# Patient Record
Sex: MALE | Race: BLACK OR AFRICAN AMERICAN | Employment: UNEMPLOYED | URBAN - METROPOLITAN AREA
[De-identification: names, ages, dates, MRNs, and addresses within clinical notes are randomized per-mention and may not be internally consistent; named-entity substitution may affect disease eponyms.]

---

## 2017-02-16 ENCOUNTER — HOSPITAL ENCOUNTER (EMERGENCY)
Age: 57
Discharge: HOME | End: 2017-02-16
Attending: EMERGENCY MEDICINE
Payer: MEDICARE

## 2017-02-16 VITALS
HEIGHT: 77 IN | WEIGHT: 271 LBS | HEART RATE: 79 BPM | SYSTOLIC BLOOD PRESSURE: 135 MMHG | TEMPERATURE: 98.9 F | DIASTOLIC BLOOD PRESSURE: 87 MMHG | BODY MASS INDEX: 32 KG/M2 | RESPIRATION RATE: 18 BRPM | OXYGEN SATURATION: 100 %
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
## 2017-02-16 DIAGNOSIS — M25.511 PAIN OF BOTH SHOULDER JOINTS: ICD-10-CM

## 2017-02-16 DIAGNOSIS — M25.512 PAIN OF BOTH SHOULDER JOINTS: ICD-10-CM

## 2017-02-16 PROCEDURE — 99283 EMERGENCY DEPT VISIT LOW MDM: CPT | Performed by: EMERGENCY MEDICINE

## 2017-02-16 RX ORDER — CYCLOBENZAPRINE HCL 5 MG
10 TABLET ORAL
Qty: 20 TAB | Refills: 0 | Status: SHIPPED | OUTPATIENT
Start: 2017-02-16 | End: 2017-03-21 | Stop reason: CLARIF
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
## 2017-02-17 ENCOUNTER — PATIENT OUTREACH (OUTPATIENT)
Dept: CASE MANAGEMENT | Age: 57
End: 2017-02-17
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
## 2017-03-10 ENCOUNTER — APPOINTMENT (OUTPATIENT)
Dept: GENERAL RADIOLOGY | Age: 57
End: 2017-03-10
Attending: EMERGENCY MEDICINE
Payer: MEDICARE

## 2017-03-10 ENCOUNTER — HOSPITAL ENCOUNTER (EMERGENCY)
Age: 57
Discharge: HOME | End: 2017-03-10
Attending: EMERGENCY MEDICINE
Payer: MEDICARE

## 2017-03-10 VITALS
TEMPERATURE: 98 F | WEIGHT: 269 LBS | HEIGHT: 77 IN | RESPIRATION RATE: 17 BRPM | DIASTOLIC BLOOD PRESSURE: 80 MMHG | OXYGEN SATURATION: 98 % | SYSTOLIC BLOOD PRESSURE: 135 MMHG | HEART RATE: 75 BPM | BODY MASS INDEX: 31.76 KG/M2

## 2017-03-10 DIAGNOSIS — S82.142A TIBIAL PLATEAU FRACTURE, LEFT, CLOSED, INITIAL ENCOUNTER: Primary | ICD-10-CM

## 2017-03-10 PROCEDURE — 74011250636 HC RX REV CODE- 250/636: Performed by: EMERGENCY MEDICINE

## 2017-03-10 PROCEDURE — 73562 X-RAY EXAM OF KNEE 3: CPT

## 2017-03-10 PROCEDURE — 96372 THER/PROPH/DIAG INJ SC/IM: CPT | Performed by: EMERGENCY MEDICINE

## 2017-03-10 PROCEDURE — 99284 EMERGENCY DEPT VISIT MOD MDM: CPT | Performed by: EMERGENCY MEDICINE

## 2017-03-10 PROCEDURE — 73590 X-RAY EXAM OF LOWER LEG: CPT

## 2017-03-10 PROCEDURE — 74011250637 HC RX REV CODE- 250/637: Performed by: EMERGENCY MEDICINE

## 2017-03-10 RX ORDER — MORPHINE SULFATE 4 MG/ML
4 INJECTION, SOLUTION INTRAMUSCULAR; INTRAVENOUS
Status: COMPLETED | OUTPATIENT
Start: 2017-03-10 | End: 2017-03-10

## 2017-03-10 RX ORDER — ONDANSETRON 4 MG/1
4 TABLET, ORALLY DISINTEGRATING ORAL
Status: COMPLETED | OUTPATIENT
Start: 2017-03-10 | End: 2017-03-10

## 2017-03-10 RX ORDER — TRAMADOL HYDROCHLORIDE 50 MG/1
50-100 TABLET ORAL
Qty: 23 TAB | Refills: 0 | Status: SHIPPED | OUTPATIENT
Start: 2017-03-10 | End: 2018-08-06

## 2017-03-10 RX ADMIN — MORPHINE SULFATE 4 MG: 4 INJECTION, SOLUTION INTRAMUSCULAR; INTRAVENOUS at 15:41

## 2017-03-10 RX ADMIN — ONDANSETRON 4 MG: 4 TABLET, ORALLY DISINTEGRATING ORAL at 15:39

## 2017-03-10 RX ADMIN — MORPHINE SULFATE 4 MG: 4 INJECTION, SOLUTION INTRAMUSCULAR; INTRAVENOUS at 17:15

## 2017-03-10 NOTE — DISCHARGE INSTRUCTIONS
Return with any fevers, worsening symptoms, or additional concerns. Keep your brace on at all times and use the crutches at all times. Please call Dr. Maykel Daniels office on Monday morning to arrange a follow-up appointment.
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
## 2017-03-12 ENCOUNTER — PATIENT OUTREACH (OUTPATIENT)
Dept: CASE MANAGEMENT | Age: 57
End: 2017-03-12

## 2017-03-12 NOTE — PROGRESS NOTES
Care coordinator attempted to outreach patient in regards to recent hospital discharge. Will attempt to outreach patient again. LVM. This note will not be viewable in [address]

## 2017-03-13 NOTE — PROGRESS NOTES
Transition of Care Discharge Follow-up Questionnaire   Date/Time of Call:   3/12/17 2:30p   What was the patient hospitalized for? Does the patient understand his/her diagnosis and/or treatment and what happened during the hospitalization? Patient verbalized understanding of treatment and diagnosis. Did the patient receive discharge instructions? Yes   Review any discharge instructions (see notes in [facility]). Ask patient if they understand these. Do they have any questions? He verbalized understanding of instructions. Were home services ordered (nursing, PT, OT, ST, etc.)? No   If so, has the first visit occurred? If not, why? (Assist with coordination of services if necessary.) n/a   Was any DME ordered? No   If so, has it been received? If not, why?  (Assist with coordination of arranging DME orders if necessary.) n/a   Complete a review of all medications (new, continued and discontinued meds per the D/C instructions and medication tab in [facility]). Care coordinator and patient review current medications. Were all new prescriptions filled? If not, why?  (Assist with obtainment of medications if necessary.) No. Patient states he has pancreatitis and is not taking any new medications. Does the patient understand the purpose and dosing instructions for all medications? (If patient has questions, provide explanation and education.) Yes   Does the patient have any problems in performing ADLs? (If patient is unable to perform ADLs  what is the limiting factor(s)? Do they have a support system that can assist? If no support system is present, discuss possible assistance that they may be able to obtain.) Patient is currently using crutches for assistance with mobility. Patient is independent with ADLs. Patient has family support. Does the patient have all follow-up appointments scheduled? Has transportation been arranged?   [facility] Pulmonary follow-up should be within 7 days of discharge; all others should have PCP follow-up within 7 days of discharge; follow-ups with other specialists as appropriate or ordered.) Patient will schedule f/u appointment. Any other questions or concerns expressed by the patient? No other questions or concerns were voiced by patient to care coordinator. He was thankful for call. GERALD Call Completed By: Angela Mcclellan LPN  [facility] [address]  Coordinator          This note will not be viewable in [address]

## 2017-03-17 PROBLEM — S82.143A FRACTURE, TIBIAL PLATEAU: Status: ACTIVE | Noted: 2017-03-17

## 2017-03-21 ENCOUNTER — ANESTHESIA EVENT (OUTPATIENT)
Dept: SURGERY | Age: 57
DRG: 492 | End: 2017-03-21
Payer: MEDICARE

## 2017-03-21 ENCOUNTER — HOSPITAL ENCOUNTER (OUTPATIENT)
Dept: CT IMAGING | Age: 57
Discharge: HOME | DRG: 492 | End: 2017-03-21
Attending: ORTHOPAEDIC SURGERY
Payer: MEDICARE

## 2017-03-21 DIAGNOSIS — S82.142A TIBIAL PLATEAU FRACTURE, LEFT, CLOSED, INITIAL ENCOUNTER: ICD-10-CM

## 2017-03-22 ENCOUNTER — ANESTHESIA (OUTPATIENT)
Dept: SURGERY | Age: 57
DRG: 492 | End: 2017-03-22
Payer: MEDICARE

## 2017-03-22 ENCOUNTER — HOSPITAL ENCOUNTER (INPATIENT)
Age: 57
LOS: 1 days | Discharge: HOME | DRG: 492 | End: 2017-03-23
Attending: ORTHOPAEDIC SURGERY | Admitting: ORTHOPAEDIC SURGERY
Payer: MEDICARE

## 2017-03-22 ENCOUNTER — APPOINTMENT (OUTPATIENT)
Dept: GENERAL RADIOLOGY | Age: 57
DRG: 492 | End: 2017-03-22
Attending: ORTHOPAEDIC SURGERY
Payer: MEDICARE

## 2017-03-22 ENCOUNTER — SURGERY (OUTPATIENT)
Age: 57
End: 2017-03-22

## 2017-03-22 DIAGNOSIS — S82.142A FRACTURE, TIBIAL PLATEAU, LEFT, CLOSED, INITIAL ENCOUNTER: Primary | ICD-10-CM

## 2017-03-22 LAB
ABO + RH BLD: NORMAL
BLOOD GROUP ANTIBODIES SERPL: NORMAL
HGB BLD-MCNC: 7.7 G/DL (ref 13.6–17.2)
POTASSIUM BLD-SCNC: 3.2 MMOL/L (ref 3.5–5.1)
SPECIMEN EXP DATE BLD: NORMAL

## 2017-03-22 PROCEDURE — 74011000250 HC RX REV CODE- 250

## 2017-03-22 PROCEDURE — 74011250636 HC RX REV CODE- 250/636: Performed by: NURSE PRACTITIONER

## 2017-03-22 PROCEDURE — C1713 ANCHOR/SCREW BN/BN,TIS/BN: HCPCS | Performed by: ORTHOPAEDIC SURGERY

## 2017-03-22 PROCEDURE — 77030002933 HC SUT MCRYL J&J -A: Performed by: ORTHOPAEDIC SURGERY

## 2017-03-22 PROCEDURE — 73590 X-RAY EXAM OF LOWER LEG: CPT

## 2017-03-22 PROCEDURE — 74011250636 HC RX REV CODE- 250/636

## 2017-03-22 PROCEDURE — 77030008703 HC TU ET UNCUF COVD -A: Performed by: ANESTHESIOLOGY

## 2017-03-22 PROCEDURE — 77030003862 HC BIT DRL SYNT -B: Performed by: ORTHOPAEDIC SURGERY

## 2017-03-22 PROCEDURE — L1830 KO IMMOB CANVAS LONG PRE OTS: HCPCS | Performed by: ORTHOPAEDIC SURGERY

## 2017-03-22 PROCEDURE — 74011000250 HC RX REV CODE- 250: Performed by: ANESTHESIOLOGY

## 2017-03-22 PROCEDURE — 77030021122 HC SPLNT MAT FST BSNM -A: Performed by: ORTHOPAEDIC SURGERY

## 2017-03-22 PROCEDURE — 77030002620 HC PLT TIB SYNT -H: Performed by: ORTHOPAEDIC SURGERY

## 2017-03-22 PROCEDURE — 77030020782 HC GWN BAIR PAWS FLX 3M -B: Performed by: ANESTHESIOLOGY

## 2017-03-22 PROCEDURE — 74011250636 HC RX REV CODE- 250/636: Performed by: ORTHOPAEDIC SURGERY

## 2017-03-22 PROCEDURE — 77030029637: Performed by: ORTHOPAEDIC SURGERY

## 2017-03-22 PROCEDURE — 77030027138 HC INCENT SPIROMETER -A

## 2017-03-22 PROCEDURE — 74011250637 HC RX REV CODE- 250/637: Performed by: ORTHOPAEDIC SURGERY

## 2017-03-22 PROCEDURE — 74011250637 HC RX REV CODE- 250/637: Performed by: NURSE PRACTITIONER

## 2017-03-22 PROCEDURE — 74011250637 HC RX REV CODE- 250/637: Performed by: ANESTHESIOLOGY

## 2017-03-22 PROCEDURE — 0QHH05Z INSERTION OF EXTERNAL FIXATION DEVICE INTO LEFT TIBIA, OPEN APPROACH: ICD-10-PCS | Performed by: ORTHOPAEDIC SURGERY

## 2017-03-22 PROCEDURE — 77030025102 HC BIT DRL QC SS 2 SYNT -B: Performed by: ORTHOPAEDIC SURGERY

## 2017-03-22 PROCEDURE — 77030016474 HC BIT DRL QC3 SYNT -C: Performed by: ORTHOPAEDIC SURGERY

## 2017-03-22 PROCEDURE — 76210000000 HC OR PH I REC 2 TO 2.5 HR: Performed by: ORTHOPAEDIC SURGERY

## 2017-03-22 PROCEDURE — 77030011640 HC PAD GRND REM COVD -A: Performed by: ORTHOPAEDIC SURGERY

## 2017-03-22 PROCEDURE — 85018 HEMOGLOBIN: CPT | Performed by: ANESTHESIOLOGY

## 2017-03-22 PROCEDURE — 84132 ASSAY OF SERUM POTASSIUM: CPT

## 2017-03-22 PROCEDURE — 76060000035 HC ANESTHESIA 2 TO 2.5 HR: Performed by: ORTHOPAEDIC SURGERY

## 2017-03-22 PROCEDURE — 86900 BLOOD TYPING SEROLOGIC ABO: CPT | Performed by: ANESTHESIOLOGY

## 2017-03-22 PROCEDURE — 77030019908 HC STETH ESOPH SIMS -A: Performed by: ANESTHESIOLOGY

## 2017-03-22 PROCEDURE — 77030020753 HC CUF TRNQT 1BLA STRY -B: Performed by: ORTHOPAEDIC SURGERY

## 2017-03-22 PROCEDURE — 74011250636 HC RX REV CODE- 250/636: Performed by: ANESTHESIOLOGY

## 2017-03-22 PROCEDURE — 76010000171 HC OR TIME 2 TO 2.5 HR INTENSV-TIER 1: Performed by: ORTHOPAEDIC SURGERY

## 2017-03-22 PROCEDURE — 99218 HC RM OBSERVATION: CPT

## 2017-03-22 PROCEDURE — 0QSH04Z REPOSITION LEFT TIBIA WITH INTERNAL FIXATION DEVICE, OPEN APPROACH: ICD-10-PCS | Performed by: ORTHOPAEDIC SURGERY

## 2017-03-22 PROCEDURE — 74011000258 HC RX REV CODE- 258: Performed by: NURSE PRACTITIONER

## 2017-03-22 PROCEDURE — 77030018836 HC SOL IRR NACL ICUM -A: Performed by: ORTHOPAEDIC SURGERY

## 2017-03-22 PROCEDURE — 77030003843: Performed by: ORTHOPAEDIC SURGERY

## 2017-03-22 PROCEDURE — 77030008477 HC STYL SATN SLP COVD -A: Performed by: ANESTHESIOLOGY

## 2017-03-22 PROCEDURE — 0QPH05Z REMOVAL OF EXTERNAL FIXATION DEVICE FROM LEFT TIBIA, OPEN APPROACH: ICD-10-PCS | Performed by: ORTHOPAEDIC SURGERY

## 2017-03-22 DEVICE — SCREW BNE L65MM DIA3.5MM CORT S STL ST LOK FULL THRD: Type: IMPLANTABLE DEVICE | Site: TIBIA | Status: FUNCTIONAL

## 2017-03-22 DEVICE — SCREW BNE L32MM DIA3.5MM CORT S STL ST LOK FULL THRD: Type: IMPLANTABLE DEVICE | Site: TIBIA | Status: FUNCTIONAL

## 2017-03-22 DEVICE — IMPLANTABLE DEVICE: Type: IMPLANTABLE DEVICE | Site: LEG | Status: FUNCTIONAL

## 2017-03-22 DEVICE — SCREW BNE L40MM DIA3.5MM CORT S STL ST LOK FULL THRD: Type: IMPLANTABLE DEVICE | Site: TIBIA | Status: FUNCTIONAL

## 2017-03-22 DEVICE — SCREW BNE L34MM DIA3.5MM CORT S STL ST LOK FULL THRD: Type: IMPLANTABLE DEVICE | Site: TIBIA | Status: FUNCTIONAL

## 2017-03-22 DEVICE — IMPLANTABLE DEVICE: Type: IMPLANTABLE DEVICE | Site: TIBIA | Status: FUNCTIONAL

## 2017-03-22 DEVICE — SCREW FIX L200MM DIA5MM S STL BLNT TRCR PNT MR CONDITIONAL: Type: IMPLANTABLE DEVICE | Site: TIBIA | Status: FUNCTIONAL

## 2017-03-22 DEVICE — SCREW BNE L85MM DIA3.5MM CORT S STL ST LOK FULL THRD T15: Type: IMPLANTABLE DEVICE | Site: TIBIA | Status: FUNCTIONAL

## 2017-03-22 DEVICE — ROTATOR CUFF QUICKANCHOR PLUS SIZE 2 (5 METRIC) PANACRYL BRAIDED ABSORBABLE SUTURE 36 INCHES (91CM), WITH CP-2 NEEDLES, WITH DISPOSABLE INSERTER
Type: IMPLANTABLE DEVICE | Site: TIBIA | Status: FUNCTIONAL
Brand: QUICKANCHOR PANACRYL

## 2017-03-22 RX ORDER — HYDROMORPHONE HYDROCHLORIDE 1 MG/ML
1.5 INJECTION, SOLUTION INTRAMUSCULAR; INTRAVENOUS; SUBCUTANEOUS
Status: DISCONTINUED | OUTPATIENT
Start: 2017-03-22 | End: 2017-03-23

## 2017-03-22 RX ORDER — HYDROMORPHONE HYDROCHLORIDE 1 MG/ML
1 INJECTION, SOLUTION INTRAMUSCULAR; INTRAVENOUS; SUBCUTANEOUS
Status: DISCONTINUED | OUTPATIENT
Start: 2017-03-22 | End: 2017-03-23

## 2017-03-22 RX ORDER — CINACALCET 30 MG/1
30 TABLET, FILM COATED ORAL DAILY
Status: DISCONTINUED | OUTPATIENT
Start: 2017-03-23 | End: 2017-03-23 | Stop reason: HOSPADM

## 2017-03-22 RX ORDER — ROCURONIUM BROMIDE 10 MG/ML
INJECTION, SOLUTION INTRAVENOUS AS NEEDED
Status: DISCONTINUED | OUTPATIENT
Start: 2017-03-22 | End: 2017-03-22 | Stop reason: HOSPADM

## 2017-03-22 RX ORDER — HYDROMORPHONE HYDROCHLORIDE 1 MG/ML
2 INJECTION, SOLUTION INTRAMUSCULAR; INTRAVENOUS; SUBCUTANEOUS
Status: DISCONTINUED | OUTPATIENT
Start: 2017-03-22 | End: 2017-03-23

## 2017-03-22 RX ORDER — SODIUM CHLORIDE 9 MG/ML
25 INJECTION, SOLUTION INTRAVENOUS CONTINUOUS
Status: DISCONTINUED | OUTPATIENT
Start: 2017-03-22 | End: 2017-03-22 | Stop reason: HOSPADM

## 2017-03-22 RX ORDER — SODIUM CHLORIDE, SODIUM LACTATE, POTASSIUM CHLORIDE, CALCIUM CHLORIDE 600; 310; 30; 20 MG/100ML; MG/100ML; MG/100ML; MG/100ML
75 INJECTION, SOLUTION INTRAVENOUS CONTINUOUS
Status: DISCONTINUED | OUTPATIENT
Start: 2017-03-22 | End: 2017-03-23 | Stop reason: HOSPADM

## 2017-03-22 RX ORDER — FENTANYL CITRATE 50 UG/ML
100 INJECTION, SOLUTION INTRAMUSCULAR; INTRAVENOUS ONCE
Status: DISCONTINUED | OUTPATIENT
Start: 2017-03-22 | End: 2017-03-22 | Stop reason: HOSPADM

## 2017-03-22 RX ORDER — DIPHENHYDRAMINE HYDROCHLORIDE 50 MG/ML
12.5 INJECTION, SOLUTION INTRAMUSCULAR; INTRAVENOUS
Status: DISCONTINUED | OUTPATIENT
Start: 2017-03-22 | End: 2017-03-22 | Stop reason: HOSPADM

## 2017-03-22 RX ORDER — FAMOTIDINE 20 MG/1
20 TABLET, FILM COATED ORAL DAILY
Status: DISCONTINUED | OUTPATIENT
Start: 2017-03-23 | End: 2017-03-23 | Stop reason: HOSPADM

## 2017-03-22 RX ORDER — HYDROMORPHONE HYDROCHLORIDE 2 MG/ML
0.5 INJECTION, SOLUTION INTRAMUSCULAR; INTRAVENOUS; SUBCUTANEOUS
Status: COMPLETED | OUTPATIENT
Start: 2017-03-22 | End: 2017-03-22

## 2017-03-22 RX ORDER — PROPOFOL 10 MG/ML
INJECTION, EMULSION INTRAVENOUS AS NEEDED
Status: DISCONTINUED | OUTPATIENT
Start: 2017-03-22 | End: 2017-03-22 | Stop reason: HOSPADM

## 2017-03-22 RX ORDER — OXYCODONE HYDROCHLORIDE 5 MG/1
10 TABLET ORAL
Status: DISCONTINUED | OUTPATIENT
Start: 2017-03-22 | End: 2017-03-23 | Stop reason: HOSPADM

## 2017-03-22 RX ORDER — ASPIRIN 81 MG/1
81 TABLET ORAL
Status: DISCONTINUED | OUTPATIENT
Start: 2017-03-23 | End: 2017-03-23 | Stop reason: HOSPADM

## 2017-03-22 RX ORDER — SODIUM CHLORIDE, SODIUM LACTATE, POTASSIUM CHLORIDE, CALCIUM CHLORIDE 600; 310; 30; 20 MG/100ML; MG/100ML; MG/100ML; MG/100ML
75 INJECTION, SOLUTION INTRAVENOUS CONTINUOUS
Status: DISCONTINUED | OUTPATIENT
Start: 2017-03-22 | End: 2017-03-22 | Stop reason: HOSPADM

## 2017-03-22 RX ORDER — CLONIDINE HYDROCHLORIDE 0.2 MG/1
0.2 TABLET ORAL 3 TIMES DAILY
Status: DISCONTINUED | OUTPATIENT
Start: 2017-03-22 | End: 2017-03-23 | Stop reason: HOSPADM

## 2017-03-22 RX ORDER — CALCITRIOL 0.25 UG/1
0.25 CAPSULE ORAL
Status: DISCONTINUED | OUTPATIENT
Start: 2017-03-22 | End: 2017-03-22

## 2017-03-22 RX ORDER — MIDAZOLAM HYDROCHLORIDE 1 MG/ML
2 INJECTION, SOLUTION INTRAMUSCULAR; INTRAVENOUS
Status: DISCONTINUED | OUTPATIENT
Start: 2017-03-22 | End: 2017-03-22 | Stop reason: HOSPADM

## 2017-03-22 RX ORDER — HYDROMORPHONE HYDROCHLORIDE 1 MG/ML
1-2 INJECTION, SOLUTION INTRAMUSCULAR; INTRAVENOUS; SUBCUTANEOUS
Status: DISCONTINUED | OUTPATIENT
Start: 2017-03-22 | End: 2017-03-22 | Stop reason: SDUPTHER

## 2017-03-22 RX ORDER — MIDAZOLAM HYDROCHLORIDE 1 MG/ML
2 INJECTION, SOLUTION INTRAMUSCULAR; INTRAVENOUS ONCE
Status: DISCONTINUED | OUTPATIENT
Start: 2017-03-22 | End: 2017-03-22 | Stop reason: HOSPADM

## 2017-03-22 RX ORDER — FLUMAZENIL 0.1 MG/ML
0.2 INJECTION INTRAVENOUS AS NEEDED
Status: DISCONTINUED | OUTPATIENT
Start: 2017-03-22 | End: 2017-03-22 | Stop reason: HOSPADM

## 2017-03-22 RX ORDER — LIDOCAINE HYDROCHLORIDE 20 MG/ML
INJECTION, SOLUTION EPIDURAL; INFILTRATION; INTRACAUDAL; PERINEURAL AS NEEDED
Status: DISCONTINUED | OUTPATIENT
Start: 2017-03-22 | End: 2017-03-22 | Stop reason: HOSPADM

## 2017-03-22 RX ORDER — OXYCODONE HYDROCHLORIDE 5 MG/1
10 TABLET ORAL
Status: DISCONTINUED | OUTPATIENT
Start: 2017-03-22 | End: 2017-03-22 | Stop reason: HOSPADM

## 2017-03-22 RX ORDER — OXYCODONE HYDROCHLORIDE 5 MG/1
5 TABLET ORAL
Status: DISCONTINUED | OUTPATIENT
Start: 2017-03-22 | End: 2017-03-22 | Stop reason: HOSPADM

## 2017-03-22 RX ORDER — NIFEDIPINE 30 MG/1
90 TABLET, EXTENDED RELEASE ORAL DAILY
Status: DISCONTINUED | OUTPATIENT
Start: 2017-03-23 | End: 2017-03-23 | Stop reason: HOSPADM

## 2017-03-22 RX ORDER — ACETAMINOPHEN 325 MG/1
650 TABLET ORAL EVERY 8 HOURS
Status: DISCONTINUED | OUTPATIENT
Start: 2017-03-22 | End: 2017-03-23 | Stop reason: HOSPADM

## 2017-03-22 RX ORDER — OXYCODONE HYDROCHLORIDE 5 MG/1
5 TABLET ORAL
Status: DISCONTINUED | OUTPATIENT
Start: 2017-03-22 | End: 2017-03-23 | Stop reason: HOSPADM

## 2017-03-22 RX ORDER — NALOXONE HYDROCHLORIDE 0.4 MG/ML
0.1 INJECTION, SOLUTION INTRAMUSCULAR; INTRAVENOUS; SUBCUTANEOUS
Status: DISCONTINUED | OUTPATIENT
Start: 2017-03-22 | End: 2017-03-22 | Stop reason: HOSPADM

## 2017-03-22 RX ORDER — TRAMADOL HYDROCHLORIDE 50 MG/1
100 TABLET ORAL
Status: DISCONTINUED | OUTPATIENT
Start: 2017-03-22 | End: 2017-03-23 | Stop reason: HOSPADM

## 2017-03-22 RX ORDER — SODIUM CHLORIDE 0.9 % (FLUSH) 0.9 %
5-10 SYRINGE (ML) INJECTION EVERY 8 HOURS
Status: DISCONTINUED | OUTPATIENT
Start: 2017-03-22 | End: 2017-03-23 | Stop reason: HOSPADM

## 2017-03-22 RX ORDER — HYDROMORPHONE HYDROCHLORIDE 2 MG/ML
INJECTION, SOLUTION INTRAMUSCULAR; INTRAVENOUS; SUBCUTANEOUS AS NEEDED
Status: DISCONTINUED | OUTPATIENT
Start: 2017-03-22 | End: 2017-03-22 | Stop reason: HOSPADM

## 2017-03-22 RX ORDER — TRAMADOL HYDROCHLORIDE 50 MG/1
50 TABLET ORAL
Status: DISCONTINUED | OUTPATIENT
Start: 2017-03-22 | End: 2017-03-23 | Stop reason: HOSPADM

## 2017-03-22 RX ORDER — LISINOPRIL 20 MG/1
40 TABLET ORAL DAILY
Status: DISCONTINUED | OUTPATIENT
Start: 2017-03-23 | End: 2017-03-23 | Stop reason: HOSPADM

## 2017-03-22 RX ORDER — LIDOCAINE HYDROCHLORIDE 10 MG/ML
0.1 INJECTION INFILTRATION; PERINEURAL AS NEEDED
Status: DISCONTINUED | OUTPATIENT
Start: 2017-03-22 | End: 2017-03-22 | Stop reason: HOSPADM

## 2017-03-22 RX ORDER — OXYCODONE HYDROCHLORIDE 5 MG/1
5-10 TABLET ORAL
Status: DISCONTINUED | OUTPATIENT
Start: 2017-03-22 | End: 2017-03-22 | Stop reason: SDUPTHER

## 2017-03-22 RX ORDER — CEFAZOLIN SODIUM IN 0.9 % NACL 2 G/50 ML
2 INTRAVENOUS SOLUTION, PIGGYBACK (ML) INTRAVENOUS
Status: COMPLETED | OUTPATIENT
Start: 2017-03-22 | End: 2017-03-22

## 2017-03-22 RX ORDER — ONDANSETRON 2 MG/ML
4 INJECTION INTRAMUSCULAR; INTRAVENOUS
Status: DISCONTINUED | OUTPATIENT
Start: 2017-03-22 | End: 2017-03-23 | Stop reason: HOSPADM

## 2017-03-22 RX ORDER — SODIUM CHLORIDE 0.9 % (FLUSH) 0.9 %
5-10 SYRINGE (ML) INJECTION AS NEEDED
Status: DISCONTINUED | OUTPATIENT
Start: 2017-03-22 | End: 2017-03-23 | Stop reason: HOSPADM

## 2017-03-22 RX ORDER — MAG HYDROX/ALUMINUM HYD/SIMETH 200-200-20
30 SUSPENSION, ORAL (FINAL DOSE FORM) ORAL
Status: DISCONTINUED | OUTPATIENT
Start: 2017-03-22 | End: 2017-03-23 | Stop reason: HOSPADM

## 2017-03-22 RX ORDER — TRAMADOL HYDROCHLORIDE 50 MG/1
50-100 TABLET ORAL
Status: DISCONTINUED | OUTPATIENT
Start: 2017-03-22 | End: 2017-03-22 | Stop reason: SDUPTHER

## 2017-03-22 RX ORDER — FENTANYL CITRATE 50 UG/ML
INJECTION, SOLUTION INTRAMUSCULAR; INTRAVENOUS AS NEEDED
Status: DISCONTINUED | OUTPATIENT
Start: 2017-03-22 | End: 2017-03-22 | Stop reason: HOSPADM

## 2017-03-22 RX ADMIN — FENTANYL CITRATE 25 MCG: 50 INJECTION, SOLUTION INTRAMUSCULAR; INTRAVENOUS at 17:28

## 2017-03-22 RX ADMIN — FENTANYL CITRATE 100 MCG: 50 INJECTION, SOLUTION INTRAMUSCULAR; INTRAVENOUS at 15:39

## 2017-03-22 RX ADMIN — HYDROMORPHONE HYDROCHLORIDE 0.5 MG: 2 INJECTION, SOLUTION INTRAMUSCULAR; INTRAVENOUS; SUBCUTANEOUS at 17:55

## 2017-03-22 RX ADMIN — CEFAZOLIN 2 G: 1 INJECTION, POWDER, FOR SOLUTION INTRAMUSCULAR; INTRAVENOUS; PARENTERAL at 15:49

## 2017-03-22 RX ADMIN — Medication 10 ML: at 21:45

## 2017-03-22 RX ADMIN — OXYCODONE HYDROCHLORIDE 10 MG: 5 TABLET ORAL at 23:45

## 2017-03-22 RX ADMIN — CLONIDINE HYDROCHLORIDE 0.2 MG: 0.2 TABLET ORAL at 21:43

## 2017-03-22 RX ADMIN — HYDROMORPHONE HYDROCHLORIDE 0.5 MG: 2 INJECTION, SOLUTION INTRAMUSCULAR; INTRAVENOUS; SUBCUTANEOUS at 18:15

## 2017-03-22 RX ADMIN — SODIUM CHLORIDE 25 ML/HR: 900 INJECTION, SOLUTION INTRAVENOUS at 12:29

## 2017-03-22 RX ADMIN — PROPOFOL 200 MG: 10 INJECTION, EMULSION INTRAVENOUS at 15:39

## 2017-03-22 RX ADMIN — PROMETHAZINE HYDROCHLORIDE 6.25 MG: 25 INJECTION, SOLUTION INTRAMUSCULAR; INTRAVENOUS at 17:50

## 2017-03-22 RX ADMIN — HYDROMORPHONE HYDROCHLORIDE 1 MG: 2 INJECTION, SOLUTION INTRAMUSCULAR; INTRAVENOUS; SUBCUTANEOUS at 17:22

## 2017-03-22 RX ADMIN — ACETAMINOPHEN 650 MG: 325 TABLET, FILM COATED ORAL at 21:42

## 2017-03-22 RX ADMIN — HYDROMORPHONE HYDROCHLORIDE 0.5 MG: 2 INJECTION, SOLUTION INTRAMUSCULAR; INTRAVENOUS; SUBCUTANEOUS at 18:00

## 2017-03-22 RX ADMIN — HYDROMORPHONE HYDROCHLORIDE 0.5 MG: 2 INJECTION, SOLUTION INTRAMUSCULAR; INTRAVENOUS; SUBCUTANEOUS at 17:13

## 2017-03-22 RX ADMIN — HYDROMORPHONE HYDROCHLORIDE 0.5 MG: 2 INJECTION, SOLUTION INTRAMUSCULAR; INTRAVENOUS; SUBCUTANEOUS at 17:18

## 2017-03-22 RX ADMIN — SODIUM CHLORIDE, SODIUM LACTATE, POTASSIUM CHLORIDE, AND CALCIUM CHLORIDE 75 ML/HR: 600; 310; 30; 20 INJECTION, SOLUTION INTRAVENOUS at 21:44

## 2017-03-22 RX ADMIN — FENTANYL CITRATE 25 MCG: 50 INJECTION, SOLUTION INTRAMUSCULAR; INTRAVENOUS at 16:54

## 2017-03-22 RX ADMIN — ROCURONIUM BROMIDE 40 MG: 10 INJECTION, SOLUTION INTRAVENOUS at 15:39

## 2017-03-22 RX ADMIN — CEFAZOLIN SODIUM 1 G: 1 INJECTION, POWDER, FOR SOLUTION INTRAMUSCULAR; INTRAVENOUS at 23:45

## 2017-03-22 RX ADMIN — FENTANYL CITRATE 25 MCG: 50 INJECTION, SOLUTION INTRAMUSCULAR; INTRAVENOUS at 17:01

## 2017-03-22 RX ADMIN — FENTANYL CITRATE 25 MCG: 50 INJECTION, SOLUTION INTRAMUSCULAR; INTRAVENOUS at 17:04

## 2017-03-22 RX ADMIN — HYDROMORPHONE HYDROCHLORIDE 0.5 MG: 2 INJECTION, SOLUTION INTRAMUSCULAR; INTRAVENOUS; SUBCUTANEOUS at 17:50

## 2017-03-22 RX ADMIN — FENTANYL CITRATE 25 MCG: 50 INJECTION, SOLUTION INTRAMUSCULAR; INTRAVENOUS at 17:35

## 2017-03-22 RX ADMIN — FENTANYL CITRATE 50 MCG: 50 INJECTION, SOLUTION INTRAMUSCULAR; INTRAVENOUS at 17:39

## 2017-03-22 RX ADMIN — FENTANYL CITRATE 25 MCG: 50 INJECTION, SOLUTION INTRAMUSCULAR; INTRAVENOUS at 16:57

## 2017-03-22 RX ADMIN — LIDOCAINE HYDROCHLORIDE 100 MG: 20 INJECTION, SOLUTION EPIDURAL; INFILTRATION; INTRACAUDAL; PERINEURAL at 15:39

## 2017-03-22 RX ADMIN — OXYCODONE HYDROCHLORIDE 10 MG: 5 TABLET ORAL at 18:00

## 2017-03-22 RX ADMIN — SODIUM CHLORIDE: 900 INJECTION, SOLUTION INTRAVENOUS at 17:03

## 2017-03-22 RX ADMIN — HYDROMORPHONE HYDROCHLORIDE 1 MG: 1 INJECTION, SOLUTION INTRAMUSCULAR; INTRAVENOUS; SUBCUTANEOUS at 21:42

## 2017-03-22 NOTE — BRIEF OP NOTE
BRIEF OPERATIVE NOTE    Date of Procedure: 3/22/2017   Preoperative Diagnosis: Fracture of left tibial plateau, closed, initial encounter [K39.799A]  Postoperative Diagnosis: Left lateral tibial plateau fracture    Procedure(s):  LEFT LATERAL TIBIAL PLATEAU OPEN REDUCTION INTERNAL FIXATION  APPLICATION AND REMOVAL UIPLANAR EX FIX LEFT LEG  Surgeon(s) and Role:     * Chelo Pineda MD - Primary            Surgical Staff:  Circ-1: Martinez Moralez RN  Radiology Technician: Trace Mi RT, R  Scrub Tech-1: Wagner Strauss  Scrub Tech-2: Belle Cosme  Scrub Tech-3: Dayne Randall  Event Time In   Incision Start 1605   Incision Close 1732     Anesthesia: Regional   Estimated Blood Loss: TOURNIQUET  Specimens: * No specimens in log *   Findings: NONE   Complications: NONE  Implants:   Implant Name Type Inv.  Item Serial No.  Lot No. LRB No. Used Action   PUTTY NORIAN DRL FAST ST 5ML --  - [id number]  PUTTY NORIAN DRL FAST ST 5ML --   [employer] [place] [id number] Left 1 Implanted   SCR BNE SCHNZ BLNT [id number] SS --  - [id number]  SCR BNE SCHNZ BLNT [id number] SS --   [employer] [place] [id number] Left 1 Implanted   SCR BNE LCK ST T25 3.5X32MM SS --  - [id number]  SCR BNE LCK ST T25 3.5X32MM SS --   [employer] [place] [id number] Left 2 Implanted   SCR BNE LCK ST T25 3.5X34MM SS --  - [id number]  SCR BNE LCK ST T25 3.5X34MM SS --   [employer] [place] [id number] Left 1 Implanted   SCR BNE LCK ST T25 3.5X40MM SS --  - [id number]  SCR BNE LCK ST T25 3.5X40MM SS --   [employer] [place] [id number] Left 1 Implanted   SCR BNE LCK ST T25 3.5X65MM SS --  - [id number]  SCR BNE LCK ST T25 3.5X65MM SS --   [employer] [place] [id number] Left 1 Implanted   SCR BNE LCK ST T25 3.5X85MM SS --  - [id number]  SCR BNE LCK ST T25 3.5X85MM SS --   [employer] [place] [id number] Left 2 Implanted   SCR BNE LCK ST T25 3.5X95MM SS --  - [id number]  SCR BNE LCK ST T25 3.5X95MM SS --   [employer] [place] [id number] Left 1 Implanted   PLATE TIB PROX 62R 9.1T263 LT -- [facility] - [id number]  PLATE TIB PROX 10H 3.5X159 LT -- [id number] [facility] [id number] Left 1 Implanted

## 2017-03-22 NOTE — ANESTHESIA PREPROCEDURE EVALUATION
Anesthetic History   No history of anesthetic complications            Review of Systems / Medical History  Patient summary reviewed and pertinent labs reviewed    Pulmonary        Sleep apnea: No treatment           Neuro/Psych   Within defined limits           Cardiovascular    Hypertension: poorly controlled            Pertinent negatives: No angina       GI/Hepatic/Renal     GERD    Renal disease: CRI and ESRD       Endo/Other        Obesity and anemia (Hgb 7.7 - pt reports this is chronic)     Other Findings            Physical Exam    Airway  Mallampati: III  TM Distance: > 6 cm  Neck ROM: normal range of motion   Mouth opening: Normal     Cardiovascular    Rhythm: regular  Rate: normal         Dental    Dentition: Poor dentition     Pulmonary  Breath sounds clear to auscultation               Abdominal         Other Findings            Anesthetic Plan    ASA: 3  Anesthesia type: general          Induction: Intravenous  Anesthetic plan and risks discussed with: Patient      T&S. Discussed possibility of blood transfusion.

## 2017-03-22 NOTE — H&P
Outpatient Surgery History and Physical      [facility] was seen and examined. Chief Complaint:   LEFT KNEE PAIN    Physical Exam:   There were no vitals taken for this visit. Heart:   Regular rhythm      Lungs:  Are clear      History:  Past Medical History:   Diagnosis Date    Anxiety     CAD (coronary artery disease)     per pt- no MI- \"They said the back of my heart was something. ..like 40%\"    Chronic kidney disease     stage 4; not on dialysis; pt has AV fistula to left arm; staRTED PERITONEAL DIALYSIS 2014    GERD (gastroesophageal reflux disease)     takes OTC med prn    Hypertension     controlled with med    Hypertensive nephrosclerosis     per nephrology note 4/14/14    PUD (peptic ulcer disease)     [facility] 1/92[facility]7733    Umbilical hernia     Unspecified sleep apnea     pt does not have a CPAP      Past Surgical History:   Procedure Laterality Date    ABDOMEN SURGERY PROC UNLISTED  6/11/14    PD catheter placement    HX AMPUTATION Right 1995    5th finger    HX ORTHOPAEDIC Left 1989    knee    HX ORTHOPAEDIC Left as a child    thumb    HX VASCULAR ACCESS Left 2012    AV fistula to arm     Family History   Problem Relation Age of Onset    Diabetes Mother     Hypertension Mother     Heart Disease Father     COPD Father     Hypertension Father       Social History     Occupational History    Not on file. Social History Main Topics    Smoking status: Former Smoker     Packs/day: 0.50     Years: 23.00     Quit date: 3/1/2014    Smokeless tobacco: Never Used      Comment: pt states only smoked cigarettes when drinking alcohol    Alcohol use No      Comment: on occasion    Drug use: Yes     Special: Marijuana      Comment: daily    Sexual activity: Not on file       Allergies: Reviewed per EMR  No Known Allergies    Medications:    Prior to Admission medications    Medication Sig Start Date End Date Taking?  Authorizing Provider raNITIdine (ZANTAC) 150 mg tablet Take 150 mg by mouth every morning. Historical Provider   HYDROcodone-acetaminophen (NORCO)  mg tablet Take 1 Tab by mouth every six (6) hours as needed for Pain. Historical Provider   traMADol (ULTRAM) 50 mg tablet Take 1-2 Tabs by mouth every eight (8) hours as needed for Pain. Max Daily Amount: 300 mg. 3/10/17   Nida Russell MD   calcitRIOL (ROCALTROL) 0.25 mcg capsule Take 0.25 mcg by mouth every seven (7) days. 5 capsules weekly    Historical Provider   lisinopril (PRINIVIL, ZESTRIL) 10 mg tablet Take 40 mg by mouth daily. Historical Provider   cinacalcet (SENSIPAR) 30 mg tablet Take 30 mg by mouth daily. Historical Provider   multivitamins-minerals-lutein (CENTRUM SILVER) tab tablet Take 1 Tab by mouth daily. Historical Provider   NIFEdipine ER (PROCARDIA XL) 90 mg ER tablet Take 90 mg by mouth daily. Instructed to take DOS per Anesthesia guidelines. Indications: HYPERTENSION    Historical Provider   cloNIDine (CATAPRES) 0.2 mg tablet Take 0.2 mg by mouth three (3) times daily. Instructed to take DOS per Anesthesia guidelines. Indications: HYPERTENSION    Historical Provider   aspirin delayed-release 81 mg tablet Take 81 mg by mouth daily (with lunch). Historical Provider        The surgery is planned for OPEN REDUCTION INTERNAL FIXATION OF LEFT TIBIAL PLATEAU          The patient is here today for outpatient surgery. I have examined the patient, no changes are noted in the patient's medical status. Necessity for the procedure/care is still present and the history and physical above is current.       Signed By: Rosa Lacey NP     March 21, 2017 8:25 PM

## 2017-03-22 NOTE — IP AVS SNAPSHOT
Jaison alessia 
 
 
 [address] [address] [address] [facility] 
[phone] Patient: [facility] MRN: [id number] :1960 You are allergic to the following No active allergies Recent Documentation Height Weight BMI Smoking Status 1.956 m 122 kg 31.9 kg/m2 Former Smoker Emergency Contacts Name Discharge Info Relation Home Work Mobile Catherine Melton  Other Relative [6] [phone] About your hospitalization You were admitted on:  2017 You last received care in the:  [facility] 2 SURGICAL You were discharged on:  2017 Unit phone number:  [phone] Why you were hospitalized Your primary diagnosis was:  Not on File Providers Seen During Your Hospitalizations Provider Role Specialty Primary office phone Cyril Stark MD Attending Provider Orthopedic Surgery [phone] Your Primary Care Physician (PCP) Primary Care Physician Office Phone Office Fax Arandres Ybarraena [phone] Follow-up Information Follow up With Details Comments Contact Info Cyril Stark MD On 3/31/2017 10:20 [address] [employer] [address] [facility] [address] Adriane Toro MD   [address] [place] [id number] [phone] Your Appointments   3:20 PM EDT Follow Up with Adriane Toro MD  
[address] [facility] ([address] [facility]) [address] [place] [id number]  
[phone] Current Discharge Medication List  
  
CONTINUE these medications which have NOT CHANGED Dose & Instructions Dispensing Information Comments Morning Noon Evening Bedtime  
 aspirin delayed-release 81 mg tablet Dose:  81 mg Take 81 mg by mouth daily (with lunch). Refills:  0  
     
   
  
   
   
  
 calcitRIOL 0.25 mcg capsule Commonly known as:  ROCALTROL Notes to Patient:  As needed and directed Dose:  0.25 mcg Take 0.25 mcg by mouth every seven (7) days. 5 capsules weekly Refills:  0 CENTRUM SILVER Tab tablet Generic drug:  multivitamins-minerals-lutein Dose:  1 Tab Take 1 Tab by mouth daily. Refills:  0  
     
  
   
   
   
  
 cloNIDine HCl 0.2 mg tablet Commonly known as:  CATAPRES Dose:  0.2 mg Take 0.2 mg by mouth three (3) times daily. Instructed to take DOS per Anesthesia guidelines. Indications: HYPERTENSION Refills:  0  
     
  
   
  
   
  
   
  
 lisinopril 10 mg tablet Commonly known as:  Jesús Melodie Dose:  40 mg Take 40 mg by mouth daily. Refills:  0 NORCO  mg tablet Generic drug:  HYDROcodone-acetaminophen Notes to Patient:  As needed and directed Dose:  1 Tab Take 1 Tab by mouth every six (6) hours as needed for Pain. Refills:  0 PROCARDIA XL 90 mg ER tablet Generic drug:  NIFEdipine ER Dose:  90 mg Take 90 mg by mouth daily. Instructed to take DOS per Anesthesia guidelines. Indications: HYPERTENSION Refills:  0 SENSIPAR 30 mg tablet Generic drug:  cinacalcet Dose:  30 mg Take 30 mg by mouth daily. Refills:  0  
     
  
   
   
   
  
 traMADol 50 mg tablet Commonly known as:  ULTRAM  
Notes to Patient:  As needed and directed Dose:   mg Take 1-2 Tabs by mouth every eight (8) hours as needed for Pain. Max Daily Amount: 300 mg. Quantity:  23 Tab Refills:  0  
     
   
   
   
  
 ZANTAC 150 mg tablet Generic drug:  raNITIdine Dose:  150 mg Take 150 mg by mouth every morning. Refills:  0 Discharge Instructions NON-WEIGHT BEARING LEFT LEG 
ELEVATE LEFT LEG 
 KEEP DRESSING CLEAN AND DRY - DO NOT REMOVE HINGED BRACE LEFT LEG - UNLOCKED IN BED OR CHAIR; LOCKED AT 30 DEGREES WHILE AMBULATING 
ICE TO LEFT KNEE 
FOLLOW-UP APPT WITH DR Sarahi Briseno - 3/31/17 @ 10:20 AM 
PRESCRIPTION FOR OXYCODONE 5 MG GIVEN TO PATIENT 
ASPIRIN 325 - 1 TABLET BY MOUTH DAILY X 1 MONTH 
INTERIM HOME HEALTH SET UP BY OFFICE - THEY WILL CALL TO SCHEDULE APPTS DISCHARGE SUMMARY from Nurse The following personal items are in your possession at time of discharge: 
 
Dental Appliances: None Visual Aid: None Home Medications: None Jewelry: None Clothing: Pants, Shirt, Footwear, Undergarments Other Valuables: Torsten Disla PATIENT INSTRUCTIONS: 
 
After general anesthesia or intravenous sedation, for 24 hours or while taking prescription Narcotics: · Limit your activities · Do not drive and operate hazardous machinery · Do not make important personal or business decisions · Do  not drink alcoholic beverages · If you have not urinated within 8 hours after discharge, please contact your surgeon on call. Report the following to your surgeon: 
· Excessive pain, swelling, redness or odor of or around the surgical area · Temperature over 100.5 · Nausea and vomiting lasting longer than 4 hours or if unable to take medications · Any signs of decreased circulation or nerve impairment to extremity: change in color, persistent  numbness, tingling, coldness or increase pain · Any questions What to do at Home: 
Recommended activity: Activity as tolerated, per MD instructions If you experience any of the following symptoms fever > 100.5, nausea, vomiting, pain without relief from medications, chest pain, shortness of breath please follow up with MD. 
 
 
*  Please give a list of your current medications to your Primary Care Provider.  
 
*  Please update this list whenever your medications are discontinued, doses are 
 changed, or new medications (including over-the-counter products) are added. *  Please carry medication information at all times in case of emergency situations. These are general instructions for a healthy lifestyle: No smoking/ No tobacco products/ Avoid exposure to second hand smoke Surgeon General's Warning:  Quitting smoking now greatly reduces serious risk to your health. Obesity, smoking, and sedentary lifestyle greatly increases your risk for illness A healthy diet, regular physical exercise & weight monitoring are important for maintaining a healthy lifestyle You may be retaining fluid if you have a history of heart failure or if you experience any of the following symptoms:  Weight gain of 3 pounds or more overnight or 5 pounds in a week, increased swelling in our hands or feet or shortness of breath while lying flat in bed. Please call your doctor as soon as you notice any of these symptoms; do not wait until your next office visit. Recognize signs and symptoms of STROKE: 
 
F-face looks uneven A-arms unable to move or move unevenly S-speech slurred or non-existent T-time-call 911 as soon as signs and symptoms begin-DO NOT go Back to bed or wait to see if you get better-TIME IS BRAIN. Warning Signs of HEART ATTACK Call 911 if you have these symptoms: 
? Chest discomfort. Most heart attacks involve discomfort in the center of the chest that lasts more than a few minutes, or that goes away and comes back. It can feel like uncomfortable pressure, squeezing, fullness, or pain. ? Discomfort in other areas of the upper body. Symptoms can include pain or discomfort in one or both arms, the back, neck, jaw, or stomach. ? Shortness of breath with or without chest discomfort. ? Other signs may include breaking out in a cold sweat, nausea, or lightheadedness. Don't wait more than five minutes to call [address]!  Fast action can save your life. Calling 911 is almost always the fastest way to get lifesaving treatment. Emergency Medical Services staff can begin treatment when they arrive  up to an hour sooner than if someone gets to the hospital by car. The discharge information has been reviewed with the patient. The patient verbalized understanding. Discharge medications reviewed with the patient and appropriate educational materials and side effects teaching were provided. Surgery: What to Expect at [facility] Your Recovery This care sheet gives you a general idea about how long it will take for you to recover from your surgery. But each person recovers at a different pace. How can you care for yourself at home? Activity · Allow your body to heal. Don't move quickly or lift anything heavy until you are feeling better. · Rest when you feel tired. · Your doctor may give you specific instructions on when you can do your normal activities again, such as driving and going back to work. · Be active. Walking is a good choice. Diet · You can eat your normal diet when you feel well. If your stomach is upset, try bland, low-fat foods like plain rice, broiled chicken, toast, and yogurt. · If your bowel movements are not regular right after surgery, try to avoid constipation and straining. Drink plenty of water. Your doctor may suggest fiber, a stool softener, or a mild laxative. Medicines · Your doctor will tell you if and when you can restart your medicines. He or she will also give you instructions about taking any new medicines. · If you take blood thinners, such as warfarin (Coumadin), clopidogrel (Plavix), or aspirin, be sure to talk to your doctor. He or she will tell you if and when to start taking those medicines again. Make sure that you understand exactly what your doctor wants you to do. · Be safe with medicines. Read and follow all instructions on the label. ¨ If the doctor gave you a prescription medicine for pain, take it as prescribed. ¨ If you are not taking a prescription pain medicine, ask your doctor if you can take an over-the-counter medicine. Incision care · You will have a dressing over the cut (incision). A dressing helps the incision heal and protects it. Your doctor will tell you how to take care of this. · If you have strips of tape on the cut the doctor made, leave the tape on for a week or until it falls off. · If you had stitches, your doctor will tell you when to come back to have them removed. · If you have skin adhesive on the cut, leave it on until it falls off. Skin adhesive is also called liquid stitches. · Change the bandage every day. · Wash the area daily with warm, soapy water, and pat it dry. Don't use hydrogen peroxide or alcohol. They can slow healing. · You may cover the area with a gauze bandage if it oozes fluid or rubs against clothing. · You may shower 24 to 48 hours after surgery. Pat the incision dry. Don't swim or take a bath for the first 2 weeks, or until your doctor tells you it is okay. Follow-up care is a key part of your treatment and safety. Be sure to make and go to all appointments, and call your doctor if you are having problems. It's also a good idea to know your test results and keep a list of the medicines you take. When should you call for help? Call 911 anytime you think you may need emergency care. For example, call if: 
· You passed out (lost consciousness). · You have severe trouble breathing. · You have sudden chest pain and shortness of breath, or you cough up blood. Call your doctor now or seek immediate medical care if: 
· You have pain that does not get better after you take pain medicine. · You have loose stitches, or your incision comes open. · You are bleeding through your dressing. A small amount of blood is normal. 
· You have signs of infection, such as: ¨ Increased pain, swelling, warmth, or redness. ¨ Red streaks leading from the incision. ¨ Pus draining from the incision. ¨ A fever. · You have symptoms of a blood clot in your arm or leg (called a deep vein thrombosis). These may include: 
¨ Pain in your calf, back of the knee, thigh, or groin. ¨ Redness and swelling in the arm, leg, or groin. Watch closely for any changes in your health, and be sure to contact your doctor if: 
· You do not have a bowel movement after taking a laxative. Where can you learn more? Go to http://darcy-chandana.info/. Enter Y816 in the search box to learn more about \"Surgery: What to Expect at Home. \" Current as of: August 14, 2016 Content Version: 11.1 © 9517-8015 [employer]. Care instructions adapted under license by [employer] (which disclaims liability or warranty for this information). If you have questions about a medical condition or this instruction, always ask your healthcare professional. [address] [address] any warranty or liability for your use of this information. Discharge Orders Procedure Order Date Status Priority Quantity Spec Type Associated Dx CALL YOUR DOCTOR For: Severe uncontrolled pain. , Redness, tenderness, or signs of infection. 03/22/17 1635 Normal Routine 1  Fracture, tibial plateau, left, closed, initial encounter [[id number]] Questions: For:  Severe uncontrolled pain. For:  Redness, tenderness, or signs of infection. ACTIVITY AFTER DISCHARGE Patient should: Restrict weight bearing 03/22/17 1635 Normal Routine 1  Fracture, tibial plateau, left, closed, initial encounter [[id number]] Questions: Patient should:  Restrict weight bearing DIET REGULAR No added salt 03/22/17 1635 Normal Routine 1  Fracture, tibial plateau, left, closed, initial encounter [[id number]] Questions: Additional options:  No added salt DRESSING, DO NOT REMOVE 03/22/17 1635 Normal Routine 1  Fracture, tibial plateau, left, closed, initial encounter [[id number]] Comments:  Keep clean, dry and intact until next clinic visit. ACO Transitions of Care Introducing [address] [address] Jazmin Rush offers a voluntary care coordination program to provide high quality service and care to Maico Reynolds fee-for-service beneficiaries. Polo Deluna was designed to help you enhance your health and well-being through the following services: ? Transitions of Care  support for individuals who are transitioning from one care setting to another (example: Hospital to home). ? Chronic and Complex Care Coordination  support for individuals and caregivers of those with serious or chronic illnesses or with more than one chronic (ongoing) condition and those who take a number of different medications. If you meet specific medical criteria, a [address] [place] [address] Rd may call you directly to coordinate your care with your primary care physician and your other care providers. For questions about the [facility] programs, please, contact your physicians office. For general questions or additional information about Accountable Care Organizations: 
Please visit www.medicare.gov/acos. html or call 1-800-MEDICARE (4-913.781.2423) TTY users should call 7-744.133.3876. Introducing [facility]! Oj Jiménez introduces [employer] patient portal. Now you can access parts of your medical record, email your doctor's office, and request medication refills online. 1. In your internet browser, go to [email or web address] 2. Click on the First Time User? Click Here link in the Sign In box. You will see the New Member Sign Up page. 3. Enter your [employer] Access Code exactly as it appears below. You will not need to use this code after youve completed the sign-up process.  If you do not sign up before the expiration date, you must request a new code. · INMAN Access Code: [email or web address] Expires: 4/4/2017 10:47 AM 
 
4. Enter the last four digits of your Social Security Number (xxxx) and Date of Birth (mm/dd/yyyy) as indicated and click Submit. You will be taken to the next sign-up page. 5. Create a INMAN ID. This will be your INMAN login ID and cannot be changed, so think of one that is secure and easy to remember. 6. Create a INMAN password. You can change your password at any time. 7. Enter your Password Reset Question and Answer. This can be used at a later time if you forget your password. 8. Enter your e-mail address. You will receive e-mail notification when new information is available in [address] 9. Click Sign Up. You can now view and download portions of your medical record. 10. Click the Download Summary menu link to download a portable copy of your medical information. If you have questions, please visit the Frequently Asked Questions section of the INMAN website. Remember, INMAN is NOT to be used for urgent needs. For medical emergencies, dial 911. Now available from your iPhone and Android! General Information Please provide this summary of care documentation to your next provider. Patient Signature:  ____________________________________________________________ Date:  ____________________________________________________________  
  
Pablo Bailon Provider Signature:  ____________________________________________________________ Date:  ____________________________________________________________

## 2017-03-22 NOTE — ANESTHESIA POSTPROCEDURE EVALUATION
Post-Anesthesia Evaluation and Assessment    Patient: Adrienne Carrion MRN: [id number]  SSN: [id number]    YOB: 1960  Age: 55 y.o. Sex: male       Cardiovascular Function/Vital Signs  Visit Vitals    BP (!) 166/101    Pulse 98    Temp 36.7 °C (98 °F)    Resp 16    Ht 6' 5\" (1.956 m)    Wt 122 kg (269 lb)    SpO2 100%    BMI 31.9 kg/m2       Patient is status post general anesthesia for Procedure(s):  LEFT LATERAL TIBIAL PLATEAU OPEN REDUCTION INTERNAL FIXATION. Nausea/Vomiting: None    Postoperative hydration reviewed and adequate. Pain:  Pain Scale 1: Visual (03/22/17 1847)  Pain Intensity 1: 8 (03/22/17 1815)   Managed    Neurological Status:   Neuro (WDL): Within Defined Limits (03/22/17 1812)  Neuro  LUE Motor Response: Purposeful (03/22/17 1812)  LLE Motor Response: Nonpurposeful (03/22/17 1812)  RUE Motor Response: Purposeful (03/22/17 1812)  RLE Motor Response: Purposeful (03/22/17 1812)   At baseline    Mental Status and Level of Consciousness: Arousable    Pulmonary Status:   O2 Device: Nasal cannula (03/22/17 1812)   Adequate oxygenation and airway patent    Complications related to anesthesia: None    Post-anesthesia assessment completed.  No concerns    Signed By: Gladis Camacho MD     March 22, 2017

## 2017-03-23 VITALS
HEART RATE: 86 BPM | RESPIRATION RATE: 17 BRPM | DIASTOLIC BLOOD PRESSURE: 96 MMHG | SYSTOLIC BLOOD PRESSURE: 148 MMHG | WEIGHT: 269 LBS | OXYGEN SATURATION: 98 % | TEMPERATURE: 98.9 F | HEIGHT: 77 IN | BODY MASS INDEX: 31.76 KG/M2

## 2017-03-23 PROBLEM — S82.142A TIBIAL PLATEAU FRACTURE, LEFT: Status: ACTIVE | Noted: 2017-03-23

## 2017-03-23 PROCEDURE — 97161 PT EVAL LOW COMPLEX 20 MIN: CPT

## 2017-03-23 PROCEDURE — 74011250636 HC RX REV CODE- 250/636: Performed by: NURSE PRACTITIONER

## 2017-03-23 PROCEDURE — 74011250637 HC RX REV CODE- 250/637: Performed by: NURSE PRACTITIONER

## 2017-03-23 PROCEDURE — 97530 THERAPEUTIC ACTIVITIES: CPT

## 2017-03-23 PROCEDURE — 65270000029 HC RM PRIVATE

## 2017-03-23 PROCEDURE — 99218 HC RM OBSERVATION: CPT

## 2017-03-23 PROCEDURE — 74011250636 HC RX REV CODE- 250/636: Performed by: ORTHOPAEDIC SURGERY

## 2017-03-23 PROCEDURE — 74011000258 HC RX REV CODE- 258: Performed by: NURSE PRACTITIONER

## 2017-03-23 PROCEDURE — 74011250637 HC RX REV CODE- 250/637: Performed by: ORTHOPAEDIC SURGERY

## 2017-03-23 RX ADMIN — FAMOTIDINE 20 MG: 20 TABLET, FILM COATED ORAL at 08:31

## 2017-03-23 RX ADMIN — LISINOPRIL 40 MG: 20 TABLET ORAL at 08:38

## 2017-03-23 RX ADMIN — Medication 10 ML: at 13:24

## 2017-03-23 RX ADMIN — ACETAMINOPHEN 650 MG: 325 TABLET, FILM COATED ORAL at 05:46

## 2017-03-23 RX ADMIN — ASPIRIN 81 MG: 81 TABLET, COATED ORAL at 12:00

## 2017-03-23 RX ADMIN — ACETAMINOPHEN 650 MG: 325 TABLET, FILM COATED ORAL at 13:23

## 2017-03-23 RX ADMIN — HYDROMORPHONE HYDROCHLORIDE 1 MG: 1 INJECTION, SOLUTION INTRAMUSCULAR; INTRAVENOUS; SUBCUTANEOUS at 04:23

## 2017-03-23 RX ADMIN — CINACALCET HYDROCHLORIDE 30 MG: 30 TABLET, COATED ORAL at 09:41

## 2017-03-23 RX ADMIN — HYDROMORPHONE HYDROCHLORIDE 1 MG: 1 INJECTION, SOLUTION INTRAMUSCULAR; INTRAVENOUS; SUBCUTANEOUS at 08:40

## 2017-03-23 RX ADMIN — NIFEDIPINE 90 MG: 30 TABLET, FILM COATED, EXTENDED RELEASE ORAL at 08:37

## 2017-03-23 RX ADMIN — OXYCODONE HYDROCHLORIDE 10 MG: 5 TABLET ORAL at 07:21

## 2017-03-23 RX ADMIN — CLONIDINE HYDROCHLORIDE 0.2 MG: 0.2 TABLET ORAL at 08:38

## 2017-03-23 RX ADMIN — Medication 10 ML: at 05:46

## 2017-03-23 RX ADMIN — CEFAZOLIN SODIUM 1 G: 1 INJECTION, POWDER, FOR SOLUTION INTRAMUSCULAR; INTRAVENOUS at 08:51

## 2017-03-23 NOTE — PROGRESS NOTES
END OF SHIFT NOTE:    INTAKE/OUTPUT  03/22 0701 - 03/23 0700  In: 417 [I. V.:417]  Out: 100   Voiding: NO  Catheter: NO  Drain:              Flatus: Patient does have flatus present. Stool:  0 occurrences. Characteristics:       Emesis: 0 occurrences. Characteristics:        VITAL SIGNS  Patient Vitals for the past 12 hrs:   Temp Pulse Resp BP SpO2   03/23/17 0330 98.9 °F (37.2 °C) 96 16 (!) 155/103 98 %   03/22/17 2345 99.8 °F (37.7 °C) (!) 105 16 158/73 96 %   03/22/17 2138 98.5 °F (36.9 °C) 100 16 (!) 150/98 98 %   03/22/17 1942 - 94 16 (!) 162/92 99 %   03/22/17 1937 - 93 16 (!) 161/91 100 %   03/22/17 1932 - 93 16 (!) 166/96 100 %   03/22/17 1927 - (!) 102 16 (!) 172/95 95 %   03/22/17 1922 - 93 14 (!) 158/92 98 %   03/22/17 1917 - 92 14 162/90 97 %   03/22/17 1912 - 91 14 (!) 180/101 100 %   03/22/17 1907 - 98 16 (!) 166/101 100 %   03/22/17 1902 - 91 16 (!) 168/102 100 %   03/22/17 1857 - 90 14 (!) 179/97 99 %   03/22/17 1852 - 87 16 (!) 170/102 98 %   03/22/17 1847 - 88 16 (!) 158/101 98 %   03/22/17 1842 - 88 16 (!) 174/99 98 %   03/22/17 1837 - (!) 101 16 (!) 174/103 98 %   03/22/17 1832 - 88 14 (!) 152/96 99 %   03/22/17 1827 - 92 14 180/84 98 %       Pain Assessment  Pain Intensity 1: 6 (03/23/17 0521)  Pain Location 1: Leg  Pain Intervention(s) 1: Medication (see MAR)  Patient Stated Pain Goal: 0    Ambulating  No    Shift report given to oncoming nurse at the bedside.     Nany Aly RN

## 2017-03-23 NOTE — PROGRESS NOTES
TRANSFER - IN REPORT:    Verbal report received from J[facility]  being received from PACU for routine post - op      Report consisted of patients Situation, Background, Assessment and   Recommendations(SBAR). Information from the following report(s) SBAR, OR Summary, Intake/Output, MAR and Recent Results was reviewed with the receiving nurse. Opportunity for questions and clarification was provided. Assessment completed upon patients arrival to unit and care assumed.

## 2017-03-23 NOTE — PERIOP NOTES
Spoke with rachael Alvarez for pt to transfer to the 2nd floor as there are no beds available on the 7th floor

## 2017-03-23 NOTE — DISCHARGE INSTRUCTIONS
NON-WEIGHT BEARING LEFT LEG  ELEVATE LEFT LEG  KEEP DRESSING CLEAN AND DRY - DO NOT REMOVE  HINGED BRACE LEFT LEG - UNLOCKED IN BED OR CHAIR; LOCKED AT 30 DEGREES WHILE AMBULATING  ICE TO LEFT KNEE  FOLLOW-UP APPT WITH DR Lexx Broderick - 3/31/17 @ 10:20 AM  PRESCRIPTION FOR OXYCODONE 5 MG GIVEN TO PATIENT  ASPIRIN 325 - 1 TABLET BY MOUTH DAILY X 1 MONTH  [facility] SET UP BY OFFICE - THEY WILL CALL TO SCHEDULE APPTS        DISCHARGE SUMMARY from Nurse    The following personal items are in your possession at time of discharge:    Dental Appliances: None  Visual Aid: None     Home Medications: None  Jewelry: None  Clothing: Pants, Shirt, Footwear, Undergarments  Other Valuables: Crutches             PATIENT INSTRUCTIONS:    After general anesthesia or intravenous sedation, for 24 hours or while taking prescription Narcotics:  · Limit your activities  · Do not drive and operate hazardous machinery  · Do not make important personal or business decisions  · Do  not drink alcoholic beverages  · If you have not urinated within 8 hours after discharge, please contact your surgeon on call. Report the following to your surgeon:  · Excessive pain, swelling, redness or odor of or around the surgical area  · Temperature over 100.5  · Nausea and vomiting lasting longer than 4 hours or if unable to take medications  · Any signs of decreased circulation or nerve impairment to extremity: change in color, persistent  numbness, tingling, coldness or increase pain  · Any questions        What to do at Home:  Recommended activity: Activity as tolerated, per MD instructions    If you experience any of the following symptoms fever > 100.5, nausea, vomiting, pain without relief from medications, chest pain, shortness of breath please follow up with MD.      *  Please give a list of your current medications to your Primary Care Provider.     *  Please update this list whenever your medications are discontinued, doses are      changed, or new medications (including over-the-counter products) are added. *  Please carry medication information at all times in case of emergency situations. These are general instructions for a healthy lifestyle:    No smoking/ No tobacco products/ Avoid exposure to second hand smoke    Surgeon General's Warning:  Quitting smoking now greatly reduces serious risk to your health. Obesity, smoking, and sedentary lifestyle greatly increases your risk for illness    A healthy diet, regular physical exercise & weight monitoring are important for maintaining a healthy lifestyle    You may be retaining fluid if you have a history of heart failure or if you experience any of the following symptoms:  Weight gain of 3 pounds or more overnight or 5 pounds in a week, increased swelling in our hands or feet or shortness of breath while lying flat in bed. Please call your doctor as soon as you notice any of these symptoms; do not wait until your next office visit. Recognize signs and symptoms of STROKE:    F-face looks uneven    A-arms unable to move or move unevenly    S-speech slurred or non-existent    T-time-call 911 as soon as signs and symptoms begin-DO NOT go       Back to bed or wait to see if you get better-TIME IS BRAIN. Warning Signs of HEART ATTACK     Call 911 if you have these symptoms:   Chest discomfort. Most heart attacks involve discomfort in the center of the chest that lasts more than a few minutes, or that goes away and comes back. It can feel like uncomfortable pressure, squeezing, fullness, or pain.  Discomfort in other areas of the upper body. Symptoms can include pain or discomfort in one or both arms, the back, neck, jaw, or stomach.  Shortness of breath with or without chest discomfort.  Other signs may include breaking out in a cold sweat, nausea, or lightheadedness. Don't wait more than five minutes to call 911 - MINUTES MATTER! Fast action can save your life.  Calling 911 is almost always the fastest way to get lifesaving treatment. Emergency Medical Services staff can begin treatment when they arrive -- up to an hour sooner than if someone gets to the hospital by car. The discharge information has been reviewed with the patient. The patient verbalized understanding. Discharge medications reviewed with the patient and appropriate educational materials and side effects teaching were provided. Surgery: What to Expect at Home  Your Recovery  This care sheet gives you a general idea about how long it will take for you to recover from your surgery. But each person recovers at a different pace. How can you care for yourself at home? Activity  · Allow your body to heal. Don't move quickly or lift anything heavy until you are feeling better. · Rest when you feel tired. · Your doctor may give you specific instructions on when you can do your normal activities again, such as driving and going back to work. · Be active. Walking is a good choice. Diet  · You can eat your normal diet when you feel well. If your stomach is upset, try bland, low-fat foods like plain rice, broiled chicken, toast, and yogurt. · If your bowel movements are not regular right after surgery, try to avoid constipation and straining. Drink plenty of water. Your doctor may suggest fiber, a stool softener, or a mild laxative. Medicines  · Your doctor will tell you if and when you can restart your medicines. He or she will also give you instructions about taking any new medicines. · If you take blood thinners, such as warfarin (Coumadin), clopidogrel (Plavix), or aspirin, be sure to talk to your doctor. He or she will tell you if and when to start taking those medicines again. Make sure that you understand exactly what your doctor wants you to do. · Be safe with medicines. Read and follow all instructions on the label.   ¨ If the doctor gave you a prescription medicine for pain, take it as prescribed. ¨ If you are not taking a prescription pain medicine, ask your doctor if you can take an over-the-counter medicine. Incision care  · You will have a dressing over the cut (incision). A dressing helps the incision heal and protects it. Your doctor will tell you how to take care of this. · If you have strips of tape on the cut the doctor made, leave the tape on for a week or until it falls off. · If you had stitches, your doctor will tell you when to come back to have them removed. · If you have skin adhesive on the cut, leave it on until it falls off. Skin adhesive is also called liquid stitches. · Change the bandage every day. · Wash the area daily with warm, soapy water, and pat it dry. Don't use hydrogen peroxide or alcohol. They can slow healing. · You may cover the area with a gauze bandage if it oozes fluid or rubs against clothing. · You may shower 24 to 48 hours after surgery. Pat the incision dry. Don't swim or take a bath for the first 2 weeks, or until your doctor tells you it is okay. Follow-up care is a key part of your treatment and safety. Be sure to make and go to all appointments, and call your doctor if you are having problems. It's also a good idea to know your test results and keep a list of the medicines you take. When should you call for help? Call 911 anytime you think you may need emergency care. For example, call if:  · You passed out (lost consciousness). · You have severe trouble breathing. · You have sudden chest pain and shortness of breath, or you cough up blood. Call your doctor now or seek immediate medical care if:  · You have pain that does not get better after you take pain medicine. · You have loose stitches, or your incision comes open. · You are bleeding through your dressing. A small amount of blood is normal.  · You have signs of infection, such as:  ¨ Increased pain, swelling, warmth, or redness. ¨ Red streaks leading from the incision.   ¨ Pus draining from the incision. ¨ A fever. · You have symptoms of a blood clot in your arm or leg (called a deep vein thrombosis). These may include:  ¨ Pain in your calf, back of the knee, thigh, or groin. ¨ Redness and swelling in the arm, leg, or groin. Watch closely for any changes in your health, and be sure to contact your doctor if:  · You do not have a bowel movement after taking a laxative. Where can you learn more? Go to http://darcy-chandana.info/. Enter W269 in the search box to learn more about \"Surgery: What to Expect at Home. \"  Current as of: August 14, 2016  Content Version: 11.1  © 3574-8675 [employer]. Care instructions adapted under license by [employer] (which disclaims liability or warranty for this information). If you have questions about a medical condition or this instruction, always ask your healthcare professional. [place] [address] any warranty or liability for your use of this information.

## 2017-03-23 NOTE — ROUTINE PROCESS
TRANSFER - OUT REPORT:    Verbal report given to [address] on [facility]  being transferred to 2nd floor for routine progression of care       Report consisted of patients Situation, Background, Assessment and   Recommendations(SBAR). Information from the following report(s) SBAR, Intake/Output and MAR was reviewed with the receiving nurse. Lines:   Peripheral IV 03/22/17 Left; Lower Arm (Active)   Site Assessment Clean, dry, & intact 3/22/2017  6:12 PM   Phlebitis Assessment 0 3/22/2017  6:12 PM   Infiltration Assessment 0 3/22/2017  6:12 PM   Dressing Status Clean, dry, & intact 3/22/2017  6:12 PM   Dressing Type Tape;Transparent 3/22/2017  6:12 PM   Hub Color/Line Status Pink; Infusing 3/22/2017  6:12 PM   Alcohol Cap Used No 3/22/2017  6:12 PM        Opportunity for questions and clarification was provided. Patient transported with:   Pt is on room air     VTE prophylaxis orders have been written for [facility]. Patient and family given floor number and nurses name. Family updated re: pt status after security code verified.

## 2017-03-23 NOTE — PROGRESS NOTES
Problem: Mobility Impaired (Adult and Pediatric)  Goal: *Acute Goals and Plan of Care (Insert Text)  Discharge Goals:  (1.)Mr. Eladio Gaviria will perform bed mobility with MODIFIED INDEPENDENCE within 3 day(s). (2.)Mr. Eladio Gaviria will transfer from bed to chair and chair to bed with MODIFIED INDEPENDENCE using the least restrictive device within 3 day(s). (3.)Mr. Eladio Gaviria will ambulate with MODIFIED INDEPENDENCE for 100+ feet with the least restrictive device within 3 day(s). (4.)Mr. Eladio Gaviria will demonstrate good dynamic standing balance utilizing crutches within 3 day(s). ________________________________________________________________________________________________      PHYSICAL THERAPY: INITIAL ASSESSMENT, TREATMENT DAY: DAY OF ASSESSMENT, AM    3/23/2017  INPATIENT: Hospital Day: 2  [place] / [place] / Product Type: Medicare /       [facility] L LE  Hinge Brace locked at 30 degrees with mobility  CPM: 0-45 degrees, 2 cycles/minute, 4 hours BID     NAME/AGE/GENDER: Seamus Lombardi is a 55 y.o. male      PRIMARY DIAGNOSIS: Fracture of left tibial plateau, closed, initial encounter [S82.142A] <principal problem not specified> <principal problem not specified>  Procedure(s) (LRB):  LEFT LATERAL TIBIAL PLATEAU OPEN REDUCTION INTERNAL FIXATION (Left)  1 Day Post-Op  ICD-10: Treatment Diagnosis:       · Difficulty in walking, Not elsewhere classified (R26.2)   Precaution/Allergies:  Review of patient's allergies indicates no known allergies. ASSESSMENT:      Mr. Eladio Gaviria presents is a 55year old male 1 day s/p left lateral tibial plateau ORIF and is NWB on L LE. Patient has a hinge brace to be locked at 30 degrees with mobility, unlocked in bed/chair, and CPM set up 0-45 degrees 2 cycles/minute, 4 hours BID. Patient seen this AM for initial physical therapy evaluation: presents supine in bed, oriented x4, and endorses 8/10 L LE pain.  Patient lives alone in a single story residence with 5 steps to enter. At baseline, patient is independent with ADLs, ambulates without utilizing an assistive device, and drives. Patient has been utilizing crutches and modified independence with ADLs/IADLs since initial L LE injury. Today, B UE and R LE strength/ROM WFL and sensation is intact to light touch C4-T1 and L3-S1 B. Hinge brace applied to L LE at stated parameters. Patient performed bed mobility with supervision and additional time, transfers with CGA, and ambulation x3ft with crutches and close CGA. Demonstrated a slow, shuffled, step-to gait pattern on R LE with fair dynamic balance throughout. Patient desires to be independent throughout mobility with mild decreased safety awareness and impulsivity; education provided. Transitioned back into supine and CPM applied at stated parameters. Patient tolerating well with no c/o pain. Emergency stop switch provided to patient and RN notified. Educated patient on hinge brace, home safety, and NWB status L LE. Mr. Julio Leblanc presents with decreased functional mobility and balance/gait status from baseline. Recommend continued skilled PT services to address stated deficits. Will follow and progress toward stated goals during acute stay. This section established at most recent assessment   PROBLEM LIST (Impairments causing functional limitations):  1. Decreased Strength  2. Decreased ADL/Functional Activities  3. Decreased Transfer Abilities  4. Decreased Ambulation Ability/Technique  5. Decreased Balance  6. Increased Pain  7. Decreased Activity Tolerance  8. Decreased Knowledge of Precautions  9. Decreased Cameron with Home Exercise Program    INTERVENTIONS PLANNED: (Benefits and precautions of physical therapy have been discussed with the patient.)  1. Balance Exercise  2. Bed Mobility  3. Family Education  4. Gait Training  5. Home Exercise Program (HEP)  6. Range of Motion (ROM)  7. Therapeutic Activites  8. Therapeutic Exercise/Strengthening  9.  Transfer Training  10. CPM  11. Group Therapy      TREATMENT PLAN: Frequency/Duration: twice daily for duration of hospital stay  Rehabilitation Potential For Stated Goals: GOOD      RECOMMENDED REHABILITATION/EQUIPMENT: (at time of discharge pending progress): Continue Skilled Therapy. HISTORY:   History of Present Injury/Illness (Reason for Referral):  Difficulty in walking; S/p left lateral tibial plateau ORIF  Past Medical History/Comorbidities:   Mr. Sonu Persaud  has a past medical history of Anxiety; CAD (coronary artery disease); Chronic kidney disease; GERD (gastroesophageal reflux disease); Hypertension; Hypertensive nephrosclerosis; PUD (peptic ulcer disease); SBP (spontaneous bacterial peritonitis) ([address] (9/24/2016); Umbilical hernia; and Unspecified sleep apnea. He also has no past medical history of Aneurysm ([address] Arrhythmia; Arthritis; Asthma; Autoimmune disease ([address] Cancer ([address] Chronic obstructive pulmonary disease ([address] Chronic pain; Coagulation disorder ([address] Diabetes ([address] Difficult intubation; Heart failure ([address] Ill-defined condition; Liver disease; Malignant hyperthermia due to anesthesia; Morbid obesity ([address] Nausea & vomiting; Pseudocholinesterase deficiency; Seizures ([address] Stroke [facility]); Thromboembolus ([address] Thyroid disease; or Unspecified adverse effect of anesthesia. Mr. Sonu Persaud  has a past surgical history that includes orthopaedic (Left, 1989); orthopaedic (Left, as a child); amputation (Right, 1995); vascular access (Left, 2012); and abdomen surgery proc unlisted (6/11/14).   Social History/Living Environment:   Home Environment: Private residence  # Steps to Enter: 5  One/Two Story Residence: One story  Living Alone: Yes  Support Systems: Family member(s), Friends \ neighbors  Patient Expects to be Discharged to[de-identified] Private residence  Current DME Used/Available at Home: Crutches  Tub or Shower Type: Tub  Prior Level of Function/Work/Activity:  Patient seen this AM for initial physical therapy evaluation: presents supine in bed, oriented x4, and endorses 8/10 L LE pain. Patient lives alone in a single story residence with 5 steps to enter. At baseline, patient is independent with ADLs, ambulates without utilizing an assistive device, and drives. Patient has been utilizing crutches and modified independence with ADLs/IADLs since initial L LE injury. Number of Personal Factors/Comorbidities that affect the Plan of Care: 1-2: MODERATE COMPLEXITY   EXAMINATION:   Most Recent Physical Functioning:   Gross Assessment:  AROM: Grossly decreased, non-functional (L LE)  Strength: Within functional limits (B UE and R LE)  Coordination: Within functional limits  Sensation: Intact (Light touch C4-T1 and L3-S1 B)               Posture:  Posture (WDL): Exceptions to WDL  Posture Assessment: Forward head  Balance:  Sitting: Intact  Standing: Impaired  Standing - Static: Fair  Standing - Dynamic : Fair Bed Mobility:  Supine to Sit: Supervision  Sit to Supine: Contact guard assistance  Scooting: Supervision  Wheelchair Mobility:     Transfers:  Sit to Stand: Contact guard assistance  Stand to Sit: Minimum assistance  Gait:     Base of Support: Shift to right;Center of gravity altered  Speed/Zaina: Slow  Stance: Right increased  Gait Abnormalities: Decreased step clearance; Step to gait;Trunk sway increased  Distance (ft): 3 Feet (ft)  Assistive Device: Crutches  Ambulation - Level of Assistance: Contact guard assistance  Interventions: Safety awareness training; Tactile cues; Verbal cues       Body Structures Involved:  1. Bones  2. Joints  3. Muscles  4. Ligaments Body Functions Affected:  1. Neuromusculoskeletal  2. Movement Related Activities and Participation Affected:  1. General Tasks and Demands  2. Mobility  3. Self Care  4.  Domestic Life   Number of elements that affect the Plan of Care: 4+: HIGH COMPLEXITY   CLINICAL PRESENTATION:   Presentation: Stable and uncomplicated: LOW COMPLEXITY CLINICAL DECISION MAKIN[address] [place] [id number] AM-PAC 6 Clicks   Basic Mobility Inpatient Short Form  How much difficulty does the patient currently have. .. Unable A Lot A Little None   1. Turning over in bed (including adjusting bedclothes, sheets and blankets)? [ ] 1   [ ] 2   [ ] 3   [X] 4   2. Sitting down on and standing up from a chair with arms ( e.g., wheelchair, bedside commode, etc.)   [ ] 1   [ ] 2   [ ] 3   [X] 4   3. Moving from lying on back to sitting on the side of the bed? [ ] 1   [ ] 2   [ ] 3   [X] 4   How much help from another person does the patient currently need. .. Total A Lot A Little None   4. Moving to and from a bed to a chair (including a wheelchair)? [ ] 1   [ ] 2   [X] 3   [ ] 4   5. Need to walk in hospital room? [ ] 1   [ ] 2   [X] 3   [ ] 4   6. Climbing 3-5 steps with a railing? [ ] 1   [ ] 2   [X] 3   [ ] 4   © 2007, Trustees of [address] [place] [address] [address] [address], under license to [employer]. All rights reserved    Score:  Initial: 21 Most Recent: 21 (Date: 3/23/17 )     Interpretation of Tool:  Represents activities that are increasingly more difficult (i.e. Bed mobility, Transfers, Gait). Score 24 23 22-20 19-15 14-10 9-7 6       Modifier CH CI CJ CK CL CM CN         · Mobility - Walking and Moving Around:               - CURRENT STATUS:    CJ - 20%-39% impaired, limited or restricted               - GOAL STATUS:           CI - 1%-19% impaired, limited or restricted               - D/C STATUS:                       ---------------To be determined---------------  [place] / [place] / Product Type: Medicare /       Medical Necessity:     · Patient demonstrates good rehab potential due to higher previous functional level. Reason for Services/Other Comments:  · Patient continues to require skilled intervention due to decreased functional mobility and balance/gait status from baseline. .   Use of outcome tool(s) and clinical judgement create a POC that gives a: Clear prediction of patient's progress: LOW COMPLEXITY                 TREATMENT:       Pre-treatment Symptoms/Complaints:    Pain: Initial:   Pain Intensity 1: 8  Pain Location 1: Leg  Pain Orientation 1: Left  Pain Intervention(s) 1: Ambulation/Increased Activity, Emotional support, Rest, Repositioned  Post Session:  Comfortable resting in bed with CPM set up. Pain unchanged. Initial Evaluation   Therapeutic Activity: (    10 Minutes): Therapeutic activities including bed mobility, transfer training, balance training, safety awareness training, ambulation on level ground x3ft, and patient education on hinge brace, CPM, home safety, and NWB status to improve mobility and balance. Required minimal Safety awareness training; Tactile cues; Verbal cues to promote dynamic balance in standing. Braces/Orthotics/Lines/Etc:   · IV  · Hinge Brace L LE  Treatment/Session Assessment:    · Response to Treatment:  See above. · Interdisciplinary Collaboration:  · Physical Therapist  · Registered Nurse  · Certified Nursing Assistant/Patient Care Technician  · After treatment position/precautions:  · Supine in bed  · Bed alarm/tab alert on  · Bed/Chair-wheels locked  · Bed in low position  · Call light within reach  · Side rails x 3  · Compliance with Program/Exercises: Will assess as treatment progresses. · Recommendations/Intent for next treatment session: \"Next visit will focus on advancements to more challenging activities and reduction in assistance provided\".      Total Treatment Duration:  PT Patient Time In/Time Out  Time In: 0944  Time Out: 4375 Kris Valverde, DPT

## 2017-03-23 NOTE — PROGRESS NOTES
Spoke to Mr. Nicole Pedraza in room 205 about discharge planning. He will return home alone, with crutches, with no discharge needs identified. He signed the [facility] Outpatient Observation Notice\" letter (although his status will be corrected to \"inpatient\"), original into his chart, and copy left with Frank Nicole Pedraza.

## 2017-03-23 NOTE — PROGRESS NOTES
Patient arrived to the unit via stretcher with a member of hospital staff. Patient is alert and oriented x4. A dual skin assessment was completed with Maria Del Rosario Allen RN with the following findings. Patient has a dressing on his left leg and an external brace, all other skin is clean, dry, and intact. Patient's vitals are stable and no distress is noted. Patient was oriented to the room, bed is low and locked with call light within reach.

## 2017-03-23 NOTE — PROGRESS NOTES
Pt's D/C instructions completed. Verbalized understanding of all instructions including diet, activity, s/sx to alert MD, medications, wound care, and f/u appointment. Patient has no family members to  and lives alone and will stay for lunch and leave after that via yellow cab. Per patient he has car here and will drive home by self, discussed with patient cant drive self home due to pain medications, SW will speak to patient regarding ride.

## 2017-03-23 NOTE — PROGRESS NOTES
Primary Nurse Haider Barrera RN and Jayson Otero RN performed a dual skin assessment on this patient Impairment noted- see wound doc flow sheet  Sb score is 18

## 2017-03-25 ENCOUNTER — PATIENT OUTREACH (OUTPATIENT)
Dept: CASE MANAGEMENT | Age: 57
End: 2017-03-25

## 2017-03-25 NOTE — PROGRESS NOTES
Transition of Care Discharge Follow-up Questionnaire   Date/Time of Call:   March 24, 2017 3:15PM   What was the patient hospitalized for? Patient hospitalized for Tibial Plateau Fracture Left. Does the patient understand his/her diagnosis and/or treatment and what happened during the hospitalization? Patient states understanding of diagnosis and treatment during hospitalization. Did the patient receive discharge instructions? Patient states discharge instructions explained and received before discharge to home. Review any discharge instructions (see notes in [facility]). Ask patient if they understand these. Do they have any questions? Discharge instructions reviewed with patient per [facility], opportunity for questions and clarification provided. Patient states no questions at this time. Were home services ordered (nursing, PT, OT, ST, etc.)? Patient states home health services ordered (PT)       If so, has the first visit occurred? If not, why? (Assist with coordination of services if necessary.) Patient states first visit occurred today March 24, 2017. Was any DME ordered? No durable medical equipment ordered. If so, has it been received? If not, why?  (Assist with coordination of arranging DME orders if necessary. ) NA         Complete a review of all medications (new, continued and discontinued meds per the D/C instructions and [facility] Care Coordinator reviewed all medications with patient per [facility], several new prescriptions given before patient discharged to home. Were all new prescriptions filled? If not, why?  (Assist with obtainment of medications if necessary.) Patient states new prescriptions filled and currently being taken per doctors order. Does the patient understand the purpose and dosing instructions for all medications?   (If patient has questions, provide explanation and education.) Patient states understanding of medication purposes and dosing instructions. Does the patient have any problems in performing ADLs? (If patient is unable to perform ADLs - what is the limiting factor(s)? Do they have a support system that can assist? If no support system is present, discuss possible assistance that they may be able to obtain.) Patient states he is independent with ADL's and uses crutches to aid with ambulation. Patient states that he requires no assistance at the moment and is doing fine. Does the patient have all follow-up appointments scheduled? Has transportation been arranged? [facility] Pulmonary follow-up should be within 7 days of discharge; all others should have PCP follow-up within 7 days of discharge; follow-ups with other specialists as appropriate or ordered.) Patient states follow up appointment scheduled with Dr. Nora Brya (Orthopedic Surgeon) March 31, 2017 @ 10:20AM    Patient states that he has no transportation needs at this time. Any other questions or concerns expressed by the patient? Patient expresses no questions or concerns. Schedule next appointment with WILLOW ALDANA Coordinator or refer to RN Case Manager/  as appropriate. No further needs identified, patient instructed to call Care Coordinator if further questions or concerns arise.    GERALD Call Completed By: Kristie Luna LPN  Care Coordinator   [facility]

## 2017-03-28 NOTE — OP NOTES
[address]   OPERATIVE REPORT       Name:  Marylou Jackson   MR#:  [id number]   :  1960   Account #:  [de-identified]   Date of Adm:  2017       DATE OF SURGERY: 2017    PREOPERATIVE DIAGNOSIS: Left lateral tibial plateau fracture. POSTOPERATIVE DIAGNOSIS: Left lateral tibial plateau fracture. PROCEDURE   1. Open reduction and internal fixation of left lateral tibial   plateau fracture. 2. Application and removal of external fixator. OPERATING TEAM: Norma Muller. MD Gaurang    ANESTHESIA: General with a block for postoperative pain relief. DESCRIPTION OF PROCEDURE: The patient was brought to the   operative suite, placed in supine position. After adequate   anesthesia was achieved in the form a general with a block,   tourniquet was applied to left thigh with adequate padding and   Sof-Rol. It was preset to a level of 300 mmHg. Left lower extremity   was then prepped and draped in the usual sterile fashion. Two   Schanz pins were placed from a lateral to medial direction. This   was done through stab wounds using the triple guide. One was in   the femur and one was in the tibia. The external fixator in the   form of a femoral distractor was then applied to these Schanz   pins and distraction was applied across the lateral joint,   opening up the joint. The leg was then elevated and   exsanguinated with the Esmarch. The tourniquet was inflated. An   S-shaped incision was made over the proximal tibia, beginning   along the line of the tibial crest, curving posteriorly over   Gerdy tubercle, and up along the lateral aspect of the distal   femur. Hemostasis was achieved with Bovie cautery. The anterior   tib muscle was taken down extraperiosteally off the tibial   shaft, except at the level of Gerdy tubercle, where it was taken   down subperiosteally. The coronary artery ligament was opened. The lateral meniscus was intact. The fracture was open booked.    The laminar  was used to visualize the displaced   fragment. The fragments were elevated and reduced in an anatomic   position. They were provisionally fixed with K-wires. Norian was   injected into the defect. Warm saline was irrigated on the   Norian until it was hardened. Once the Norian was hardened, the   laminar  was removed and the open book was closed. This   resulted in an anatomic reduction of the fracture. A Synthes 3.5   mm proximal tibial locking plate was chosen. The first screw   placed was a conical screw proximally through the plate. This   allowed the fracture to be compressed. Locking screws were then   placed proximally. A cortical screw was placed distally to pull   the plate to the bone, and then locking screws were placed   distally. The original conical screw proximally was replaced   with a locking screw and the original cortical screw in the   shaft was removed. At this point AP, lateral, and oblique   fluoroscopic images confirmed that the fracture was reduced   anatomically and the hardware was in good position. The external   fixator frame was then removed and the Schanz pins were removed. The wound was irrigated with normal saline. The coronary   ligament was repaired and the remainder of the wound was closed   in layers. Sterile compressive dressings were applied. A hinged   knee brace was applied. Tourniquet was deflated and the patient   was transferred to the recovery room, alert and oriented, under   the care of Anesthesia. ESTIMATED BLOOD LOSS: Minimal.    FLUIDS: See anesthesia record. CLOSURE: Primary. COMPLICATIONS: None. MD Renetta Shea / [address]   D:  03/28/2017   08:50   T:  03/28/2017   11:58   Job #:  [id number]

## 2017-06-24 ENCOUNTER — APPOINTMENT (OUTPATIENT)
Dept: GENERAL RADIOLOGY | Age: 57
DRG: 868 | End: 2017-06-24
Payer: MEDICARE

## 2017-06-24 ENCOUNTER — HOSPITAL ENCOUNTER (INPATIENT)
Age: 57
LOS: 1 days | Discharge: HOME | DRG: 868 | End: 2017-06-24
Admitting: HOSPITALIST
Payer: MEDICARE

## 2017-06-24 VITALS
TEMPERATURE: 98.9 F | RESPIRATION RATE: 17 BRPM | BODY MASS INDEX: 30.11 KG/M2 | DIASTOLIC BLOOD PRESSURE: 63 MMHG | HEIGHT: 77 IN | HEART RATE: 100 BPM | WEIGHT: 255 LBS | OXYGEN SATURATION: 100 % | SYSTOLIC BLOOD PRESSURE: 149 MMHG

## 2017-06-24 DIAGNOSIS — K65.2 SBP (SPONTANEOUS BACTERIAL PERITONITIS) (HCC): Primary | ICD-10-CM

## 2017-06-24 DIAGNOSIS — Z99.2: ICD-10-CM

## 2017-06-24 PROBLEM — T85.71XA PERITONEAL DIALYSIS-ASSOCIATED PERITONITIS (HCC): Status: ACTIVE | Noted: 2017-06-24

## 2017-06-24 PROBLEM — I16.0 HYPERTENSIVE URGENCY: Status: ACTIVE | Noted: 2017-06-24

## 2017-06-24 LAB
ALBUMIN SERPL BCP-MCNC: 3.1 G/DL (ref 3.5–5)
ALBUMIN/GLOB SERPL: 0.6 {RATIO} (ref 1.2–3.5)
ALP SERPL-CCNC: 71 U/L (ref 50–136)
ALT SERPL-CCNC: 16 U/L (ref 12–65)
ANION GAP BLD CALC-SCNC: 12 MMOL/L (ref 7–16)
AST SERPL W P-5'-P-CCNC: 20 U/L (ref 15–37)
ATRIAL RATE: 84 BPM
BASOPHILS # BLD AUTO: 0 K/UL (ref 0–0.2)
BASOPHILS # BLD: 0 % (ref 0–2)
BILIRUB SERPL-MCNC: 0.6 MG/DL (ref 0.2–1.1)
BUN SERPL-MCNC: 46 MG/DL (ref 6–23)
CALCIUM SERPL-MCNC: 8.8 MG/DL (ref 8.3–10.4)
CALCULATED P AXIS, ECG09: 46 DEGREES
CALCULATED R AXIS, ECG10: 39 DEGREES
CALCULATED T AXIS, ECG11: 61 DEGREES
CHLORIDE SERPL-SCNC: 96 MMOL/L (ref 98–107)
CO2 SERPL-SCNC: 31 MMOL/L (ref 21–32)
CREAT SERPL-MCNC: 15.8 MG/DL (ref 0.8–1.5)
DIAGNOSIS, 93000: NORMAL
DIFFERENTIAL METHOD BLD: ABNORMAL
EOSINOPHIL # BLD: 0.1 K/UL (ref 0–0.8)
EOSINOPHIL NFR BLD: 1 % (ref 0.5–7.8)
ERYTHROCYTE [DISTWIDTH] IN BLOOD BY AUTOMATED COUNT: 17.4 % (ref 11.9–14.6)
GLOBULIN SER CALC-MCNC: 4.8 G/DL (ref 2.3–3.5)
GLUCOSE SERPL-MCNC: 125 MG/DL (ref 65–100)
HCT VFR BLD AUTO: 31.1 % (ref 41.1–50.3)
HGB BLD-MCNC: 10.6 G/DL (ref 13.6–17.2)
IMM GRANULOCYTES # BLD: 0 K/UL (ref 0–0.5)
IMM GRANULOCYTES NFR BLD AUTO: 0.3 % (ref 0–5)
LACTATE BLD-SCNC: 1.6 MMOL/L (ref 0.5–1.9)
LYMPHOCYTES # BLD AUTO: 12 % (ref 13–44)
LYMPHOCYTES # BLD: 1.5 K/UL (ref 0.5–4.6)
MCH RBC QN AUTO: 31.6 PG (ref 26.1–32.9)
MCHC RBC AUTO-ENTMCNC: 34.1 G/DL (ref 31.4–35)
MCV RBC AUTO: 92.8 FL (ref 79.6–97.8)
MONOCYTES # BLD: 0.8 K/UL (ref 0.1–1.3)
MONOCYTES NFR BLD AUTO: 7 % (ref 4–12)
NEUTS SEG # BLD: 9.8 K/UL (ref 1.7–8.2)
NEUTS SEG NFR BLD AUTO: 80 % (ref 43–78)
P-R INTERVAL, ECG05: 178 MS
PLATELET # BLD AUTO: 199 K/UL (ref 150–450)
PMV BLD AUTO: 11 FL (ref 10.8–14.1)
POTASSIUM SERPL-SCNC: 3.6 MMOL/L (ref 3.5–5.1)
PROCALCITONIN SERPL-MCNC: 0.7 NG/ML
PROT SERPL-MCNC: 7.9 G/DL (ref 6.3–8.2)
Q-T INTERVAL, ECG07: 410 MS
QRS DURATION, ECG06: 96 MS
QTC CALCULATION (BEZET), ECG08: 484 MS
RBC # BLD AUTO: 3.35 M/UL (ref 4.23–5.67)
SODIUM SERPL-SCNC: 139 MMOL/L (ref 136–145)
VENTRICULAR RATE, ECG03: 84 BPM
WBC # BLD AUTO: 12.2 K/UL (ref 4.3–11.1)

## 2017-06-24 PROCEDURE — 85025 COMPLETE CBC W/AUTO DIFF WBC: CPT

## 2017-06-24 PROCEDURE — 87040 BLOOD CULTURE FOR BACTERIA: CPT

## 2017-06-24 PROCEDURE — 96376 TX/PRO/DX INJ SAME DRUG ADON: CPT | Performed by: EMERGENCY MEDICINE

## 2017-06-24 PROCEDURE — 96365 THER/PROPH/DIAG IV INF INIT: CPT | Performed by: EMERGENCY MEDICINE

## 2017-06-24 PROCEDURE — 65270000029 HC RM PRIVATE

## 2017-06-24 PROCEDURE — 87205 SMEAR GRAM STAIN: CPT

## 2017-06-24 PROCEDURE — 84145 PROCALCITONIN (PCT): CPT

## 2017-06-24 PROCEDURE — 74011250636 HC RX REV CODE- 250/636: Performed by: HOSPITALIST

## 2017-06-24 PROCEDURE — 87077 CULTURE AEROBIC IDENTIFY: CPT

## 2017-06-24 PROCEDURE — 96366 THER/PROPH/DIAG IV INF ADDON: CPT | Performed by: EMERGENCY MEDICINE

## 2017-06-24 PROCEDURE — 99284 EMERGENCY DEPT VISIT MOD MDM: CPT | Performed by: EMERGENCY MEDICINE

## 2017-06-24 PROCEDURE — 96375 TX/PRO/DX INJ NEW DRUG ADDON: CPT | Performed by: EMERGENCY MEDICINE

## 2017-06-24 PROCEDURE — 87186 SC STD MICRODIL/AGAR DIL: CPT

## 2017-06-24 PROCEDURE — 83605 ASSAY OF LACTIC ACID: CPT

## 2017-06-24 PROCEDURE — 96367 TX/PROPH/DG ADDL SEQ IV INF: CPT | Performed by: EMERGENCY MEDICINE

## 2017-06-24 PROCEDURE — 80053 COMPREHEN METABOLIC PANEL: CPT

## 2017-06-24 PROCEDURE — 71010 XR CHEST PORT: CPT

## 2017-06-24 PROCEDURE — 74011250636 HC RX REV CODE- 250/636

## 2017-06-24 PROCEDURE — 93005 ELECTROCARDIOGRAM TRACING: CPT

## 2017-06-24 PROCEDURE — 74011000258 HC RX REV CODE- 258

## 2017-06-24 RX ORDER — CINACALCET 30 MG/1
30 TABLET, FILM COATED ORAL DAILY
Status: CANCELLED | OUTPATIENT
Start: 2017-06-24

## 2017-06-24 RX ORDER — LISINOPRIL 20 MG/1
40 TABLET ORAL DAILY
Status: CANCELLED | OUTPATIENT
Start: 2017-06-24

## 2017-06-24 RX ORDER — ONDANSETRON 2 MG/ML
4 INJECTION INTRAMUSCULAR; INTRAVENOUS
Status: COMPLETED | OUTPATIENT
Start: 2017-06-24 | End: 2017-06-24

## 2017-06-24 RX ORDER — HEPARIN SODIUM 5000 [USP'U]/ML
5000 INJECTION, SOLUTION INTRAVENOUS; SUBCUTANEOUS EVERY 8 HOURS
Status: CANCELLED | OUTPATIENT
Start: 2017-06-24

## 2017-06-24 RX ORDER — TRAMADOL HYDROCHLORIDE 50 MG/1
50-100 TABLET ORAL
Status: CANCELLED | OUTPATIENT
Start: 2017-06-24

## 2017-06-24 RX ORDER — VANCOMYCIN/0.9 % SOD CHLORIDE 1.5G/250ML
1500 PLASTIC BAG, INJECTION (ML) INTRAVENOUS
Status: DISCONTINUED | OUTPATIENT
Start: 2017-06-24 | End: 2017-06-24

## 2017-06-24 RX ORDER — HYDROMORPHONE HYDROCHLORIDE 1 MG/ML
0.5 INJECTION, SOLUTION INTRAMUSCULAR; INTRAVENOUS; SUBCUTANEOUS
Status: CANCELLED | OUTPATIENT
Start: 2017-06-24

## 2017-06-24 RX ORDER — SODIUM CHLORIDE 0.9 % (FLUSH) 0.9 %
5-10 SYRINGE (ML) INJECTION AS NEEDED
Status: DISCONTINUED | OUTPATIENT
Start: 2017-06-24 | End: 2017-06-24

## 2017-06-24 RX ORDER — CALCITRIOL 0.25 UG/1
0.25 CAPSULE ORAL
Status: CANCELLED | OUTPATIENT
Start: 2017-06-24

## 2017-06-24 RX ORDER — ACETAMINOPHEN 325 MG/1
650 TABLET ORAL
Status: CANCELLED | OUTPATIENT
Start: 2017-06-24

## 2017-06-24 RX ORDER — NIFEDIPINE 90 MG/1
90 TABLET, EXTENDED RELEASE ORAL DAILY
Status: CANCELLED | OUTPATIENT
Start: 2017-06-24

## 2017-06-24 RX ORDER — HYDRALAZINE HYDROCHLORIDE 20 MG/ML
20 INJECTION INTRAMUSCULAR; INTRAVENOUS ONCE
Status: COMPLETED | OUTPATIENT
Start: 2017-06-24 | End: 2017-06-24

## 2017-06-24 RX ORDER — NALOXONE HYDROCHLORIDE 0.4 MG/ML
0.4 INJECTION, SOLUTION INTRAMUSCULAR; INTRAVENOUS; SUBCUTANEOUS AS NEEDED
Status: CANCELLED | OUTPATIENT
Start: 2017-06-24

## 2017-06-24 RX ORDER — CLONIDINE HYDROCHLORIDE 0.1 MG/1
0.2 TABLET ORAL 3 TIMES DAILY
Status: CANCELLED | OUTPATIENT
Start: 2017-06-24

## 2017-06-24 RX ORDER — HYDROCODONE BITARTRATE AND ACETAMINOPHEN 10; 325 MG/1; MG/1
1 TABLET ORAL
Status: CANCELLED | OUTPATIENT
Start: 2017-06-24

## 2017-06-24 RX ORDER — ADHESIVE BANDAGE
30 BANDAGE TOPICAL DAILY PRN
Status: CANCELLED | OUTPATIENT
Start: 2017-06-24

## 2017-06-24 RX ORDER — SODIUM CHLORIDE 0.9 % (FLUSH) 0.9 %
5-10 SYRINGE (ML) INJECTION AS NEEDED
Status: CANCELLED | OUTPATIENT
Start: 2017-06-24

## 2017-06-24 RX ORDER — VANCOMYCIN 2 GRAM/500 ML IN 0.9 % SODIUM CHLORIDE INTRAVENOUS
2000 ONCE
Status: DISCONTINUED | OUTPATIENT
Start: 2017-06-24 | End: 2017-06-24

## 2017-06-24 RX ORDER — HYDROMORPHONE HYDROCHLORIDE 1 MG/ML
1 INJECTION, SOLUTION INTRAMUSCULAR; INTRAVENOUS; SUBCUTANEOUS
Status: COMPLETED | OUTPATIENT
Start: 2017-06-24 | End: 2017-06-24

## 2017-06-24 RX ORDER — ASPIRIN 81 MG/1
81 TABLET ORAL
Status: CANCELLED | OUTPATIENT
Start: 2017-06-24

## 2017-06-24 RX ORDER — SODIUM CHLORIDE 0.9 % (FLUSH) 0.9 %
5-10 SYRINGE (ML) INJECTION EVERY 8 HOURS
Status: CANCELLED | OUTPATIENT
Start: 2017-06-24

## 2017-06-24 RX ORDER — ONDANSETRON 2 MG/ML
4 INJECTION INTRAMUSCULAR; INTRAVENOUS
Status: CANCELLED | OUTPATIENT
Start: 2017-06-24

## 2017-06-24 RX ORDER — FAMOTIDINE 20 MG/1
20 TABLET, FILM COATED ORAL 2 TIMES DAILY
Status: CANCELLED | OUTPATIENT
Start: 2017-06-24

## 2017-06-24 RX ADMIN — HYDRALAZINE HYDROCHLORIDE 20 MG: 20 INJECTION INTRAMUSCULAR; INTRAVENOUS at 08:40

## 2017-06-24 RX ADMIN — HYDROMORPHONE HYDROCHLORIDE 1 MG: 1 INJECTION, SOLUTION INTRAMUSCULAR; INTRAVENOUS; SUBCUTANEOUS at 06:37

## 2017-06-24 RX ADMIN — VANCOMYCIN HYDROCHLORIDE 2000 MG: 10 INJECTION, POWDER, LYOPHILIZED, FOR SOLUTION INTRAVENOUS at 07:12

## 2017-06-24 RX ADMIN — PIPERACILLIN SODIUM,TAZOBACTAM SODIUM 4.5 G: 4; .5 INJECTION, POWDER, FOR SOLUTION INTRAVENOUS at 06:47

## 2017-06-24 RX ADMIN — SODIUM CHLORIDE 3471 ML: 900 INJECTION, SOLUTION INTRAVENOUS at 07:11

## 2017-06-24 RX ADMIN — HYDROMORPHONE HYDROCHLORIDE 1 MG: 1 INJECTION, SOLUTION INTRAMUSCULAR; INTRAVENOUS; SUBCUTANEOUS at 08:08

## 2017-06-24 RX ADMIN — ONDANSETRON 4 MG: 2 INJECTION INTRAMUSCULAR; INTRAVENOUS at 06:37

## 2017-06-24 NOTE — PROGRESS NOTES
This RN accessed chart to obtain report from ED. Per ED RN Ольга Leach, patient is no longer being admitted.

## 2017-06-24 NOTE — ED PROVIDER NOTES
HPI Comments: 81-year-old male with abdominal pain. Patient's peritoneal dialysis andstarted having abdominal pain and noticed that his fluid is now cloudy. He's had this once before and it was spontaneous bacterial peritonitis. He does have a low-grade temp. Patient is a 55 y.o. male presenting with abdominal pain. The history is provided by the patient. Abdominal Pain    This is a recurrent problem. The current episode started 6 to 12 hours ago. The problem occurs constantly. The pain is located in the generalized abdominal region. The quality of the pain is aching. The pain is at a severity of 10/10. The pain is severe. Associated symptoms include nausea. The pain is worsened by palpation. The pain is relieved by nothing. The patient's surgical history non-contributory. Past Medical History:   Diagnosis Date    Anxiety     CAD (coronary artery disease)     per pt- no MI- \"They said the back of my heart was something. ..like 40%\"    Chronic kidney disease     stage 4; not on dialysis; pt has AV fistula to left arm; staRTED PERITONEAL DIALYSIS 2014    GERD (gastroesophageal reflux disease)     takes OTC med prn    Hypertension     controlled with med    Hypertensive nephrosclerosis     per nephrology note 4/14/14    PUD (peptic ulcer disease)     SBP (spontaneous bacterial peritonitis) ([facility] [address] 3/08/6632    Umbilical hernia     Unspecified sleep apnea     pt does not have a CPAP       Past Surgical History:   Procedure Laterality Date    ABDOMEN SURGERY PROC UNLISTED  6/11/14    PD catheter placement    HX AMPUTATION Right 1995    5th finger    HX ORTHOPAEDIC Left 1989    knee    HX ORTHOPAEDIC Left as a child    thumb    HX VASCULAR ACCESS Left 2012    AV fistula to arm         Family History:   Problem Relation Age of Onset    Diabetes Mother     Hypertension Mother     Heart Disease Father     COPD Father     Hypertension Father        Social History     Social History    Marital status: SINGLE     Spouse name: N/A    Number of children: N/A    Years of education: N/A     Occupational History    Not on file. Social History Main Topics    Smoking status: Former Smoker     Packs/day: 0.50     Years: 23.00     Quit date: 3/1/2014    Smokeless tobacco: Never Used      Comment: pt states only smoked cigarettes when drinking alcohol    Alcohol use No      Comment: on occasion    Drug use: Yes     Special: Marijuana      Comment: daily    Sexual activity: Not on file     Other Topics Concern    Not on file     Social History Narrative         ALLERGIES: Review of patient's allergies indicates no known allergies. Review of Systems   Constitutional: Negative. Negative for activity change. HENT: Negative. Eyes: Negative. Respiratory: Negative. Cardiovascular: Negative. Gastrointestinal: Positive for abdominal pain and nausea. Genitourinary: Negative. Musculoskeletal: Negative. Skin: Negative. Neurological: Negative. Psychiatric/Behavioral: Negative. All other systems reviewed and are negative. Vitals:    06/24/17 0543   BP: 138/83   Pulse: 88   Resp: 22   Temp: 99.4 °F (37.4 °C)   SpO2: 97%   Weight: 115.7 kg (255 lb)   Height: 6' 5\" (1.956 m)            Physical Exam   Constitutional: He is oriented to person, place, and time. He appears well-developed and well-nourished. No distress. HENT:   Head: Normocephalic and atraumatic. Right Ear: External ear normal.   Left Ear: External ear normal.   Nose: Nose normal.   Mouth/Throat: Oropharynx is clear and moist. No oropharyngeal exudate. Eyes: Conjunctivae and EOM are normal. Pupils are equal, round, and reactive to light. Right eye exhibits no discharge. Left eye exhibits no discharge. No scleral icterus. Neck: Normal range of motion. Neck supple. No JVD present. No tracheal deviation present. Cardiovascular: Normal rate, regular rhythm and intact distal pulses.     Pulmonary/Chest: Effort normal and breath sounds normal. No stridor. No respiratory distress. He has no wheezes. He exhibits no tenderness. Abdominal: Soft. Bowel sounds are normal. He exhibits no distension and no mass. There is no tenderness. Musculoskeletal: Normal range of motion. He exhibits no edema or tenderness. Neurological: He is alert and oriented to person, place, and time. No cranial nerve deficit. Skin: Skin is warm and dry. No rash noted. He is not diaphoretic. No erythema. No pallor. Psychiatric: He has a normal mood and affect. His behavior is normal. Thought content normal.   Nursing note and vitals reviewed. MDM  Number of Diagnoses or Management Options  Peritoneal dialysis finding [facility]): established and worsening  SBP (spontaneous bacterial peritonitis) ([facility] [address]): established and worsening  Diagnosis management comments: Diffuse abdominal tenderness consistent with SBP dialysate wwith gram-positive cocci and leukocytes. Awaiting nephrology return page to discuss intra-abdominal vancomycin otherwise patient submitted to the hospital service. Dominique Larios  Nephrology called back and came and saw pt. Dr. Delta Cline. She wants pt to be discharged after Vanc with cipro and pain meds outpatient and follow up Monday for a repeat Vanc dose. Hospitalist notified. Will discharge per nephrology.         Amount and/or Complexity of Data Reviewed  Clinical lab tests: ordered and reviewed  Tests in the radiology section of CPT®: ordered and reviewed  Tests in the medicine section of CPT®: ordered and reviewed    Risk of Complications, Morbidity, and/or Mortality  Presenting problems: high  Diagnostic procedures: high  Management options: high      ED Course       Procedures

## 2017-06-24 NOTE — ED TRIAGE NOTES
Patient states that he is having abdominal pain and has noticed that his dialysis fluid has changed color. States that last time he had this happen he had an infection.

## 2017-06-24 NOTE — Clinical Note
Status[de-identified] Inpatient [101] Type of Bed: Telemetry Remote [29] Inpatient Hospitalization Certified Necessary for the Following Reasons: 3. Patient receiving treatment that can only be provided in an inpatient setting (further clarification in H&P documentation) Admitting Diagnosis: Peritoneal dialysis-associated peritonitis ([facility] [address] [8744421] Admitting Diagnosis: Hypertensive urgency [8751583] Admitting Physician: Vianney Orozco [[id number]] Attending Physician: Vianney Orozco [[id number]] Estimated Length of Stay: 3-4 Midnights Discharge Plan[de-identified] Home with Office Follow-up

## 2017-06-24 NOTE — IP AVS SNAPSHOT
[address] [facility] 
[phone] Patient: [facility] MRN: [id number] :1960 You are allergic to the following No active allergies Recent Documentation Height Weight BMI Smoking Status 1.956 m 115.7 kg 30.24 kg/m2 Former Smoker Unresulted Labs Order Current Status CULTURE, BLOOD In process CULTURE, BLOOD Preliminary result CULTURE, BODY FLUID W GRAM STAIN Preliminary result Emergency Contacts Name Discharge Info Relation Home Work Mobile Noe Dakins  Other Relative [6] [phone] About your hospitalization You were admitted on:  2017 You last received care in the:  [facility] 7 MED SURG You were discharged on:  2017 Unit phone number:  [phone] Why you were hospitalized Your primary diagnosis was:  Not on File Your diagnoses also included:  Peritoneal Dialysis-Associated Peritonitis (Hcc), Hypertensive Urgency Providers Seen During Your Hospitalizations Provider Role Specialty Primary office phone Sanam Michelle MD Attending Provider Emergency Medicine [phone] Karen Lugo MD Attending Provider Internal Medicine [phone] Your Primary Care Physician (PCP) Primary Care Physician Office Phone Office Fax [facility] [phone] Follow-up Information None Current Discharge Medication List  
  
ASK your doctor about these medications Dose & Instructions Dispensing Information Comments Morning Noon Evening Bedtime  
 aspirin delayed-release 81 mg tablet Your last dose was: Your next dose is:    
   
   
 Dose:  81 mg Take 81 mg by mouth daily (with lunch). Refills:  0  
     
   
   
   
  
 calcitRIOL 0.25 mcg capsule Commonly known as:  ROCALTROL Your last dose was: Your next dose is:    
   
   
 Dose:  0.25 mcg Take 0.25 mcg by mouth every seven (7) days. 5 capsules weekly Refills:  0 CENTRUM SILVER Tab tablet Generic drug:  multivitamins-minerals-lutein Your last dose was: Your next dose is:    
   
   
 Dose:  1 Tab Take 1 Tab by mouth daily. Refills:  0  
     
   
   
   
  
 cloNIDine HCl 0.2 mg tablet Commonly known as:  CATAPRES Your last dose was: Your next dose is:    
   
   
 Dose:  0.2 mg Take 0.2 mg by mouth three (3) times daily. Instructed to take DOS per Anesthesia guidelines. Indications: HYPERTENSION Refills:  0  
     
   
   
   
  
 lisinopril 10 mg tablet Commonly known as:  Khadijah Ebony Your last dose was: Your next dose is:    
   
   
 Dose:  40 mg Take 40 mg by mouth daily. Refills:  0 NORCO  mg tablet Generic drug:  HYDROcodone-acetaminophen Your last dose was: Your next dose is:    
   
   
 Dose:  1 Tab Take 1 Tab by mouth every six (6) hours as needed for Pain. Refills:  0 PROCARDIA XL 90 mg ER tablet Generic drug:  NIFEdipine ER Your last dose was: Your next dose is:    
   
   
 Dose:  90 mg Take 90 mg by mouth daily. Instructed to take DOS per Anesthesia guidelines. Indications: HYPERTENSION Refills:  0 SENSIPAR 30 mg tablet Generic drug:  cinacalcet Your last dose was: Your next dose is:    
   
   
 Dose:  30 mg Take 30 mg by mouth daily. Refills:  0  
     
   
   
   
  
 traMADol 50 mg tablet Commonly known as:  ULTRAM  
   
Your last dose was: Your next dose is:    
   
   
 Dose:   mg Take 1-2 Tabs by mouth every eight (8) hours as needed for Pain. Max Daily Amount: 300 mg. Quantity:  23 Tab Refills:  0  
     
   
   
   
  
 ZANTAC 150 mg tablet Generic drug:  raNITIdine Your last dose was: Your next dose is:    
   
   
 Dose:  150 mg Take 150 mg by mouth every morning. Refills:  0 Discharge Instructions None Discharge Orders None ACO Transitions of Care Introducing [facility] [employer] offers a voluntary care coordination program to provide high quality service and care to [facility] fee-for-service beneficiaries. Kathy Tee was designed to help you enhance your health and well-being through the following services: ? Transitions of Care  support for individuals who are transitioning from one care setting to another (example: Hospital to home). ? Chronic and Complex Care Coordination  support for individuals and caregivers of those with serious or chronic illnesses or with more than one chronic (ongoing) condition and those who take a number of different medications. If you meet specific medical criteria, a [address] [place] [address] Rd may call you directly to coordinate your care with your primary care physician and your other care providers. For questions about the [facility] programs, please, contact your physicians office. For general questions or additional information about Accountable Care Organizations: 
Please visit www.medicare.gov/acos. html or call 1-800-MEDICARE (3-129.256.2165) TTY users should call 8-541.559.1598. Introducing [facility]! Jensengloria Joselin introduces [facility] patient portal. Now you can access parts of your medical record, email your doctor's office, and request medication refills online. 1. In your internet browser, go to [email or web address] 2. Click on the First Time User? Click Here link in the Sign In box. You will see the New Member Sign Up page. 3. Enter your [facility] Access Code exactly as it appears below.  You will not need to use this code after youve completed the sign-up process. If you do not sign up before the expiration date, you must request a new code. · "[employer] Access Code: [email or web address] Expires: 7/8/2017  6:50 PM 
 
4. Enter the last four digits of your Social Security Number (xxxx) and Date of Birth (mm/dd/yyyy) as indicated and click Submit. You will be taken to the next sign-up page. 5. Create a "[employer] ID. This will be your "[employer] login ID and cannot be changed, so think of one that is secure and easy to remember. 6. Create a "[employer] password. You can change your password at any time. 7. Enter your Password Reset Question and Answer. This can be used at a later time if you forget your password. 8. Enter your e-mail address. You will receive e-mail notification when new information is available in [address] 9. Click Sign Up. You can now view and download portions of your medical record. 10. Click the Download Summary menu link to download a portable copy of your medical information. If you have questions, please visit the Frequently Asked Questions section of the "[employer] website. Remember, "[employer] is NOT to be used for urgent needs. For medical emergencies, dial 911. Now available from your iPhone and Android! General Information Please provide this summary of care documentation to your next provider. Patient Signature:  ____________________________________________________________ Date:  ____________________________________________________________  
  
aLverne Engle Provider Signature:  ____________________________________________________________ Date:  ____________________________________________________________

## 2017-06-24 NOTE — CONSULTS
Nephrology consult    Admission Date:  6/24/2017    Admission Diagnosis  Peritoneal dialysis-associated peritonitis ([facility] [address]  Hypertensive urgency    We are asked by Dr. Reggie Muir    History of Present Illness:this is a 55year old male on PD for over two years now. He states everything has been doing well and he noted some ankle swelling for the previous 24 hours. He used a 4.25% regimen overnight and developed severe cramping with a 3.5 Liter UF. His effluent had been clear and his first bag this morning he noted to be abnormally cloudy. His home clinic is in [place], [place] and he came to the ER because it is after hours. He had one episode of nausea and emesis otherwise he has been tolerating oral well and he has had fevers and abdominal pain. He also has had regular bowel movements. Past Medical History:   Diagnosis Date    Anxiety     CAD (coronary artery disease)     per pt- no MI- \"They said the back of my heart was something. ..like 40%\"    Chronic kidney disease     stage 4; not on dialysis; pt has AV fistula to left arm; staRTED PERITONEAL DIALYSIS 2014    GERD (gastroesophageal reflux disease)     takes OTC med prn    Hypertension     controlled with med    Hypertensive nephrosclerosis     per nephrology note 4/14/14    PUD (peptic ulcer disease)     SBP (spontaneous bacterial peritonitis) ([facility] [address] 4/23/2848    Umbilical hernia     Unspecified sleep apnea     pt does not have a CPAP      Past Surgical History:   Procedure Laterality Date    ABDOMEN SURGERY PROC UNLISTED  6/11/14    PD catheter placement    HX AMPUTATION Right 1995    5th finger    HX ORTHOPAEDIC Left 1989    knee    HX ORTHOPAEDIC Left as a child    thumb    HX VASCULAR ACCESS Left 2012    AV fistula to arm      No current facility-administered medications for this encounter. Current Outpatient Prescriptions   Medication Sig    raNITIdine (ZANTAC) 150 mg tablet Take 150 mg by mouth every morning.     HYDROcodone-acetaminophen (NORCO)  mg tablet Take 1 Tab by mouth every six (6) hours as needed for Pain.  traMADol (ULTRAM) 50 mg tablet Take 1-2 Tabs by mouth every eight (8) hours as needed for Pain. Max Daily Amount: 300 mg.  calcitRIOL (ROCALTROL) 0.25 mcg capsule Take 0.25 mcg by mouth every seven (7) days. 5 capsules weekly    lisinopril (PRINIVIL, ZESTRIL) 10 mg tablet Take 40 mg by mouth daily.  cinacalcet (SENSIPAR) 30 mg tablet Take 30 mg by mouth daily.  multivitamins-minerals-lutein (CENTRUM SILVER) tab tablet Take 1 Tab by mouth daily.  NIFEdipine ER (PROCARDIA XL) 90 mg ER tablet Take 90 mg by mouth daily. Instructed to take DOS per Anesthesia guidelines. Indications: HYPERTENSION    cloNIDine (CATAPRES) 0.2 mg tablet Take 0.2 mg by mouth three (3) times daily. Instructed to take DOS per Anesthesia guidelines. Indications: HYPERTENSION    aspirin delayed-release 81 mg tablet Take 81 mg by mouth daily (with lunch).      No Known Allergies   Social History   Substance Use Topics    Smoking status: Former Smoker     Packs/day: 0.50     Years: 23.00     Quit date: 3/1/2014    Smokeless tobacco: Never Used      Comment: pt states only smoked cigarettes when drinking alcohol    Alcohol use No      Comment: on occasion      Family History   Problem Relation Age of Onset    Diabetes Mother     Hypertension Mother     Heart Disease Father     COPD Father     Hypertension Father         Review of Systems  Gen - no fever, no chills, appetite okay  HEENT - no sore throat, no decreased vision, no hearing loss  Neck - no neck mass  CV - no chest pain, no palpitation, no orthopnea  Lung - no shortness of breath, no cough, no hemoptysis  Abd - no tenderness, no nausea/vomiting, no bloody stool  Ext - no edema, no clubbing, no cyanosis  Musculoskeletal - no joint pain, no back pain  Neurologic - no headaches, no dizziness, no seizures  Psychiatric - no anxiety, no depression  Skin - no rashes, no pupura  Genitourinary - no decreased urine output, no hematuria, no foamy urine    Objective:     Vitals:    06/24/17 0816 06/24/17 0831 06/24/17 0840 06/24/17 0846   BP: (!) 163/96 (!) 151/93 (!) 151/93 (!) 163/92   Pulse: 96 94 94 99   Resp: 19 16  16   Temp:    98.9 °F (37.2 °C)   SpO2: 98% 97%  100%   Weight:       Height:         No intake or output data in the 24 hours ending 06/24/17 0914    Physical Exam  GEN :in no distress, alert and oriented  HEENT: anicteric sclerae, eomi. Oropharynx without lesions. Mucous membranes are moist.  Neck - supple without JVD, no thyromegaly. No lymphadenopathy. CV - regular rate and rhythm, no murmur, no rub  Lung - clear bilaterally, lungs expand symmetrically  Chest wall - normal appearance  Abd - soft, nontender, bowel sounds present, no hepatosplenomegaly  Ext - no clubbing, no cyanosis, no edema  Neurologic - nonfocal  Genitourinary - bladder nonpalpable  Skin - no rashes, no purpura, no ecchymoses  Psychiatric: Normal mood and affect. Data Review:   Recent Labs      06/24/17   0555   WBC  12.2*   HGB  10.6*   HCT  31.1*   PLT  199     Recent Labs      06/24/17   0555   NA  139   K  3.6   CL  96*   CO2  31   BUN  46*   CREA  15.80*   GLU  125*   CA  8.8     No results for input(s): PH, PCO2, PO2, PCO2 in the last 72 hours. Problem List:     Patient Active Problem List    Diagnosis Date Noted    Peritoneal dialysis-associated peritonitis ([address] 06/24/2017    Hypertensive urgency 06/24/2017    Tibial plateau fracture, left 03/23/2017    Fracture, tibial plateau 84/64/7860    SBP (spontaneous bacterial peritonitis) ([address] 09/24/2016    Sepsis ([address] 09/24/2016    Essential hypertension 04/13/2016    Unspecified sleep apnea 08/07/2015    CKD (chronic kidney disease), stage IV ([address] 33/58/7587    Umbilical hernia 44/48/4108       Impression:    Plan:   1. ESRD on PD   2. Peritonitis  3.  Gram stain and C&S done  Pt given IV vancomycin   Ashly Pollard written a RX cipro 500 mg po bid #20 1 RF\  Norco 10/325 mg q 6 prn #12 0RF  Pt will f/u with [facility] Monday for further treatments and reculture once abx over  He voices understanding and wishes to proceed  He will avoid 4.25% unless he has breathing problems

## 2017-06-24 NOTE — IP AVS SNAPSHOT
Current Discharge Medication List  
  
ASK your doctor about these medications Dose & Instructions Dispensing Information Comments Morning Noon Evening Bedtime  
 aspirin delayed-release 81 mg tablet Your last dose was: Your next dose is:    
   
   
 Dose:  81 mg Take 81 mg by mouth daily (with lunch). Refills:  0  
     
   
   
   
  
 calcitRIOL 0.25 mcg capsule Commonly known as:  ROCALTROL Your last dose was: Your next dose is:    
   
   
 Dose:  0.25 mcg Take 0.25 mcg by mouth every seven (7) days. 5 capsules weekly Refills:  0 CENTRUM SILVER Tab tablet Generic drug:  multivitamins-minerals-lutein Your last dose was: Your next dose is:    
   
   
 Dose:  1 Tab Take 1 Tab by mouth daily. Refills:  0  
     
   
   
   
  
 cloNIDine HCl 0.2 mg tablet Commonly known as:  CATAPRES Your last dose was: Your next dose is:    
   
   
 Dose:  0.2 mg Take 0.2 mg by mouth three (3) times daily. Instructed to take DOS per Anesthesia guidelines. Indications: HYPERTENSION Refills:  0  
     
   
   
   
  
 lisinopril 10 mg tablet Commonly known as:  Shaun Apo Your last dose was: Your next dose is:    
   
   
 Dose:  40 mg Take 40 mg by mouth daily. Refills:  0 NORCO  mg tablet Generic drug:  HYDROcodone-acetaminophen Your last dose was: Your next dose is:    
   
   
 Dose:  1 Tab Take 1 Tab by mouth every six (6) hours as needed for Pain. Refills:  0 PROCARDIA XL 90 mg ER tablet Generic drug:  NIFEdipine ER Your last dose was: Your next dose is:    
   
   
 Dose:  90 mg Take 90 mg by mouth daily. Instructed to take DOS per Anesthesia guidelines. Indications: HYPERTENSION Refills:  0 SENSIPAR 30 mg tablet Generic drug:  cinacalcet Your last dose was: Your next dose is:    
   
   
 Dose:  30 mg Take 30 mg by mouth daily. Refills:  0  
     
   
   
   
  
 traMADol 50 mg tablet Commonly known as:  ULTRAM  
   
Your last dose was: Your next dose is:    
   
   
 Dose:   mg Take 1-2 Tabs by mouth every eight (8) hours as needed for Pain. Max Daily Amount: 300 mg. Quantity:  23 Tab Refills:  0  
     
   
   
   
  
 ZANTAC 150 mg tablet Generic drug:  raNITIdine Your last dose was: Your next dose is:    
   
   
 Dose:  150 mg Take 150 mg by mouth every morning. Refills:  0

## 2017-06-25 ENCOUNTER — PATIENT OUTREACH (OUTPATIENT)
Dept: CASE MANAGEMENT | Age: 57
End: 2017-06-25

## 2017-06-25 NOTE — PROGRESS NOTES
Transition of Care Discharge Follow-up Questionnaire   Date/Time of Call:   6/25/17 6:50p   What was the patient hospitalized for? SBP (spontaneous bacterial peritonitis) ([facility] [address]    Peritoneal dialysis finding [facility])         Does the patient understand his/her diagnosis and/or treatment and what happened during the hospitalization? Patient understands diagnosis and treatment during hospitalization. Did the patient receive discharge instructions? Yes   Review any discharge instructions (see notes in [facility]). Ask patient if they understand these. Do they have any questions? He does not have any questions in regards to instructions. Were home services ordered (nursing, PT, OT, ST, etc.)? No   If so, has the first visit occurred? If not, why? (Assist with coordination of services if necessary.) n/a   Was any DME ordered? No   If so, has it been received? If not, why?  (Assist with coordination of arranging DME orders if necessary.) n/a   Complete a review of all medications (new, continued and discontinued meds per the D/C instructions and medication tab in [facility]). Patient and care coordinator reviewed current medications. An ABT (not displayed on chart review per [facility]) and Norco   Were all new prescriptions filled? If not, why?  (Assist with obtainment of medications if necessary.) Yes, patient taking ABT and [place] for pain. Does the patient understand the purpose and dosing instructions for all medications? (If patient has questions, provide explanation and education.) Yes   Does the patient have any problems in performing ADLs? (If patient is unable to perform ADLs  what is the limiting factor(s)? Do they have a support system that can assist? If no support system is present, discuss possible assistance that they may be able to obtain.) Patient does not verbalize having any problems in performing ADLs. Patient states, \"I've been managing. I'm good.  I will follow-up with my doctor tomorrow. \"        Does the patient have all follow-up appointments scheduled? Has transportation been arranged? [facility] Pulmonary follow-up should be within 7 days of discharge; all others should have PCP follow-up within 7 days of discharge; follow-ups with other specialists as appropriate or ordered.) Patient does not have f/u appointment scheduled with PCP. Patient states, \"I will follow-up with my Dialysis physician. This doesn't have anything to do with Dr. Ngoc Mccollum. I will follow-up with my dialysis physician. \"        Any other questions or concerns expressed by the patient? No other questions or concerns were expressed by patient to care coordinator. He was thankful for services received at [facility]. and appreciative for follow-up call. GERALD Call Completed By:  Marlys Lemon LPN  [facility] [address] [facility] Coordinator          This note will not be viewable in [address]

## 2017-06-25 NOTE — Clinical Note
Admission f/u completed. [employer] patient needs 7 day f/u appointment. He probably won't go because he will f/u with his Nephrologist tomorrow. But just wanted to let you know. TY!

## 2017-06-27 LAB
BACTERIA SPEC CULT: ABNORMAL
GRAM STN SPEC: ABNORMAL
SERVICE CMNT-IMP: ABNORMAL

## 2017-06-29 LAB
BACTERIA SPEC CULT: NORMAL
BACTERIA SPEC CULT: NORMAL
SERVICE CMNT-IMP: NORMAL
SERVICE CMNT-IMP: NORMAL

## 2017-07-13 ENCOUNTER — PATIENT OUTREACH (OUTPATIENT)
Dept: CASE MANAGEMENT | Age: 57
End: 2017-07-13

## 2017-07-13 NOTE — PROGRESS NOTES
GERALD Call #2:    Patient verbalizes that he is \"doing okay. \" Patient is under the care of Dialysis physicians. Patient states he is receiving treatment at the clinic. Patient does not verbalize any new concerns or needs. He was thankful for follow-up call. This note will not be viewable in [address]

## 2018-03-22 ENCOUNTER — ANESTHESIA EVENT (OUTPATIENT)
Dept: ENDOSCOPY | Age: 58
End: 2018-03-22
Payer: MEDICARE

## 2018-03-22 RX ORDER — SODIUM CHLORIDE 0.9 % (FLUSH) 0.9 %
5-10 SYRINGE (ML) INJECTION AS NEEDED
Status: CANCELLED | OUTPATIENT
Start: 2018-03-22

## 2018-03-22 RX ORDER — SODIUM CHLORIDE, SODIUM LACTATE, POTASSIUM CHLORIDE, CALCIUM CHLORIDE 600; 310; 30; 20 MG/100ML; MG/100ML; MG/100ML; MG/100ML
100 INJECTION, SOLUTION INTRAVENOUS CONTINUOUS
Status: CANCELLED | OUTPATIENT
Start: 2018-03-22

## 2018-03-23 ENCOUNTER — ANESTHESIA (OUTPATIENT)
Dept: ENDOSCOPY | Age: 58
End: 2018-03-23
Payer: MEDICARE

## 2018-03-23 ENCOUNTER — HOSPITAL ENCOUNTER (OUTPATIENT)
Age: 58
Setting detail: OUTPATIENT SURGERY
Discharge: HOME | End: 2018-03-23
Attending: INTERNAL MEDICINE | Admitting: INTERNAL MEDICINE
Payer: MEDICARE

## 2018-03-23 VITALS
HEIGHT: 77 IN | WEIGHT: 286 LBS | HEART RATE: 69 BPM | OXYGEN SATURATION: 100 % | RESPIRATION RATE: 24 BRPM | SYSTOLIC BLOOD PRESSURE: 115 MMHG | TEMPERATURE: 96.8 F | DIASTOLIC BLOOD PRESSURE: 65 MMHG | BODY MASS INDEX: 33.77 KG/M2

## 2018-03-23 LAB — POTASSIUM BLD-SCNC: 3.4 MMOL/L (ref 3.5–5.1)

## 2018-03-23 PROCEDURE — 76060000032 HC ANESTHESIA 0.5 TO 1 HR: Performed by: INTERNAL MEDICINE

## 2018-03-23 PROCEDURE — 88312 SPECIAL STAINS GROUP 1: CPT | Performed by: INTERNAL MEDICINE

## 2018-03-23 PROCEDURE — 76040000026: Performed by: INTERNAL MEDICINE

## 2018-03-23 PROCEDURE — 74011250636 HC RX REV CODE- 250/636: Performed by: INTERNAL MEDICINE

## 2018-03-23 PROCEDURE — 88305 TISSUE EXAM BY PATHOLOGIST: CPT | Performed by: INTERNAL MEDICINE

## 2018-03-23 PROCEDURE — 74011250636 HC RX REV CODE- 250/636

## 2018-03-23 PROCEDURE — 84132 ASSAY OF SERUM POTASSIUM: CPT

## 2018-03-23 PROCEDURE — 77030009426 HC FCPS BIOP ENDOSC BSC -B: Performed by: INTERNAL MEDICINE

## 2018-03-23 RX ORDER — PROPOFOL 10 MG/ML
INJECTION, EMULSION INTRAVENOUS AS NEEDED
Status: DISCONTINUED | OUTPATIENT
Start: 2018-03-23 | End: 2018-03-23 | Stop reason: HOSPADM

## 2018-03-23 RX ORDER — SODIUM CHLORIDE 9 MG/ML
50 INJECTION, SOLUTION INTRAVENOUS CONTINUOUS
Status: DISCONTINUED | OUTPATIENT
Start: 2018-03-23 | End: 2018-03-23 | Stop reason: HOSPADM

## 2018-03-23 RX ORDER — PROPOFOL 10 MG/ML
INJECTION, EMULSION INTRAVENOUS
Status: DISCONTINUED | OUTPATIENT
Start: 2018-03-23 | End: 2018-03-23 | Stop reason: HOSPADM

## 2018-03-23 RX ADMIN — SODIUM CHLORIDE 50 ML/HR: 900 INJECTION, SOLUTION INTRAVENOUS at 08:30

## 2018-03-23 RX ADMIN — PROPOFOL 50 MG: 10 INJECTION, EMULSION INTRAVENOUS at 09:17

## 2018-03-23 RX ADMIN — PROPOFOL 50 MG: 10 INJECTION, EMULSION INTRAVENOUS at 09:15

## 2018-03-23 RX ADMIN — PROPOFOL 160 MCG/KG/MIN: 10 INJECTION, EMULSION INTRAVENOUS at 09:15

## 2018-03-23 RX ADMIN — PROPOFOL 50 MG: 10 INJECTION, EMULSION INTRAVENOUS at 09:18

## 2018-03-23 NOTE — PROCEDURES
DATE OF PROCEDURE: 3/23/18    REQUESTING PHYSICIAN: Dr Dot Latif: Esophagogastroduodenoscopy. ENDOSCOPIST: Joann Whitehead M.D. PREOPERATIVE DIAGNOSIS: Anemia, Heme + stool, Kidney transplant eval, GERD    POSTOPERATIVE DIAGNOSIS: Reflux esophagitis, Gastritis, Duodenitis, Irregular Z line    INSTRUMENTS: GIF H190    SEDATION: MAC    DESCRIPTION: After informed consent was obtained, the patient was taken to the endoscopy suite and placed in the left lateral decubitus position. Intravenous sedation was administered as above and posterior pharynx was anesthetized with local anesthetic spray. After adequate sedation had been achieved the endoscope was inserted over the tongue and through the posterior pharynx under direct visualization down the esophagus to the stomach and into the second portion of the duodenum. The endoscopic was withdrawn from that point, performing a careful survey of the mucosa. Retroflexion was performed in the gastric fundus. FINDINGS:  Esophagus: Normal mucosa with mild healing LA grade A reflux esophagitis with irregular Z line. Bx's. Stomach: Normal proximal mucosa with mild inflammation in the antrum. Bx's. Duodenum: Nodular duodenitis in the bulb with remaining mucosa being normal.     Estimated blood loss:  0 cc-minimal    IMPRESSION:   Gastritis  Duodenitis  Reflux esophagitis  Irregular Z line    PLAN:  - F/u path and treat H pylori if +  - Repeat EGD in one yr if Pineda's  - Continue daily Omeprazole  - Proceed to colo          PROCEDURE: Colonoscopy. PREOPERATIVE DIAGNOSIS: CRCS, Anemia, Heme + stool, Kidney transplant eval    POSTOPERATIVE DIAGNOSIS: Polyps, Diverticulosis, Internal hemorrhoids     SEDATION: MAC    INSTRUMENT: CF H190    DESCRIPTION OF PROCEDURE:  After informed consent was obtained the patient was placed in the left lateral decubitus position. Intravenous sedation was administered as above.   After adequate sedation had been achieved, digital rectal examination was performed. The colonoscope was then inserted through the anus and followed the course of the rectum and colon to the cecum, which was confirmed by visualization of the ileocecal valve and appendiceal orifice. The colonoscope was withdrawn from that point, performing a careful survey of the mucosa. Retroflexion was performed in the rectum. FINDINGS:  Normal colonic mucosa from rectum to cecum without inflammation. 4mm ascending polyp removed with bx forceps. 6mm transverse polyp removed with cold snare. 5mm sigmoid polyp removed with cold snare. Mild sigmoid diverticulosis. Internal hemorrhoids seen on retroflexion. Fair to poor prep though no large masses or cancer seen.      Estimated Blood Loss:  0 cc-min    IMPRESSION:  Colon polyps  Diverticulosis  Internal hemorrhoids     PLAN:  - F/u path  - Repeat colo in 2yrs with 2 day prep  - Proceed with transplant eval P Edris Boxer, MD

## 2018-03-23 NOTE — DISCHARGE INSTRUCTIONS
Gastrointestinal Colonoscopy/Flexible Sigmoidoscopy - Lower Exam Discharge Instructions  1. Call Dr. Thang Gresham at [phone] for any problems or questions. 2. Contact the doctors office for follow up appointment as directed  3. Medication may cause drowsiness for several hours, therefore, do not drive or operate machinery for remainder of the day. 4. No alcohol today. 5. Ordinarily, you may resume regular diet and activity after exam unless otherwise specified by your physician. 6. Because of air put into your colon during exam, you may experience some abdominal distension, relieved by the passage of gas, for several hours. 7. Contact your physician if you have any of the following:  a. Excessive amount of bleeding - large amount when having a bowel movement. Occasional specks of blood with bowel movement would not be unusual.  b. Severe abdominal pain  c. Fever or Chills    Any additional instructions: Follow up as needed      Gastrointestinal Esophagogastroduodenoscopy (EGD) - Upper Exam Discharge Instructions    1. Call Dr. Thang Gresham at [phone] for any problems or questions. 2. Contact the doctor's office for follow up appointment as directed. 3. Medication may cause drowsiness for several hours, therefore, do not drive or  operate machinery for remainder of the day. 4. No alcohol today. 5. Ordinarily, you may resume regular diet and activity after exam unless otherwise specified by your physician. 6. For mild soreness in your throat you may use Cepacol throat lozenges or warm  salt-water gargles as needed. Any additional instructions:   Follow up as needed

## 2018-03-23 NOTE — IP AVS SNAPSHOT
[address] 
[phone] Patient: Shorty Agrawal MRN: [id number] :1960 About your hospitalization You were admitted on:  2018 You last received care in the:  [facility] You were discharged on:  2018 Why you were hospitalized Your primary diagnosis was:  Not on File Follow-up Information None Discharge Orders None A check jayesh indicates which time of day the medication should be taken. My Medications ASK your doctor about these medications Instructions Each Dose to Equal  
 Morning Noon Evening Bedtime  
 aspirin delayed-release 81 mg tablet Your last dose was: Your next dose is: Take 81 mg by mouth nightly. 81 mg  
    
   
   
   
  
 calcitRIOL 0.25 mcg capsule Commonly known as:  ROCALTROL Your last dose was: Your next dose is: Take 0.25 mcg by mouth nightly. 5 capsules weekly 0.25 mcg CENTRUM SILVER Tab tablet Generic drug:  multivitamins-minerals-lutein Your last dose was: Your next dose is: Take 1 Tab by mouth daily. 1 Tab  
    
   
   
   
  
 cloNIDine HCl 0.2 mg tablet Commonly known as:  CATAPRES Your last dose was: Your next dose is: Take 0.2 mg by mouth three (3) times daily. Instructed to take DOS per Anesthesia guidelines. Indications: HYPERTENSION  
 0.2 mg  
    
   
   
   
  
 NORCO  mg tablet Generic drug:  HYDROcodone-acetaminophen Your last dose was: Your next dose is: Take 1 Tab by mouth every six (6) hours as needed for Pain. 1 Tab  
    
   
   
   
  
 omeprazole 40 mg capsule Commonly known as:  PRILOSEC Your last dose was: Your next dose is: Take 40 mg by mouth nightly.   
 40 mg  
    
   
   
   
 PROCARDIA XL 90 mg ER tablet Generic drug:  NIFEdipine ER Your last dose was: Your next dose is: Take 90 mg by mouth nightly. Instructed to take DOS per Anesthesia guidelines. Indications: hypertension 90 mg SENSIPAR 30 mg tablet Generic drug:  cinacalcet Your last dose was: Your next dose is: Take 30 mg by mouth nightly. 30 mg  
    
   
   
   
  
 traMADol 50 mg tablet Commonly known as:  ULTRAM  
   
Your last dose was: Your next dose is: Take 1-2 Tabs by mouth every eight (8) hours as needed for Pain. Max Daily Amount: 300 mg.  
  mg  
    
   
   
   
  
 valsartan 320 mg tablet Commonly known as:  DIOVAN Your last dose was: Your next dose is: Take 320 mg by mouth nightly. 320 mg Discharge Instructions Gastrointestinal Colonoscopy/Flexible Sigmoidoscopy - Lower Exam Discharge Instructions 1. Call Dr. Shelia Fuller at [phone] for any problems or questions. 2. Contact the doctors office for follow up appointment as directed 3. Medication may cause drowsiness for several hours, therefore, do not drive or operate machinery for remainder of the day. 4. No alcohol today. 5. Ordinarily, you may resume regular diet and activity after exam unless otherwise specified by your physician. 6. Because of air put into your colon during exam, you may experience some abdominal distension, relieved by the passage of gas, for several hours. 7. Contact your physician if you have any of the following: 
a. Excessive amount of bleeding  large amount when having a bowel movement. Occasional specks of blood with bowel movement would not be unusual. 
b. Severe abdominal pain 
c. Fever or Chills Any additional instructions: Follow up as needed Gastrointestinal Esophagogastroduodenoscopy (EGD) - Upper Exam Discharge Instructions 1. Call Dr. Tutu Story at [phone] for any problems or questions. 2. Contact the doctor's office for follow up appointment as directed. 3. Medication may cause drowsiness for several hours, therefore, do not drive or  operate machinery for remainder of the day. 4. No alcohol today. 5. Ordinarily, you may resume regular diet and activity after exam unless otherwise specified by your physician. 6. For mild soreness in your throat you may use Cepacol throat lozenges or warm  salt-water gargles as needed. Any additional instructions: Follow up as needed ACO Transitions of Care Introducing [employer] [address]azmin Rush offers a voluntary care coordination program to provide high quality service and care to [facility] fee-for-service beneficiaries. Michelle Ng was designed to help you enhance your health and well-being through the following services: ? Transitions of Care  support for individuals who are transitioning from one care setting to another (example: Hospital to home). ? Chronic and Complex Care Coordination  support for individuals and caregivers of those with serious or chronic illnesses or with more than one chronic (ongoing) condition and those who take a number of different medications. If you meet specific medical criteria, a [facility] [address] may call you directly to coordinate your care with your primary care physician and your other care providers. For questions about the [facility] programs, please, contact your physicians office. For general questions or additional information about Accountable Care Organizations: 
Please visit www.medicare.gov/acos. html or call 1-800-MEDICARE (6-410.769.1900) TTY users should call 6-805.393.6609. Introducing [facility] [place] [facility]!    
 [facility] introduces [employer] patient portal. Now you can access parts of your medical record, email your doctor's office, and request medication refills online. 1. In your internet browser, go to [email or web address] 2. Click on the First Time User? Click Here link in the Sign In box. You will see the New Member Sign Up page. 3. Enter your [employer] Access Code exactly as it appears below. You will not need to use this code after youve completed the sign-up process. If you do not sign up before the expiration date, you must request a new code. · [employer] Access Code: [email or web address] Expires: 4/6/2018  9:49 AM 
 
4. Enter the last four digits of your Social Security Number (xxxx) and Date of Birth (mm/dd/yyyy) as indicated and click Submit. You will be taken to the next sign-up page. 5. Create a [employer] ID. This will be your [employer] login ID and cannot be changed, so think of one that is secure and easy to remember. 6. Create a [employer] password. You can change your password at any time. 7. Enter your Password Reset Question and Answer. This can be used at a later time if you forget your password. 8. Enter your e-mail address. You will receive e-mail notification when new information is available in [address] 9. Click Sign Up. You can now view and download portions of your medical record. 10. Click the Download Summary menu link to download a portable copy of your medical information. If you have questions, please visit the Frequently Asked Questions section of the [employer] website. Remember, [employer] is NOT to be used for urgent needs. For medical emergencies, dial 911. Now available from your iPhone and Android! Providers Seen During Your Hospitalization Provider Specialty Primary office phone Chino Cross MD Gastroenterology [phone] Your Primary Care Physician (PCP) Primary Care Physician Office Phone Office Fax Wild Marx [phone] You are allergic to the following No active allergies Recent Documentation Height Weight BMI Smoking Status 1.956 m 129.7 kg 33.91 kg/m2 Former Smoker Emergency Contacts Name Discharge Info Relation Home Work Mobile Percy Todd  Brother [24] [phone] Fabian Ruizs CAREGIVER [3] Friend [5] [phone] Patient Belongings The following personal items are in your possession at time of discharge: 
  Dental Appliances: None  Visual Aid: None Please provide this summary of care documentation to your next provider. Signatures-by signing, you are acknowledging that this After Visit Summary has been reviewed with you and you have received a copy. Patient Signature:  ____________________________________________________________ Date:  ____________________________________________________________  
  
[facility] Provider Signature:  ____________________________________________________________ Date:  ____________________________________________________________

## 2018-03-23 NOTE — ANESTHESIA POSTPROCEDURE EVALUATION
Post-Anesthesia Evaluation and Assessment    Patient: Thelma Ivey MRN: [id number]  SSN: [id number]    YOB: 1960  Age: 65 y.o. Sex: male       Cardiovascular Function/Vital Signs  Visit Vitals    BP 93/50    Pulse 78    Temp 36 °C (96.8 °F)    Resp 16    Ht 6' 5\" (1.956 m)    Wt 129.7 kg (286 lb)    SpO2 97%    BMI 33.91 kg/m2       Patient is status post total IV anesthesia anesthesia for Procedure(s):  ESOPHAGOGASTRODUODENOSCOPY (EGD)  COLONOSCOPY  BMI 34  ESOPHAGOGASTRODUODENAL (EGD) BIOPSY  ENDOSCOPIC POLYPECTOMY. Nausea/Vomiting: None    Postoperative hydration reviewed and adequate. Pain:  Pain Scale 1: Numeric (0 - 10) (03/23/18 4244)  Pain Intensity 1: 0 (03/23/18 5734)   Managed    Neurological Status: At baseline    Mental Status and Level of Consciousness: Arousable    Pulmonary Status:   O2 Device: CO2 nasal cannula (03/23/18 9688)   Adequate oxygenation and airway patent    Complications related to anesthesia: None    Post-anesthesia assessment completed.  No concerns    Signed By: Diana Burr MD     March 23, 2018

## 2018-03-23 NOTE — ANESTHESIA PREPROCEDURE EVALUATION
Anesthetic History   No history of anesthetic complications            Review of Systems / Medical History  Patient summary reviewed and pertinent labs reviewed    Pulmonary        Sleep apnea: No treatment           Neuro/Psych   Within defined limits           Cardiovascular    Hypertension: well controlled            Pertinent negatives: No angina  Exercise tolerance: >4 METS     GI/Hepatic/Renal     GERD    Renal disease: CRI and ESRD       Endo/Other        Obesity and anemia (Hgb 7.7 - pt reports this is chronic)     Other Findings              Physical Exam    Airway  Mallampati: III  TM Distance: > 6 cm  Neck ROM: normal range of motion   Mouth opening: Normal     Cardiovascular    Rhythm: regular  Rate: normal         Dental    Dentition: Poor dentition     Pulmonary  Breath sounds clear to auscultation               Abdominal         Other Findings            Anesthetic Plan    ASA: 3  Anesthesia type: total IV anesthesia          Induction: Intravenous  Anesthetic plan and risks discussed with: Patient

## 2018-03-23 NOTE — ROUTINE PROCESS
Pt. Discharged to car by Jefry Viera with friend . Vital signs stable. Able to tolerate PO fluids. Passing gas.  Seen by MD.

## 2018-08-06 PROBLEM — E66.01 SEVERE OBESITY (BMI 35.0-39.9): Status: ACTIVE | Noted: 2018-08-06

## 2018-12-06 RX ORDER — CEFAZOLIN SODIUM/WATER 2 G/20 ML
2 SYRINGE (ML) INTRAVENOUS
Status: CANCELLED | OUTPATIENT
Start: 2018-12-06 | End: 2018-12-06

## 2018-12-11 ENCOUNTER — ANESTHESIA (OUTPATIENT)
Dept: SURGERY | Age: 58
End: 2018-12-11
Payer: MEDICARE

## 2018-12-11 ENCOUNTER — HOSPITAL ENCOUNTER (OUTPATIENT)
Age: 58
Setting detail: OUTPATIENT SURGERY
Discharge: HOME | End: 2018-12-11
Attending: UROLOGY | Admitting: UROLOGY
Payer: MEDICARE

## 2018-12-11 ENCOUNTER — ANESTHESIA EVENT (OUTPATIENT)
Dept: SURGERY | Age: 58
End: 2018-12-11
Payer: MEDICARE

## 2018-12-11 VITALS
HEIGHT: 77 IN | DIASTOLIC BLOOD PRESSURE: 86 MMHG | TEMPERATURE: 97.4 F | SYSTOLIC BLOOD PRESSURE: 125 MMHG | RESPIRATION RATE: 18 BRPM | OXYGEN SATURATION: 96 % | WEIGHT: 315 LBS | BODY MASS INDEX: 37.19 KG/M2 | HEART RATE: 75 BPM

## 2018-12-11 DIAGNOSIS — R31.0 GROSS HEMATURIA: Primary | ICD-10-CM

## 2018-12-11 LAB
POTASSIUM BLD-SCNC: 3.4 MMOL/L (ref 3.5–5.1)
PSA SERPL-MCNC: 0.7 NG/ML

## 2018-12-11 PROCEDURE — 76060000032 HC ANESTHESIA 0.5 TO 1 HR: Performed by: UROLOGY

## 2018-12-11 PROCEDURE — 76210000020 HC REC RM PH II FIRST 0.5 HR: Performed by: UROLOGY

## 2018-12-11 PROCEDURE — 74011250637 HC RX REV CODE- 250/637: Performed by: ANESTHESIOLOGY

## 2018-12-11 PROCEDURE — 84132 ASSAY OF SERUM POTASSIUM: CPT

## 2018-12-11 PROCEDURE — 77030033269 HC SLV COMPR SCD KNE2 CARD -B: Performed by: UROLOGY

## 2018-12-11 PROCEDURE — 77030010509 HC AIRWY LMA MSK TELE -A: Performed by: ANESTHESIOLOGY

## 2018-12-11 PROCEDURE — 76210000006 HC OR PH I REC 0.5 TO 1 HR: Performed by: UROLOGY

## 2018-12-11 PROCEDURE — 74011250636 HC RX REV CODE- 250/636

## 2018-12-11 PROCEDURE — 74011636320 HC RX REV CODE- 636/320: Performed by: UROLOGY

## 2018-12-11 PROCEDURE — C1769 GUIDE WIRE: HCPCS | Performed by: UROLOGY

## 2018-12-11 PROCEDURE — 77030019927 HC TBNG IRR CYSTO BAXT -A: Performed by: UROLOGY

## 2018-12-11 PROCEDURE — 88112 CYTOPATH CELL ENHANCE TECH: CPT

## 2018-12-11 PROCEDURE — 74011250636 HC RX REV CODE- 250/636: Performed by: ANESTHESIOLOGY

## 2018-12-11 PROCEDURE — C1758 CATHETER, URETERAL: HCPCS | Performed by: UROLOGY

## 2018-12-11 PROCEDURE — 74011250636 HC RX REV CODE- 250/636: Performed by: UROLOGY

## 2018-12-11 PROCEDURE — 84153 ASSAY OF PSA TOTAL: CPT

## 2018-12-11 PROCEDURE — 76010000160 HC OR TIME 0.5 TO 1 HR INTENSV-TIER 1: Performed by: UROLOGY

## 2018-12-11 RX ORDER — SODIUM CHLORIDE 0.9 % (FLUSH) 0.9 %
5-10 SYRINGE (ML) INJECTION AS NEEDED
Status: DISCONTINUED | OUTPATIENT
Start: 2018-12-11 | End: 2018-12-11 | Stop reason: HOSPADM

## 2018-12-11 RX ORDER — ONDANSETRON 2 MG/ML
INJECTION INTRAMUSCULAR; INTRAVENOUS AS NEEDED
Status: DISCONTINUED | OUTPATIENT
Start: 2018-12-11 | End: 2018-12-11 | Stop reason: HOSPADM

## 2018-12-11 RX ORDER — SODIUM CHLORIDE 9 MG/ML
50 INJECTION, SOLUTION INTRAVENOUS CONTINUOUS
Status: DISCONTINUED | OUTPATIENT
Start: 2018-12-11 | End: 2018-12-11 | Stop reason: HOSPADM

## 2018-12-11 RX ORDER — SODIUM CHLORIDE 0.9 % (FLUSH) 0.9 %
5-10 SYRINGE (ML) INJECTION EVERY 8 HOURS
Status: DISCONTINUED | OUTPATIENT
Start: 2018-12-11 | End: 2018-12-11 | Stop reason: HOSPADM

## 2018-12-11 RX ORDER — MIDAZOLAM HYDROCHLORIDE 1 MG/ML
2 INJECTION, SOLUTION INTRAMUSCULAR; INTRAVENOUS
Status: DISCONTINUED | OUTPATIENT
Start: 2018-12-11 | End: 2018-12-11 | Stop reason: HOSPADM

## 2018-12-11 RX ORDER — FENTANYL CITRATE 50 UG/ML
INJECTION, SOLUTION INTRAMUSCULAR; INTRAVENOUS AS NEEDED
Status: DISCONTINUED | OUTPATIENT
Start: 2018-12-11 | End: 2018-12-11 | Stop reason: HOSPADM

## 2018-12-11 RX ORDER — DEXAMETHASONE SODIUM PHOSPHATE 4 MG/ML
INJECTION, SOLUTION INTRA-ARTICULAR; INTRALESIONAL; INTRAMUSCULAR; INTRAVENOUS; SOFT TISSUE AS NEEDED
Status: DISCONTINUED | OUTPATIENT
Start: 2018-12-11 | End: 2018-12-11 | Stop reason: HOSPADM

## 2018-12-11 RX ORDER — SODIUM CHLORIDE, SODIUM LACTATE, POTASSIUM CHLORIDE, CALCIUM CHLORIDE 600; 310; 30; 20 MG/100ML; MG/100ML; MG/100ML; MG/100ML
1000 INJECTION, SOLUTION INTRAVENOUS CONTINUOUS
Status: DISCONTINUED | OUTPATIENT
Start: 2018-12-11 | End: 2018-12-11 | Stop reason: HOSPADM

## 2018-12-11 RX ORDER — OXYCODONE AND ACETAMINOPHEN 5; 325 MG/1; MG/1
1 TABLET ORAL
Qty: 10 TAB | Refills: 0 | Status: SHIPPED | OUTPATIENT
Start: 2018-12-11 | End: 2020-11-09 | Stop reason: ALTCHOICE

## 2018-12-11 RX ORDER — PROPOFOL 10 MG/ML
INJECTION, EMULSION INTRAVENOUS AS NEEDED
Status: DISCONTINUED | OUTPATIENT
Start: 2018-12-11 | End: 2018-12-11 | Stop reason: HOSPADM

## 2018-12-11 RX ORDER — ALBUTEROL SULFATE 0.83 MG/ML
2.5 SOLUTION RESPIRATORY (INHALATION) AS NEEDED
Status: DISCONTINUED | OUTPATIENT
Start: 2018-12-11 | End: 2018-12-11 | Stop reason: HOSPADM

## 2018-12-11 RX ORDER — LIDOCAINE HYDROCHLORIDE 20 MG/ML
INJECTION, SOLUTION EPIDURAL; INFILTRATION; INTRACAUDAL; PERINEURAL AS NEEDED
Status: DISCONTINUED | OUTPATIENT
Start: 2018-12-11 | End: 2018-12-11 | Stop reason: HOSPADM

## 2018-12-11 RX ORDER — ONDANSETRON 2 MG/ML
4 INJECTION INTRAMUSCULAR; INTRAVENOUS
Status: DISCONTINUED | OUTPATIENT
Start: 2018-12-11 | End: 2018-12-11 | Stop reason: HOSPADM

## 2018-12-11 RX ORDER — HYDROMORPHONE HYDROCHLORIDE 2 MG/ML
0.5 INJECTION, SOLUTION INTRAMUSCULAR; INTRAVENOUS; SUBCUTANEOUS
Status: DISCONTINUED | OUTPATIENT
Start: 2018-12-11 | End: 2018-12-11 | Stop reason: HOSPADM

## 2018-12-11 RX ORDER — LIDOCAINE HYDROCHLORIDE 10 MG/ML
0.1 INJECTION INFILTRATION; PERINEURAL AS NEEDED
Status: DISCONTINUED | OUTPATIENT
Start: 2018-12-11 | End: 2018-12-11 | Stop reason: HOSPADM

## 2018-12-11 RX ORDER — ACETAMINOPHEN 500 MG
1000 TABLET ORAL ONCE
Status: COMPLETED | OUTPATIENT
Start: 2018-12-11 | End: 2018-12-11

## 2018-12-11 RX ADMIN — HYDROMORPHONE HYDROCHLORIDE 0.5 MG: 2 INJECTION, SOLUTION INTRAMUSCULAR; INTRAVENOUS; SUBCUTANEOUS at 09:15

## 2018-12-11 RX ADMIN — FENTANYL CITRATE 25 MCG: 50 INJECTION, SOLUTION INTRAMUSCULAR; INTRAVENOUS at 08:44

## 2018-12-11 RX ADMIN — FENTANYL CITRATE 25 MCG: 50 INJECTION, SOLUTION INTRAMUSCULAR; INTRAVENOUS at 08:36

## 2018-12-11 RX ADMIN — ACETAMINOPHEN 1000 MG: 500 TABLET, FILM COATED ORAL at 07:14

## 2018-12-11 RX ADMIN — DEXAMETHASONE SODIUM PHOSPHATE 4 MG: 4 INJECTION, SOLUTION INTRA-ARTICULAR; INTRALESIONAL; INTRAMUSCULAR; INTRAVENOUS; SOFT TISSUE at 08:47

## 2018-12-11 RX ADMIN — PROPOFOL 50 MG: 10 INJECTION, EMULSION INTRAVENOUS at 08:44

## 2018-12-11 RX ADMIN — ONDANSETRON 4 MG: 2 INJECTION INTRAMUSCULAR; INTRAVENOUS at 08:47

## 2018-12-11 RX ADMIN — SODIUM CHLORIDE 50 ML/HR: 900 INJECTION, SOLUTION INTRAVENOUS at 07:13

## 2018-12-11 RX ADMIN — PROPOFOL 250 MG: 10 INJECTION, EMULSION INTRAVENOUS at 08:25

## 2018-12-11 RX ADMIN — LIDOCAINE HYDROCHLORIDE 100 MG: 20 INJECTION, SOLUTION EPIDURAL; INFILTRATION; INTRACAUDAL; PERINEURAL at 08:25

## 2018-12-11 RX ADMIN — Medication 3 G: at 08:30

## 2018-12-11 NOTE — OP NOTES
[facility] REPORT    Jael Palacios  MR#: [id number]  : 1960  ACCOUNT #: [de-identified]   DATE OF SERVICE: 2018    PREOPERATIVE DIAGNOSIS:  Gross hematuria. POSTOPERATIVE DIAGNOSIS:  Gross hematuria. PROCEDURE PERFORMED:  Cystoscopy with bilateral retrograde pyelograms. SURGEON:  Ray Bautista MD    ASSISTANT:  None. ANESTHESIA:  General.    ESTIMATED BLOOD LOSS:  1 mL. SPECIMENS REMOVED:  Urine sent for cytology. FINDINGS:  Normal cystourethroscopy with bilateral retrograde pyelograms without filling defects. The patient was found to have a friable prostate with some mild enlargement of the lateral lobes. There was no median lobe identified. COMPLICATIONS:  None. IMPLANTS:  None. INDICATIONS FOR OPERATIVE PROCEDURE:  The patient is a 81-year-old gentleman with end-stage renal disease on peritoneal dialysis, who presents with gross hematuria from the penis. He makes a little bit of urine and usually voids once daily to once every other day. Therefore, hematuria workup was indicated. He was unable to have IV contrast given that his kidneys do not function and therefore was taken to the operating room for cystoscopy with bilateral retrograde pyelograms. He is also scheduled to have a CT without contrast as an outpatient for his upper tract imaging. DESCRIPTION OF OPERATIVE PROCEDURE:  After informed consent was obtained and the correct patient identified in the preoperative holding area, he was taken back to the operating room suite and placed on the table in the supine position. Timeout was performed confirming the correct patient and planned procedure. He received 2 g of IV Ancef prior to the smooth induction of general endotracheal anesthesia. He was moved into the dorsal lithotomy position and prepped and draped in the usual sterile fashion.   I began the case by inserting a 22-Bangladeshi rigid cystoscope with a 30-degree lens into the urethra and advanced into the patient's bladder. Pancystoscopy was performed which was unremarkable. There were no concerning bladder lesions identified. A urine sample was sent off for cytology. We then turned our attention to the right ureteral orifice, which was in the orthotopic position. An open-ended 5-English Pollack catheter was inserted into the ureteral orifice distally and then 10 mL of a 50:50 mixture of Isovue contrast and water was injected. There were no filling defects or hydroureteronephrosis noted on the right side. I then turned my attention to the left ureteral orifice and performed a retrograde pyelogram on the left ureteral orifice in the exact same fashion, which was also normal.  I then repeated the cystourethroscopy, which was completely normal.  I then backed the scope out into the prostatic urethra. He had mild obstruction from lateral lobes, but no median lobe or other abnormalities. The prostate was found to be friable and likely the source of the patient's bleeding. I then drained the bladder completely. The patient was awoken from anesthesia and transferred to PACU in stable condition. He tolerated the procedure well. There were no complications. All counts were correct at the end of the procedure. MD DRE De León / PN  D: 12/11/2018 08:59     T: 12/11/2018 09:39  JOB #: [id number]

## 2018-12-11 NOTE — ANESTHESIA POSTPROCEDURE EVALUATION
Procedure(s): 
CYSTOSCOPY WITH BILATERAL RETROGRADE PYELOGRAMS. Anesthesia Post Evaluation Multimodal analgesia: multimodal analgesia used between 6 hours prior to anesthesia start to PACU discharge Patient location during evaluation: bedside Patient participation: complete - patient participated Level of consciousness: responsive to physical stimuli and awake Pain management: adequate Airway patency: patent Anesthetic complications: no 
Cardiovascular status: acceptable, stable and hemodynamically stable Respiratory status: unassisted, spontaneous ventilation, nonlabored ventilation and acceptable Hydration status: acceptable Post anesthesia nausea and vomiting:  controlled Visit Vitals /73 Pulse 82 Temp 36.3 °C (97.3 °F) Resp 18 Ht 6' 5\" (1.956 m) Wt 146.7 kg (323 lb 6.4 oz) SpO2 93% BMI 38.35 kg/m²

## 2018-12-11 NOTE — DISCHARGE INSTRUCTIONS
Cystoscopy: What to Expect at [address] [facility]  A cystoscopy is a procedure that lets a doctor look inside of the bladder and the urethra. The urethra is the tube that carries urine from the bladder to outside the body. The doctor uses a thin, lighted tube called a cystoscope to look for kidney or bladder stones, tumors, bleeding, or infection. After the cystoscopy, your urethra may be sore at first, and it may burn when you urinate for the first few days after the procedure. You may feel the need to urinate more often, and your urine may be pink. These symptoms should get better in 1 or 2 days. You will probably be able to go back to work or most of your usual activities in 1 or 2 days. How can you care for yourself at home? Activity  1. Rest when you feel tired. Getting enough sleep will help you recover. 2. Try to walk each day. Start by walking a little more than you did the day before. Bit by bit, increase the amount you walk. Walking boosts blood flow and helps prevent pneumonia and constipation. 3. Avoid strenuous activities, such as bicycle riding, jogging, weight lifting, or aerobic exercise, until your doctor says it is okay. 4. Ask your doctor when you can drive again. 5. Most people are able to return to work within 1 or 2 days after the procedure. 6. You may shower and take baths as usual.  7. Ask your doctor when it is okay for you to have sex. Diet  · You can eat your normal diet. If your stomach is upset, try bland, low-fat foods like plain rice, broiled chicken, toast, and yogurt. · Drink plenty of fluids (unless your doctor tells you not to). Medicines  · Take pain medicines exactly as directed. ¨ If the doctor gave you a prescription medicine for pain, take it as prescribed. ¨ If you are not taking a prescription pain medicine, ask your doctor if you can take an over-the-counter medicine.   · If you think your pain medicine is making you sick to your stomach:  ¨ Take your medicine after meals (unless your doctor has told you not to). ¨ Ask your doctor for a different pain medicine. · If your doctor prescribed antibiotics, take them as directed. Do not stop taking them just because you feel better. You need to take the full course of antibiotics. Follow-up care is a key part of your treatment and safety. Be sure to make and go to all appointments, and call your doctor if you are having problems. It's also a good idea to know your test results and keep a list of the medicines you take. When should you call for help? Call 911 anytime you think you may need emergency care. For example, call if:  · You passed out (lost consciousness). · You have severe trouble breathing. · You have sudden chest pain and shortness of breath, or you cough up blood. · You have severe belly pain. Call your doctor now or seek immediate medical care if:  · You are sick to your stomach or cannot keep fluids down. · Your urine is still red or you see blood clots after you have urinated several times. · You have trouble passing urine or stool, especially if you have pain or swelling in your lower belly. · You have signs of a blood clot, such as:  ¨ Pain in your calf, back of the knee, thigh, or groin. ¨ Redness and swelling in your leg or groin. · You develop a fever or severe chills. · You have pain in your back just below your rib cage. This is called flank pain. Watch closely for changes in your health, and be sure to contact your doctor if:  · A burning feeling is normal for a day or two after the test, but call if it does not get better. · You have a frequent urge to urinate but can pass only small amounts of urine. · It is normal for the urine to have a pinkish color for a few days after the test, but call if it does not get better or if your urine is cloudy, smells bad, or has pus in it.     After general anesthesia or intravenous sedation, for 24 hours or while taking prescription Narcotics:  · Limit your activities  · Do not drive and operate hazardous machinery  · Do not make important personal or business decisions  · Do  not drink alcoholic beverages  · If you have not urinated within 8 hours after discharge, please contact your surgeon on call. *  Please give a list of your current medications to your Primary Care Provider. *  Please update this list whenever your medications are discontinued, doses are      changed, or new medications (including over-the-counter products) are added. *  Please carry medication information at all times in case of emergency situations. These are general instructions for a healthy lifestyle:  No smoking/ No tobacco products/ Avoid exposure to second hand smoke  Surgeon General's Warning:  Quitting smoking now greatly reduces serious risk to your health. Obesity, smoking, and sedentary lifestyle greatly increases your risk for illness  A healthy diet, regular physical exercise & weight monitoring are important for maintaining a healthy lifestyle    You may be retaining fluid if you have a history of heart failure or if you experience any of the following symptoms:  Weight gain of 3 pounds or more overnight or 5 pounds in a week, increased swelling in our hands or feet or shortness of breath while lying flat in bed. Please call your doctor as soon as you notice any of these symptoms; do not wait until your next office visit. Recognize signs and symptoms of STROKE:    F-face looks uneven  A-arms unable to move or move unevenly  S-speech slurred or non-existent  T-time-call 911 as soon as signs and symptoms begin-DO NOT go       Back to bed or wait to see if you get better-TIME IS BRAIN.

## 2018-12-11 NOTE — ANESTHESIA PREPROCEDURE EVALUATION
Anesthetic History No history of anesthetic complications Review of Systems / Medical History Patient summary reviewed and pertinent labs reviewed Pulmonary Sleep apnea: No treatment Neuro/Psych Within defined limits Cardiovascular Hypertension: well controlled Pertinent negatives: No angina Exercise tolerance: >4 METS 
  
GI/Hepatic/Renal 
  
GERD Renal disease: CRI and ESRD Endo/Other Morbid obesity and anemia (Hgb 7.7 - pt reports this is chronic) Other Findings Physical Exam 
 
Airway Mallampati: III 
TM Distance: > 6 cm Neck ROM: normal range of motion Mouth opening: Normal 
 
 Cardiovascular Rhythm: regular Rate: normal 
 
 
Pertinent negatives: No murmur Dental 
 
Dentition: Poor dentition Pulmonary Breath sounds clear to auscultation Abdominal 
 
 
 
 Other Findings Anesthetic Plan ASA: 3 Anesthesia type: general 
 
 
 
 
Induction: Intravenous Anesthetic plan and risks discussed with: Patient LMA

## 2018-12-11 NOTE — BRIEF OP NOTE
BRIEF OPERATIVE NOTE Date of Procedure: 12/11/2018 Preoperative Diagnosis: Gross hematuria [R31.0] Postoperative Diagnosis: * No post-op diagnosis entered * Procedure(s): 
CYSTOSCOPY WITH BILATERAL RETROGRADE PYELOGRAMS Surgeon(s) and Role: Annalisa Liriano MD - Primary Surgical Assistant: None Surgical Staff: 
Circ-1: Hannah Everett RN 
Circ-2: Ernestina Nails RN Event Time In Time Out Incision Start 1835 Incision Close Anesthesia: General  
Estimated Blood Loss: 1 cc Specimens:  
ID Type Source Tests Collected by Time Destination 1 : Urine Fresh Urine, Cystoscopy  José Huerta MD 12/11/2018 0848 Cytology Findings: Normal cystoscopy and bilateral retrograde pyelograms without filling defects Complications: None Implants: * No implants in log *

## 2018-12-21 ENCOUNTER — HOSPITAL ENCOUNTER (OUTPATIENT)
Dept: CT IMAGING | Age: 58
Discharge: HOME | End: 2018-12-21
Attending: UROLOGY
Payer: MEDICARE

## 2018-12-21 DIAGNOSIS — R31.0 GROSS HEMATURIA: ICD-10-CM

## 2018-12-21 PROCEDURE — 74176 CT ABD & PELVIS W/O CONTRAST: CPT

## 2019-01-10 ENCOUNTER — HOSPITAL ENCOUNTER (OUTPATIENT)
Dept: ULTRASOUND IMAGING | Age: 59
Discharge: HOME | End: 2019-01-10
Attending: UROLOGY
Payer: MEDICARE

## 2019-01-10 DIAGNOSIS — N28.1 RENAL CYST: ICD-10-CM

## 2019-01-10 PROCEDURE — 76770 US EXAM ABDO BACK WALL COMP: CPT

## 2019-01-10 NOTE — PROGRESS NOTES
Please call patient to let him know that his ultrasound showed kidney cysts (which is common when on dialysis) but no concerning solid kidney masses. I will plan to see him in 1 year for follow up for his kidney stones as we previously planned.

## 2020-10-01 ENCOUNTER — HOSPITAL ENCOUNTER (OUTPATIENT)
Dept: MRI IMAGING | Age: 60
Discharge: HOME | End: 2020-10-01
Attending: INTERNAL MEDICINE
Payer: MEDICARE

## 2020-10-01 DIAGNOSIS — G89.29 CHRONIC BACK PAIN: ICD-10-CM

## 2020-10-01 DIAGNOSIS — M54.9 CHRONIC BACK PAIN: ICD-10-CM

## 2020-10-01 PROCEDURE — 72148 MRI LUMBAR SPINE W/O DYE: CPT

## 2020-11-13 ENCOUNTER — HOSPITAL ENCOUNTER (OUTPATIENT)
Dept: CT IMAGING | Age: 60
Discharge: HOME | End: 2020-11-13
Attending: UROLOGY
Payer: MEDICARE

## 2020-11-13 DIAGNOSIS — R31.0 GROSS HEMATURIA: ICD-10-CM

## 2020-11-13 PROCEDURE — 74176 CT ABD & PELVIS W/O CONTRAST: CPT

## 2021-05-04 PROBLEM — S82.143A FRACTURE, TIBIAL PLATEAU: Status: RESOLVED | Noted: 2017-03-17 | Resolved: 2021-05-04

## 2021-05-04 PROBLEM — T85.71XA PERITONEAL DIALYSIS-ASSOCIATED PERITONITIS (HCC): Status: RESOLVED | Noted: 2017-06-24 | Resolved: 2021-05-04

## 2021-05-04 PROBLEM — N18.6 ESRD (END STAGE RENAL DISEASE) (HCC): Status: ACTIVE | Noted: 2021-05-04

## 2021-05-04 PROBLEM — S82.142A TIBIAL PLATEAU FRACTURE, LEFT: Status: RESOLVED | Noted: 2017-03-23 | Resolved: 2021-05-04

## 2021-09-30 PROBLEM — K40.90 NON-RECURRENT UNILATERAL INGUINAL HERNIA WITHOUT OBSTRUCTION OR GANGRENE: Status: ACTIVE | Noted: 2020-12-22

## 2022-03-18 PROBLEM — I16.0 HYPERTENSIVE URGENCY: Status: ACTIVE | Noted: 2017-06-24

## 2022-03-19 PROBLEM — N18.6 ESRD (END STAGE RENAL DISEASE) (HCC): Status: ACTIVE | Noted: 2021-05-04

## 2022-03-19 PROBLEM — K40.90 NON-RECURRENT UNILATERAL INGUINAL HERNIA WITHOUT OBSTRUCTION OR GANGRENE: Status: ACTIVE | Noted: 2020-12-22

## 2022-04-21 PROBLEM — E78.49 OTHER HYPERLIPIDEMIA: Status: ACTIVE | Noted: 2022-04-21

## 2022-06-09 NOTE — PERIOP NOTE
Patient verified name and . Order for consent NOT found in EHR; patient verifies procedure. Type 1B surgery, phone assessment complete. Orders NOT received. Labs per surgeon: unknown; no orders received in EHR at time of assessment. Labs per anesthesia protocol: HGB-states had labs drawn at [address] [facility]. [facility] on 22. Office called ([phone]) and requested labs to be faxed to [phone]. If no HGB included will need one dos. Charge nurse to follow up. Patient answered medical/surgical history questions at their best of ability. All prior to admission medications documented in [facility]. Patient instructed to take the following medications the day of surgery according to anesthesia guidelines with a small sip of water: patient states he will not take his medication on the the of surgery because it will be to early in the morning. On the day before surgery please take Acetaminophen 1000mg in the morning and then again before bed. You may substitute for Tylenol 650 mg. Hold all vitamins 7 days prior to surgery and NSAIDS 5 days prior to surgery. Prescription meds to hold: none. Patient instructed on the following:    > Arrive at A Entrance, time of arrival to be called the day before by 1700  > NPO after midnight including gum, mints, and ice chips  > Responsible adult must drive patient to the hospital, stay during surgery, and patient will need supervision 24 hours after anesthesia  > Use dial antibacterial soap in shower the night before surgery and on the morning of surgery  > All piercings must be removed prior to arrival.    > Leave all valuables (money and jewelry) at home but bring insurance card and ID on DOS.   > You may be required to pay a deductible or co-pay on the day of your procedure. You can pre-pay by calling [phone] if your surgery is at the [facility] or [phone] if your surgery is at the [facility].   > Do not wear make-up, nail polish, lotions, cologne, perfumes, powders, or oil on skin. Artificial nails are not permitted.

## 2022-06-10 NOTE — H&P
aDate: 2022      Name: Angela Richardson      MRN: [id number]       : 1960       Age: 62 y.o. Sex: male        Mary Alaniz MD       CC:  No chief complaint on file. HPI:     Angela Richardson is a 62 y.o. male who presents for evaluation of soft tissue masses as a referral from Dr. Xenia Alexis. I know the patient from a laparoscopic PD catheter placement with an umbilical hernia repair done on 14. The patient's PD catheter is still functioning well. Today he reports three soft tissue masses which likely represent epidermal inclusion cysts. There is one on the left buttock which is painful when he sits down. There is one on the anterior RLQ abdominal wall and a third over the upper right flank. The one on the upper right flank has opened up and drained twice. He would like to have these excised due to pain and a fear that they will get infected and affect his PD catheter. PMH:    Past Medical History:   Diagnosis Date    Anxiety     CAD (coronary artery disease)     per pt- no MI- \"They said the back of my heart was something. ..like 40%\"    Chronic kidney disease     stage 4; not on dialysis; pt has AV fistula to left arm; staRTED PERITONEAL DIALYSIS     CKD (chronic kidney disease)     Fracture, tibial plateau     GERD (gastroesophageal reflux disease)     takes OTC med prn    Heart disease     Hypertension     controlled with med    Hypertensive nephrosclerosis     per nephrology note 14    Hypoactive thyroid     Peritoneal dialysis-associated peritonitis ([address] 2017    PUD (peptic ulcer disease)     SBP (spontaneous bacterial peritonitis) ([address] 2016    Sepsis ([address] 2016    Tibial plateau fracture, left     Umbilical hernia     Unspecified sleep apnea     pt does not have a CPAP       PSH:    Past Surgical History:   Procedure Laterality Date    COLONOSCOPY N/A 3/23/2018    COLONOSCOPY  BMI 34 performed by Dio Blanton MD at [facility] ENDOSCOPY    HX AMPUTATION Right 1995    5th finger    HX HERNIA REPAIR      HX ORTHOPAEDIC Left 1989    knee    HX ORTHOPAEDIC Left as a child    thumb    HX VASCULAR ACCESS Left 2012    AV fistula to arm    [place] ABDOMEN SURGERY PROC UNLISTED  6/11/14    PD catheter placement       MEDS:    Current Outpatient Medications   Medication Sig    rosuvastatin (CRESTOR) 10 mg tablet Take 1 Tablet by mouth nightly.  escitalopram oxalate (LEXAPRO) 10 mg tablet Take 1 Tablet by mouth daily.  traZODone (DESYREL) 50 mg tablet Take 1 Tablet by mouth nightly.  finasteride (PROSCAR) 5 mg tablet Take 1 Tablet by mouth daily.  baclofen (LIORESAL) 10 mg tablet Take 0.5 Tablets by mouth three (3) times daily. (Patient not taking: Reported on 4/21/2022)    potassium chloride SR (KLOR-CON 10) 10 mEq tablet Take 20 mEq by mouth two (2) times a day.  esomeprazole (NEXIUM) 40 mg capsule Take 1 Cap by mouth daily.  Dialyvite 100-1 mg tab tablet Take 1 Tab by mouth daily.  hydrALAZINE (APRESOLINE) 50 mg tablet Take 1 Tab by mouth three (3) times daily.  ondansetron hcl (ZOFRAN) 4 mg tablet Take 4 mg by mouth every eight (8) hours as needed for Nausea or Vomiting.  cinacalcet (SENSIPAR) 90 mg tablet Take 90 mg by mouth daily.  aspirin 81 mg chewable tablet Take 81 mg by mouth nightly.  valsartan (DIOVAN) 320 mg tablet Take 320 mg by mouth nightly.  sevelamer (RENAGEL) 800 mg tablet Take 800 mg by mouth three (3) times daily (with meals).  cloNIDine (CATAPRES) 0.3 mg/24 hr 1 Patch by TransDERmal route every seven (7) days.  LACTULOSE PO Take 15 mL by mouth as needed.  calcitRIOL (ROCALTROL) 0.25 mcg capsule Take 0.25 mcg by mouth nightly. 4 capsules three times a week (Patient not taking: Reported on 4/21/2022)    NIFEdipine ER (PROCARDIA XL) 90 mg ER tablet Take 90 mg by mouth nightly. Instructed to take DOS per Anesthesia guidelines.   Indications: hypertension  cloNIDine (CATAPRES) 0.2 mg tablet Take 0.2 mg by mouth three (3) times daily. Instructed to take DOS per Anesthesia guidelines. Indications: HYPERTENSION     No current facility-administered medications for this visit. ALLERGIES:      No Known Allergies    SH:    Social History     Tobacco Use    Smoking status: Former Smoker     Packs/day: 0.50     Years: 23.00     Pack years: 11.50     Quit date: 3/1/2014     Years since quittin.1    Smokeless tobacco: Never Used    Tobacco comment: pt states only smoked cigarettes when drinking alcohol   Vaping Use    Vaping Use: Never used   Substance Use Topics    Alcohol use: Yes     Comment: 1 glass every 3 mths    Drug use: Yes     Types: Marijuana     Comment: daily       FH:    Family History   Problem Relation Age of Onset    Diabetes Mother     Hypertension Mother     Heart Disease Father     COPD Father     Hypertension Father        ROS: The patient has no difficulty with chest pain or shortness of breath. No fever or chills. Comprehensive 13 point review of systems was otherwise unremarkable except as noted above. Physical Exam:     There were no vitals taken for this visit. General: Alert, oriented, cooperative black male in no acute distress. Eyes: Sclera are clear. Conjunctiva and lids within normal limits. No icterus. Ears and Nose: no gross deformities to visual inspection, gross hearing intact  Neck: Supple, trachea midline. No lymphadenopathy. Resp: Breathing is  non-labored. Lungs clear to auscultation without wheezing or rhonchi   CV: RRR. No murmurs, rubs or gallops appreciated. Abd: soft non-tender and non-distended without peritoneal signs. +bs   Skin: Today he reports three soft tissue masses which likely represent epidermal inclusion cysts. There is one on the left buttock which is painful when he sits down. There is one on the anterior RLQ abdominal wall and a third over the upper right flank.  The one on the upper right flank has opened up and drained twice. None of these three areas appear infected at the present time. Psych:  Mood and affect appropriate. Short-term memory and understanding intact      Assessment/Plan:  Rajani Alonso is a 62 y.o. male who has signs and symptoms consistent with a soft tissue masses of the left buttock, RLQ abdominal wall and upper right flank. 1.  Excision of soft tissue masses of the left buttock, RLQ abdominal wall and upper right flank in the operating room. 2.  I went through the risks of bleeding, infection, anesthesia, hematoma/seroma formation and possible recurrence of the lesion. I said it may be necessary to place a drain. It may be necessary to leave the wound open, if badly infected.     Caryn Dickey MD     FACS   5/7/2022  4:01 PM

## 2022-06-12 RX ORDER — SODIUM CHLORIDE 0.9 % (FLUSH) 0.9 %
5-40 SYRINGE (ML) INJECTION EVERY 12 HOURS SCHEDULED
Status: CANCELLED | OUTPATIENT
Start: 2022-06-12

## 2022-06-12 RX ORDER — SODIUM CHLORIDE 0.9 % (FLUSH) 0.9 %
5-40 SYRINGE (ML) INJECTION PRN
Status: CANCELLED | OUTPATIENT
Start: 2022-06-12

## 2022-06-13 ENCOUNTER — ANESTHESIA (OUTPATIENT)
Dept: SURGERY | Age: 62
End: 2022-06-13
Payer: MEDICARE

## 2022-06-13 ENCOUNTER — ANESTHESIA EVENT (OUTPATIENT)
Dept: SURGERY | Age: 62
End: 2022-06-13
Payer: MEDICARE

## 2022-06-13 ENCOUNTER — HOSPITAL ENCOUNTER (OUTPATIENT)
Age: 62
Setting detail: OUTPATIENT SURGERY
Discharge: HOME | End: 2022-06-13
Attending: SURGERY | Admitting: SURGERY
Payer: MEDICARE

## 2022-06-13 VITALS
OXYGEN SATURATION: 95 % | HEIGHT: 76 IN | RESPIRATION RATE: 16 BRPM | TEMPERATURE: 97.9 F | DIASTOLIC BLOOD PRESSURE: 80 MMHG | SYSTOLIC BLOOD PRESSURE: 126 MMHG | BODY MASS INDEX: 33.06 KG/M2 | WEIGHT: 271.5 LBS | HEART RATE: 82 BPM

## 2022-06-13 DIAGNOSIS — M79.89 SOFT TISSUE MASS: Primary | ICD-10-CM

## 2022-06-13 LAB
HGB BLD-MCNC: 10.9 G/DL (ref 13.6–17.2)
POTASSIUM BLD-SCNC: 3.7 MMOL/L (ref 3.5–5.1)

## 2022-06-13 PROCEDURE — 7100000001 HC PACU RECOVERY - ADDTL 15 MIN: Performed by: SURGERY

## 2022-06-13 PROCEDURE — 3700000000 HC ANESTHESIA ATTENDED CARE: Performed by: SURGERY

## 2022-06-13 PROCEDURE — 88304 TISSUE EXAM BY PATHOLOGIST: CPT

## 2022-06-13 PROCEDURE — 7100000000 HC PACU RECOVERY - FIRST 15 MIN: Performed by: SURGERY

## 2022-06-13 PROCEDURE — 6360000002 HC RX W HCPCS: Performed by: NURSE ANESTHETIST, CERTIFIED REGISTERED

## 2022-06-13 PROCEDURE — 88305 TISSUE EXAM BY PATHOLOGIST: CPT

## 2022-06-13 PROCEDURE — 11404 EXC TR-EXT B9+MARG 3.1-4 CM: CPT | Performed by: SURGERY

## 2022-06-13 PROCEDURE — 7100000010 HC PHASE II RECOVERY - FIRST 15 MIN: Performed by: SURGERY

## 2022-06-13 PROCEDURE — 2500000003 HC RX 250 WO HCPCS: Performed by: NURSE ANESTHETIST, CERTIFIED REGISTERED

## 2022-06-13 PROCEDURE — 84132 ASSAY OF SERUM POTASSIUM: CPT

## 2022-06-13 PROCEDURE — 3600000012 HC SURGERY LEVEL 2 ADDTL 15MIN: Performed by: SURGERY

## 2022-06-13 PROCEDURE — 2500000003 HC RX 250 WO HCPCS: Performed by: ANESTHESIOLOGY

## 2022-06-13 PROCEDURE — 2709999900 HC NON-CHARGEABLE SUPPLY: Performed by: SURGERY

## 2022-06-13 PROCEDURE — 6360000002 HC RX W HCPCS: Performed by: SURGERY

## 2022-06-13 PROCEDURE — 3600000002 HC SURGERY LEVEL 2 BASE: Performed by: SURGERY

## 2022-06-13 PROCEDURE — 22903 EXC ABD LES SC 3 CM/>: CPT | Performed by: SURGERY

## 2022-06-13 PROCEDURE — 11402 EXC TR-EXT B9+MARG 1.1-2 CM: CPT | Performed by: SURGERY

## 2022-06-13 PROCEDURE — 2500000003 HC RX 250 WO HCPCS: Performed by: SURGERY

## 2022-06-13 PROCEDURE — 2580000003 HC RX 258: Performed by: ANESTHESIOLOGY

## 2022-06-13 PROCEDURE — 6370000000 HC RX 637 (ALT 250 FOR IP): Performed by: ANESTHESIOLOGY

## 2022-06-13 PROCEDURE — 2580000003 HC RX 258: Performed by: NURSE ANESTHETIST, CERTIFIED REGISTERED

## 2022-06-13 PROCEDURE — 3700000001 HC ADD 15 MINUTES (ANESTHESIA): Performed by: SURGERY

## 2022-06-13 PROCEDURE — 12032 INTMD RPR S/A/T/EXT 2.6-7.5: CPT | Performed by: SURGERY

## 2022-06-13 PROCEDURE — 85018 HEMOGLOBIN: CPT

## 2022-06-13 PROCEDURE — 6360000002 HC RX W HCPCS: Performed by: ANESTHESIOLOGY

## 2022-06-13 RX ORDER — BUPIVACAINE HYDROCHLORIDE 5 MG/ML
INJECTION, SOLUTION EPIDURAL; INTRACAUDAL PRN
Status: DISCONTINUED | OUTPATIENT
Start: 2022-06-13 | End: 2022-06-13 | Stop reason: ALTCHOICE

## 2022-06-13 RX ORDER — ACETAMINOPHEN 500 MG
1000 TABLET ORAL ONCE
Status: COMPLETED | OUTPATIENT
Start: 2022-06-13 | End: 2022-06-13

## 2022-06-13 RX ORDER — EPHEDRINE SULFATE/0.9% NACL/PF 50 MG/5 ML
SYRINGE (ML) INTRAVENOUS PRN
Status: DISCONTINUED | OUTPATIENT
Start: 2022-06-13 | End: 2022-06-13 | Stop reason: SDUPTHER

## 2022-06-13 RX ORDER — LIDOCAINE HYDROCHLORIDE 10 MG/ML
1 INJECTION, SOLUTION INFILTRATION; PERINEURAL
Status: COMPLETED | OUTPATIENT
Start: 2022-06-13 | End: 2022-06-13

## 2022-06-13 RX ORDER — GLYCOPYRROLATE 0.2 MG/ML
INJECTION INTRAMUSCULAR; INTRAVENOUS PRN
Status: DISCONTINUED | OUTPATIENT
Start: 2022-06-13 | End: 2022-06-13 | Stop reason: SDUPTHER

## 2022-06-13 RX ORDER — SODIUM CHLORIDE, SODIUM LACTATE, POTASSIUM CHLORIDE, CALCIUM CHLORIDE 600; 310; 30; 20 MG/100ML; MG/100ML; MG/100ML; MG/100ML
INJECTION, SOLUTION INTRAVENOUS CONTINUOUS
Status: DISCONTINUED | OUTPATIENT
Start: 2022-06-13 | End: 2022-06-13 | Stop reason: HOSPADM

## 2022-06-13 RX ORDER — FENTANYL CITRATE 50 UG/ML
INJECTION, SOLUTION INTRAMUSCULAR; INTRAVENOUS PRN
Status: DISCONTINUED | OUTPATIENT
Start: 2022-06-13 | End: 2022-06-13 | Stop reason: SDUPTHER

## 2022-06-13 RX ORDER — PROCHLORPERAZINE EDISYLATE 5 MG/ML
5 INJECTION INTRAMUSCULAR; INTRAVENOUS
Status: DISCONTINUED | OUTPATIENT
Start: 2022-06-13 | End: 2022-06-13 | Stop reason: HOSPADM

## 2022-06-13 RX ORDER — MIDAZOLAM HYDROCHLORIDE 2 MG/2ML
2 INJECTION, SOLUTION INTRAMUSCULAR; INTRAVENOUS
Status: DISCONTINUED | OUTPATIENT
Start: 2022-06-13 | End: 2022-06-13 | Stop reason: HOSPADM

## 2022-06-13 RX ORDER — SODIUM CHLORIDE 0.9 % (FLUSH) 0.9 %
5-40 SYRINGE (ML) INJECTION EVERY 12 HOURS SCHEDULED
Status: DISCONTINUED | OUTPATIENT
Start: 2022-06-13 | End: 2022-06-13 | Stop reason: HOSPADM

## 2022-06-13 RX ORDER — SODIUM CHLORIDE 9 MG/ML
INJECTION, SOLUTION INTRAVENOUS PRN
Status: DISCONTINUED | OUTPATIENT
Start: 2022-06-13 | End: 2022-06-13 | Stop reason: HOSPADM

## 2022-06-13 RX ORDER — HYDROMORPHONE HYDROCHLORIDE 1 MG/ML
0.5 INJECTION, SOLUTION INTRAMUSCULAR; INTRAVENOUS; SUBCUTANEOUS EVERY 5 MIN PRN
Status: DISCONTINUED | OUTPATIENT
Start: 2022-06-13 | End: 2022-06-13 | Stop reason: HOSPADM

## 2022-06-13 RX ORDER — OXYCODONE HYDROCHLORIDE 5 MG/1
5 TABLET ORAL
Status: DISCONTINUED | OUTPATIENT
Start: 2022-06-13 | End: 2022-06-13 | Stop reason: HOSPADM

## 2022-06-13 RX ORDER — SODIUM CHLORIDE, SODIUM LACTATE, POTASSIUM CHLORIDE, CALCIUM CHLORIDE 600; 310; 30; 20 MG/100ML; MG/100ML; MG/100ML; MG/100ML
INJECTION, SOLUTION INTRAVENOUS CONTINUOUS PRN
Status: DISCONTINUED | OUTPATIENT
Start: 2022-06-13 | End: 2022-06-13 | Stop reason: SDUPTHER

## 2022-06-13 RX ORDER — HALOPERIDOL 5 MG/ML
1 INJECTION INTRAMUSCULAR
Status: DISCONTINUED | OUTPATIENT
Start: 2022-06-13 | End: 2022-06-13 | Stop reason: HOSPADM

## 2022-06-13 RX ORDER — LIDOCAINE HYDROCHLORIDE 20 MG/ML
INJECTION, SOLUTION EPIDURAL; INFILTRATION; INTRACAUDAL; PERINEURAL PRN
Status: DISCONTINUED | OUTPATIENT
Start: 2022-06-13 | End: 2022-06-13 | Stop reason: SDUPTHER

## 2022-06-13 RX ORDER — OXYCODONE HYDROCHLORIDE AND ACETAMINOPHEN 5; 325 MG/1; MG/1
1 TABLET ORAL EVERY 6 HOURS PRN
Qty: 20 TABLET | Refills: 0 | Status: SHIPPED | OUTPATIENT
Start: 2022-06-13 | End: 2022-06-18

## 2022-06-13 RX ORDER — NEOSTIGMINE METHYLSULFATE 1 MG/ML
INJECTION, SOLUTION INTRAVENOUS PRN
Status: DISCONTINUED | OUTPATIENT
Start: 2022-06-13 | End: 2022-06-13 | Stop reason: SDUPTHER

## 2022-06-13 RX ORDER — ROCURONIUM BROMIDE 10 MG/ML
INJECTION, SOLUTION INTRAVENOUS PRN
Status: DISCONTINUED | OUTPATIENT
Start: 2022-06-13 | End: 2022-06-13 | Stop reason: SDUPTHER

## 2022-06-13 RX ORDER — PROPOFOL 10 MG/ML
INJECTION, EMULSION INTRAVENOUS PRN
Status: DISCONTINUED | OUTPATIENT
Start: 2022-06-13 | End: 2022-06-13 | Stop reason: SDUPTHER

## 2022-06-13 RX ORDER — SODIUM CHLORIDE 0.9 % (FLUSH) 0.9 %
5-40 SYRINGE (ML) INJECTION PRN
Status: DISCONTINUED | OUTPATIENT
Start: 2022-06-13 | End: 2022-06-13 | Stop reason: HOSPADM

## 2022-06-13 RX ORDER — IPRATROPIUM BROMIDE AND ALBUTEROL SULFATE 2.5; .5 MG/3ML; MG/3ML
1 SOLUTION RESPIRATORY (INHALATION)
Status: DISCONTINUED | OUTPATIENT
Start: 2022-06-13 | End: 2022-06-13 | Stop reason: HOSPADM

## 2022-06-13 RX ORDER — FENTANYL CITRATE 50 UG/ML
100 INJECTION, SOLUTION INTRAMUSCULAR; INTRAVENOUS
Status: DISCONTINUED | OUTPATIENT
Start: 2022-06-13 | End: 2022-06-13 | Stop reason: HOSPADM

## 2022-06-13 RX ADMIN — GLYCOPYRROLATE 0.2 MG: 0.2 INJECTION, SOLUTION INTRAMUSCULAR; INTRAVENOUS at 09:56

## 2022-06-13 RX ADMIN — FENTANYL CITRATE 100 MCG: 50 INJECTION INTRAMUSCULAR; INTRAVENOUS at 09:36

## 2022-06-13 RX ADMIN — Medication 2 G: at 09:30

## 2022-06-13 RX ADMIN — Medication 3 MG: at 10:24

## 2022-06-13 RX ADMIN — SODIUM CHLORIDE, POTASSIUM CHLORIDE, SODIUM LACTATE AND CALCIUM CHLORIDE: 600; 310; 30; 20 INJECTION, SOLUTION INTRAVENOUS at 08:30

## 2022-06-13 RX ADMIN — PHENYLEPHRINE HYDROCHLORIDE 100 MCG: 0.1 INJECTION, SOLUTION INTRAVENOUS at 09:55

## 2022-06-13 RX ADMIN — PROPOFOL 160 MG: 10 INJECTION, EMULSION INTRAVENOUS at 09:36

## 2022-06-13 RX ADMIN — Medication 10 MG: at 10:00

## 2022-06-13 RX ADMIN — PHENYLEPHRINE HYDROCHLORIDE 100 MCG: 0.1 INJECTION, SOLUTION INTRAVENOUS at 09:59

## 2022-06-13 RX ADMIN — ACETAMINOPHEN 1000 MG: 500 TABLET, FILM COATED ORAL at 08:28

## 2022-06-13 RX ADMIN — LIDOCAINE HYDROCHLORIDE 80 MG: 20 INJECTION, SOLUTION EPIDURAL; INFILTRATION; INTRACAUDAL; PERINEURAL at 09:36

## 2022-06-13 RX ADMIN — ROCURONIUM BROMIDE 40 MG: 50 INJECTION, SOLUTION INTRAVENOUS at 09:37

## 2022-06-13 RX ADMIN — GLYCOPYRROLATE 0.4 MG: 0.2 INJECTION, SOLUTION INTRAMUSCULAR; INTRAVENOUS at 10:24

## 2022-06-13 RX ADMIN — SODIUM CHLORIDE, SODIUM LACTATE, POTASSIUM CHLORIDE, AND CALCIUM CHLORIDE: 600; 310; 30; 20 INJECTION, SOLUTION INTRAVENOUS at 09:30

## 2022-06-13 RX ADMIN — HYDROMORPHONE HYDROCHLORIDE 0.5 MG: 1 INJECTION, SOLUTION INTRAMUSCULAR; INTRAVENOUS; SUBCUTANEOUS at 10:48

## 2022-06-13 RX ADMIN — HYDROMORPHONE HYDROCHLORIDE 0.5 MG: 1 INJECTION, SOLUTION INTRAMUSCULAR; INTRAVENOUS; SUBCUTANEOUS at 10:53

## 2022-06-13 RX ADMIN — LIDOCAINE HYDROCHLORIDE 1 ML: 10 INJECTION, SOLUTION INFILTRATION; PERINEURAL at 08:31

## 2022-06-13 ASSESSMENT — PAIN DESCRIPTION - PAIN TYPE
TYPE: SURGICAL PAIN

## 2022-06-13 ASSESSMENT — PAIN DESCRIPTION - ORIENTATION
ORIENTATION: RIGHT

## 2022-06-13 ASSESSMENT — PAIN DESCRIPTION - FREQUENCY
FREQUENCY: CONTINUOUS

## 2022-06-13 ASSESSMENT — PAIN SCALES - GENERAL
PAINLEVEL_OUTOF10: 7
PAINLEVEL_OUTOF10: 7
PAINLEVEL_OUTOF10: 8

## 2022-06-13 ASSESSMENT — PAIN DESCRIPTION - DESCRIPTORS
DESCRIPTORS: ACHING;BURNING

## 2022-06-13 ASSESSMENT — PAIN DESCRIPTION - ONSET
ONSET: ON-GOING

## 2022-06-13 ASSESSMENT — PAIN - FUNCTIONAL ASSESSMENT: PAIN_FUNCTIONAL_ASSESSMENT: 0-10

## 2022-06-13 NOTE — ANESTHESIA POSTPROCEDURE EVALUATION
Department of Anesthesiology  Postprocedure Note    Patient: Sher Crane  MRN: [id number]  YOB: 1960  Date of evaluation: 6/13/2022  Time:  11:32 AM     Procedure Summary     Date: 06/13/22 [place] / [place] [facility]  / [facility] [place]    Anesthesia Start: 0930 Anesthesia Stop: 2213    Procedures:       RIGHT LOWER QUADRANT ABDOMEN LESION BIOPSY EXCISION (Right Abdomen)      LEFT BUTTOCK MASS EXCISION (Left Buttocks) Diagnosis:       Soft tissue mass      (Soft tissue mass [M79.89])    Surgeons: Sola Stuart MD Responsible Provider: Yoko Og MD    Anesthesia Type: general ASA Status: 3          Anesthesia Type: No value filed. [place] Phase I: [place]ico Score: 10    [place]ico Phase II: [place] Score: 10    Last vitals: Reviewed and per EMR flowsheets.        Anesthesia Post Evaluation    Patient location during evaluation: PACU  Patient participation: complete - patient participated  Level of consciousness: awake  Airway patency: patent  Nausea & Vomiting: no nausea  Complications: no  Cardiovascular status: hemodynamically stable  Respiratory status: acceptable and nonlabored ventilation  Hydration status: stable  Multimodal analgesia pain management approach

## 2022-06-13 NOTE — PERIOP NOTE
Sister Mark Meng [phone], will return after surgery. Pls ask Dr. Cherry Flores call her with update. Call her with time to return to hospital for discharge.

## 2022-06-13 NOTE — OP NOTE
[place]  OPERATIVE REPORT    Name:  Terry Brandt  MR#:  [id number]  :  1960  ACCOUNT #:  [de-identified]  DATE OF SERVICE:  2022    PREOPERATIVE DIAGNOSIS:  Three soft tissue masses, one of the left buttock, one of the right anterior abdominal wall in the right lower quadrant, one in the right flank. POSTOPERATIVE DIAGNOSIS:  Three soft tissue masses, one of the left buttock, one of the right anterior abdominal wall in the right lower quadrant, one in the right flank, probable epidermal inclusion cysts. PROCEDURE PERFORMED:  Excision of three skin and soft tissue masses of the left buttock, right lower quadrant abdominal wall, and right flank. The left buttock wound was 4 cm x 2 cm x 2 cm. The right lower quadrant was likewise 4 cm x 2 cm x 2 cm. The right flank one was 2 cm x 2 cm x 2 cm. SURGEON:  Rainelle Riedel. [facility], MD    ASSISTANT:  None. ANESTHESIA:  General endotracheal anesthesia with Dr. Obdulio Villalba plus 30 mL of 0.5% Marcaine used as local anesthesia at the end of the procedure. COMPLICATIONS:  None. SPECIMENS REMOVED:  1. Left buttock soft tissue mass. 2.  Right anterior abdominal wall right lower quadrant soft tissue mass. 3.  Right flank soft tissue mass    IMPLANTS:  None. ESTIMATED BLOOD LOSS:  10 mL. DRAINS:  None. HISTORY:  This is a 58-year-old male who I know from previous peritoneal dialysis catheter placement some 8 years ago. The PD catheter is still working. He came back to my office with complaints of multiple soft tissue masses. The one on the right flank had opened up and drained several times. He and his primary care physician, Dr. Jasmin Garcia, were concerned that this would cause infection of the peritoneal dialysis catheter. He was seen in the office. I recommended excision of all these lesions. The one on the left buttock caused pain when he sat down. The one on the anterior abdominal wall gave him pain at times when he was sitting. In the right flank, lesion had drained twice. I recommended excision of this. I went through risks of bleeding, infection, anesthesia, hematoma, seroma formation, potential recurrence of all these lesions. He was agreeable, signed the consent form. PROCEDURE:  The patient was seen in the preop area. All these lesions were marked with a marking pen. He was then transported to room #4 [address] [place] [address].  He was placed on the operating room table in the right lateral decubitus position so that we could attend to the left buttock soft tissue mass. The area was prepped and draped in the usual sterile manner. I did a time-out for all three procedures indicating his name, his birth date of 1960. surgeon of Jose Carlos Lobo, and what we are going to excise. When everyone agreed with the time-out, I began the procedure. The left buttock wound was excised using a combination of the 15 scalpel blade and electrocautery. I excised 4 cm x 2 cm x 2 cm of skin and soft tissue. Lesion was excised and sent to Pathology for evaluation. The hemostasis was achieved with electrocautery. The wound was then closed with vertical mattress sutures of 2-0 nylon and surgical staples. I injected 10 mL of 0.5% Marcaine around this wound site. The patient was then placed in the supine position and the right anterior abdominal wall right lower quadrant area lesion was excised. It measured 4 cm x 2 cm x 2 cm of skin and soft tissue. I excised it with a combination of the 15 scalpel blade and electrocautery. Lesion was excised and sent to Pathology for evaluation as the right anterior abdominal wall soft tissue mass. Hemostasis was achieved with electrocautery. The wound was closed with vertical mattress sutures of 2-0 nylon and surgical staples. I injected 10 mL of 0.5% Marcaine around this wound site. The right flank lesion was then excised in exactly the same manner. It was smaller.   It measured only 2 cm x 2 cm x 2 cm. It was excised and sent to Pathology for evaluation. Hemostasis was achieved with electrocautery. Wound was closed vertical mattress sutures of 2-0 nylon and surgical staples. I injected 10 mL of 0.5% Marcaine plain around this wound site. Total local anesthesia was 10 mL in each of the three sites for a total of 30 mL. Dry sterile dressings were placed over the wounds. The patient tolerated the procedure well, was extubated and brought to recovery room in stable condition.       MD LUCHO [id number]  D:  06/13/2022 11:15  T:  06/13/2022 18:50  JOB #:  [id number]

## 2022-06-13 NOTE — INTERVAL H&P NOTE
Update History & Physical    The patient's History and Physical of 6/10/22 was reviewed with the patient and I examined the patient. There was no change. The surgical site was confirmed by the patient and me. Plan: The risks, benefits, expected outcome, and alternative to the recommended procedure have been discussed with the patient. Patient understands and wants to proceed with the procedure.      Electronically signed by Ivanna Watters MD on 6/13/2022 at 7:56 AM

## 2022-06-13 NOTE — BRIEF OP NOTE
Brief Postoperative Note      Patient: [address]  YOB: 1960  MRN: [id number]    Date of Procedure: 6/13/2022    Pre-Op Diagnosis: Soft tissue mass [M79.89] x 3    Post-Op Diagnosis: Same       Procedure: Excision of three soft tissue masses consisting of skin and soft tissue from the left buttock, RLQ anterior abdominal wall and right flank    Surgeon(s):  Jadiel Almanza MD    Assistant:  * No surgical staff found *    Anesthesia: General    Estimated Blood Loss (mL): Minimal    Complications: None    Specimens:   ID Type Source Tests Collected by Time Destination   A : Left Buttock soft tissue mass Tissue Thigh SURGICAL PATHOLOGY Jadiel Almanza MD 6/13/2022 1009    B : Right flank soft tissues mass Tissue Flank SURGICAL PATHOLOGY Jadiel Almanza MD 6/13/2022 1022    C : Right lower quadrant soft tissue mass Tissue Abdomen SURGICAL PATHOLOGY Jadiel Almanza MD 6/13/2022 1027        Implants:  * No implants in log *      Drains: * No LDAs found *    Findings: See dictated note.     Electronically signed by Marry Dc MD on 6/13/2022 at 10:29 AM

## 2022-06-13 NOTE — ANESTHESIA PRE PROCEDURE
Department of Anesthesiology  Preprocedure Note       Name:  Rock Munoz   Age:  62 y.o.  :  1960                                          MRN:  [id number]         Date:  2022      Surgeon: Miguel Angel Jackson):  Jermaine Woods MD    Procedure: Procedure(s):  RIGHT LOWER QUADRANT ABDOMEN LESION BIOPSY EXCISION  LEFT BUTTOCK MASS EXCISION    Medications prior to admission:   Prior to Admission medications    Medication Sig Start Date End Date Taking?  Authorizing Provider   B Complex-C-Folic Acid (DIALYVITE PO) Take 1 tablet by mouth daily 3/2/21  Yes Historical Provider, MD   LACTULOSE PO Take 15 mLs by mouth as needed    Ar Automatic Reconciliation   aspirin 81 MG chewable tablet Take 81 mg by mouth  Patient not taking: Reported on 2022    Ar Automatic Reconciliation   baclofen (LIORESAL) 10 MG tablet Take 5 mg by mouth 3 times daily 21   Ar Automatic Reconciliation   calcitRIOL (ROCALTROL) 0.25 MCG capsule Take 0.25 mcg by mouth  Patient not taking: Reported on 2022    Ar Automatic Reconciliation   cinacalcet (SENSIPAR) 90 MG tablet Take 90 mg by mouth daily    Ar Automatic Reconciliation   cloNIDine (CATAPRES) 0.3 MG/24HR PTWK Place 1 patch onto the skin every 3 days     Ar Automatic Reconciliation   cloNIDine (CATAPRES) 0.2 MG tablet Take 0.2 mg by mouth 2 times daily     Ar Automatic Reconciliation   escitalopram (LEXAPRO) 10 MG tablet Take 10 mg by mouth daily 21   Ar Automatic Reconciliation   esomeprazole (NEXIUM) 40 MG delayed release capsule Take 1 capsule by mouth daily 21   Ar Automatic Reconciliation   finasteride (PROSCAR) 5 MG tablet Take 5 mg by mouth daily 22   Ar Automatic Reconciliation   hydrALAZINE (APRESOLINE) 50 MG tablet Take 1 tablet by mouth 3 times daily  Patient not taking: Reported on 2022   Ar Automatic Reconciliation   NIFEdipine (PROCARDIA XL) 90 MG extended release tablet Take 90 mg by mouth daily     Ar Automatic Reconciliation ondansetron (ZOFRAN) 4 MG tablet Take 4 mg by mouth every 8 hours as needed    Ar Automatic Reconciliation   potassium chloride (KLOR-CON) 10 MEQ extended release tablet Take 10-20 mEq by mouth daily     Ar Automatic Reconciliation   rosuvastatin (CRESTOR) 10 MG tablet Take 10 mg by mouth at bedtime  9/13/21   Ar Automatic Reconciliation   sevelamer hcl (RENAGEL) 800 MG tablet Take 2,400 mg by mouth 3 times daily (with meals)     Ar Automatic Reconciliation   traZODone (DESYREL) 50 MG tablet Take 50 mg by mouth nightly  9/13/21   Ar Automatic Reconciliation   valsartan (DIOVAN) 320 MG tablet Take 320 mg by mouth at bedtime     Ar Automatic Reconciliation       Current medications:    Current Facility-Administered Medications   Medication Dose Route Frequency Provider Last Rate Last Admin    ceFAZolin (ANCEF) 2000 mg in sterile water 20 mL IV syringe  2,000 mg IntraVENous Once Nuvia Araya MD        lidocaine 1 % injection 1 mL  1 mL IntraDERmal Once PRN Zita Sánchez MD        acetaminophen (TYLENOL) tablet 1,000 mg  1,000 mg Oral Once Zita Sánchez MD        fentaNYL (SUBLIMAZE) injection 100 mcg  100 mcg IntraVENous Once PRN Zita Sánchez MD        lactated ringers infusion   IntraVENous Continuous Zita Sánchez MD        0.9 % sodium chloride infusion   IntraVENous PRN Zita Sánchez MD        midazolam PF (VERSED) injection 2 mg  2 mg IntraVENous Once PRN Zita Sánchez MD           Allergies:  No Known Allergies    Problem List:    Patient Active Problem List   Diagnosis Code    Umbilical hernia E40.6    Hypertensive urgency I16.0    Essential hypertension I10    ESRD (end stage renal disease) ([facility] [address] N18.6    Non-recurrent unilateral inguinal hernia without obstruction or gangrene K40.90    Other hyperlipidemia E78.49       Past Medical History:        Diagnosis Date    Anemia     Anxiety     Arthritis     CAD (coronary artery disease)     per pt- no MI- \"They said the back of my heart was something. ..like 40%\"    Chronic kidney disease     [facility]; stage 5; pt has AV fistula to left arm; PERITONEAL DIALYSIS daily since     Chronic pain     CKD (chronic kidney disease)     Former smoker     Fracture, tibial plateau     GERD (gastroesophageal reflux disease)     Heart disease     Hyperlipidemia     on med for control    Hypertension     controlled with med    Hypertensive nephrosclerosis     per nephrology note 14    Hypoactive thyroid     Peritoneal dialysis catheter in place [facility])     Peritoneal dialysis-associated peritonitis ([facility] [address] 2017    PUD (peptic ulcer disease)     SBP (spontaneous bacterial peritonitis) ([facility] [address] 2016    Sepsis ([facility] [address] 2016    Tibial plateau fracture, left 6141    Umbilical hernia     Unspecified sleep apnea     pt does not have a CPAP       Past Surgical History:        Procedure Laterality Date    AMPUTATION Right     tip of 5th finger    COLONOSCOPY N/A 3/23/2018    COLONOSCOPY  BMI 34 performed by Anthony Jane MD at [place] [address] Left as a child    thumb    ORTHOPEDIC SURGERY Left     knee    ORTHOPEDIC SURGERY Left 2017    LEFT LATERAL TIBIAL PLATEAU OPEN REDUCTION INTERNAL FIXATION    [place] ABDOMEN SURGERY PROC UNLISTED  14    PD catheter placement    VASCULAR SURGERY Left     AV fistula to arm       Social History:    Social History     Tobacco Use    Smoking status: Former Smoker     Packs/day: 0.50     Types: Cigarettes     Quit date: 3/1/2014     Years since quittin.2    Smokeless tobacco: Never Used    Tobacco comment: Quit smoking: pt states only smoked cigarettes when drinking alcohol   Substance Use Topics    Alcohol use:  Yes                                Counseling given: Not Answered  Comment: Quit smoking: pt states only smoked cigarettes when drinking alcohol      Vital Signs (Current):   Vitals:    06/09/22 1451 06/13/22 0800   BP:  128/83   Pulse:  70   Resp:  16   Temp:  98.1 °F (36.7 °C)   TempSrc:  Oral   SpO2:  100%   Weight: 263 lb (119.3 kg) 271 lb 8 oz (123.2 kg)   Height: 6' 4\" (1.93 m)                                               BP Readings from Last 3 Encounters:   06/13/22 128/83   05/10/22 120/84   04/21/22 130/81       NPO Status:                                                                                 BMI:   Wt Readings from Last 3 Encounters:   06/13/22 271 lb 8 oz (123.2 kg)   05/10/22 269 lb 3.2 oz (122.1 kg)   04/21/22 276 lb 12.8 oz (125.6 kg)     Body mass index is 33.05 kg/m². CBC:   Lab Results   Component Value Date    WBC 5.8 04/21/2022    RBC 3.18 04/21/2022    HGB 9.7 04/21/2022    HCT 29.3 04/21/2022    MCV 92 04/21/2022    RDW 17.7 04/21/2022     04/21/2022       CMP:   Lab Results   Component Value Date     04/21/2022    K 4.1 04/21/2022    CL 94 04/21/2022    CO2 27 04/21/2022    BUN 45 04/21/2022    CREATININE 12.46 04/21/2022    GFRAA 3 05/04/2021    GLUCOSE 98 04/21/2022    PROT 7.1 04/21/2022    CALCIUM 9.5 04/21/2022    BILITOT 0.4 04/21/2022    ALKPHOS 71 04/21/2022    AST 11 04/21/2022    ALT 13 04/21/2022       POC Tests: No results for input(s): POCGLU, POCNA, POCK, POCCL, POCBUN, POCHEMO, POCHCT in the last 72 hours.     Coags: No results found for: PROTIME, INR, APTT    HCG (If Applicable): No results found for: PREGTESTUR, PREGSERUM, HCG, HCGQUANT     ABGs: No results found for: PHART, PO2ART, EFK8BJY, VPH5EEZ, BEART, C7BSERLP     Type & Screen (If Applicable):  No results found for: LABABO, LABRH    Drug/Infectious Status (If Applicable):  No results found for: HIV, HEPCAB    COVID-19 Screening (If Applicable): No results found for: COVID19        Anesthesia Evaluation  Patient summary reviewed and Nursing notes reviewed no history of anesthetic complications:   Airway: Mallampati: II  TM distance: >3 FB   Neck ROM: full  Mouth opening: > = 3 FB   Dental: normal exam         Pulmonary: breath sounds clear to auscultation  (+) sleep apnea: on noncompliant,                             Cardiovascular:  Exercise tolerance: good (>4 METS),   (+) hypertension: severe, CAD: non-obstructive, hyperlipidemia    (-)  angina    ECG reviewed               Beta Blocker:  Not on Beta Blocker         Neuro/Psych:   (+) depression/anxiety             GI/Hepatic/Renal:   (+) GERD:, renal disease (peritoneal): CRI, ESRD and dialysis,           Endo/Other: Negative Endo/Other ROS                    Abdominal:             Vascular: negative vascular ROS. Other Findings: Anemia of chronic dz- most recent Hgb ~9.5          Anesthesia Plan      general     ASA 3       Induction: intravenous. MIPS: Postoperative opioids intended and Prophylactic antiemetics administered. Anesthetic plan and risks discussed with patient.                         Rloand Melendrez MD   6/13/2022

## 2022-07-01 NOTE — PROGRESS NOTES
poDate: 2022      Name: Titi Leblanc      MRN: [id number]       : 1960       Age: 62 y.o. Sex: male        Ruddy Etienne MD       CC:  No chief complaint on file. HPI:  The patient presents for the first post-op visit s/p excision of three skin and soft tissue masses of the left buttock, right lower quadrant abdominal wall, and right flank done on 22. The left buttock wound was 4 cm x 2 cm x 2 cm. The right lower quadrant was likewise 4 cm x 2 cm x 2 cm. The right flank one was 2 cm x 2 cm x 2 cm. . The patient comes back on post-op day number 22. He has no complaints. Physical Exam:     There were no vitals taken for this visit. General: Alert, oriented, cooperative black male in no acute distress. Neck: Supple, trachea midline, no appreciable thyromegaly  Resp: Breathing is  non-labored. Lungs clear to auscultation without wheezing or rhonchi   CV: RRR. No murmurs, rubs or gallops appreciated. Abd: soft non-tender and non-distended without peritoneal signs. +bs    Skin/incision: all three wounds intact. No signs of infection. Assessment/Plan:  Titi Leblanc is a 62 y.o. male who is s/p excision of three skin and soft tissue masses of the left buttock, right lower quadrant abdominal wall, and right flank done on 22. The left buttock wound was 4 cm x 2 cm x 2 cm. The right lower quadrant was likewise 4 cm x 2 cm x 2 cm. The right flank one was 2 cm x 2 cm x 2 cm. .    1. Remove all staples. 2. Remove all sutures. 3. Follow-up with me prn.     Walter Judge MD  FACS   2022  3:29 PM

## 2022-07-05 ENCOUNTER — OFFICE VISIT (OUTPATIENT)
Dept: SURGERY | Age: 62
End: 2022-07-05

## 2022-07-05 DIAGNOSIS — M79.89 SOFT TISSUE MASS: Primary | ICD-10-CM

## 2022-07-05 PROCEDURE — 99024 POSTOP FOLLOW-UP VISIT: CPT | Performed by: SURGERY

## 2022-07-06 ENCOUNTER — APPOINTMENT (OUTPATIENT)
Dept: CT IMAGING | Age: 62
End: 2022-07-06
Payer: MEDICARE

## 2022-07-06 ENCOUNTER — HOSPITAL ENCOUNTER (EMERGENCY)
Age: 62
Discharge: HOME | End: 2022-07-06
Attending: EMERGENCY MEDICINE
Payer: MEDICARE

## 2022-07-06 VITALS
WEIGHT: 265 LBS | HEART RATE: 84 BPM | DIASTOLIC BLOOD PRESSURE: 70 MMHG | BODY MASS INDEX: 32.27 KG/M2 | RESPIRATION RATE: 18 BRPM | HEIGHT: 76 IN | OXYGEN SATURATION: 97 % | SYSTOLIC BLOOD PRESSURE: 108 MMHG | TEMPERATURE: 99.2 F

## 2022-07-06 DIAGNOSIS — S30.1XXA ABDOMINAL WALL SEROMA, INITIAL ENCOUNTER: Primary | ICD-10-CM

## 2022-07-06 LAB
ALBUMIN SERPL-MCNC: 3 G/DL (ref 3.2–4.6)
ALBUMIN/GLOB SERPL: 0.6 {RATIO} (ref 1.2–3.5)
ALP SERPL-CCNC: 71 U/L (ref 50–136)
ALT SERPL-CCNC: 18 U/L (ref 12–65)
ANION GAP SERPL CALC-SCNC: 4 MMOL/L (ref 7–16)
AST SERPL-CCNC: 20 U/L (ref 15–37)
BASOPHILS # BLD: 0 K/UL (ref 0–0.2)
BASOPHILS NFR BLD: 0 % (ref 0–2)
BILIRUB SERPL-MCNC: 0.3 MG/DL (ref 0.2–1.1)
BUN SERPL-MCNC: 41 MG/DL (ref 8–23)
CALCIUM SERPL-MCNC: 9.2 MG/DL (ref 8.3–10.4)
CHLORIDE SERPL-SCNC: 97 MMOL/L (ref 98–107)
CO2 SERPL-SCNC: 39 MMOL/L (ref 21–32)
CREAT SERPL-MCNC: 15.3 MG/DL (ref 0.8–1.5)
DIFFERENTIAL METHOD BLD: ABNORMAL
EOSINOPHIL # BLD: 0.1 K/UL (ref 0–0.8)
EOSINOPHIL NFR BLD: 2 % (ref 0.5–7.8)
ERYTHROCYTE [DISTWIDTH] IN BLOOD BY AUTOMATED COUNT: 15.6 % (ref 11.9–14.6)
GLOBULIN SER CALC-MCNC: 4.7 G/DL (ref 2.3–3.5)
GLUCOSE SERPL-MCNC: 95 MG/DL (ref 65–100)
HCT VFR BLD AUTO: 32.1 % (ref 41.1–50.3)
HGB BLD-MCNC: 10.6 G/DL (ref 13.6–17.2)
IMM GRANULOCYTES # BLD AUTO: 0 K/UL (ref 0–0.5)
IMM GRANULOCYTES NFR BLD AUTO: 0 % (ref 0–5)
LACTATE SERPL-SCNC: 1.1 MMOL/L (ref 0.4–2)
LYMPHOCYTES # BLD: 1.5 K/UL (ref 0.5–4.6)
LYMPHOCYTES NFR BLD: 29 % (ref 13–44)
MCH RBC QN AUTO: 31.3 PG (ref 26.1–32.9)
MCHC RBC AUTO-ENTMCNC: 33 G/DL (ref 31.4–35)
MCV RBC AUTO: 94.7 FL (ref 79.6–97.8)
MONOCYTES # BLD: 0.6 K/UL (ref 0.1–1.3)
MONOCYTES NFR BLD: 11 % (ref 4–12)
NEUTS SEG # BLD: 3 K/UL (ref 1.7–8.2)
NEUTS SEG NFR BLD: 58 % (ref 43–78)
NRBC # BLD: 0 K/UL (ref 0–0.2)
PLATELET # BLD AUTO: 226 K/UL (ref 150–450)
PMV BLD AUTO: 10.7 FL (ref 9.4–12.3)
POTASSIUM SERPL-SCNC: 3.1 MMOL/L (ref 3.5–5.1)
PROT SERPL-MCNC: 7.7 G/DL (ref 6.3–8.2)
RBC # BLD AUTO: 3.39 M/UL (ref 4.23–5.6)
SODIUM SERPL-SCNC: 140 MMOL/L (ref 138–145)
WBC # BLD AUTO: 5.2 K/UL (ref 4.3–11.1)

## 2022-07-06 PROCEDURE — 99285 EMERGENCY DEPT VISIT HI MDM: CPT

## 2022-07-06 PROCEDURE — 80053 COMPREHEN METABOLIC PANEL: CPT

## 2022-07-06 PROCEDURE — 83605 ASSAY OF LACTIC ACID: CPT

## 2022-07-06 PROCEDURE — 74177 CT ABD & PELVIS W/CONTRAST: CPT

## 2022-07-06 PROCEDURE — 87040 BLOOD CULTURE FOR BACTERIA: CPT

## 2022-07-06 PROCEDURE — 80047 BASIC METABLC PNL IONIZED CA: CPT

## 2022-07-06 PROCEDURE — 85025 COMPLETE CBC W/AUTO DIFF WBC: CPT

## 2022-07-06 PROCEDURE — 6360000004 HC RX CONTRAST MEDICATION: Performed by: EMERGENCY MEDICINE

## 2022-07-06 RX ADMIN — IOPAMIDOL 100 ML: 755 INJECTION, SOLUTION INTRAVENOUS at 02:25

## 2022-07-06 ASSESSMENT — ENCOUNTER SYMPTOMS
ABDOMINAL PAIN: 0
FACIAL SWELLING: 0
DIARRHEA: 0
VOMITING: 0
SHORTNESS OF BREATH: 0

## 2022-07-06 ASSESSMENT — PAIN - FUNCTIONAL ASSESSMENT: PAIN_FUNCTIONAL_ASSESSMENT: NONE - DENIES PAIN

## 2022-07-06 NOTE — ED TRIAGE NOTES
Patient states that he had a cyst removed from lower abdomen that he had the sutures/staples removed from yesterday. States has been leaking for the past hour. States fluid is clear. Patient does do PD.

## 2022-07-06 NOTE — ED PROVIDER NOTES
[employer] Emergency Department Provider Note                   PCP:                Shea Jiménez MD               Age: 62 y.o. Sex: male       ICD-10-CM    1. Abdominal wall seroma, initial encounter  S30. 1XXA        DISPOSITION          MDM  Number of Diagnoses or Management Options  Diagnosis management comments: I wore appropriate PPE throughout this patient's ED visit. Rolando Mohan MD, 1:34 AM    Seroma vs peritoneal dialysate drainage. Will get labs and CT    CT reviewed with Dr. Gracie Coleman at handoff. Inconclusive whether fluid is coming from the intra-abdominal cavity through the wound. Wound has opened slightly but not full-thickness. There is some serous fluid noted. Patient states that he did not see fluid coming from the wound until he instilled the dialysis fluid placement made him think that it was coming from the wound. The pressure on the abdominal wall could have pushed the seroma fluid out as well. Discussed with case surgery and they do not think it is coming from the intraperitoneal cavity through the wound. There is fluid leaking around his PD catheter which is chronic. The want to see him in the office tomorrow. They are making arrangements for that to happen. The wound was sealed with Dermabond and reinforced with Steri-Strips so it does not open any further. Also the patient keep putting his hands into the wound and therefore after thorough cleansing with ChloraPrep prep the glue was placed and Tegaderm were placed over top of it to keep his hands out of it.        Amount and/or Complexity of Data Reviewed  Clinical lab tests: ordered and reviewed  Tests in the radiology section of CPT®: ordered and reviewed  Review and summarize past medical records: yes  Independent visualization of images, tracings, or specimens: yes         Orders Placed This Encounter   Procedures    Blood Culture 1    Blood Culture 2    CT ABDOMEN PELVIS W IV CONTRAST Additional Contrast? None quadrant abdominal wall, and right flank done on 6/13/22. The left buttock wound was 4 cm x 2 cm x 2 cm.  The right lower quadrant was likewise 4 cm x 2 cm x 2 cm.  The right flank one was 2 cm x 2 cm x 2 cm. .     1. Remove all staples.     2. Remove all sutures.      3. Follow-up with me prn.  Laura Tran MD              Review of Systems   Constitutional: Negative for fever. HENT: Negative for facial swelling. Eyes: Negative for visual disturbance. Respiratory: Negative for shortness of breath. Cardiovascular: Negative for chest pain. Gastrointestinal: Negative for abdominal pain, diarrhea and vomiting. Musculoskeletal: Negative for joint swelling. Skin: Positive for wound. Negative for rash. Neurological: Negative for speech difficulty. Psychiatric/Behavioral: Negative for confusion. All other systems reviewed and are negative. Past Medical History:   Diagnosis Date    Anemia     Anxiety     Arthritis     CAD (coronary artery disease)     per pt- no MI- \"They said the back of my heart was something. ..like 40%\"    Chronic kidney disease     [facility]; stage 5; pt has AV fistula to left arm; PERITONEAL DIALYSIS daily since 2014    Chronic pain     CKD (chronic kidney disease)     Former smoker     Fracture, tibial plateau 41/16/1480    GERD (gastroesophageal reflux disease)     Heart disease     Hyperlipidemia     on med for control    Hypertension     controlled with med    Hypertensive nephrosclerosis     per nephrology note 4/14/14    Hypoactive thyroid     Peritoneal dialysis catheter in place [facility])     Peritoneal dialysis-associated peritonitis ([address] 06/24/2017    PUD (peptic ulcer disease)     SBP (spontaneous bacterial peritonitis) ([address] 09/24/2016    Sepsis ([address] 09/24/2016    Tibial plateau fracture, left 32/07/0957    Umbilical hernia     Unspecified sleep apnea     pt does not have a CPAP        Past Surgical History:   Procedure Laterality Date    ABDOMEN SURGERY Right 6/13/2022    RIGHT LOWER QUADRANT ABDOMEN LESION BIOPSY EXCISION performed by Nigel Etienne MD at [facility] Right 1995    tip of 5th finger    COLONOSCOPY N/A 3/23/2018    COLONOSCOPY  BMI 34 performed by [facility]ond [facility]ine [facility] at [facility] PYELOGRAMS    HERNIA REPAIR      ORTHOPEDIC SURGERY Left as a child    thumb    ORTHOPEDIC SURGERY Left 1989    knee    ORTHOPEDIC SURGERY Left 2017    LEFT LATERAL TIBIAL PLATEAU OPEN REDUCTION INTERNAL FIXATION    [place] ABDOMEN SURGERY PROC UNLISTED  6/11/14    PD catheter placement    SKIN BIOPSY Left 6/13/2022    LEFT BUTTOCK MASS EXCISION performed by [facility]el [facility]ienne MD at  [facility] VASCULAR SURGERY Left 2012    AV fistula to arm        Family History   Problem Relation Age of Onset    COPD Father     Heart Disease Father     Hypertension Mother     Diabetes Mother     Hypertension Father            Social Connections:     Frequency of Communication with Friends and Family: Not on file    Frequency of Social Gatherings with Friends and Family: Not on file    Attends [employer]ism Services: Not on file    Active Member of Clubs or Organizations: Not on file    Attends Club or Organization Meetings: Not on file    Marital Status: Not on file        No Known Allergies     Vitals signs and nursing note reviewed. No data found. Physical Exam  Vitals and nursing note reviewed. Constitutional:       Appearance: Normal appearance. HENT:      Head: Normocephalic and atraumatic. Nose: Nose normal.      Mouth/Throat:      Mouth: Mucous membranes are moist.   Eyes:      Extraocular Movements: Extraocular movements intact. Pupils: Pupils are equal, round, and reactive to light. Cardiovascular:      Rate and Rhythm: Normal rate and regular rhythm.    Pulmonary:      Effort: Pulmonary effort is normal. No respiratory distress. Abdominal:      General: Abdomen is flat. There is distension. Tenderness: There is no abdominal tenderness. Musculoskeletal:         General: No deformity. Normal range of motion. Cervical back: Normal range of motion and neck supple. Skin:     General: Skin is warm and dry. Neurological:      General: No focal deficit present. Mental Status: He is alert. Mental status is at baseline. Psychiatric:         Mood and Affect: Mood normal.          Procedures      Labs Reviewed   CBC WITH AUTO DIFFERENTIAL - Abnormal; Notable for the following components:       Result Value    RBC 3.39 (*)     Hemoglobin 10.6 (*)     Hematocrit 32.1 (*)     RDW 15.6 (*)     All other components within normal limits   COMPREHENSIVE METABOLIC PANEL - Abnormal; Notable for the following components:    Potassium 3.1 (*)     Chloride 97 (*)     CO2 39 (*)     Anion Gap 4 (*)     BUN 41 (*)     CREATININE 15.30 (*)     GFR  4 (*)     GFR Non- 3 (*)     Albumin 3.0 (*)     Globulin 4.7 (*)     Albumin/Globulin Ratio 0.6 (*)     All other components within normal limits   CULTURE, BLOOD 1   CULTURE, BLOOD 1   LACTIC ACID        CT ABDOMEN PELVIS W IV CONTRAST Additional Contrast? None   Final Result      1. Periumbilical hernia, containing small amount of fluid and a small bowel   loop. This was also noted on the November 2020 CT. 2. Herniated abdominal fluid into the left hemiscrotum. 3. Moderate fluid within the abdomen, likely related to the presence of   peritoneal dialysis catheter. Ascites related to underlying liver disease is   also possible. 4. Polycystic kidneys. No hydronephrosis. 5. No evidence of colitis, diverticulitis, appendicitis or bowel obstruction.                        ED Course as of 07/06/22 0839   Wed Jul 06, 2022   0721 CT ABDOMEN PELVIS W IV CONTRAST Additional Contrast? None [JH]      ED Course User Index  [JH] Shaun Zimmer MD        Voice dictation software was used during the making of this note. This software is not perfect and grammatical and other typographical errors may be present. This note has not been completely proofread for errors.      Shaun Zimmer MD  07/06/22 2728       Shaun Zimmer MD  07/06/22 5682

## 2022-07-06 NOTE — PROGRESS NOTES
poDate: 2022      Name: Tracy Mckeon      MRN: [id number]       : 1960       Age: 62 y.o. Sex: male        Elvin Markham MD       CC:  No chief complaint on file. HPI:  The patient presents for the second post-op visit s/p excision of three soft tissue masses done on 22. The patient had the staples removed on 22 which was day number 22 after surgery. The patient had some wound drainage for which he went to the ED on 22. The ED visit was as follows:    Seroma vs peritoneal dialysate drainage. Will get labs and CT     CT reviewed with Dr. Savage Chin at handoff. Inconclusive whether fluid is coming from the intra-abdominal cavity through the wound. Wound has opened slightly but not full-thickness. There is some serous fluid noted.     Patient states that he did not see fluid coming from the wound until he instilled the dialysis fluid placement made him think that it was coming from the wound. The pressure on the abdominal wall could have pushed the seroma fluid out as well. Discussed with case surgery and they do not think it is coming from the intraperitoneal cavity through the wound. There is fluid leaking around his PD catheter which is chronic. The want to see him in the office tomorrow. They are making arrangements for that to happen. The wound was sealed with Dermabond and reinforced with Steri-Strips so it does not open any further. Also the patient keep putting his hands into the wound and therefore after thorough cleansing with ChloraPrep prep the glue was placed and Tegaderm were placed over top of it to keep his hands out of it. Serous drainage from right lower abdominal wound noted. Patient reports pain at the site. Physical Exam:     There were no vitals taken for this visit. General: Alert, oriented, cooperative black male in no acute distress. Neck: Supple, trachea midline, no appreciable thyromegaly  Resp: Breathing is  non-labored.  Lungs clear to auscultation without wheezing or rhonchi   CV: RRR. No murmurs, rubs or gallops appreciated. Abd: soft non-tender and non-distended without peritoneal signs. +bs    Skin/incisions: serous drainage noted from right lower abdominal wound. Assessment/Plan:  Jenni Franco is a 62 y.o. male who is s/p excision of three soft tissue masses done on 6/9/22. 1. Local wound care. 2. Light activities. 3. Follow-up in 2 weeks.     Martha Vasquez MD  [facility]   7/6/2022  12:37 PM

## 2022-07-06 NOTE — ED NOTES
Discharge instructions reviewed with patient, no prescriptions given, patient ambulatory to discharge home with self     Esther Shah RN  07/06/22 [id number]

## 2022-07-07 ENCOUNTER — OFFICE VISIT (OUTPATIENT)
Dept: SURGERY | Age: 62
End: 2022-07-07

## 2022-07-07 DIAGNOSIS — M79.89 SOFT TISSUE MASS: Primary | ICD-10-CM

## 2022-07-07 DIAGNOSIS — N18.6 ESRD (END STAGE RENAL DISEASE) (HCC): ICD-10-CM

## 2022-07-07 PROCEDURE — 99024 POSTOP FOLLOW-UP VISIT: CPT | Performed by: SURGERY

## 2022-07-07 RX ORDER — OXYCODONE HYDROCHLORIDE AND ACETAMINOPHEN 5; 325 MG/1; MG/1
1 TABLET ORAL EVERY 6 HOURS PRN
Qty: 20 TABLET | Refills: 0 | Status: SHIPPED | OUTPATIENT
Start: 2022-07-07 | End: 2022-07-12

## 2022-07-14 LAB
BUN BLD-MCNC: 36 MG/DL (ref 9–20)
CHLORIDE BLD-SCNC: 97 MMOL/L (ref 98–107)
CO2 BLD-SCNC: 32.2 MMOL/L (ref 21–32)
CREAT BLD-MCNC: >15 MG/DL (ref 0.6–1.3)
GLUCOSE BLD-MCNC: 80 MG/DL (ref 65–100)
LACTATE BLD-SCNC: 0.99 MMOL/L (ref 0.4–2)
POTASSIUM BLD-SCNC: 3 MMOL/L (ref 3.5–5.1)
SODIUM BLD-SCNC: 145 MMOL/L (ref 136–145)

## 2022-07-18 ENCOUNTER — HOSPITAL ENCOUNTER (EMERGENCY)
Age: 62
Discharge: HOME | End: 2022-07-18
Attending: STUDENT IN AN ORGANIZED HEALTH CARE EDUCATION/TRAINING PROGRAM
Payer: MEDICARE

## 2022-07-18 VITALS
OXYGEN SATURATION: 99 % | BODY MASS INDEX: 31.17 KG/M2 | WEIGHT: 264 LBS | HEART RATE: 76 BPM | DIASTOLIC BLOOD PRESSURE: 83 MMHG | HEIGHT: 77 IN | TEMPERATURE: 98.3 F | RESPIRATION RATE: 18 BRPM | SYSTOLIC BLOOD PRESSURE: 118 MMHG

## 2022-07-18 DIAGNOSIS — K05.219 GINGIVAL ABSCESS: ICD-10-CM

## 2022-07-18 DIAGNOSIS — K08.89 PAIN, DENTAL: Primary | ICD-10-CM

## 2022-07-18 PROCEDURE — 99283 EMERGENCY DEPT VISIT LOW MDM: CPT

## 2022-07-18 RX ORDER — HYDROCODONE BITARTRATE AND ACETAMINOPHEN 5; 325 MG/1; MG/1
1 TABLET ORAL EVERY 8 HOURS PRN
Qty: 10 TABLET | Refills: 0 | Status: SHIPPED | OUTPATIENT
Start: 2022-07-18 | End: 2022-07-21

## 2022-07-18 RX ORDER — AMOXICILLIN AND CLAVULANATE POTASSIUM 875; 125 MG/1; MG/1
1 TABLET, FILM COATED ORAL 2 TIMES DAILY
Qty: 14 TABLET | Refills: 0 | Status: SHIPPED | OUTPATIENT
Start: 2022-07-18 | End: 2022-07-25

## 2022-07-18 ASSESSMENT — PAIN SCALES - GENERAL
PAINLEVEL_OUTOF10: 8
PAINLEVEL_OUTOF10: 7

## 2022-07-18 ASSESSMENT — ENCOUNTER SYMPTOMS
NAUSEA: 0
TROUBLE SWALLOWING: 0
RHINORRHEA: 0
ABDOMINAL PAIN: 0
VOICE CHANGE: 0
SHORTNESS OF BREATH: 0
VOMITING: 0
SORE THROAT: 0

## 2022-07-18 ASSESSMENT — PAIN DESCRIPTION - LOCATION: LOCATION: TEETH

## 2022-07-18 NOTE — DISCHARGE INSTRUCTIONS
Take the antibiotics as prescribed for the abscess. Medicine for pain as needed. Contact the dental office phone number I have provided you to try and schedule an appointment. Follow-up with your family doctor. Return to the ER as necessary should you develop fevers, worsening pain, or other new symptoms.

## 2022-07-18 NOTE — ED PROVIDER NOTES
[facility] Emergency Department Provider Note                   PCP:                Bertram Libman, MD               Age: 62 y.o. Sex: male       ICD-10-CM    1. Pain, dental  K08.89       2. Gingival abscess  K05.219 HYDROcodone-acetaminophen (NORCO) 5-325 MG per tablet          DISPOSITION Decision To Discharge 07/18/2022 11:23:33 AM        MDM  Number of Diagnoses or Management Options  Gingival abscess  Pain, dental  Diagnosis management comments: Patient is a 62-year-old male who presents to facility with a dental abscess. We discussed draining versus antibiotics and patient preferred to go the antibiotic route at this time. Patient is afebrile, normotensive, with regular heart rate. No need for labs at this time. A small dental abscess underneath the right molar is noted. We will start antibiotics and medicine for pain. Follow-up with dentistry, office number given to patient to contact today when he leaves. Discussed strict return precautions such as fevers, worsening pain, difficulty swallowing, or other new complications which patient reports understanding. Patient agreeable with the plan as discussed and discharged in stable condition. Amount and/or Complexity of Data Reviewed  Review and summarize past medical records: yes  Independent visualization of images, tracings, or specimens: yes    Risk of Complications, Morbidity, and/or Mortality  Presenting problems: low  Diagnostic procedures: low  Management options: low    Patient Progress  Patient progress: stable       No orders of the defined types were placed in this encounter. Sherine Huntley is a 62 y.o. male who presents to the Emergency Department with chief complaint of    Chief Complaint   Patient presents with    Dental Pain      Patient is a 62-year-old male who states since Friday he has been having some dental pain that he feels like it is going down his neck and through his teeth and up into his cheek as well.   He states it is uncomfortable to eat as well when it involves chewing. He states he is drinking without issues. He has had no fevers or chills. States he tried to go to the dentist today but they would not see him so after speaking to his dialysis doctor they told him to come to the ER to be evaluated so he presents here today. The history is provided by the patient. Review of Systems   Constitutional:  Negative for chills and fever. HENT:  Positive for dental problem. Negative for congestion, drooling, rhinorrhea, sore throat, trouble swallowing and voice change. Respiratory:  Negative for shortness of breath. Cardiovascular:  Negative for chest pain. Gastrointestinal:  Negative for abdominal pain, nausea and vomiting. Musculoskeletal:  Negative for gait problem. Skin:  Negative for rash. Neurological:  Negative for dizziness, syncope and headaches. Psychiatric/Behavioral:  Negative for agitation and behavioral problems. All other systems reviewed and are negative. Past Medical History:   Diagnosis Date    Anemia     Anxiety     Arthritis     CAD (coronary artery disease)     per pt- no MI- \"They said the back of my heart was something. ..like 40%\"    Chronic kidney disease     [facility]; pt has AV fistula to left arm; PERITONEAL DIALYSIS daily since 2014    Chronic pain     CKD (chronic kidney disease)     Former smoker     Fracture, tibial plateau 00/47/2552    GERD (gastroesophageal reflux disease)     Heart disease     Hyperlipidemia     on med for control    Hypertension     controlled with med    Hypertensive nephrosclerosis     per nephrology note 4/14/14    Hypoactive thyroid     Peritoneal dialysis catheter in place [facility])     Peritoneal dialysis-associated peritonitis ([address] 06/24/2017    PUD (peptic ulcer disease)     SBP (spontaneous bacterial peritonitis) ([address] 09/24/2016    Sepsis ([address] 09/24/2016    Tibial plateau fracture, left 03/23/2017 Umbilical hernia     Unspecified sleep apnea     pt does not have a CPAP        Past Surgical History:   Procedure Laterality Date    ABDOMEN SURGERY Right 6/13/2022    RIGHT LOWER QUADRANT ABDOMEN LESION BIOPSY EXCISION performed by Sylvia Liao MD at [address] Right 1995    tip of 5th finger    COLONOSCOPY N/A 3/23/2018    COLONOSCOPY  BMI 34 performed by Nain Rogers MD at [facility] Left as a child    thumb    ORTHOPEDIC SURGERY Left 1989    knee    ORTHOPEDIC SURGERY Left 2017    LEFT LATERAL TIBIAL PLATEAU OPEN REDUCTION INTERNAL FIXATION    [place] ABDOMEN SURGERY PROC UNLISTED  6/11/14    PD catheter placement    SKIN BIOPSY Left 6/13/2022    LEFT BUTTOCK MASS EXCISION performed by Syl[facility] L[facility] MD at [facility]    VASCULAR SURGERY Left 2012    AV fistula to arm        Family History   Problem Relation Age of Onset    COPD Father     Heart Disease Father     Hypertension Mother     Diabetes Mother     Hypertension Father         Social Connections: Not on file        No Known Allergies     Vitals signs and nursing note reviewed. Patient Vitals for the past 4 hrs:   Temp Pulse Resp BP SpO2   07/18/22 1149 98.3 °F (36.8 °C) 76 18 118/83 99 %   07/18/22 0930 98.4 °F (36.9 °C) 80 18 122/85 98 %          Physical Exam  Vitals and nursing note reviewed. Constitutional:       General: He is not in acute distress. Appearance: Normal appearance. He is not ill-appearing. HENT:      Head: Normocephalic and atraumatic. Right Ear: External ear normal.      Left Ear: External ear normal.      Mouth/Throat:      Mouth: Mucous membranes are dry. Dentition: Dental caries and dental abscesses present. Pharynx: Oropharynx is clear. Uvula midline. No pharyngeal swelling, posterior oropharyngeal erythema or uvula swelling.      Eyes:      Extraocular Movements: Extraocular movements intact. Conjunctiva/sclera: Conjunctivae normal.   Cardiovascular:      Rate and Rhythm: Normal rate and regular rhythm. Pulses: Normal pulses. Heart sounds: Normal heart sounds. Pulmonary:      Effort: Pulmonary effort is normal.      Breath sounds: Normal breath sounds. Abdominal:      General: Abdomen is flat. Musculoskeletal:         General: Normal range of motion. Cervical back: Normal range of motion. Skin:     General: Skin is warm and dry. Capillary Refill: Capillary refill takes less than 2 seconds. Neurological:      General: No focal deficit present. Mental Status: He is alert and oriented to person, place, and time. Psychiatric:         Mood and Affect: Mood normal.         Behavior: Behavior normal.        Procedures      Labs Reviewed - No data to display     No orders to display                          Voice dictation software was used during the making of this note. This software is not perfect and grammatical and other typographical errors may be present. This note has not been completely proofread for errors.      Rajinder Alberto PA-C  07/18/22 [id number]

## 2022-07-18 NOTE — ED NOTES
I have reviewed discharge instructions with the patient. The patient verbalized understanding. Patient left ED via Discharge Method: ambulatory to Home with self. Opportunity for questions and clarification provided. Patient given 2 scripts. To continue your aftercare when you leave the hospital, you may receive an automated call from our care team to check in on how you are doing. This is a free service and part of our promise to provide the best care and service to meet your aftercare needs.  If you have questions, or wish to unsubscribe from this service please call [phone]. Thank you for Choosing our [facility] Emergency Department.        Terence Craig RN  07/18/22 2779

## 2022-07-23 NOTE — PROGRESS NOTES
poDate: 2022      Name: Colby Parker      MRN: [id number]       : 1960       Age: 62 y.o. Sex: male        Kia Alvarado MD       CC:  No chief complaint on file. HPI:  The patient presents for the third post-op visit s/p excision of three soft tissue masses done on 22. The patient had the staples removed on 22 which was day number 22 after surgery. The patient had some wound drainage for which he went to the ED on 22. The ED visit was as follows:    Seroma vs peritoneal dialysate drainage. Will get labs and CT     CT reviewed with Dr. Marilu Sethi at handoff. Inconclusive whether fluid is coming from the intra-abdominal cavity through the wound. Wound has opened slightly but not full-thickness. There is some serous fluid noted. Patient states that he did not see fluid coming from the wound until he instilled the dialysis fluid placement made him think that it was coming from the wound. The pressure on the abdominal wall could have pushed the seroma fluid out as well. Discussed with case surgery and they do not think it is coming from the intraperitoneal cavity through the wound. There is fluid leaking around his PD catheter which is chronic. The want to see him in the office tomorrow. They are making arrangements for that to happen. The wound was sealed with Dermabond and reinforced with Steri-Strips so it does not open any further. Also the patient keep putting his hands into the wound and therefore after thorough cleansing with ChloraPrep prep the glue was placed and Tegaderm were placed over top of it to keep his hands out of it. Serous drainage from right lower abdominal wound noted. Patient reports pain at the site. Physical Exam:     There were no vitals taken for this visit. General: Alert, oriented, cooperative black male in no acute distress. Neck: Supple, trachea midline, no appreciable thyromegaly  Resp: Breathing is  non-labored.  Lungs clear to auscultation without wheezing or rhonchi   CV: RRR. No murmurs, rubs or gallops appreciated. Abd: soft non-tender and non-distended without peritoneal signs. +bs    Skin/incisions: no drainage noted from right lower abdominal wound. No signs of infection. Assessment/Plan:  Edith Caballero is a 62 y.o. male who is s/p excision of three soft tissue masses done on 6/9/22. 1. Local wound care. 2. May resume all activities. 3. Follow-up prn.     Estefani Noguera MD  [facility]   7/23/2022  3:59 PM

## 2022-07-26 ENCOUNTER — OFFICE VISIT (OUTPATIENT)
Dept: SURGERY | Age: 62
End: 2022-07-26

## 2022-07-26 DIAGNOSIS — M79.89 SOFT TISSUE MASS: Primary | ICD-10-CM

## 2022-07-26 PROCEDURE — 99024 POSTOP FOLLOW-UP VISIT: CPT | Performed by: SURGERY

## 2022-08-24 ENCOUNTER — OFFICE VISIT (OUTPATIENT)
Dept: INTERNAL MEDICINE CLINIC | Facility: CLINIC | Age: 62
End: 2022-08-24
Payer: MEDICARE

## 2022-08-24 VITALS
HEIGHT: 77 IN | SYSTOLIC BLOOD PRESSURE: 99 MMHG | TEMPERATURE: 97.2 F | DIASTOLIC BLOOD PRESSURE: 80 MMHG | OXYGEN SATURATION: 96 % | WEIGHT: 258 LBS | HEART RATE: 81 BPM | BODY MASS INDEX: 30.46 KG/M2

## 2022-08-24 DIAGNOSIS — G47.33 OBSTRUCTIVE SLEEP APNEA SYNDROME: ICD-10-CM

## 2022-08-24 DIAGNOSIS — E78.49 OTHER HYPERLIPIDEMIA: ICD-10-CM

## 2022-08-24 DIAGNOSIS — N18.6 END STAGE RENAL DISEASE (HCC): ICD-10-CM

## 2022-08-24 DIAGNOSIS — Z00.00 MEDICARE ANNUAL WELLNESS VISIT, SUBSEQUENT: ICD-10-CM

## 2022-08-24 DIAGNOSIS — F51.01 PRIMARY INSOMNIA: ICD-10-CM

## 2022-08-24 DIAGNOSIS — I10 ESSENTIAL HYPERTENSION: ICD-10-CM

## 2022-08-24 DIAGNOSIS — R10.9 ABDOMINAL PAIN, UNSPECIFIED ABDOMINAL LOCATION: Primary | ICD-10-CM

## 2022-08-24 PROCEDURE — G0439 PPPS, SUBSEQ VISIT: HCPCS | Performed by: INTERNAL MEDICINE

## 2022-08-24 PROCEDURE — 99213 OFFICE O/P EST LOW 20 MIN: CPT | Performed by: INTERNAL MEDICINE

## 2022-08-24 RX ORDER — TRAZODONE HYDROCHLORIDE 50 MG/1
50 TABLET ORAL NIGHTLY
Qty: 90 TABLET | Refills: 1 | Status: SHIPPED | OUTPATIENT
Start: 2022-08-24

## 2022-08-24 SDOH — ECONOMIC STABILITY: FOOD INSECURITY: WITHIN THE PAST 12 MONTHS, YOU WORRIED THAT YOUR FOOD WOULD RUN OUT BEFORE YOU GOT MONEY TO BUY MORE.: NEVER TRUE

## 2022-08-24 SDOH — ECONOMIC STABILITY: FOOD INSECURITY: WITHIN THE PAST 12 MONTHS, THE FOOD YOU BOUGHT JUST DIDN'T LAST AND YOU DIDN'T HAVE MONEY TO GET MORE.: NEVER TRUE

## 2022-08-24 ASSESSMENT — SOCIAL DETERMINANTS OF HEALTH (SDOH): HOW HARD IS IT FOR YOU TO PAY FOR THE VERY BASICS LIKE FOOD, HOUSING, MEDICAL CARE, AND HEATING?: NOT HARD AT ALL

## 2022-08-24 ASSESSMENT — ENCOUNTER SYMPTOMS
ABDOMINAL PAIN: 1
DIARRHEA: 0
NAUSEA: 0
ABDOMINAL DISTENTION: 1
VOMITING: 0
SHORTNESS OF BREATH: 0

## 2022-08-24 ASSESSMENT — LIFESTYLE VARIABLES
HOW MANY STANDARD DRINKS CONTAINING ALCOHOL DO YOU HAVE ON A TYPICAL DAY: PATIENT DOES NOT DRINK
HOW OFTEN DO YOU HAVE A DRINK CONTAINING ALCOHOL: NEVER

## 2022-08-24 ASSESSMENT — PATIENT HEALTH QUESTIONNAIRE - PHQ9
SUM OF ALL RESPONSES TO PHQ9 QUESTIONS 1 & 2: 0
SUM OF ALL RESPONSES TO PHQ QUESTIONS 1-9: 0
SUM OF ALL RESPONSES TO PHQ QUESTIONS 1-9: 0
2. FEELING DOWN, DEPRESSED OR HOPELESS: 0
1. LITTLE INTEREST OR PLEASURE IN DOING THINGS: 0
SUM OF ALL RESPONSES TO PHQ QUESTIONS 1-9: 0
SUM OF ALL RESPONSES TO PHQ QUESTIONS 1-9: 0

## 2022-08-24 NOTE — PROGRESS NOTES
Kimberly Quevedo M.D. Internal Medicine  [facility] [address]  Office : [phone]  Fax : [phone]    Chief Complaint   Patient presents with    Abdominal Pain       History of Present Illness:  Jimbo Pleitez is a 62 y.o. male. Abdominal Pain  This is a new problem. The current episode started 1 to 4 weeks ago. The onset quality is sudden. The problem occurs constantly. The problem has been rapidly improving. The pain is located in the suprapubic region, right flank and left flank. The pain is mild. The quality of the pain is aching. The abdominal pain does not radiate. Pertinent negatives include no anorexia, diarrhea, fever, nausea or vomiting. Nothing aggravates the pain. Relieved by: antibiotics. He has tried antibiotics for the symptoms. The treatment provided significant relief. His past medical history is significant for abdominal surgery. Patient had an episode of peritonitis and had been in the hospital 3 times since the last visit. Now he is being considered for a nephrectomy    Hypertension  Patient is in for Hypertension which is stable. Diet and Lifestyle: generally follows a low sodium diet  exercises sporadically  nonsmoker  Home BP Monitoring: is not measured at home . Patient's recent blood pressures were   BP Readings from Last 3 Encounters:   08/24/22 99/80   07/18/22 118/83   07/06/22 108/70   . taking medications as instructed, no medication side effects noted, no TIA's, no chest pain on exertion, no dyspnea on exertion, no swelling of ankles. Cardiac risk factors consist of dyslipidemia, hypertension, and male gender. Hyperlipidemia  Patient is in for follow-up for hyperlipidemia. Diet and Lifestyle: generally follows a low fat low cholesterol diet. Risk factors for vascular disease consist of hyperlipidemia and hypertension.  Last LDL was   Lab Results   Component Value Date    LDLCALC 76 04/21/2022   . Last ALT was   Lab Results   Component Value Date/Time    ALT 18 07/06/2022 02:01 AM   .  No muscle aches. ESRD   Managed by nephrology. Doing peritoneal dialysis every night. Being evaluated for a kidney transplant      Sleep Apnea   Non-adherent to CPAP due to insomnia      Anxiety   Much improved on lexapro. Still using THC           Past Medical History:  Past Medical History:   Diagnosis Date    Anemia     Anxiety     Arthritis     CAD (coronary artery disease)     per pt- no MI- \"They said the back of my heart was something. ..like 40%\"    Chronic kidney disease     [facility]; stage 5; pt has AV fistula to left arm; PERITONEAL DIALYSIS daily since 2014    Chronic pain     CKD (chronic kidney disease)     Former smoker     Fracture, tibial plateau 59/18/7074    GERD (gastroesophageal reflux disease)     Heart disease     Hyperlipidemia     on med for control    Hypertension     controlled with med    Hypertensive nephrosclerosis     per nephrology note 4/14/14    Hypoactive thyroid     Peritoneal dialysis catheter in place [facility])     Peritoneal dialysis-associated peritonitis ([facility] 06/24/2017    PUD (peptic ulcer disease)     SBP (spontaneous bacterial peritonitis) ([address] 09/24/2016    Sepsis ([address] 09/24/2016    Tibial plateau fracture, left 10/52/0411    Umbilical hernia     Unspecified sleep apnea     pt does not have a CPAP     Past Surgical History:  Past Surgical History:   Procedure Laterality Date    ABDOMEN SURGERY Right 6/13/2022    RIGHT LOWER QUADRANT ABDOMEN LESION BIOPSY EXCISION performed by Janiya Paris MD at [facility] Right 1995    tip of 5th finger    COLONOSCOPY N/A 3/23/2018    COLONOSCOPY  BMI 34 performed by Bin Murray MD at [facility] Left as a child    thumb    ORTHOPEDIC SURGERY Left 1989    knee    ORTHOPEDIC SURGERY Left 2017    LEFT LATERAL TIBIAL PLATEAU OPEN REDUCTION INTERNAL FIXATION    [place] ABDOMEN SURGERY PROC UNLISTED  6/11/14    PD catheter placement    SKIN BIOPSY Left 6/13/2022    LEFT BUTTOCK MASS EXCISION performed by Jean Valenzuela MD at [address] [place] [address] Left 2012    AV fistula to arm     Allergies:   No Known Allergies  Medications:   Current Outpatient Medications   Medication Sig Dispense Refill    traZODone (DESYREL) 50 MG tablet Take 1 tablet by mouth nightly 90 tablet 1    B Complex-C-Folic Acid (DIALYVITE PO) Take 1 tablet by mouth daily      LACTULOSE PO Take 15 mLs by mouth as needed      aspirin 81 MG chewable tablet Take 81 mg by mouth       baclofen (LIORESAL) 10 MG tablet Take 5 mg by mouth 3 times daily      calcitRIOL (ROCALTROL) 0.25 MCG capsule Take 0.25 mcg by mouth       cinacalcet (SENSIPAR) 90 MG tablet Take 90 mg by mouth daily      cloNIDine (CATAPRES) 0.3 MG/24HR PTWK Place 1 patch onto the skin every 3 days       cloNIDine (CATAPRES) 0.2 MG tablet Take 0.2 mg by mouth 2 times daily       escitalopram (LEXAPRO) 10 MG tablet Take 10 mg by mouth daily      esomeprazole (NEXIUM) 40 MG delayed release capsule Take 1 capsule by mouth daily      finasteride (PROSCAR) 5 MG tablet Take 5 mg by mouth daily      hydrALAZINE (APRESOLINE) 50 MG tablet Take 1 tablet by mouth 3 times daily       NIFEdipine (PROCARDIA XL) 90 MG extended release tablet Take 90 mg by mouth daily       ondansetron (ZOFRAN) 4 MG tablet Take 4 mg by mouth every 8 hours as needed      potassium chloride (KLOR-CON) 10 MEQ extended release tablet Take 10-20 mEq by mouth daily       rosuvastatin (CRESTOR) 10 MG tablet Take 10 mg by mouth at bedtime       sevelamer hcl (RENAGEL) 800 MG tablet Take 2,400 mg by mouth 3 times daily (with meals)       valsartan (DIOVAN) 320 MG tablet Take 320 mg by mouth at bedtime        No current facility-administered medications for this visit.      Social History:  Social History     Tobacco Use    Smoking status: Former     Packs/day: 0.50     Years: 15.00     Pack years: 7.50     Types: Cigarettes     Quit date: 3/1/2014     Years since quittin.4    Smokeless tobacco: Never    Tobacco comments:     Quit smoking: pt states only smoked cigarettes when drinking alcohol   Substance Use Topics    Alcohol use: Yes     Family History  Family History   Problem Relation Age of Onset    COPD Father     Heart Disease Father     Hypertension Mother     Diabetes Mother     Hypertension Father        Review of Systems   Constitutional:  Negative for chills, fatigue and fever. Respiratory:  Negative for shortness of breath. Gastrointestinal:  Positive for abdominal distention and abdominal pain. Negative for anorexia, diarrhea, nausea and vomiting. Skin:  Negative for rash. Neurological:  Negative for speech difficulty. Psychiatric/Behavioral:  Positive for sleep disturbance. Negative for dysphoric mood. Vital Signs  BP 99/80 (Site: Left Upper Arm, Position: Sitting, Cuff Size: Large Adult)   Pulse 81   Temp 97.2 °F (36.2 °C) (Temporal)   Ht 6' 5\" (1.956 m)   Wt 258 lb (117 kg)   SpO2 96%   BMI 30.59 kg/m²   Body mass index is 30.59 kg/m². Physical Exam  Vitals reviewed. Constitutional:       General: He is not in acute distress. Appearance: Normal appearance. He is not ill-appearing. HENT:      Head: Normocephalic and atraumatic. Eyes:      General: No scleral icterus. Extraocular Movements: Extraocular movements intact. Conjunctiva/sclera: Conjunctivae normal.   Neck:      Vascular: No carotid bruit. Cardiovascular:      Rate and Rhythm: Normal rate and regular rhythm. Heart sounds: Normal heart sounds. No murmur heard. Pulmonary:      Effort: Pulmonary effort is normal.      Breath sounds: Normal breath sounds. Abdominal:      General: Bowel sounds are normal. There is distension. Palpations: Abdomen is soft. There is no mass. Tenderness: There is no abdominal tenderness. Musculoskeletal:         General: No swelling. Skin:     Coloration: Skin is not jaundiced. Findings: No rash. Neurological:      General: No focal deficit present. Mental Status: He is alert. Mental status is at baseline. Cranial Nerves: No cranial nerve deficit. Motor: No weakness. Gait: Gait normal.   Psychiatric:         Mood and Affect: Mood normal.         Behavior: Behavior normal.         Thought Content: Thought content normal.         Judgment: Judgment normal.         Assessment/Plan:  Demarco Rosales was seen today for abdominal pain. Diagnoses and all orders for this visit:    Abdominal pain, unspecified abdominal location    Medicare annual wellness visit, subsequent    Primary insomnia  -     traZODone (DESYREL) 50 MG tablet; Take 1 tablet by mouth nightly    Essential hypertension    Other hyperlipidemia    End stage renal disease (HCC)    Obstructive sleep apnea syndrome  Pain likely related to kidneys and peritonitis has improved. He will follow up with urology    Return in 4 months (on 12/24/2022), or if symptoms worsen or fail to improve, for Medicare Annual Wellness Visit in 1 year.   __  Tequila Palmer M.D.

## 2022-08-24 NOTE — PATIENT INSTRUCTIONS
Personalized Preventive Plan for Jackie Weaver - 8/24/2022  Medicare offers a range of preventive health benefits. Some of the tests and screenings are paid in full while other may be subject to a deductible, co-insurance, and/or copay. Some of these benefits include a comprehensive review of your medical history including lifestyle, illnesses that may run in your family, and various assessments and screenings as appropriate. After reviewing your medical record and screening and assessments performed today your provider may have ordered immunizations, labs, imaging, and/or referrals for you. A list of these orders (if applicable) as well as your Preventive Care list are included within your After Visit Summary for your review. Other Preventive Recommendations:    A preventive eye exam performed by an eye specialist is recommended every 1-2 years to screen for glaucoma; cataracts, macular degeneration, and other eye disorders. A preventive dental visit is recommended every 6 months. Try to get at least 150 minutes of exercise per week or 10,000 steps per day on a pedometer . Order or download the FREE \"Exercise & Physical Activity: Your Everyday Guide\" from [employer]. Call 7-603.230.2928 or search [employer] online. You need 4664-7589 mg of calcium and 4182-7882 IU of vitamin D per day. It is possible to meet your calcium requirement with diet alone, but a vitamin D supplement is usually necessary to meet this goal.  When exposed to the sun, use a sunscreen that protects against both UVA and UVB radiation with an SPF of 30 or greater. Reapply every 2 to 3 hours or after sweating, drying off with a towel, or swimming. Always wear a seat belt when traveling in a car. Always wear a helmet when riding a bicycle or motorcycle.

## 2022-08-24 NOTE — PROGRESS NOTES
Radha Zaragoza M.D. Internal Medicine  [address] , [address]   Office : [phone]  Fax : [phone]  Chief Complaint   Patient presents with    Medicare AWV     4 mo follow up with AWV     Medicare Annual Wellness Visit    [facility] is here for Medicare AWV (4 mo follow up with AWV)    Assessment & Plan   Medicare annual wellness visit, subsequent    Recommendations for Preventive Services Due: see orders and patient instructions/AVS.  Recommended screening schedule for the next 5-10 years is provided to the patient in written form: see Patient Instructions/AVS.     Return for Medicare Annual Wellness Visit in 1 year. Subjective       Patient's complete Health Risk Assessment and screening values have been reviewed and are found in Flowsheets. The following problems were reviewed today and where indicated follow up appointments were made and/or referrals ordered.     Positive Risk Factor Screenings with Interventions:         Drug Use Screening: DAST-10 Score: 1  DAST-10 Score Interpretation:  1-2: Low level - Monitor, re-assess at a later date; 3-5: Moderate level - Further Investigation; 6-8: Substantial level - Intensive Assessment; 9-10: Severe level - Intensive Assessment  Substance Use - Drug Use Interventions:  patient is not ready to change his/her recreational drug use behavior at this time       General Health and ACP:  General  In general, how would you say your health is?: Good  In the past 7 days, have you experienced any of the following: New or Increased Pain, New or Increased Fatigue, Loneliness, Social Isolation, Stress or Anger?: No  Do you get the social and emotional support that you need?: (!) No  Do you have a Living Will?: (!) No    Advance Directives       Power of  Living Will ACP-Advance Directive ACP-Power of     Not on File Not on File Not on File Not on File General Health Risk Interventions:  Inadequate social/emotional support: patient declines any further intervention for this issue  No Living Will: Patient declines ACP discussion/assistance    Health Habits/Nutrition:  Physical Activity: Insufficiently Active    Days of Exercise per Week: 1 day    Minutes of Exercise per Session: 20 min     Have you lost any weight without trying in the past 3 months?: No  Body mass index: (!) 30.59  Have you seen the dentist within the past year?: (!) No  Health Habits/Nutrition Interventions:  Dental exam overdue:  patient encouraged to make appointment with his/her dentist    Hearing/Vision:  Do you or your family notice any trouble with your hearing that hasn't been managed with hearing aids?: No  Do you have difficulty driving, watching TV, or doing any of your daily activities because of your eyesight?: No  Have you had an eye exam within the past year?: (!) No  No results found. Hearing/Vision Interventions:  Vision concerns:  patient encouraged to make appointment with his/her eye specialist    Safety:  Do you have working smoke detectors?: Yes  Do you have any tripping hazards - loose or unsecured carpets or rugs?: No  Do you have any tripping hazards - clutter in doorways, halls, or stairs?: No  Do you have either shower bars, grab bars, non-slip mats or non-slip surfaces in your shower or bathtub?: Yes  Do all of your stairways have a railing or banister?: (!) No  Do you always fasten your seatbelt when you are in a car?: Yes  Safety Interventions:  Home safety tips provided           Objective   Vitals:    08/24/22 1516   BP: 99/80   Site: Left Upper Arm   Position: Sitting   Cuff Size: Large Adult   Pulse: 81   Temp: 97.2 °F (36.2 °C)   TempSrc: Temporal   SpO2: 96%   Weight: 258 lb (117 kg)   Height: 6' 5\" (1.956 m)      Body mass index is 30.59 kg/m². No Known Allergies  Prior to Visit Medications    Medication Sig Taking?  Authorizing Provider B Complex-C-Folic Acid (DIALYVITE PO) Take 1 tablet by mouth daily Yes Historical Provider, MD   LACTULOSE PO Take 15 mLs by mouth as needed Yes Ar Automatic Reconciliation   aspirin 81 MG chewable tablet Take 81 mg by mouth  Yes Ar Automatic Reconciliation   baclofen (LIORESAL) 10 MG tablet Take 5 mg by mouth 3 times daily Yes Ar Automatic Reconciliation   calcitRIOL (ROCALTROL) 0.25 MCG capsule Take 0.25 mcg by mouth  Yes Ar Automatic Reconciliation   cinacalcet (SENSIPAR) 90 MG tablet Take 90 mg by mouth daily Yes Ar Automatic Reconciliation   cloNIDine (CATAPRES) 0.3 MG/24HR PTWK Place 1 patch onto the skin every 3 days  Yes Ar Automatic Reconciliation   cloNIDine (CATAPRES) 0.2 MG tablet Take 0.2 mg by mouth 2 times daily  Yes Ar Automatic Reconciliation   escitalopram (LEXAPRO) 10 MG tablet Take 10 mg by mouth daily Yes Ar Automatic Reconciliation   esomeprazole (NEXIUM) 40 MG delayed release capsule Take 1 capsule by mouth daily Yes Ar Automatic Reconciliation   finasteride (PROSCAR) 5 MG tablet Take 5 mg by mouth daily Yes Ar Automatic Reconciliation   hydrALAZINE (APRESOLINE) 50 MG tablet Take 1 tablet by mouth 3 times daily  Yes Ar Automatic Reconciliation   NIFEdipine (PROCARDIA XL) 90 MG extended release tablet Take 90 mg by mouth daily  Yes Ar Automatic Reconciliation   ondansetron (ZOFRAN) 4 MG tablet Take 4 mg by mouth every 8 hours as needed Yes Ar Automatic Reconciliation   potassium chloride (KLOR-CON) 10 MEQ extended release tablet Take 10-20 mEq by mouth daily  Yes Ar Automatic Reconciliation   rosuvastatin (CRESTOR) 10 MG tablet Take 10 mg by mouth at bedtime  Yes Ar Automatic Reconciliation   sevelamer hcl (RENAGEL) 800 MG tablet Take 2,400 mg by mouth 3 times daily (with meals)  Yes Ar Automatic Reconciliation   traZODone (DESYREL) 50 MG tablet Take 50 mg by mouth nightly  Yes Ar Automatic Reconciliation   valsartan (DIOVAN) 320 MG tablet Take 320 mg by mouth at bedtime  Yes Ar Automatic Reconciliation       CareTeam (Including outside providers/suppliers regularly involved in providing care):   Patient Care Team:  Catrachita Mcginnis MD as PCP - General (Internal Medicine)  Catrachita Mcginnis MD as PCP - [facility] Empaneled Provider  LEROY Ring - NP as Referring Physician  Sylvia Liao MD as Surgeon     Reviewed and updated this visit:  Tobacco  Allergies  Meds  Problems  Med Hx  Surg Hx  Soc Hx  Fam Hx          __  Catrachita Mcginnis M.D.

## 2022-09-28 ENCOUNTER — TELEPHONE (OUTPATIENT)
Dept: INTERNAL MEDICINE CLINIC | Facility: CLINIC | Age: 62
End: 2022-09-28

## 2022-09-28 NOTE — TELEPHONE ENCOUNTER
Received fax from [facility] stating the request submitted for Saranya Callahan had been approved. Patient is eligible for 252 total meals.

## 2022-10-06 ENCOUNTER — TELEPHONE (OUTPATIENT)
Dept: INTERNAL MEDICINE CLINIC | Facility: CLINIC | Age: 62
End: 2022-10-06

## 2022-10-06 NOTE — TELEPHONE ENCOUNTER
Received fax from [facility] stating the request submitted by [facility] #[id number] has been approved.

## 2022-10-11 ENCOUNTER — OFFICE VISIT (OUTPATIENT)
Dept: UROLOGY | Age: 62
End: 2022-10-11
Payer: MEDICARE

## 2022-10-11 DIAGNOSIS — N18.6 ESRD (END STAGE RENAL DISEASE) (HCC): ICD-10-CM

## 2022-10-11 DIAGNOSIS — N20.0 BILATERAL RENAL STONES: Primary | ICD-10-CM

## 2022-10-11 DIAGNOSIS — Q61.3 POLYCYSTIC KIDNEY: ICD-10-CM

## 2022-10-11 PROCEDURE — 99215 OFFICE O/P EST HI 40 MIN: CPT | Performed by: NURSE PRACTITIONER

## 2022-10-11 ASSESSMENT — ENCOUNTER SYMPTOMS: BACK PAIN: 1

## 2022-10-11 NOTE — PROGRESS NOTES
[facility]  [address] [facility]    David Trevizo [address], [address]  : 1960    Chief Complaint   Patient presents with    Nephrolithiasis          HPI     Bianka Barker is a 62 y.o. male being seen today for left flank pain. Followed by Dr. [place]. PMH of gross hematuria s/p negative work up 2018 and 2020 (stones and BPh = sources), BPH, ESRD on dialysis as well as bilateral kidney stones. Finasteride was added for hematuria likely due to BPH in . Last PSA 1.1 in 2020. Had CT abd pelvis w IV contrast in 2022 revealing polycystic kidneys. Stones not visualized. Images reviewed. Cr 15.3. Has had int left flank pain happening on a daily basis. Pain is severe at times. Denies gross hematuria, however he is ESRD, urinates very few drops. He is afebrile. Here today to discuss stone tx. KUB in office today reviewed by myself and shows 9-10 mm stone to left renal pelvis; right kidney is not visualized. Followed by Dr. Sonia Tobias. Past Medical History:   Diagnosis Date    Anemia     Anxiety     Arthritis     CAD (coronary artery disease)     per pt- no MI- \"They said the back of my heart was something. ..like 40%\"    Chronic kidney disease     [facility]ed [facility]; stage 5; pt has AV fistula to left arm; PERITONEAL DIALYSIS daily since     Chronic pain     CKD (chronic kidney disease)     Former smoker     Fracture, tibial plateau     GERD (gastroesophageal reflux disease)     Heart disease     Hyperlipidemia     on med for control    Hypertension     controlled with med    Hypertensive nephrosclerosis     per nephrology note 14    Hypoactive thyroid     Peritoneal dialysis catheter in place [facility])     Peritoneal dialysis-associated peritonitis ([address] 2017    PUD (peptic ulcer disease)     SBP (spontaneous bacterial peritonitis) ([address] 2016    Sepsis ([address] 2016 Tibial plateau fracture, left 76/01/2952    Umbilical hernia     Unspecified sleep apnea     pt does not have a CPAP     Past Surgical History:   Procedure Laterality Date    ABDOMEN SURGERY Right 6/13/2022    RIGHT LOWER QUADRANT ABDOMEN LESION BIOPSY EXCISION performed by Deandra Lino MD at [address] [facility] Right 1995    tip of 5th finger    COLONOSCOPY N/A 3/23/2018    COLONOSCOPY  BMI 34 performed by Tacos Mooney MD at [facility] Left as a child    thumb    ORTHOPEDIC SURGERY Left 1989    knee    ORTHOPEDIC SURGERY Left 2017    LEFT LATERAL TIBIAL PLATEAU OPEN REDUCTION INTERNAL FIXATION    KS ABDOMEN SURGERY PROC UNLISTED  6/11/14    PD catheter placement    SKIN BIOPSY Left 6/13/2022    LEFT BUTTOCK MASS EXCISION performed by Deandra Lino MD at [address] [facility] Left 2012    AV fistula to arm     Current Outpatient Medications   Medication Sig Dispense Refill    traZODone (DESYREL) 50 MG tablet Take 1 tablet by mouth nightly 90 tablet 1    B Complex-C-Folic Acid (DIALYVITE PO) Take 1 tablet by mouth daily      LACTULOSE PO Take 15 mLs by mouth as needed      aspirin 81 MG chewable tablet Take 81 mg by mouth       baclofen (LIORESAL) 10 MG tablet Take 5 mg by mouth 3 times daily      calcitRIOL (ROCALTROL) 0.25 MCG capsule Take 0.25 mcg by mouth       cinacalcet (SENSIPAR) 90 MG tablet Take 90 mg by mouth daily      cloNIDine (CATAPRES) 0.3 MG/24HR PTWK Place 1 patch onto the skin every 3 days       cloNIDine (CATAPRES) 0.2 MG tablet Take 0.2 mg by mouth 2 times daily       escitalopram (LEXAPRO) 10 MG tablet Take 10 mg by mouth daily      esomeprazole (NEXIUM) 40 MG delayed release capsule Take 1 capsule by mouth daily      finasteride (PROSCAR) 5 MG tablet Take 5 mg by mouth daily      hydrALAZINE (APRESOLINE) 50 MG tablet Take 1 tablet by mouth 3 times daily NIFEdipine (PROCARDIA XL) 90 MG extended release tablet Take 90 mg by mouth daily       ondansetron (ZOFRAN) 4 MG tablet Take 4 mg by mouth every 8 hours as needed      potassium chloride (KLOR-CON) 10 MEQ extended release tablet Take 10-20 mEq by mouth daily       rosuvastatin (CRESTOR) 10 MG tablet Take 10 mg by mouth at bedtime       sevelamer hcl (RENAGEL) 800 MG tablet Take 2,400 mg by mouth 3 times daily (with meals)       valsartan (DIOVAN) 320 MG tablet Take 320 mg by mouth at bedtime        No current facility-administered medications for this visit.      No Known Allergies  Social History     Socioeconomic History    Marital status: Single     Spouse name: Not on file    Number of children: Not on file    Years of education: Not on file    Highest education level: Not on file   Occupational History    Not on file   Tobacco Use    Smoking status: Former     Packs/day: 0.50     Years: 15.00     Pack years: 7.50     Types: Cigarettes     Quit date: 3/1/2014     Years since quittin.6    Smokeless tobacco: Never    Tobacco comments:     Quit smoking: pt states only smoked cigarettes when drinking alcohol   Vaping Use    Vaping Use: Not on file   Substance and Sexual Activity    Alcohol use: Yes    Drug use: Yes     Types: Marijuana Angela Erickson)     Comment: \"when I can afford it\"     Sexual activity: Not on file   Other Topics Concern    Not on file   Social History Narrative    Not on file     Social Determinants of Health     Financial Resource Strain: Low Risk     Difficulty of Paying Living Expenses: Not hard at all   Food Insecurity: No Food Insecurity    Worried About Running Out of Food in the Last Year: Never true    Ran Out of Food in the Last Year: Never true   Transportation Needs: Not on file   Physical Activity: Insufficiently Active    Days of Exercise per Week: 1 day    Minutes of Exercise per Session: 20 min   Stress: Not on file   Social Connections: Not on file   Intimate Partner Violence: Not on file   Housing Stability: Not on file     Family History   Problem Relation Age of Onset    COPD Father     Heart Disease Father     Hypertension Mother     Diabetes Mother     Hypertension Father        Review of Systems  Constitutional: Negative  GI: Neg GI ROS  Musculoskeletal: Positive for back pain (LEFT). Urinalysis  UA - Dipstick  No results found for this or any previous visit. PHYSICAL EXAM    There were no vitals taken for this visit. General appearance - well appearing and in no distress  Mental status - alert, oriented to person, place, and time  Neck - supple, no significant adenopathy   Heart- RRR Normal S1/S2, No murmurs  Chest/Lung-  Quiet, even and easy respiratory effort without use of accessory muscles, BS clear  Neurological - normal speech, no focal findings or movement disorder noted on gross visual exam  Musculoskeletal - normal gait and station  Skin - normal coloration and turgor, no rashes          Assessment and Plan    ICD-10-CM    1. Bilateral renal stones  N20.0       2. Polycystic kidney  Q61.3       3. ESRD (end stage renal disease) ([facility]  N18.6       We discussed stone tx in the form of URS vs ESWL. Due to medical complexity, will discuss pt w Dr. Yasemin Villavicencio before proceeding. Pt will be called w update. Mandy Helton is supervising physician today and he approves plan of care. I have spent 43 minutes today reviewing previous notes, test results and face to face with the patient as well as documenting. Addendum on 10/11/22 at 1648:  Called pt to discuss surgery. No answer. VM left to return call.    Suzanne Sauer, APRN - CNP

## 2022-10-12 ENCOUNTER — TELEPHONE (OUTPATIENT)
Dept: UROLOGY | Age: 62
End: 2022-10-12

## 2022-10-12 NOTE — PROGRESS NOTES
Called pt again to discuss surgical options for kidney stone. No answer. VM left to return call.    Olamide Huntley, APRN - CNP

## 2022-10-13 ENCOUNTER — TELEPHONE (OUTPATIENT)
Dept: UROLOGY | Age: 62
End: 2022-10-13

## 2022-10-13 NOTE — PROGRESS NOTES
Spoke w pt regarding stone tx to left kidney. Pt case was reviewed by Dr. Darren Montgomery who rec L URS w stent placement. This was discussed w pt. After our discussion, the patient would like to proceed with LEFT Ureteroscopy/Laser Lithotripsy/Basket Extraction of Stone/Stent Placement. Risks of ureteroscopy that we discussed were bleeding, infection, injury to the bladder/ureter/kidney and surrounding structures, incomplete fragmentation of stones, steinstrasse, inability to pass stone fragments requiring additional operative intervention in the form of second look ureteroscopy/laser lithotripsy and/or stent placement, ureteral stricture, stent migration, stent pain, urinary retention, risks of general anesthesia, recurrent stones. The patient expressed understanding of the above. He will be contacted w surgery details. Will follow up after the procedure. Advised to call sooner if needed.     Nicolasa Schneider, LEROY - CNP

## 2022-10-13 NOTE — TELEPHONE ENCOUNTER
----- Message from LEROY Fiore CNP sent at 10/13/2022 12:35 PM EDT -----  Needs left blue plate w doug in next 1-2 weeks.

## 2022-10-14 DIAGNOSIS — N20.0 CALCULUS OF KIDNEY: Primary | ICD-10-CM

## 2022-10-14 NOTE — TELEPHONE ENCOUNTER
Procedures: Procedure(s):   CYSTOSCOPY, LEFT URETEROSCOPY, LASER LITHOTRIPSY, LEFT URETERAL STENT INSERTION   Date: 11/1/2022   Time: 1440   Location: [facility] OR  CYSTO     Scheduled. Please complete orders.

## 2022-10-27 NOTE — PRE-PROCEDURE INSTRUCTIONS
Patient verified name and . Order for consent was found in EHR and matches case posting; patient verifies procedure. Type lB surgery, phone assessment complete. Orders were received. Labs per surgeon: noted in EHR. Patient has a hx of ESRD, unable to provide specimen  Labs per anesthesia protocol: Potassium DOS    Patient answered medical/surgical history questions at their best of ability. All prior to admission medications documented in [facility]. Patient instructed to take the following medications the day of surgery according to anesthesia guidelines with a small sip of water: none   On the day before surgery please take Acetaminophen 1000mg in the morning and then again before bed. You may substitute for Tylenol 650 mg. Hold all vitamins 7 days prior to surgery and NSAIDS 5 days prior to surgery. Prescription meds to hold:Potassium am of surgery  Patient instructed on the following:    > Arrive at [facility], time of arrival to be called the day before by 1700  > NPO after midnight, unless otherwise indicated, including gum, mints, and ice chips  > Responsible adult must drive patient to the hospital, stay during surgery, and patient will need supervision 24 hours after anesthesia  > Use antibacterial soap in shower the night before surgery and on the morning of surgery  > All piercings must be removed prior to arrival.    > Leave all valuables (money and jewelry) at home but bring insurance card and ID on DOS.   > You may be required to pay a deductible or co-pay on the day of your procedure. You can pre-pay by calling [phone] if your surgery is at the [facility] or [phone] if your surgery is at the [facility]. > Do not wear make-up, nail polish, lotions, cologne, perfumes, powders, or oil on skin. Artificial nails are not permitted.

## 2022-10-31 ENCOUNTER — ANESTHESIA EVENT (OUTPATIENT)
Dept: SURGERY | Age: 62
End: 2022-10-31
Payer: MEDICARE

## 2022-10-31 NOTE — PERIOP NOTE
Directly informed patient and or family member of pre op arrival time 18 on 11/1. All questions answered. Pre op instructions reviewed. Left contact information for any additional questions or needs.

## 2022-10-31 NOTE — PERIOP NOTE
Directly informed patient of pre op arrival time 1100 on 11/1. All questions answered. Pre op instructions reviewed. Left contact information for any additional questions or needs.

## 2022-11-01 ENCOUNTER — HOSPITAL ENCOUNTER (OUTPATIENT)
Age: 62
Setting detail: OUTPATIENT SURGERY
Discharge: HOME | End: 2022-11-01
Attending: UROLOGY | Admitting: UROLOGY
Payer: MEDICARE

## 2022-11-01 ENCOUNTER — ANESTHESIA (OUTPATIENT)
Dept: SURGERY | Age: 62
End: 2022-11-01
Payer: MEDICARE

## 2022-11-01 VITALS
WEIGHT: 262 LBS | BODY MASS INDEX: 30.94 KG/M2 | OXYGEN SATURATION: 95 % | TEMPERATURE: 98 F | HEART RATE: 84 BPM | SYSTOLIC BLOOD PRESSURE: 112 MMHG | RESPIRATION RATE: 17 BRPM | DIASTOLIC BLOOD PRESSURE: 75 MMHG | HEIGHT: 77 IN

## 2022-11-01 DIAGNOSIS — N20.0 KIDNEY STONE: ICD-10-CM

## 2022-11-01 LAB
ANION GAP SERPL CALC-SCNC: 9 MMOL/L (ref 2–11)
BASOPHILS # BLD: 0 K/UL (ref 0–0.2)
BASOPHILS NFR BLD: 1 % (ref 0–2)
BUN SERPL-MCNC: 34 MG/DL (ref 8–23)
CALCIUM SERPL-MCNC: 9.1 MG/DL (ref 8.3–10.4)
CHLORIDE SERPL-SCNC: 102 MMOL/L (ref 101–110)
CO2 SERPL-SCNC: 30 MMOL/L (ref 21–32)
CREAT SERPL-MCNC: 14 MG/DL (ref 0.8–1.5)
DIFFERENTIAL METHOD BLD: ABNORMAL
EOSINOPHIL # BLD: 0.1 K/UL (ref 0–0.8)
EOSINOPHIL NFR BLD: 3 % (ref 0.5–7.8)
ERYTHROCYTE [DISTWIDTH] IN BLOOD BY AUTOMATED COUNT: 15.1 % (ref 11.9–14.6)
GLUCOSE SERPL-MCNC: 100 MG/DL (ref 65–100)
HCT VFR BLD AUTO: 32.5 % (ref 41.1–50.3)
HGB BLD-MCNC: 10.8 G/DL (ref 13.6–17.2)
IMM GRANULOCYTES # BLD AUTO: 0 K/UL (ref 0–0.5)
IMM GRANULOCYTES NFR BLD AUTO: 0 % (ref 0–5)
LYMPHOCYTES # BLD: 1.3 K/UL (ref 0.5–4.6)
LYMPHOCYTES NFR BLD: 28 % (ref 13–44)
MCH RBC QN AUTO: 31.1 PG (ref 26.1–32.9)
MCHC RBC AUTO-ENTMCNC: 33.2 G/DL (ref 31.4–35)
MCV RBC AUTO: 93.7 FL (ref 82–102)
MONOCYTES # BLD: 0.5 K/UL (ref 0.1–1.3)
MONOCYTES NFR BLD: 11 % (ref 4–12)
NEUTS SEG # BLD: 2.7 K/UL (ref 1.7–8.2)
NEUTS SEG NFR BLD: 57 % (ref 43–78)
NRBC # BLD: 0 K/UL (ref 0–0.2)
PLATELET # BLD AUTO: 191 K/UL (ref 150–450)
PMV BLD AUTO: 11.7 FL (ref 9.4–12.3)
POTASSIUM BLD-SCNC: 3 MMOL/L (ref 3.5–5.1)
POTASSIUM SERPL-SCNC: 3.1 MMOL/L (ref 3.5–5.1)
RBC # BLD AUTO: 3.47 M/UL (ref 4.23–5.6)
SODIUM SERPL-SCNC: 141 MMOL/L (ref 133–143)
WBC # BLD AUTO: 4.7 K/UL (ref 4.3–11.1)

## 2022-11-01 PROCEDURE — C1894 INTRO/SHEATH, NON-LASER: HCPCS | Performed by: UROLOGY

## 2022-11-01 PROCEDURE — 6360000002 HC RX W HCPCS: Performed by: NURSE PRACTITIONER

## 2022-11-01 PROCEDURE — 6360000002 HC RX W HCPCS: Performed by: NURSE ANESTHETIST, CERTIFIED REGISTERED

## 2022-11-01 PROCEDURE — 84132 ASSAY OF SERUM POTASSIUM: CPT

## 2022-11-01 PROCEDURE — 3600000014 HC SURGERY LEVEL 4 ADDTL 15MIN: Performed by: UROLOGY

## 2022-11-01 PROCEDURE — 7100000000 HC PACU RECOVERY - FIRST 15 MIN: Performed by: UROLOGY

## 2022-11-01 PROCEDURE — 2720000010 HC SURG SUPPLY STERILE: Performed by: UROLOGY

## 2022-11-01 PROCEDURE — 7100000001 HC PACU RECOVERY - ADDTL 15 MIN: Performed by: UROLOGY

## 2022-11-01 PROCEDURE — 2500000003 HC RX 250 WO HCPCS: Performed by: NURSE ANESTHETIST, CERTIFIED REGISTERED

## 2022-11-01 PROCEDURE — 80048 BASIC METABOLIC PNL TOTAL CA: CPT

## 2022-11-01 PROCEDURE — C2617 STENT, NON-COR, TEM W/O DEL: HCPCS | Performed by: UROLOGY

## 2022-11-01 PROCEDURE — 85025 COMPLETE CBC W/AUTO DIFF WBC: CPT

## 2022-11-01 PROCEDURE — 3700000000 HC ANESTHESIA ATTENDED CARE: Performed by: UROLOGY

## 2022-11-01 PROCEDURE — 7100000010 HC PHASE II RECOVERY - FIRST 15 MIN: Performed by: UROLOGY

## 2022-11-01 PROCEDURE — C1769 GUIDE WIRE: HCPCS | Performed by: UROLOGY

## 2022-11-01 PROCEDURE — 3700000001 HC ADD 15 MINUTES (ANESTHESIA): Performed by: UROLOGY

## 2022-11-01 PROCEDURE — 6360000002 HC RX W HCPCS: Performed by: ANESTHESIOLOGY

## 2022-11-01 PROCEDURE — 2709999900 HC NON-CHARGEABLE SUPPLY: Performed by: UROLOGY

## 2022-11-01 PROCEDURE — 6370000000 HC RX 637 (ALT 250 FOR IP): Performed by: ANESTHESIOLOGY

## 2022-11-01 PROCEDURE — 3600000004 HC SURGERY LEVEL 4 BASE: Performed by: UROLOGY

## 2022-11-01 PROCEDURE — 2580000003 HC RX 258: Performed by: UROLOGY

## 2022-11-01 PROCEDURE — 52353 CYSTOURETERO W/LITHOTRIPSY: CPT | Performed by: UROLOGY

## 2022-11-01 DEVICE — URETERAL STENT
Type: IMPLANTABLE DEVICE | Site: PENIS | Status: FUNCTIONAL
Brand: PERCUFLEX™ PLUS

## 2022-11-01 RX ORDER — GLYCOPYRROLATE 0.2 MG/ML
INJECTION INTRAMUSCULAR; INTRAVENOUS PRN
Status: DISCONTINUED | OUTPATIENT
Start: 2022-11-01 | End: 2022-11-01 | Stop reason: SDUPTHER

## 2022-11-01 RX ORDER — ONDANSETRON 2 MG/ML
INJECTION INTRAMUSCULAR; INTRAVENOUS PRN
Status: DISCONTINUED | OUTPATIENT
Start: 2022-11-01 | End: 2022-11-01 | Stop reason: SDUPTHER

## 2022-11-01 RX ORDER — CEPHALEXIN 500 MG/1
500 CAPSULE ORAL 2 TIMES DAILY
Qty: 10 CAPSULE | Refills: 0 | Status: SHIPPED | OUTPATIENT
Start: 2022-11-01 | End: 2022-11-06

## 2022-11-01 RX ORDER — LABETALOL HYDROCHLORIDE 5 MG/ML
10 INJECTION, SOLUTION INTRAVENOUS
Status: DISCONTINUED | OUTPATIENT
Start: 2022-11-01 | End: 2022-11-01 | Stop reason: HOSPADM

## 2022-11-01 RX ORDER — HYDROCODONE BITARTRATE AND ACETAMINOPHEN 5; 325 MG/1; MG/1
1 TABLET ORAL EVERY 4 HOURS PRN
Qty: 18 TABLET | Refills: 0 | Status: SHIPPED | OUTPATIENT
Start: 2022-11-01 | End: 2022-11-04

## 2022-11-01 RX ORDER — OMEPRAZOLE 40 MG/1
CAPSULE, DELAYED RELEASE ORAL DAILY
COMMUNITY

## 2022-11-01 RX ORDER — PROPOFOL 10 MG/ML
INJECTION, EMULSION INTRAVENOUS PRN
Status: DISCONTINUED | OUTPATIENT
Start: 2022-11-01 | End: 2022-11-01 | Stop reason: SDUPTHER

## 2022-11-01 RX ORDER — SODIUM CHLORIDE 9 MG/ML
INJECTION, SOLUTION INTRAVENOUS CONTINUOUS
Status: DISCONTINUED | OUTPATIENT
Start: 2022-11-01 | End: 2022-11-01 | Stop reason: HOSPADM

## 2022-11-01 RX ORDER — HYDROMORPHONE HYDROCHLORIDE 2 MG/ML
0.25 INJECTION, SOLUTION INTRAMUSCULAR; INTRAVENOUS; SUBCUTANEOUS EVERY 5 MIN PRN
Status: DISCONTINUED | OUTPATIENT
Start: 2022-11-01 | End: 2022-11-01 | Stop reason: HOSPADM

## 2022-11-01 RX ORDER — NEOSTIGMINE METHYLSULFATE 1 MG/ML
INJECTION, SOLUTION INTRAVENOUS PRN
Status: DISCONTINUED | OUTPATIENT
Start: 2022-11-01 | End: 2022-11-01 | Stop reason: SDUPTHER

## 2022-11-01 RX ORDER — VALSARTAN AND HYDROCHLOROTHIAZIDE 320; 25 MG/1; MG/1
TABLET, FILM COATED ORAL
COMMUNITY
Start: 2022-10-24

## 2022-11-01 RX ORDER — LIDOCAINE HYDROCHLORIDE 20 MG/ML
INJECTION, SOLUTION EPIDURAL; INFILTRATION; INTRACAUDAL; PERINEURAL PRN
Status: DISCONTINUED | OUTPATIENT
Start: 2022-11-01 | End: 2022-11-01 | Stop reason: SDUPTHER

## 2022-11-01 RX ORDER — HYDROMORPHONE HYDROCHLORIDE 2 MG/ML
0.5 INJECTION, SOLUTION INTRAMUSCULAR; INTRAVENOUS; SUBCUTANEOUS EVERY 5 MIN PRN
Status: DISCONTINUED | OUTPATIENT
Start: 2022-11-01 | End: 2022-11-01 | Stop reason: HOSPADM

## 2022-11-01 RX ORDER — ROCURONIUM BROMIDE 10 MG/ML
INJECTION, SOLUTION INTRAVENOUS PRN
Status: DISCONTINUED | OUTPATIENT
Start: 2022-11-01 | End: 2022-11-01 | Stop reason: SDUPTHER

## 2022-11-01 RX ORDER — FENTANYL CITRATE 50 UG/ML
INJECTION, SOLUTION INTRAMUSCULAR; INTRAVENOUS PRN
Status: DISCONTINUED | OUTPATIENT
Start: 2022-11-01 | End: 2022-11-01 | Stop reason: SDUPTHER

## 2022-11-01 RX ORDER — SODIUM CHLORIDE, SODIUM LACTATE, POTASSIUM CHLORIDE, CALCIUM CHLORIDE 600; 310; 30; 20 MG/100ML; MG/100ML; MG/100ML; MG/100ML
INJECTION, SOLUTION INTRAVENOUS CONTINUOUS
Status: DISCONTINUED | OUTPATIENT
Start: 2022-11-01 | End: 2022-11-01 | Stop reason: HOSPADM

## 2022-11-01 RX ORDER — LIDOCAINE HYDROCHLORIDE 10 MG/ML
1 INJECTION, SOLUTION INFILTRATION; PERINEURAL
Status: DISCONTINUED | OUTPATIENT
Start: 2022-11-01 | End: 2022-11-01 | Stop reason: HOSPADM

## 2022-11-01 RX ORDER — MIDAZOLAM HYDROCHLORIDE 2 MG/2ML
2 INJECTION, SOLUTION INTRAMUSCULAR; INTRAVENOUS
Status: DISCONTINUED | OUTPATIENT
Start: 2022-11-01 | End: 2022-11-01 | Stop reason: HOSPADM

## 2022-11-01 RX ORDER — FENTANYL CITRATE 50 UG/ML
100 INJECTION, SOLUTION INTRAMUSCULAR; INTRAVENOUS
Status: DISCONTINUED | OUTPATIENT
Start: 2022-11-01 | End: 2022-11-01 | Stop reason: HOSPADM

## 2022-11-01 RX ORDER — OXYCODONE HYDROCHLORIDE 5 MG/1
5 TABLET ORAL
Status: COMPLETED | OUTPATIENT
Start: 2022-11-01 | End: 2022-11-01

## 2022-11-01 RX ORDER — PROCHLORPERAZINE EDISYLATE 5 MG/ML
5 INJECTION INTRAMUSCULAR; INTRAVENOUS
Status: DISCONTINUED | OUTPATIENT
Start: 2022-11-01 | End: 2022-11-01 | Stop reason: HOSPADM

## 2022-11-01 RX ORDER — HYDRALAZINE HYDROCHLORIDE 20 MG/ML
10 INJECTION INTRAMUSCULAR; INTRAVENOUS
Status: DISCONTINUED | OUTPATIENT
Start: 2022-11-01 | End: 2022-11-01 | Stop reason: HOSPADM

## 2022-11-01 RX ORDER — ONDANSETRON 2 MG/ML
4 INJECTION INTRAMUSCULAR; INTRAVENOUS
Status: DISCONTINUED | OUTPATIENT
Start: 2022-11-01 | End: 2022-11-01 | Stop reason: HOSPADM

## 2022-11-01 RX ORDER — HYOSCYAMINE SULFATE 0.12 MG/1
0.12 TABLET SUBLINGUAL EVERY 4 HOURS PRN
Qty: 30 EACH | Refills: 1 | Status: SHIPPED | OUTPATIENT
Start: 2022-11-01

## 2022-11-01 RX ADMIN — FENTANYL CITRATE 50 MCG: 50 INJECTION, SOLUTION INTRAMUSCULAR; INTRAVENOUS at 15:30

## 2022-11-01 RX ADMIN — ROCURONIUM BROMIDE 50 MG: 50 INJECTION, SOLUTION INTRAVENOUS at 15:13

## 2022-11-01 RX ADMIN — Medication 2 G: at 15:22

## 2022-11-01 RX ADMIN — OXYCODONE 5 MG: 5 TABLET ORAL at 16:44

## 2022-11-01 RX ADMIN — PROPOFOL 200 MG: 10 INJECTION, EMULSION INTRAVENOUS at 15:12

## 2022-11-01 RX ADMIN — LIDOCAINE HYDROCHLORIDE 60 MG: 20 INJECTION, SOLUTION EPIDURAL; INFILTRATION; INTRACAUDAL; PERINEURAL at 15:12

## 2022-11-01 RX ADMIN — HYDROMORPHONE HYDROCHLORIDE 0.5 MG: 2 INJECTION, SOLUTION INTRAMUSCULAR; INTRAVENOUS; SUBCUTANEOUS at 16:24

## 2022-11-01 RX ADMIN — SODIUM CHLORIDE: 9 INJECTION, SOLUTION INTRAVENOUS at 13:17

## 2022-11-01 RX ADMIN — GLYCOPYRROLATE 0.8 MG: 0.2 INJECTION, SOLUTION INTRAMUSCULAR; INTRAVENOUS at 16:03

## 2022-11-01 RX ADMIN — Medication 5 MG: at 16:03

## 2022-11-01 RX ADMIN — HYDROMORPHONE HYDROCHLORIDE 0.5 MG: 2 INJECTION, SOLUTION INTRAMUSCULAR; INTRAVENOUS; SUBCUTANEOUS at 16:29

## 2022-11-01 RX ADMIN — ONDANSETRON 4 MG: 2 INJECTION INTRAMUSCULAR; INTRAVENOUS at 15:43

## 2022-11-01 RX ADMIN — FENTANYL CITRATE 50 MCG: 50 INJECTION, SOLUTION INTRAMUSCULAR; INTRAVENOUS at 15:12

## 2022-11-01 RX ADMIN — HYDROMORPHONE HYDROCHLORIDE 0.5 MG: 2 INJECTION, SOLUTION INTRAMUSCULAR; INTRAVENOUS; SUBCUTANEOUS at 16:34

## 2022-11-01 ASSESSMENT — PAIN - FUNCTIONAL ASSESSMENT
PAIN_FUNCTIONAL_ASSESSMENT: 0-10

## 2022-11-01 ASSESSMENT — PAIN SCALES - GENERAL
PAINLEVEL_OUTOF10: 10
PAINLEVEL_OUTOF10: 8
PAINLEVEL_OUTOF10: 8

## 2022-11-01 ASSESSMENT — PAIN DESCRIPTION - LOCATION
LOCATION: PENIS
LOCATION: PENIS

## 2022-11-01 ASSESSMENT — PAIN DESCRIPTION - DESCRIPTORS: DESCRIPTORS: ACHING

## 2022-11-01 NOTE — DISCHARGE INSTRUCTIONS
Remove stent Thursday     Ureteral Stent Placement: What to Expect at [address] [facility]  A ureteral (say \"you-REE-ter-ul\") stent is a thin, hollow tube that is placed in the ureter to help urine pass from the kidney into the bladder. Ureters are the tubes that connect the kidneys to the bladder. You may have a small amount of blood in your urine for 1 to 3 days after the procedure. While the stent is in place, you may have to urinate more often, feel a sudden need to urinate, or feel like you cannot completely empty your bladder. You may feel some pain when you urinate or do strenuous activity. You also may notice a small amount of blood in your urine after strenuous activities. These side effects usually do not prevent people from doing their normal daily activities. You may have a string attached to your stent. Do not to pull the string unless the doctor tells you to. The doctor will use the string to pull out the stent when you no longer need it. After the procedure, urine may flow better from your kidneys to your bladder. A ureteral stent may be left in place for several days or for as long as several months. Your doctor will take it out when you no longer need it. [facility]yst[facility]: [facility]  A cystoscopy is a procedure that lets a doctor look inside of the bladder and the urethra. The urethra is the tube that carries urine from the bladder to outside the body. The doctor uses a thin, lighted tube called a cystoscope to look for kidney or bladder stones, tumors, bleeding, or infection. After the cystoscopy, your urethra may be sore at first, and it may burn when you urinate for the first few days after the procedure. You may feel the need to urinate more often, and your urine may be pink. These symptoms should get better in 1 or 2 days. You will probably be able to go back to work or most of your usual activities in 1 or 2 days.     How can you care for yourself at home?  Activity  Rest when you feel tired. Getting enough sleep will help you recover. Try to walk each day. Start by walking a little more than you did the day before. Bit by bit, increase the amount you walk. Walking boosts blood flow and helps prevent pneumonia and constipation. Avoid strenuous activities, such as bicycle riding, jogging, weight lifting, or aerobic exercise, until your doctor says it is okay. Ask your doctor when you can drive again. Most people are able to return to work within 1 or 2 days after the procedure. You may shower and take baths as usual.  Ask your doctor when it is okay for you to have sex. Diet  You can eat your normal diet. If your stomach is upset, try bland, low-fat foods like plain rice, broiled chicken, toast, and yogurt. Drink plenty of fluids (unless your doctor tells you not to). Medicines  Take pain medicines exactly as directed. If the doctor gave you a prescription medicine for pain, take it as prescribed. If you are not taking a prescription pain medicine, ask your doctor if you can take an over-the-counter medicine. If you think your pain medicine is making you sick to your stomach: Take your medicine after meals (unless your doctor has told you not to). Ask your doctor for a different pain medicine. If your doctor prescribed antibiotics, take them as directed. Do not stop taking them just because you feel better. You need to take the full course of antibiotics. Medication Interaction:  During your procedure you potentially received a medication or medications which may reduce the effectiveness of oral contraceptives. Please consider other forms of contraception for 1 month following your procedure if you are currently using oral contraceptives as your primary form of birth control. In addition to this, we recommend continuing your oral contraceptive as prescribed, unless otherwise instructed by your physician, during this time.     Follow-up care is a key part of your treatment and safety. Be sure to make and go to all appointments, and call your doctor if you are having problems. It's also a good idea to know your test results and keep a list of the medicines you take. When should you call for help? Call 911 anytime you think you may need emergency care. For example, call if:  You passed out (lost consciousness). You have severe trouble breathing. You have sudden chest pain and shortness of breath, or you cough up blood. You have severe belly pain. Call your doctor now or seek immediate medical care if:  You are sick to your stomach or cannot keep fluids down. Your urine is still red or you see blood clots after you have urinated several times. You have trouble passing urine or stool, especially if you have pain or swelling in your lower belly. You have signs of a blood clot, such as:  Pain in your calf, back of the knee, thigh, or groin. Redness and swelling in your leg or groin. You develop a fever or severe chills. You have pain in your back just below your rib cage. This is called flank pain. Watch closely for changes in your health, and be sure to contact your doctor if:  A burning feeling is normal for a day or two after the test, but call if it does not get better. You have a frequent urge to urinate but can pass only small amounts of urine. It is normal for the urine to have a pinkish color for a few days after the test, but call if it does not get better or if your urine is cloudy, smells bad, or has pus in it.     After general anesthesia or intravenous sedation, for 24 hours or while taking prescription Narcotics:  Limit your activities  A responsible adult needs to be with you for the next 24 hours  Do not drive and operate hazardous machinery  Do not make important personal or business decisions  Do not drink alcoholic beverages  If you have not urinated within 8 hours after discharge, and you are experiencing discomfort from urinary retention, please go to the nearest ED. If you have sleep apnea and have a CPAP machine, please use it for all naps and sleeping. Please use caution when taking narcotics and any of your home medications that may cause drowsiness. *  Please give a list of your current medications to your Primary Care Provider. *  Please update this list whenever your medications are discontinued, doses are      changed, or new medications (including over-the-counter products) are added. *  Please carry medication information at all times in case of emergency situations. These are general instructions for a healthy lifestyle:  No smoking/ No tobacco products/ Avoid exposure to second hand smoke  Surgeon General's Warning:  Quitting smoking now greatly reduces serious risk to your health. Obesity, smoking, and sedentary lifestyle greatly increases your risk for illness  A healthy diet, regular physical exercise & weight monitoring are important for maintaining a healthy lifestyle    You may be retaining fluid if you have a history of heart failure or if you experience any of the following symptoms:  Weight gain of 3 pounds or more overnight or 5 pounds in a week, increased swelling in our hands or feet or shortness of breath while lying flat in bed. Please call your doctor as soon as you notice any of these symptoms; do not wait until your next office visit.

## 2022-11-01 NOTE — BRIEF OP NOTE
Brief Postoperative Note      Patient: [address]  YOB: 1960  MRN: [id number]    Date of Procedure: 11/1/2022    Pre-Op Diagnosis: Kidney stone [N20.0]    Post-Op Diagnosis: Same       Procedure(s):  CYSTOSCOPY, LEFT URETEROSCOPY, BASKET STONE EXTRACTION, LEFT URETERAL STENT INSERTION    Surgeon(s):  Mitch French MD    Assistant:  * No surgical staff found *    Anesthesia: General    Estimated Blood Loss (mL): Minimal    Complications: None    Specimens:   * No specimens in log *    Implants:  Implant Name Type Inv. Item Serial No.  Lot No. LRB No. Used Action   Cordon Gault 6FR L28CM HYDR+ PGTL Ivanleatha Chadwick MRK  - [id number]  Cordon Gault 6FR L28CM HYDR+ PGTL TAPR TIP GRAD BLDR MRK LO  [employer] [facility]OLOG[facility]- [id number] Left 1 Implanted         Drains: * No LDAs found *    Findings: Left distal ureter and left kidney stones basketed and removed.      Electronically signed by Mitch French MD on 11/1/2022 at 4:15 PM

## 2022-11-01 NOTE — PERIOP NOTE
Discharge instructions given to Yulia, patient's sister. They verbalized understanding and was given opportunity for questions.

## 2022-11-01 NOTE — ANESTHESIA POSTPROCEDURE EVALUATION
Department of Anesthesiology  Postprocedure Note    Patient: Isreal Macdonald  MRN: [id number]  YOB: 1960  Date of evaluation: 11/1/2022      Procedure Summary     Date: 11/01/22 Room / Location: [facility] MAIN OR 01 CYSTO / [facility] MAIN OR    Anesthesia Start: 8418 Anesthesia Stop: 1622    Procedure: CYSTOSCOPY, LEFT URETEROSCOPY, BASKET STONE EXTRACTION, LEFT URETERAL STENT INSERTION (Left: Penis) Diagnosis:       Kidney stone      (Kidney stone [N20.0])    Providers: Mallory Amezcua MD Responsible Provider: Juan Carlos Connors MD    Anesthesia Type: general ASA Status: 3          Anesthesia Type: No value filed.     Chico Phase I: Chico Score: 10    Chico Phase II:        Anesthesia Post Evaluation    Patient location during evaluation: PACU  Patient participation: complete - patient participated  Level of consciousness: awake and alert  Pain score: 0  Airway patency: patent  Nausea & Vomiting: no nausea and no vomiting  Complications: no  Cardiovascular status: hemodynamically stable  Respiratory status: acceptable, nonlabored ventilation and spontaneous ventilation  Hydration status: euvolemic  Comments: /79   Pulse 79   Temp 97.8 °F (36.6 °C) (Skin)   Resp 16   Ht 6' 5\" (1.956 m)   Wt 262 lb (118.8 kg)   SpO2 99%   BMI 31.07 kg/m²     Multimodal analgesia pain management approach

## 2022-11-01 NOTE — ANESTHESIA PROCEDURE NOTES
Airway  Date/Time: 11/1/2022 3:16 PM  Urgency: elective    Airway not difficult    General Information and Staff    Patient location during procedure: OR  Resident/CRNA: LEROY Irving CRNA  Performed: resident/CRNA     Indications and Patient Condition  Indications for airway management: anesthesia  Spontaneous Ventilation: absent  Sedation level: deep  Preoxygenated: yes  Patient position: sniffing  MILS not maintained throughout  Mask difficulty assessment: vent by bag mask + OA or adjuvant +/- NMBA    Final Airway Details  Final airway type: endotracheal airway      Successful airway: ETT  Cuffed: yes   Successful intubation technique: direct laryngoscopy  Facilitating devices/methods: intubating stylet  Endotracheal tube insertion site: oral  Blade: Carpenter  Blade size: #2  ETT size (mm): 8.0  Cormack-Lehane Classification: grade I - full view of glottis  Placement verified by: chest auscultation and capnometry   Measured from: lips  ETT to lips (cm): 24  Number of attempts at approach: 1  Ventilation between attempts: bag mask  Number of other approaches attempted: 0    no

## 2022-11-01 NOTE — ANESTHESIA PRE PROCEDURE
Department of Anesthesiology  Preprocedure Note       Name:  Estela Luther   Age:  62 y.o.  :  1960                                          MRN:  [id number]         Date:  2022      Surgeon: Robyn Reed):  Susan Rios MD    Procedure: Procedure(s):  CYSTOSCOPY, LEFT URETEROSCOPY, LASER LITHOTRIPSY, LEFT URETERAL STENT INSERTION    Medications prior to admission:   Prior to Admission medications    Medication Sig Start Date End Date Taking?  Authorizing Provider   omeprazole (PRILOSEC) 40 MG delayed release capsule Take by mouth daily    Historical Provider, MD   valsartan-hydroCHLOROthiazide (DIOVAN-HCT) 320-25 MG per tablet TAKE ONE Tablet BY MOUTH ONCE A DAY 10/24/22   Historical Provider, MD   traZODone (DESYREL) 50 MG tablet Take 1 tablet by mouth nightly 22   Amando Ahumada MD   B Complex-C-Folic Acid (DIALYVITE PO) Take 1 tablet by mouth daily  Patient not taking: Reported on 2022 3/2/21   Historical Provider, MD   LACTULOSE PO Take 15 mLs by mouth as needed    Ar Automatic Reconciliation   cinacalcet (SENSIPAR) 90 MG tablet Take 90 mg by mouth at bedtime    Ar Automatic Reconciliation   cloNIDine (CATAPRES) 0.3 MG/24HR PTWK Place 1 patch onto the skin every 3 days     Ar Automatic Reconciliation   cloNIDine (CATAPRES) 0.2 MG tablet Take 0.2 mg by mouth at bedtime    Ar Automatic Reconciliation   escitalopram (LEXAPRO) 10 MG tablet Take 10 mg by mouth daily as needed 21   Ar Automatic Reconciliation   esomeprazole (NEXIUM) 40 MG delayed release capsule Take 1 capsule by mouth daily as needed 21   Ar Automatic Reconciliation   finasteride (PROSCAR) 5 MG tablet Take 5 mg by mouth at bedtime 22   Ar Automatic Reconciliation   hydrALAZINE (APRESOLINE) 50 MG tablet Take 1 tablet by mouth daily as needed 21   Ar Automatic Reconciliation   NIFEdipine (PROCARDIA XL) 90 MG extended release tablet Take 90 mg by mouth at bedtime    Ar Automatic Reconciliation ondansetron (ZOFRAN) 4 MG tablet Take 4 mg by mouth every 8 hours as needed    Ar Automatic Reconciliation   potassium chloride (KLOR-CON) 10 MEQ extended release tablet Take 10-20 mEq by mouth daily     Ar Automatic Reconciliation   rosuvastatin (CRESTOR) 10 MG tablet Take 10 mg by mouth at bedtime  9/13/21   Ar Automatic Reconciliation   sevelamer hcl (RENAGEL) 800 MG tablet Take 2,400 mg by mouth 3 times daily (with meals)     Ar Automatic Reconciliation       Current medications:    Current Facility-Administered Medications   Medication Dose Route Frequency Provider Last Rate Last Admin    lidocaine 1 % injection 1 mL  1 mL IntraDERmal Once PRN Dorthey Iron Friskey, DO        fentaNYL (SUBLIMAZE) injection 100 mcg  100 mcg IntraVENous Once PRN Dorthey Iron Friskey, DO        midazolam PF (VERSED) injection 2 mg  2 mg IntraVENous Once PRN Dorthey Iron Friskey, DO        ceFAZolin (ANCEF) 2000 mg in sterile water 20 mL IV syringe  2,000 mg IntraVENous On Call to Sim 43, APRN - CNP        lactated ringers infusion   IntraVENous Continuous Dorthey Iron Christian Salm, DO        0.9 % sodium chloride infusion   IntraVENous Continuous Dajuan Avalos  mL/hr at 11/01/22 1317 New Bag at 11/01/22 1317       Allergies: Allergies   Allergen Reactions    Iodine Other (See Comments)     Iodine Dye r/t kidney function       Problem List:    Patient Active Problem List   Diagnosis Code    Umbilical hernia B98.3    Hypertensive urgency I16.0    Essential hypertension I10    ESRD (end stage renal disease) ([facility] [address] N18.6    Non-recurrent unilateral inguinal hernia without obstruction or gangrene K40.90    Other hyperlipidemia E78.49    Soft tissue mass M79.89    Kidney stone N20.0       Past Medical History:        Diagnosis Date    Anemia     Anxiety     Arthritis     CAD (coronary artery disease)     per pt- no MI- \"They said the back of my heart was something. ..like 40%\"    Chronic kidney disease     [facility]; stage 5; pt has AV fistula to left arm; PERITONEAL DIALYSIS daily since     Chronic pain     CKD (chronic kidney disease)     Former smoker     Fracture, tibial plateau     GERD (gastroesophageal reflux disease)     Heart disease     Hyperlipidemia     on med for control    Hypertension     controlled with med    Hypertensive nephrosclerosis     per nephrology note 14    Hypoactive thyroid     Kidney stone     Peritoneal dialysis catheter in place [facility])     Peritoneal dialysis-associated peritonitis ([facility] [address] 2017    during hs    PUD (peptic ulcer disease)     SBP (spontaneous bacterial peritonitis) ([facility] [address] 2016    Sepsis ([facility] [address] 2016    Tibial plateau fracture, left     Umbilical hernia     Unspecified sleep apnea     pt does not have a CPAP       Past Surgical History:        Procedure Laterality Date    ABDOMEN SURGERY Right 2022    RIGHT LOWER QUADRANT ABDOMEN LESION BIOPSY EXCISION performed by Deepika Garland MD at [facility] Right     tip of 5th finger    COLONOSCOPY N/A 3/23/2018    COLONOSCOPY  BMI 34 performed by Rosemarie Carlisle MD at [facility] Left as a child    thumb    ORTHOPEDIC SURGERY Left     knee    ORTHOPEDIC SURGERY Left     LEFT LATERAL TIBIAL PLATEAU OPEN REDUCTION INTERNAL FIXATION    MO ABDOMEN SURGERY PROC UNLISTED  14    PD catheter placement    SKIN BIOPSY Left 2022    LEFT BUTTOCK MASS EXCISION performed by Deepika aGrland MD at [address] [place] [address] VASCULAR SURGERY Left     AV fistula to arm       Social History:    Social History     Tobacco Use    Smoking status: Former     Packs/day: 0.50     Years: 15.00     Pack years: 7.50     Types: Cigarettes     Quit date: 3/1/2014     Years since quittin.6    Smokeless tobacco: Never    Tobacco comments:     Quit smoking: pt states only smoked cigarettes when drinking alcohol   Substance Use Topics    Alcohol use: Yes                                Counseling given: Not Answered  Tobacco comments: Quit smoking: pt states only smoked cigarettes when drinking alcohol      Vital Signs (Current):   Vitals:    10/27/22 1528 11/01/22 1245   BP:  120/81   Pulse:  67   Resp:  16   Temp:  98.2 °F (36.8 °C)   TempSrc:  Oral   SpO2:  99%   Weight: 253 lb (114.8 kg) 262 lb (118.8 kg)   Height: 6' 5\" (1.956 m) 6' 5\" (1.956 m)                                              BP Readings from Last 3 Encounters:   11/01/22 120/81   08/24/22 99/80   07/18/22 118/83       NPO Status: Time of last liquid consumption: 0000                        Time of last solid consumption: 0000                        Date of last liquid consumption: 10/31/22                        Date of last solid food consumption: 10/31/22    BMI:   Wt Readings from Last 3 Encounters:   11/01/22 262 lb (118.8 kg)   08/24/22 258 lb (117 kg)   07/18/22 264 lb (119.7 kg)     Body mass index is 31.07 kg/m².     CBC:   Lab Results   Component Value Date/Time    WBC 4.7 11/01/2022 01:13 PM    RBC 3.47 11/01/2022 01:13 PM    HGB 10.8 11/01/2022 01:13 PM    HCT 32.5 11/01/2022 01:13 PM    MCV 93.7 11/01/2022 01:13 PM    RDW 15.1 11/01/2022 01:13 PM     11/01/2022 01:13 PM       CMP:   Lab Results   Component Value Date/Time     07/06/2022 02:01 AM    K 3.1 07/06/2022 02:01 AM    CL 97 07/06/2022 02:01 AM    CO2 39 07/06/2022 02:01 AM    BUN 41 07/06/2022 02:01 AM    CREATININE >15.00 07/06/2022 02:33 AM    CREATININE 15.30 07/06/2022 02:01 AM    GFRAA 4 07/06/2022 02:01 AM    LABGLOM 3 07/06/2022 02:01 AM    GLUCOSE 95 07/06/2022 02:01 AM    PROT 7.7 07/06/2022 02:01 AM    CALCIUM 9.2 07/06/2022 02:01 AM    BILITOT 0.3 07/06/2022 02:01 AM    ALKPHOS 71 07/06/2022 02:01 AM    ALKPHOS 71 04/21/2022 04:02 PM    AST 20 07/06/2022 02:01 AM    ALT 18 07/06/2022 02:01 AM       POC Tests:   Recent Labs     11/01/22  1313   POCK 3.0*       Coags: No results found for: PROTIME, INR, APTT    HCG (If Applicable): No results found for: PREGTESTUR, PREGSERUM, HCG, HCGQUANT     ABGs: No results found for: PHART, PO2ART, PQX1DEP, EPC4PST, BEART, R7OINVAW     Type & Screen (If Applicable):  No results found for: LABABO, LABRH    Drug/Infectious Status (If Applicable):  No results found for: HIV, HEPCAB    COVID-19 Screening (If Applicable): No results found for: COVID19        Anesthesia Evaluation  Patient summary reviewed and Nursing notes reviewed  Airway: Mallampati: II          Dental: normal exam         Pulmonary:normal exam    (+) sleep apnea: on noncompliant,                             Cardiovascular:  Exercise tolerance: good (>4 METS),   (+) hypertension:, CAD: no interval change,                   Neuro/Psych:   Negative Neuro/Psych ROS              GI/Hepatic/Renal:   (+) GERD: well controlled, PUD, renal disease: ESRD and dialysis,           Endo/Other:    (+) hypothyroidism::., .                 Abdominal:             Vascular: Other Findings:           Anesthesia Plan      general     ASA 3       Induction: intravenous. MIPS: Postoperative opioids intended and Prophylactic antiemetics administered. Anesthetic plan and risks discussed with patient.                         Ok Ziegler DO   11/1/2022

## 2022-11-01 NOTE — H&P
[address] Urology H&P Note                                           11/01/22     Patient: Xiomy Leahy  MRN: [id number]    Admission Date:  11/1/2022, 0  Admission Diagnosis: Kidney stone [N20.0]  Reason for Consult: Left Kidney Stone    ASSESSMENT: 62 y.o. male with Left Kidney Stone who presents for treatment today. PLAN:  -To OR for I reviewed the risks/benefits/alternatives for stone treatment today in detail with the patient. Treatment options discussed include medical expulsive therapy (MET) vs. ESWL vs. Ureteroscopy/laser lithotropsy vs. PCNL. After our discussion, the patient would like to proceed with LEFT Ureteroscopy/Laser Lithotripsy/Basket Extraction of Stone/Stent Placement. Risks of ureteroscopy that we discussed were bleeding, infection, injury to the bladder/ureter/kidney and surrounding structures, incomplete fragmentation of stones, steinstrasse, inability to pass stone fragments requiring additional operative intervention in the form of second look ureteroscopy/laser lithotripsy and/or stent placement, ureteral stricture, stent migration, stent pain, urinary retention, risks of general anesthesia, recurrent stones. The patient expressed understanding of the above.      -Consented  -Antibiotic on call to OR  -NPO for procedure.      __________________________________________________________________________________    HPI:     Xiomy Leahy is a 62 y.o. male with L flank pain and ESRD on dialysis. Has polycystic kidneys and 9 mm L renal pelvis stone that is thought to be ball valving into L ureter and causing pain. Presents today for treatment of stone. Past Medical History:  Past Medical History:   Diagnosis Date    Anemia     Anxiety     Arthritis     CAD (coronary artery disease)     per pt- no MI- \"They said the back of my heart was something. ..like 40%\"    Chronic kidney disease     [facility] [place]; stage 5; pt has AV fistula to left arm; PERITONEAL DIALYSIS daily since 2014    Chronic pain     CKD (chronic kidney disease)     Former smoker     Fracture, tibial plateau 61/01/1889    GERD (gastroesophageal reflux disease)     Heart disease     Hyperlipidemia     on med for control    Hypertension     controlled with med    Hypertensive nephrosclerosis     per nephrology note 4/14/14    Hypoactive thyroid     Kidney stone     Peritoneal dialysis catheter in place [facility])     Peritoneal dialysis-associated peritonitis ([address] 06/24/2017    during hs    PUD (peptic ulcer disease)     SBP (spontaneous bacterial peritonitis) ([address] 09/24/2016    Sepsis ([address] 09/24/2016    Tibial plateau fracture, left 08/77/9131    Umbilical hernia     Unspecified sleep apnea     pt does not have a CPAP       Past Surgical History:  Past Surgical History:   Procedure Laterality Date    ABDOMEN SURGERY Right 6/13/2022    RIGHT LOWER QUADRANT ABDOMEN LESION BIOPSY EXCISION performed by Prema Lira MD at [facility] Right 1995    tip of 5th finger    COLONOSCOPY N/A 3/23/2018    COLONOSCOPY  BMI 34 performed by Nicole Nazario MD at [facility] Left as a child    thumb    ORTHOPEDIC SURGERY Left 1989    knee    ORTHOPEDIC SURGERY Left 2017    LEFT LATERAL TIBIAL PLATEAU OPEN REDUCTION INTERNAL FIXATION    NE ABDOMEN SURGERY PROC UNLISTED  6/11/14    PD catheter placement    SKIN BIOPSY Left 6/13/2022    LEFT BUTTOCK MASS EXCISION performed by Prema Lira MD at [facility] Left 2012    AV fistula to arm       Medications:  No current facility-administered medications on file prior to encounter.      Current Outpatient Medications on File Prior to Encounter   Medication Sig Dispense Refill    omeprazole (PRILOSEC) 40 MG delayed release capsule Take by mouth daily valsartan-hydroCHLOROthiazide (DIOVAN-HCT) 320-25 MG per tablet TAKE ONE Tablet BY MOUTH ONCE A DAY      traZODone (DESYREL) 50 MG tablet Take 1 tablet by mouth nightly 90 tablet 1    B Complex-C-Folic Acid (DIALYVITE PO) Take 1 tablet by mouth daily (Patient not taking: Reported on 2022)      LACTULOSE PO Take 15 mLs by mouth as needed      cinacalcet (SENSIPAR) 90 MG tablet Take 90 mg by mouth at bedtime      cloNIDine (CATAPRES) 0.3 MG/24HR PTWK Place 1 patch onto the skin every 3 days       cloNIDine (CATAPRES) 0.2 MG tablet Take 0.2 mg by mouth at bedtime      escitalopram (LEXAPRO) 10 MG tablet Take 10 mg by mouth daily as needed      esomeprazole (NEXIUM) 40 MG delayed release capsule Take 1 capsule by mouth daily as needed      finasteride (PROSCAR) 5 MG tablet Take 5 mg by mouth at bedtime      hydrALAZINE (APRESOLINE) 50 MG tablet Take 1 tablet by mouth daily as needed      NIFEdipine (PROCARDIA XL) 90 MG extended release tablet Take 90 mg by mouth at bedtime      ondansetron (ZOFRAN) 4 MG tablet Take 4 mg by mouth every 8 hours as needed      potassium chloride (KLOR-CON) 10 MEQ extended release tablet Take 10-20 mEq by mouth daily       rosuvastatin (CRESTOR) 10 MG tablet Take 10 mg by mouth at bedtime       sevelamer hcl (RENAGEL) 800 MG tablet Take 2,400 mg by mouth 3 times daily (with meals)          Allergies:   Allergies   Allergen Reactions    Iodine Other (See Comments)     Iodine Dye r/t kidney function       Social History:  Social History     Socioeconomic History    Marital status: Single     Spouse name: Not on file    Number of children: Not on file    Years of education: Not on file    Highest education level: Not on file   Occupational History    Not on file   Tobacco Use    Smoking status: Former     Packs/day: 0.50     Years: 15.00     Pack years: 7.50     Types: Cigarettes     Quit date: 3/1/2014     Years since quittin.6    Smokeless tobacco: Never    Tobacco comments:     Quit smoking: pt states only smoked cigarettes when drinking alcohol   Vaping Use    Vaping Use: Not on file   Substance and Sexual Activity    Alcohol use: Yes    Drug use: Yes     Types: Marijuana Velkaylyn Ma)     Comment: \"when I can afford it\"     Sexual activity: Not on file   Other Topics Concern    Not on file   Social History Narrative    Not on file     Social Determinants of Health     Financial Resource Strain: Low Risk     Difficulty of Paying Living Expenses: Not hard at all   Food Insecurity: No Food Insecurity    Worried About Running Out of Food in the Last Year: Never true    Ran Out of Food in the Last Year: Never true   Transportation Needs: Not on file   Physical Activity: Insufficiently Active    Days of Exercise per Week: 1 day    Minutes of Exercise per Session: 20 min   Stress: Not on file   Social Connections: Not on file   Intimate Partner Violence: Not on file   Housing Stability: Not on file       Family History:  Family History   Problem Relation Age of Onset    COPD Father     Heart Disease Father     Hypertension Mother     Diabetes Mother     Hypertension Father        ROS:  Review of Systems - General ROS: negative for - chills, fatigue, or fever    Vitals:  Vitals:    11/01/22 1245   BP: 120/81   Pulse: 67   Resp: 16   Temp: 98.2 °F (36.8 °C)   SpO2: 99%       Intake/Output:  No intake or output data in the 24 hours ending 11/01/22 1339     Physical Exam:   /81   Pulse 67   Temp 98.2 °F (36.8 °C) (Oral)   Resp 16   Ht 6' 5\" (1.956 m)   Wt 262 lb (118.8 kg)   SpO2 99%   BMI 31.07 kg/m²      GENERAL: No acute distress, Awake, Alert, Oriented X 3  CARDIAC: regular rate and rhythm  CHEST AND LUNG: Easy work of breathing,   ABDOMEN: soft, non tender, non-distended,     Lab/Radiology/Other Diagnostic Tests:  Recent Labs     11/01/22  1313   HGB 10.8*   HCT 32.5*   WBC 4.7         Deejay Young M.D.     [facility]  [address] [facility], [facility] [address]  Phone: [phone]  Fax: [phone]

## 2022-11-02 NOTE — OP NOTE
[facility]  OPERATIVE REPORT    Name:  Cristiane Smiley  MR#:  [id number]  :  1960  ACCOUNT #:  [de-identified]  DATE OF SERVICE:  2022    PREOPERATIVE DIAGNOSIS:  Left kidney stone. POSTOPERATIVE DIAGNOSIS:  Left kidney stone. PROCEDURES PERFORMED:  Cystoscopy, left ureteroscopy, basket stone extraction, left ureteral stent placement. SURGEON:  Nawaf Donis MD    ASSISTANT:  None. ANESTHESIA:  General.    COMPLICATIONS:  None. SPECIMENS REMOVED:  None. IMPLANTS:  A 6 x 28 double-J left ureter stent with strings. ESTIMATED BLOOD LOSS:  Minimal.    DRAINS:  None. FINDINGS:  Left distal ureter and left kidney stones basketed and removed. INDICATIONS FOR OPERATIVE PROCEDURE:  The patient is a 60-year-old gentleman with a history of left flank pain and end-stage renal disease on dialysis, as well as polycystic kidneys who was found to have a left-sided kidney stones. He was in his native kidneys. He was counseled on management options and wanted to proceed with left-sided ureteroscopy today for stone removal.    DESCRIPTION OF OPERATIVE PROCEDURE:  After informed consent was obtained, the correct patient was identified in the preoperative holding area, he was taken back to the operating suite and placed on table in supine position. Time-out was performed confirming correct patient and planned procedure. He received 2 g IV Ancef prior to the smooth induction of general endotracheal anesthesia. He was moved into dorsal lithotomy position, prepped and draped in usual sterile fashion. I began the case by inserting a 22-[place] rigid cystoscope with a 30-degree lens in urethra, advanced in the patient's bladder. Pancystoscopy was performed which showed no bladder abnormalities. His ureteral orifices were stenotic consistent from longstanding end-stage renal disease. They were visible in the orthotopic positions.   He had mild lateral lobe BPH and a high-riding bladder neck. I attempted to cannulate the left ureteral orifice with a sensor wire. Unfortunately, it was too stenotic and it would not go. I then switched to pressure bag and my semi-rigid ureteroscope. I inserted the semi-rigid ureteroscope under direct vision through the urethra into the bladder, and I was able to navigate the sensor wire into the left ureteral orifice with the semi-rigid ureteroscope. I was able to then pass the wire under fluoroscopic guidance up to the left renal pelvis. I then was able to advance the scope over the wire, dilating the left UO stenosis and I encountered multiple stone fragments in the distal left ureter. A Alejandro basket was used to remove these pieces after backloading the scope off the wire and then inserting this into the distal left ureter. This cleared the distal left ureter of stone and these likely were the cause of his pain. I then passed a second sensor wire through the semi-rigid ureteroscope and advanced the scope over the wire all the way up to the left UPJ. No other ureteral abnormalities or stone fragments were noted. I then removed the semi-rigid scope. I loaded the second wire onto a 13 x 15 left ureter access sheath. I passed this under fluoroscopic guidance into the proximal left ureter. I then removed the inner wire and inner sheath leaving the outer sheath in place. I then switched to my flexible ureteroscope and inserted this through the sheath in the left renal pelvis. Pyeloscopy was performed. I used a [place] basket to remove the stone fragments that were in the left lower pole. No other stone fragments were identified. There were a few fragments remaining that were too small for the [place] basket, but none were large enough that required a laser, therefore, only basket stone extraction was performed. I then backed my scope down the ureter removing the access sheath and ureteroscope together leaving the wire in place as a safety.   I loaded the wire onto the rigid cystoscope using the orange pusher and then passed a 6 x 28 double-J left ureter stent with strings over the wire under fluoroscopic guidance in the left renal pelvis. Once the stent was in position, I pulled the wire noting a good curl in the left renal pelvis as well as the bladder. The patient was then awoken from anesthesia and transferred to PACU in stable condition. He tolerated the procedure well. There were no complications. All counts were correct at the end of the procedure.       Alba Baird MD      [id number]  D:  11/01/2022 16:23  T:  11/01/2022 21:10  JOB #:  [id number]

## 2022-11-04 ENCOUNTER — OFFICE VISIT (OUTPATIENT)
Dept: UROLOGY | Age: 62
End: 2022-11-04
Payer: MEDICARE

## 2022-11-04 DIAGNOSIS — N20.1 LEFT URETERAL STONE: Primary | ICD-10-CM

## 2022-11-04 DIAGNOSIS — N20.0 CALCULUS OF KIDNEY: ICD-10-CM

## 2022-11-04 PROCEDURE — 99214 OFFICE O/P EST MOD 30 MIN: CPT | Performed by: NURSE PRACTITIONER

## 2022-11-04 NOTE — PROGRESS NOTES
[facility]  [facility]    Colon Dienes [address], [address]  : 1960    Chief Complaint   Patient presents with    Nephrolithiasis     Stent           HPI     Gena Richards is a 62 y.o. male being seen today for post op visit. He was seen on 10/11/22 w  int left flank pain happening on a daily basis. Pain is severe at times. Denies gross hematuria, however he is ESRD, urinates very few drops. He is afebrile. Here today to discuss stone tx. KUB in office reviewed by myself and shows 9-10 mm stone to left renal pelvis; right kidney is not visualized. He is s/p L URS w stent placement on 22 by Dr. Wisam Young. Left distal ureter and left kidney stones basketed and removed. Today, he report minor irritative sx w stent. Afebrile. KUB in office today reviewed by myself and shows no ?large right renal stone, left ureter is clear; left ureteral stent in good position. PMH of gross hematuria s/p negative work up 2018 and 2020 (stones and BPh = sources), BPH, ESRD on dialysis as well as bilateral kidney stones. Finasteride was added for hematuria likely due to BPH in . Last PSA 1.1 in 2020. Had CT abd pelvis w IV contrast in 2022 revealing polycystic kidneys. Stones not visualized. Images reviewed. Cr 15.3. Past Medical History:   Diagnosis Date    Anemia     Anxiety     Arthritis     CAD (coronary artery disease)     per pt- no MI- \"They said the back of my heart was something. ..like 40%\"    Chronic kidney disease     [facility]; stage 5; pt has AV fistula to left arm; PERITONEAL DIALYSIS daily since     Chronic pain     CKD (chronic kidney disease)     Former smoker     Fracture, tibial plateau 18/15/0266    GERD (gastroesophageal reflux disease)     Heart disease     Hyperlipidemia     on med for control    Hypertension     controlled with med    Hypertensive nephrosclerosis     per nephrology note 4/14/14    Hypoactive thyroid     Kidney stone     Peritoneal dialysis catheter in place [facility])     Peritoneal dialysis-associated peritonitis [address] 06/24/2017    during hs    PUD (peptic ulcer disease)     SBP (spontaneous bacterial peritonitis) ([facility] [address] 09/24/2016    Sepsis ([facility] 09/24/2016    Tibial plateau fracture, left 23/28/3991    Umbilical hernia     Unspecified sleep apnea     pt does not have a CPAP     Past Surgical History:   Procedure Laterality Date    ABDOMEN SURGERY Right 6/13/2022    RIGHT LOWER QUADRANT ABDOMEN LESION BIOPSY EXCISION performed by aSulo Granado MD at [address] [facility] Right 1995    tip of 5th finger    COLONOSCOPY N/A 3/23/2018    COLONOSCOPY  BMI 34 performed by Renato Joyner MD at [facility]    CYSTOSCOPY Left 11/1/2022    CYSTOSCOPY, LEFT URETEROSCOPY, BASKET STONE EXTRACTION, LEFT URETERAL STENT INSERTION performed by Rafael Hansen MD at [facility] Left as a child    thumb    ORTHOPEDIC SURGERY Left 1989    knee    ORTHOPEDIC SURGERY Left 2017    LEFT LATERAL TIBIAL PLATEAU OPEN REDUCTION INTERNAL FIXATION    PA ABDOMEN SURGERY PROC UNLISTED  6/11/14    PD catheter placement    SKIN BIOPSY Left 6/13/2022    LEFT BUTTOCK MASS EXCISION performed by Saulo Granado MD at [address] [place] [address] Left 2012    AV fistula to arm     Current Outpatient Medications   Medication Sig Dispense Refill    omeprazole (PRILOSEC) 40 MG delayed release capsule Take by mouth daily      valsartan-hydroCHLOROthiazide (DIOVAN-HCT) 320-25 MG per tablet TAKE ONE Tablet BY MOUTH ONCE A DAY      HYDROcodone-acetaminophen (NORCO) 5-325 MG per tablet Take 1 tablet by mouth every 4 hours as needed for Pain for up to 3 days. Intended supply: 3 days.  Take lowest dose possible to manage pain 18 tablet 0    Hyoscyamine Sulfate SL (LEVSIN/SL) 0.125 MG SUBL Place 0.125 mg under the tongue every 4 hours as needed (bladder spasms) 30 each 1    cephALEXin (KEFLEX) 500 MG capsule Take 1 capsule by mouth 2 times daily for 5 days 10 capsule 0    traZODone (DESYREL) 50 MG tablet Take 1 tablet by mouth nightly 90 tablet 1    LACTULOSE PO Take 15 mLs by mouth as needed      cinacalcet (SENSIPAR) 90 MG tablet Take 90 mg by mouth at bedtime      cloNIDine (CATAPRES) 0.3 MG/24HR PTWK Place 1 patch onto the skin every 3 days       cloNIDine (CATAPRES) 0.2 MG tablet Take 0.2 mg by mouth at bedtime      escitalopram (LEXAPRO) 10 MG tablet Take 10 mg by mouth daily as needed      esomeprazole (NEXIUM) 40 MG delayed release capsule Take 1 capsule by mouth daily as needed      finasteride (PROSCAR) 5 MG tablet Take 5 mg by mouth at bedtime      hydrALAZINE (APRESOLINE) 50 MG tablet Take 1 tablet by mouth daily as needed      NIFEdipine (PROCARDIA XL) 90 MG extended release tablet Take 90 mg by mouth at bedtime      ondansetron (ZOFRAN) 4 MG tablet Take 4 mg by mouth every 8 hours as needed      potassium chloride (KLOR-CON) 10 MEQ extended release tablet Take 10-20 mEq by mouth daily       rosuvastatin (CRESTOR) 10 MG tablet Take 10 mg by mouth at bedtime       sevelamer hcl (RENAGEL) 800 MG tablet Take 2,400 mg by mouth 3 times daily (with meals)       B Complex-C-Folic Acid (DIALYVITE PO) Take 1 tablet by mouth daily (Patient not taking: No sig reported)       No current facility-administered medications for this visit.      Allergies   Allergen Reactions    Iodine Other (See Comments)     Iodine Dye r/t kidney function     Social History     Socioeconomic History    Marital status: Single     Spouse name: Not on file    Number of children: Not on file    Years of education: Not on file    Highest education level: Not on file   Occupational History    Not on file   Tobacco Use    Smoking status: Former     Packs/day: 0.50     Years: 15.00     Pack years: 7.50 Types: Cigarettes     Quit date: 3/1/2014     Years since quittin.6    Smokeless tobacco: Never    Tobacco comments:     Quit smoking: pt states only smoked cigarettes when drinking alcohol   Vaping Use    Vaping Use: Not on file   Substance and Sexual Activity    Alcohol use: Yes    Drug use: Yes     Types: Marijuana Rina Ma)     Comment: \"when I can afford it\"     Sexual activity: Not on file   Other Topics Concern    Not on file   Social History Narrative    Not on file     Social Determinants of Health     Financial Resource Strain: Low Risk     Difficulty of Paying Living Expenses: Not hard at all   Food Insecurity: No Food Insecurity    Worried About Running Out of Food in the Last Year: Never true    Ran Out of Food in the Last Year: Never true   Transportation Needs: Not on file   Physical Activity: Insufficiently Active    Days of Exercise per Week: 1 day    Minutes of Exercise per Session: 20 min   Stress: Not on file   Social Connections: Not on file   Intimate Partner Violence: Not on file   Housing Stability: Not on file     Family History   Problem Relation Age of Onset    COPD Father     Heart Disease Father     Hypertension Mother     Diabetes Mother     Hypertension Father        Review of Systems  Constitutional: Negative  GI: Neg GI ROS  Genitourinary: Positive for hematuria. Urinalysis  UA - Dipstick  No results found for this or any previous visit. PHYSICAL EXAM    General appearance - well appearing and in no distress  Mental status - alert, oriented to person, place, and time  Neck - supple, no significant adenopathy  Chest/Lung-  Quiet, even and easy respiratory effort without use of accessory muscles  Skin - normal coloration and turgor, no rashes        Assessment and Plan    ICD-10-CM    1. Left ureteral stone  N20.1 US RETROPERITONEAL COMPLETE      2. Calculus of kidney  N20.0 AMB POC XRAY ABDOMEN 1 VIEW     US RETROPERITONEAL COMPLETE        Patient placed in supine position.

## 2022-12-25 ENCOUNTER — APPOINTMENT (OUTPATIENT)
Dept: CT IMAGING | Age: 62
End: 2022-12-25
Payer: MEDICARE

## 2022-12-25 ENCOUNTER — HOSPITAL ENCOUNTER (INPATIENT)
Age: 62
LOS: 6 days | Discharge: HOME HEALTH | End: 2022-12-31
Attending: EMERGENCY MEDICINE | Admitting: INTERNAL MEDICINE
Payer: MEDICARE

## 2022-12-25 ENCOUNTER — APPOINTMENT (OUTPATIENT)
Dept: GENERAL RADIOLOGY | Age: 62
End: 2022-12-25
Payer: MEDICARE

## 2022-12-25 DIAGNOSIS — T85.71XA PERITONITIS, DIALYSIS-ASSOCIATED, INITIAL ENCOUNTER (HCC): Primary | ICD-10-CM

## 2022-12-25 DIAGNOSIS — M72.6 NECROTIZING FASCIITIS (HCC): ICD-10-CM

## 2022-12-25 DIAGNOSIS — L02.211 ABDOMINAL WALL ABSCESS: ICD-10-CM

## 2022-12-25 DIAGNOSIS — T85.71XD INFECTION ASSOCIATED WITH PERITONEAL DIALYSIS CATHETER, SUBSEQUENT ENCOUNTER (HCC): ICD-10-CM

## 2022-12-25 DIAGNOSIS — L98.493 ABDOMINAL WALL ULCER, WITH NECROSIS OF MUSCLE (HCC): ICD-10-CM

## 2022-12-25 DIAGNOSIS — K65.9 LOCALIZED PERITONITIS (HCC): ICD-10-CM

## 2022-12-25 PROBLEM — Z99.2 PERITONEAL DIALYSIS STATUS (HCC): Chronic | Status: ACTIVE | Noted: 2022-12-25

## 2022-12-25 PROBLEM — D64.9 ANEMIA: Status: ACTIVE | Noted: 2022-12-25

## 2022-12-25 PROBLEM — E83.42 HYPOMAGNESEMIA: Status: ACTIVE | Noted: 2022-12-25

## 2022-12-25 PROBLEM — N18.6 ESRD (END STAGE RENAL DISEASE) (HCC): Status: ACTIVE | Noted: 2021-05-04

## 2022-12-25 PROBLEM — A41.9 SEPSIS (HCC): Status: ACTIVE | Noted: 2022-12-25

## 2022-12-25 PROBLEM — Z66 DNR (DO NOT RESUSCITATE): Chronic | Status: ACTIVE | Noted: 2022-12-25

## 2022-12-25 LAB
ALBUMIN SERPL-MCNC: 2.8 G/DL (ref 3.2–4.6)
ALBUMIN/GLOB SERPL: 0.5 {RATIO} (ref 0.4–1.6)
ALP SERPL-CCNC: 85 U/L (ref 50–136)
ALT SERPL-CCNC: 15 U/L (ref 12–65)
ANION GAP SERPL CALC-SCNC: 10 MMOL/L (ref 2–11)
AST SERPL-CCNC: 9 U/L (ref 15–37)
BASOPHILS # BLD: 0 K/UL (ref 0–0.2)
BASOPHILS NFR BLD: 0 % (ref 0–2)
BILIRUB SERPL-MCNC: 0.8 MG/DL (ref 0.2–1.1)
BUN SERPL-MCNC: 49 MG/DL (ref 8–23)
CALCIUM SERPL-MCNC: 9.3 MG/DL (ref 8.3–10.4)
CHLORIDE SERPL-SCNC: 97 MMOL/L (ref 101–110)
CO2 SERPL-SCNC: 29 MMOL/L (ref 21–32)
CREAT SERPL-MCNC: 15.4 MG/DL (ref 0.8–1.5)
DIFFERENTIAL METHOD BLD: ABNORMAL
EKG ATRIAL RATE: 99 BPM
EKG DIAGNOSIS: NORMAL
EKG P AXIS: 36 DEGREES
EKG P-R INTERVAL: 194 MS
EKG Q-T INTERVAL: 366 MS
EKG QRS DURATION: 98 MS
EKG QTC CALCULATION (BAZETT): 469 MS
EKG R AXIS: 1 DEGREES
EKG T AXIS: 47 DEGREES
EKG VENTRICULAR RATE: 99 BPM
EOSINOPHIL # BLD: 0.1 K/UL (ref 0–0.8)
EOSINOPHIL NFR BLD: 0 % (ref 0.5–7.8)
ERYTHROCYTE [DISTWIDTH] IN BLOOD BY AUTOMATED COUNT: 15.9 % (ref 11.9–14.6)
GLOBULIN SER CALC-MCNC: 5.2 G/DL (ref 2.8–4.5)
GLUCOSE SERPL-MCNC: 109 MG/DL (ref 65–100)
HCT VFR BLD AUTO: 29.6 % (ref 41.1–50.3)
HGB BLD-MCNC: 9.8 G/DL (ref 13.6–17.2)
IMM GRANULOCYTES # BLD AUTO: 0.1 K/UL (ref 0–0.5)
IMM GRANULOCYTES NFR BLD AUTO: 0 % (ref 0–5)
LACTATE SERPL-SCNC: 1.4 MMOL/L (ref 0.4–2)
LIPASE SERPL-CCNC: 88 U/L (ref 73–393)
LYMPHOCYTES # BLD: 1 K/UL (ref 0.5–4.6)
LYMPHOCYTES NFR BLD: 8 % (ref 13–44)
MAGNESIUM SERPL-MCNC: 1.5 MG/DL (ref 1.8–2.4)
MCH RBC QN AUTO: 31.6 PG (ref 26.1–32.9)
MCHC RBC AUTO-ENTMCNC: 33.1 G/DL (ref 31.4–35)
MCV RBC AUTO: 95.5 FL (ref 82–102)
MONOCYTES # BLD: 0.9 K/UL (ref 0.1–1.3)
MONOCYTES NFR BLD: 8 % (ref 4–12)
NEUTS SEG # BLD: 9.6 K/UL (ref 1.7–8.2)
NEUTS SEG NFR BLD: 84 % (ref 43–78)
NRBC # BLD: 0 K/UL (ref 0–0.2)
PLATELET # BLD AUTO: 199 K/UL (ref 150–450)
PMV BLD AUTO: 10.6 FL (ref 9.4–12.3)
POTASSIUM SERPL-SCNC: 3.7 MMOL/L (ref 3.5–5.1)
PROT SERPL-MCNC: 8 G/DL (ref 6.3–8.2)
RBC # BLD AUTO: 3.1 M/UL (ref 4.23–5.6)
SODIUM SERPL-SCNC: 136 MMOL/L (ref 133–143)
WBC # BLD AUTO: 11.5 K/UL (ref 4.3–11.1)

## 2022-12-25 PROCEDURE — 3E1M39Z IRRIGATION OF PERITONEAL CAVITY USING DIALYSATE, PERCUTANEOUS APPROACH: ICD-10-PCS | Performed by: INTERNAL MEDICINE

## 2022-12-25 PROCEDURE — 96365 THER/PROPH/DIAG IV INF INIT: CPT

## 2022-12-25 PROCEDURE — 71045 X-RAY EXAM CHEST 1 VIEW: CPT

## 2022-12-25 PROCEDURE — 6360000002 HC RX W HCPCS: Performed by: INTERNAL MEDICINE

## 2022-12-25 PROCEDURE — 87070 CULTURE OTHR SPECIMN AEROBIC: CPT

## 2022-12-25 PROCEDURE — 1100000000 HC RM PRIVATE

## 2022-12-25 PROCEDURE — 6360000002 HC RX W HCPCS: Performed by: EMERGENCY MEDICINE

## 2022-12-25 PROCEDURE — 2580000003 HC RX 258: Performed by: EMERGENCY MEDICINE

## 2022-12-25 PROCEDURE — 85025 COMPLETE CBC W/AUTO DIFF WBC: CPT

## 2022-12-25 PROCEDURE — 96367 TX/PROPH/DG ADDL SEQ IV INF: CPT

## 2022-12-25 PROCEDURE — 80053 COMPREHEN METABOLIC PANEL: CPT

## 2022-12-25 PROCEDURE — 90945 DIALYSIS ONE EVALUATION: CPT

## 2022-12-25 PROCEDURE — 74176 CT ABD & PELVIS W/O CONTRAST: CPT

## 2022-12-25 PROCEDURE — 93005 ELECTROCARDIOGRAM TRACING: CPT | Performed by: INTERNAL MEDICINE

## 2022-12-25 PROCEDURE — 96375 TX/PRO/DX INJ NEW DRUG ADDON: CPT

## 2022-12-25 PROCEDURE — 83605 ASSAY OF LACTIC ACID: CPT

## 2022-12-25 PROCEDURE — 99285 EMERGENCY DEPT VISIT HI MDM: CPT

## 2022-12-25 PROCEDURE — 2580000003 HC RX 258: Performed by: INTERNAL MEDICINE

## 2022-12-25 PROCEDURE — 87040 BLOOD CULTURE FOR BACTERIA: CPT

## 2022-12-25 PROCEDURE — 83690 ASSAY OF LIPASE: CPT

## 2022-12-25 PROCEDURE — 6370000000 HC RX 637 (ALT 250 FOR IP): Performed by: INTERNAL MEDICINE

## 2022-12-25 PROCEDURE — 83735 ASSAY OF MAGNESIUM: CPT

## 2022-12-25 PROCEDURE — 6360000004 HC RX CONTRAST MEDICATION: Performed by: INTERNAL MEDICINE

## 2022-12-25 RX ORDER — CLONIDINE 0.3 MG/24H
1 PATCH, EXTENDED RELEASE TRANSDERMAL
Status: DISCONTINUED | OUTPATIENT
Start: 2022-12-25 | End: 2022-12-25

## 2022-12-25 RX ORDER — HEPARIN SODIUM 5000 [USP'U]/ML
5000 INJECTION, SOLUTION INTRAVENOUS; SUBCUTANEOUS EVERY 8 HOURS SCHEDULED
Status: DISCONTINUED | OUTPATIENT
Start: 2022-12-26 | End: 2022-12-31 | Stop reason: HOSPADM

## 2022-12-25 RX ORDER — ONDANSETRON 4 MG/1
4 TABLET, ORALLY DISINTEGRATING ORAL EVERY 8 HOURS PRN
Status: DISCONTINUED | OUTPATIENT
Start: 2022-12-25 | End: 2022-12-31 | Stop reason: HOSPADM

## 2022-12-25 RX ORDER — SODIUM CHLORIDE 0.9 % (FLUSH) 0.9 %
5-40 SYRINGE (ML) INJECTION PRN
Status: DISCONTINUED | OUTPATIENT
Start: 2022-12-25 | End: 2022-12-31 | Stop reason: HOSPADM

## 2022-12-25 RX ORDER — MORPHINE SULFATE 4 MG/ML
4 INJECTION, SOLUTION INTRAMUSCULAR; INTRAVENOUS
Status: COMPLETED | OUTPATIENT
Start: 2022-12-25 | End: 2022-12-25

## 2022-12-25 RX ORDER — MORPHINE SULFATE 2 MG/ML
2 INJECTION, SOLUTION INTRAMUSCULAR; INTRAVENOUS EVERY 4 HOURS PRN
Status: DISCONTINUED | OUTPATIENT
Start: 2022-12-25 | End: 2022-12-26 | Stop reason: SDUPTHER

## 2022-12-25 RX ORDER — PANTOPRAZOLE SODIUM 40 MG/1
40 TABLET, DELAYED RELEASE ORAL
Status: DISCONTINUED | OUTPATIENT
Start: 2022-12-26 | End: 2022-12-26

## 2022-12-25 RX ORDER — VALSARTAN AND HYDROCHLOROTHIAZIDE 320; 25 MG/1; MG/1
1 TABLET, FILM COATED ORAL DAILY
Status: DISCONTINUED | OUTPATIENT
Start: 2022-12-26 | End: 2022-12-25 | Stop reason: SDUPTHER

## 2022-12-25 RX ORDER — OXYCODONE HYDROCHLORIDE 5 MG/1
5 TABLET ORAL EVERY 6 HOURS PRN
Status: DISCONTINUED | OUTPATIENT
Start: 2022-12-25 | End: 2022-12-31 | Stop reason: HOSPADM

## 2022-12-25 RX ORDER — VALSARTAN 320 MG/1
320 TABLET ORAL DAILY
Status: DISCONTINUED | OUTPATIENT
Start: 2022-12-26 | End: 2022-12-31 | Stop reason: HOSPADM

## 2022-12-25 RX ORDER — ACETAMINOPHEN 325 MG/1
650 TABLET ORAL EVERY 6 HOURS PRN
Status: DISCONTINUED | OUTPATIENT
Start: 2022-12-25 | End: 2022-12-31 | Stop reason: HOSPADM

## 2022-12-25 RX ORDER — HYDROCHLOROTHIAZIDE 25 MG/1
25 TABLET ORAL DAILY
Status: DISCONTINUED | OUTPATIENT
Start: 2022-12-26 | End: 2022-12-31 | Stop reason: HOSPADM

## 2022-12-25 RX ORDER — SODIUM CHLORIDE 0.9 % (FLUSH) 0.9 %
5-40 SYRINGE (ML) INJECTION EVERY 12 HOURS SCHEDULED
Status: DISCONTINUED | OUTPATIENT
Start: 2022-12-25 | End: 2022-12-31 | Stop reason: HOSPADM

## 2022-12-25 RX ORDER — CINACALCET 30 MG/1
90 TABLET, FILM COATED ORAL NIGHTLY
Status: DISCONTINUED | OUTPATIENT
Start: 2022-12-25 | End: 2022-12-31 | Stop reason: HOSPADM

## 2022-12-25 RX ORDER — ONDANSETRON 2 MG/ML
4 INJECTION INTRAMUSCULAR; INTRAVENOUS
Status: COMPLETED | OUTPATIENT
Start: 2022-12-25 | End: 2022-12-25

## 2022-12-25 RX ORDER — ONDANSETRON 2 MG/ML
4 INJECTION INTRAMUSCULAR; INTRAVENOUS EVERY 6 HOURS PRN
Status: DISCONTINUED | OUTPATIENT
Start: 2022-12-25 | End: 2022-12-31 | Stop reason: HOSPADM

## 2022-12-25 RX ORDER — POLYETHYLENE GLYCOL 3350 17 G/17G
17 POWDER, FOR SOLUTION ORAL DAILY PRN
Status: DISCONTINUED | OUTPATIENT
Start: 2022-12-25 | End: 2022-12-27

## 2022-12-25 RX ORDER — FINASTERIDE 5 MG/1
5 TABLET, FILM COATED ORAL NIGHTLY
Status: DISCONTINUED | OUTPATIENT
Start: 2022-12-25 | End: 2022-12-31 | Stop reason: HOSPADM

## 2022-12-25 RX ORDER — SODIUM CHLORIDE 9 MG/ML
INJECTION, SOLUTION INTRAVENOUS PRN
Status: DISCONTINUED | OUTPATIENT
Start: 2022-12-25 | End: 2022-12-31 | Stop reason: HOSPADM

## 2022-12-25 RX ORDER — TRAZODONE HYDROCHLORIDE 50 MG/1
50 TABLET ORAL NIGHTLY
Status: DISCONTINUED | OUTPATIENT
Start: 2022-12-25 | End: 2022-12-31 | Stop reason: HOSPADM

## 2022-12-25 RX ORDER — NIFEDIPINE 90 MG/1
90 TABLET, EXTENDED RELEASE ORAL NIGHTLY
Status: DISCONTINUED | OUTPATIENT
Start: 2022-12-25 | End: 2022-12-31 | Stop reason: HOSPADM

## 2022-12-25 RX ORDER — ACETAMINOPHEN 650 MG/1
650 SUPPOSITORY RECTAL EVERY 6 HOURS PRN
Status: DISCONTINUED | OUTPATIENT
Start: 2022-12-25 | End: 2022-12-31 | Stop reason: HOSPADM

## 2022-12-25 RX ORDER — CLONIDINE 0.3 MG/24H
1 PATCH, EXTENDED RELEASE TRANSDERMAL WEEKLY
Status: DISCONTINUED | OUTPATIENT
Start: 2022-12-26 | End: 2022-12-31 | Stop reason: HOSPADM

## 2022-12-25 RX ADMIN — TRAZODONE HYDROCHLORIDE 50 MG: 50 TABLET ORAL at 20:32

## 2022-12-25 RX ADMIN — ONDANSETRON 4 MG: 2 INJECTION INTRAMUSCULAR; INTRAVENOUS at 22:34

## 2022-12-25 RX ADMIN — ONDANSETRON 4 MG: 2 INJECTION INTRAMUSCULAR; INTRAVENOUS at 12:45

## 2022-12-25 RX ADMIN — OXYCODONE 5 MG: 5 TABLET ORAL at 17:58

## 2022-12-25 RX ADMIN — FINASTERIDE 5 MG: 5 TABLET, FILM COATED ORAL at 20:32

## 2022-12-25 RX ADMIN — PIPERACILLIN AND TAZOBACTAM 3375 MG: 3; .375 INJECTION, POWDER, LYOPHILIZED, FOR SOLUTION INTRAVENOUS at 20:32

## 2022-12-25 RX ADMIN — CINACALCET HYDROCHLORIDE 90 MG: 30 TABLET, FILM COATED ORAL at 20:32

## 2022-12-25 RX ADMIN — MORPHINE SULFATE 4 MG: 4 INJECTION INTRAVENOUS at 12:45

## 2022-12-25 RX ADMIN — PIPERACILLIN AND TAZOBACTAM 4500 MG: 4; .5 INJECTION, POWDER, LYOPHILIZED, FOR SOLUTION INTRAVENOUS at 12:45

## 2022-12-25 RX ADMIN — Medication 2500 MG: at 14:02

## 2022-12-25 RX ADMIN — NIFEDIPINE 90 MG: 90 TABLET, EXTENDED RELEASE ORAL at 20:32

## 2022-12-25 RX ADMIN — DIATRIZOATE MEGLUMINE AND DIATRIZOATE SODIUM 15 ML: 660; 100 LIQUID ORAL; RECTAL at 16:17

## 2022-12-25 RX ADMIN — SODIUM CHLORIDE, PRESERVATIVE FREE 10 ML: 5 INJECTION INTRAVENOUS at 20:37

## 2022-12-25 ASSESSMENT — ENCOUNTER SYMPTOMS
SHORTNESS OF BREATH: 0
ABDOMINAL PAIN: 1
BACK PAIN: 0
NAUSEA: 0
VOMITING: 0

## 2022-12-25 ASSESSMENT — PAIN SCALES - GENERAL: PAINLEVEL_OUTOF10: 6

## 2022-12-25 ASSESSMENT — PAIN DESCRIPTION - LOCATION: LOCATION: ABDOMEN

## 2022-12-25 ASSESSMENT — PAIN - FUNCTIONAL ASSESSMENT: PAIN_FUNCTIONAL_ASSESSMENT: NONE - DENIES PAIN

## 2022-12-25 NOTE — ED TRIAGE NOTES
Pt arrives ambulatory stating he does PD daily. Pt states yellowish solution after PD. Pt states he has been doing PD for 8 years. Pt states hx of peritonitis. Pt states he was told if it doesn't not clear up to come to the ER. Pt states his nephro office  is closed so he is unable to get the abx for peritonitis

## 2022-12-25 NOTE — ED PROVIDER NOTES
Emergency Department Provider Note                   PCP:                Celeste Palencia MD               Age: 65 y.o. Sex: male     No diagnosis found. DISPOSITION          MDM  Number of Diagnoses or Management Options  Peritonitis, dialysis-associated, initial encounter [facility])  Diagnosis management comments: Lab work shows leukocytosis 11.5 with leftward shift, chronic renal failure creatinine 15, BUN 49, electrolytes unremarkable. Lactic acid normal.  Patient is being treated with IV vancomycin and Zosyn. Peritoneal fluid sent for culture and Gram stain. Hospitalist consulted for admission for suspected peritonitis.        Amount and/or Complexity of Data Reviewed  Clinical lab tests: ordered and reviewed  Tests in the medicine section of CPT®: ordered and reviewed  Discuss the patient with other providers: yes    Risk of Complications, Morbidity, and/or Mortality  Presenting problems: moderate  Diagnostic procedures: moderate  Management options: moderate                                Orders Placed This Encounter   Procedures    Culture, Blood 1    Culture, Body Fluid    CBC with Auto Differential    Comprehensive Metabolic Panel    Lipase    Lactic Acid    Magnesium    Saline lock IV        Medications   ondansetron (ZOFRAN) injection 4 mg (has no administration in time range)   morphine sulfate (PF) injection 4 mg (has no administration in time range)   piperacillin-tazobactam (ZOSYN) 4,500 mg in sodium chloride 0.9 % 100 mL IVPB (mini-bag) (has no administration in time range)   vancomycin (VANCOCIN) 2500 mg in sodium chloride 0.9 % 500 mL IVPB (has no administration in time range)       New Prescriptions    No medications on file        Carolina Johnson is a 65 y.o. male who presents to the Emergency Department with chief complaint of    Chief Complaint   Patient presents with    Other      61-year-old -American male history of end-stage renal disease requiring peritoneal dialysis presents with abdominal pain worsening over the past 2 days. Pain is diffuse and cramping. He states it feels similar to previous episodes of peritonitis. Over the past 2 days his peritoneal fluid has become cloudy. No definite fever. No nausea or vomiting. He estimates his last episode of peritonitis was approximately 6 months ago. The history is provided by the patient. Review of Systems   Constitutional:  Negative for fever. HENT:  Negative for congestion. Respiratory:  Negative for shortness of breath. Cardiovascular:  Negative for chest pain. Gastrointestinal:  Positive for abdominal pain. Negative for nausea and vomiting. Genitourinary:  Negative for dysuria. Musculoskeletal:  Negative for back pain and neck pain. Skin:  Negative for rash. Neurological:  Negative for headaches. All other systems reviewed and are negative. Past Medical History:   Diagnosis Date    Anemia     Anxiety     Arthritis     CAD (coronary artery disease)     per pt- no MI- \"They said the back of my heart was something. ..like 40%\"    Chronic kidney disease     [facility]; stage 5; pt has AV fistula to left arm; PERITONEAL DIALYSIS daily since 2014    Chronic pain     CKD (chronic kidney disease)     Former smoker     Fracture, tibial plateau 03/44/8997    GERD (gastroesophageal reflux disease)     Heart disease     Hyperlipidemia     on med for control    Hypertension     controlled with med    Hypertensive nephrosclerosis     per nephrology note 4/14/14    Hypoactive thyroid     Kidney stone     Peritoneal dialysis catheter in place [facility])     Peritoneal dialysis-associated peritonitis ([address] 06/24/2017    during hs    PUD (peptic ulcer disease)     SBP (spontaneous bacterial peritonitis) ([address] 09/24/2016    Sepsis ([address] 09/24/2016    Tibial plateau fracture, left 08/48/9602    Umbilical hernia     Unspecified sleep apnea     pt does not have a CPAP        Past Surgical History: Procedure Laterality Date    ABDOMEN SURGERY Right 2022    RIGHT LOWER QUADRANT ABDOMEN LESION BIOPSY EXCISION performed by Rajat Martínez MD at [address] Right     tip of 5th finger    COLONOSCOPY N/A 3/23/2018    COLONOSCOPY  BMI 34 performed by Estevan Arevalo MD at [address] [facility] PYELOGRAMS    CYSTOSCOPY Left 2022    CYSTOSCOPY, LEFT URETEROSCOPY, BASKET STONE EXTRACTION, LEFT URETERAL STENT INSERTION performed by Enma Rodriguez MD at \[facility]"" [facility] Left as a child    thumb    ORTHOPEDIC SURGERY Left     knee    ORTHOPEDIC SURGERY Left     LEFT LATERAL TIBIAL PLATEAU OPEN REDUCTION INTERNAL FIXATION    UT ABDOMEN SURGERY [address] UNLISTED  14    PD catheter placement    SKIN BIOPSY Left 2022    LEFT BUTTOCK MASS EXCISION performed by Rajat Martínez MD at [address] [facility] Left     AV fistula to arm        Family History   Problem Relation Age of Onset    COPD Father     Heart Disease Father     Hypertension Mother     Diabetes Mother     Hypertension Father         Social History     Socioeconomic History    Marital status: Single   Tobacco Use    Smoking status: Former     Packs/day: 0.50     Years: 15.00     Pack years: 7.50     Types: Cigarettes     Quit date: 3/1/2014     Years since quittin.8    Smokeless tobacco: Never    Tobacco comments:     Quit smoking: pt states only smoked cigarettes when drinking alcohol   Substance and Sexual Activity    Alcohol use: Yes    Drug use: Yes     Types: Marijuana Juanetta Carson)     Comment: \"when I can afford it\"      Social Determinants of Health     Financial Resource Strain: Low Risk     Difficulty of Paying Living Expenses: Not hard at all   Food Insecurity: No Food Insecurity    Worried About Running Out of Food in the Last Year: Never true    Ran Out of Food in the Last Year: Never true   Physical Activity: Insufficiently Active    Days of Exercise per Week: 1 day    Minutes of Exercise per Session: 20 min         Iodine     Previous Medications    B COMPLEX-C-FOLIC ACID (DIALYVITE PO)    Take 1 tablet by mouth daily    CINACALCET (SENSIPAR) 90 MG TABLET    Take 90 mg by mouth at bedtime    CLONIDINE (CATAPRES) 0.2 MG TABLET    Take 0.2 mg by mouth at bedtime    CLONIDINE (CATAPRES) 0.3 MG/24HR PTWK    Place 1 patch onto the skin every 3 days     ESCITALOPRAM (LEXAPRO) 10 MG TABLET    Take 10 mg by mouth daily as needed    ESOMEPRAZOLE (NEXIUM) 40 MG DELAYED RELEASE CAPSULE    Take 1 capsule by mouth daily as needed    FINASTERIDE (PROSCAR) 5 MG TABLET    Take 5 mg by mouth at bedtime    HYDRALAZINE (APRESOLINE) 50 MG TABLET    Take 1 tablet by mouth daily as needed    HYOSCYAMINE SULFATE SL (LEVSIN/SL) 0.125 MG SUBL    Place 0.125 mg under the tongue every 4 hours as needed (bladder spasms)    LACTULOSE PO    Take 15 mLs by mouth as needed    NIFEDIPINE (PROCARDIA XL) 90 MG EXTENDED RELEASE TABLET    Take 90 mg by mouth at bedtime    OMEPRAZOLE (PRILOSEC) 40 MG DELAYED RELEASE CAPSULE    Take by mouth daily    ONDANSETRON (ZOFRAN) 4 MG TABLET    Take 4 mg by mouth every 8 hours as needed    POTASSIUM CHLORIDE (KLOR-CON) 10 MEQ EXTENDED RELEASE TABLET    Take 10-20 mEq by mouth daily     ROSUVASTATIN (CRESTOR) 10 MG TABLET    Take 10 mg by mouth at bedtime     SEVELAMER HCL (RENAGEL) 800 MG TABLET    Take 2,400 mg by mouth 3 times daily (with meals)     TRAZODONE (DESYREL) 50 MG TABLET    Take 1 tablet by mouth nightly    VALSARTAN-HYDROCHLOROTHIAZIDE (DIOVAN-HCT) 320-25 MG PER TABLET    TAKE ONE Tablet BY MOUTH ONCE A DAY        Vitals signs and nursing note reviewed. Patient Vitals for the past 4 hrs:   Temp Pulse Resp BP SpO2   12/25/22 1145 -- -- -- 108/62 --   12/25/22 1143 99.1 °F (37.3 °C) (!) 107 20 -- 100 %          Physical Exam  Vitals and nursing note reviewed.    Constitutional:       General: He is not in acute distress. Appearance: Normal appearance. He is not toxic-appearing. HENT:      Head: Normocephalic and atraumatic. Nose: Nose normal.      Mouth/Throat:      Mouth: Mucous membranes are moist.      Pharynx: Oropharynx is clear. Eyes:      Conjunctiva/sclera: Conjunctivae normal.      Pupils: Pupils are equal, round, and reactive to light. Cardiovascular:      Rate and Rhythm: Normal rate and regular rhythm. Pulmonary:      Effort: Pulmonary effort is normal.      Breath sounds: Normal breath sounds. Abdominal:      Palpations: Abdomen is soft. Comments: Reducible umbilical hernia. Diffuse tenderness with some guarding. No rigidity. PD catheter in place. No erythema or discharge from the catheter site. Musculoskeletal:         General: Normal range of motion. Cervical back: Normal range of motion and neck supple. Skin:     General: Skin is warm and dry. Neurological:      Mental Status: He is alert and oriented to person, place, and time. Psychiatric:         Mood and Affect: Mood normal.         Behavior: Behavior normal.        Procedures    No results found for any visits on 12/25/22. No orders to display                       Voice dictation software was used during the making of this note. This software is not perfect and grammatical and other typographical errors may be present. This note has not been completely proofread for errors.      Ruiz Garcia MD  12/25/22 3819

## 2022-12-25 NOTE — PROGRESS NOTES
VANCO DAILY FOLLOW UP RENAL INSUFFICIENCY PATIENT   [address] [facility] Pharmacokinetic Monitoring Service - Vancomycin    Consulting Provider: Dr. Carlos A Castañeda   Indication: Suspected peritonitis  Target Concentration: Random level ? 15 mg/L  Day of Therapy: 1  Additional Antimicrobials: Zosyn    Patient eligible for piperacillin-tazobactam to cefepime auto-substitution per P&T approved protocol? NO    Pertinent Laboratory Values: Wt Readings from Last 1 Encounters:   12/25/22 260 lb (117.9 kg)     Temp Readings from Last 1 Encounters:   12/25/22 99.1 °F (37.3 °C) (Oral)     Recent Labs     12/25/22  1155   BUN 49*   CREATININE 15.40*   WBC 11.5*   LACACIDPL 1.4       No results found for: Isabell Chaney    MRSA Nasal Swab: N/A. Non-respiratory infection. Assessment:  Date:  Dose/Freq Admin Times Level/Time:   12/25 2500mg x1 1402    12/26                          Plan:  Concentration-guided dosing due to peritoneal dialysis  Vancomycin 2500mg x1 given.  No additional dose needed at this time  Vancomycin concentrations will be ordered as clinically appropriate   Pharmacy will continue to monitor patient and adjust therapy as indicated    Thank you for the consult,  Celestine Truong, [facility]

## 2022-12-25 NOTE — H&P
Hospitalist History and Physical   Admit Date:  2022 11:46 AM   Name:  Virginia Cullen   Age:  65 y.o. Sex:  male  :  1960   MRN:  [id number]   Room:  2/01    Presenting Complaint: Other     Reason(s) for Admission: Peritonitis ([facility] [K65.9]  Peritonitis, dialysis-associated, [facility] encounter [facility]     History of Present Illness:       Virginia Cullen is a 65 y.o. male with medical history of ESRD on peritoneal dialysis, HTN, peritonitis, who is evaluated with acute onset of diffuse abdominal pain 10/10. Pulled knees up to feel better, did PD today. Has prior peritonitis and took intraperitoneal antibiotics. Has periumbilical hernia that he states he did not wish for repair. No fever or diarrhea, eating ok, no chest pain or dyspnea, no edema. Noticed his PD fluid is cloudy. Had moved his bed and though he pulled a muscle moving furniture. Called PD RN and they called in oral antibiotics. S/p zosyn in ED. Meets sepsis criteria - leukocytosis and tachycardia. PD fluid sent for analysis. Blood culture pending. DNR    Sister Luna Pfeifefr    Lives alone       Home meds attempted to be reviewed with patient and will need re-review with RN      Review of Systems:  10 systems reviewed and negative except as noted in HPI. - no chest pain or edema, no dyspnea or cough        Assessment & Plan:     Principal Problem:    Peritonitis ([facility]  Plan:    Active Problems:    Peritoneal dialysis status ([facility]  Plan:   Admit to medical bed  Clear liquids to advance if tolerant pending course  CT AP due to umbilical hernia   D1 zosyn/vancomycin  Pending peritoneal fluid studies / blood cultures  Anuric  Check CXR        Umbilical hernia:  CTAP               Anemia  Plan:   HGB 9.8-trend         Hypomagnesemia  Plan:   Defer to nephrology with PD           DNR (do not resuscitate)  Plan:   Discussed on admit           Essential hypertension  Plan:   Resume home meds  Will need to clarify home meds but reviewed with patient on admit           ESRD (end stage renal disease) ([facility]  Plan:   Consult nephrology       Anticipated discharge needs:     Pending     Diet: ADULT DIET; Clear Liquid  VTE ppx: heparin  Code status: DNR    Hospital Problems:  Principal Problem:    Peritonitis ([address]  Active Problems:    Peritoneal dialysis status ([address]    Anemia    Hypomagnesemia    DNR (do not resuscitate)    Sepsis ([facility]    Essential hypertension    ESRD (end stage renal disease) ([facility]  Resolved Problems:    * No resolved hospital problems. *       Past History:     Past Medical History:   Diagnosis Date    Anemia     Anxiety     Arthritis     CAD (coronary artery disease)     per pt- no MI- \"They said the back of my heart was something. ..like 40%\"    Chronic kidney disease     [facility]ed [facility]; stage 5; pt has AV fistula to left arm; PERITONEAL DIALYSIS daily since 2014    Chronic pain     CKD (chronic kidney disease)     Former smoker     Fracture, tibial plateau 25/12/5736    GERD (gastroesophageal reflux disease)     Heart disease     Hyperlipidemia     on med for control    Hypertension     controlled with med    Hypertensive nephrosclerosis     per nephrology note 4/14/14    Hypoactive thyroid     Kidney stone     Peritoneal dialysis catheter in place [facility])     Peritoneal dialysis-associated peritonitis ([facility] 06/24/2017    during hs    PUD (peptic ulcer disease)     SBP (spontaneous bacterial peritonitis) ([address] 09/24/2016    Sepsis ([facility] 09/24/2016    Tibial plateau fracture, left 76/04/2433    Umbilical hernia     Unspecified sleep apnea     pt does not have a CPAP       Past Surgical History:   Procedure Laterality Date    ABDOMEN SURGERY Right 6/13/2022    RIGHT LOWER QUADRANT ABDOMEN LESION BIOPSY EXCISION performed by Yecenia Crockett MD at [facility] Right 1995    tip of 5th finger    COLONOSCOPY N/A 3/23/2018    COLONOSCOPY  BMI 34 performed by Hitesh Wilkinson MD at [facility] [address] PYELOGRAMS    CYSTOSCOPY Left 2022    CYSTOSCOPY, LEFT URETEROSCOPY, BASKET STONE EXTRACTION, LEFT URETERAL STENT INSERTION performed by Winsome Bush MD at [place] [address] Left as a child    thumb    ORTHOPEDIC SURGERY Left     knee    ORTHOPEDIC SURGERY Left 2017    LEFT LATERAL TIBIAL PLATEAU OPEN REDUCTION INTERNAL FIXATION    FL ABDOMEN SURGERY [address] UNLISTED  14    PD catheter placement    SKIN BIOPSY Left 2022    LEFT BUTTOCK MASS EXCISION performed by Jeremiah Diaz MD at [facility] Left     AV fistula to arm        Social History     Tobacco Use    Smoking status: Former     Packs/day: 0.50     Years: 15.00     Pack years: 7.50     Types: Cigarettes     Quit date: 3/1/2014     Years since quittin.8    Smokeless tobacco: Never    Tobacco comments:     Quit smoking: pt states only smoked cigarettes when drinking alcohol   Substance Use Topics    Alcohol use: Yes      Social History     Substance and Sexual Activity   Drug Use Yes    Types: Marijuana Delona Members)    Comment: \"when I can afford it\"        Family History   Problem Relation Age of Onset    COPD Father     Heart Disease Father     Hypertension Mother     Diabetes Mother     Hypertension Father         Immunization History   Administered Date(s) Administered    COVID-19, PFIZER PURPLE top, DILUTE for use, (age 15 y+), 30mcg/0.3mL 2021, 2021, 10/27/2021    Influenza Virus Vaccine 10/11/2021     Allergies   Allergen Reactions    Iodine Other (See Comments)     Iodine Dye r/t kidney function     Prior to Admit Medications:  Current Outpatient Medications   Medication Instructions    B Complex-C-Folic Acid (DIALYVITE PO) 1 tablet, Oral, DAILY    cinacalcet (SENSIPAR) 90 mg, Oral, Nightly    cloNIDine (CATAPRES) 0.3 MG/24HR PTWK 1 patch, TransDERmal, EVERY 3 DAYS    cloNIDine (CATAPRES) 0.2 mg, Oral, Nightly    escitalopram (LEXAPRO) 10 mg, Oral, DAILY PRN    esomeprazole (NEXIUM) 40 MG delayed release capsule 1 capsule, Oral, DAILY PRN    finasteride (PROSCAR) 5 mg, Oral, Nightly    hydrALAZINE (APRESOLINE) 50 MG tablet 1 tablet, Oral, DAILY PRN    Hyoscyamine Sulfate SL (LEVSIN/SL) 0.125 mg, SubLINGual, EVERY 4 HOURS PRN    LACTULOSE PO 15 mLs, Oral, PRN    NIFEdipine (PROCARDIA XL) 90 mg, Oral, Nightly    omeprazole (PRILOSEC) 40 MG delayed release capsule Oral, DAILY    ondansetron (ZOFRAN) 4 mg, Oral, EVERY 8 HOURS PRN    potassium chloride (KLOR-CON) 10 MEQ extended release tablet 10-20 mEq, Oral, DAILY    rosuvastatin (CRESTOR) 10 mg, Oral, Nightly    sevelamer hcl (RENAGEL) 2,400 mg, Oral, 3 TIMES DAILY WITH MEALS    traZODone (DESYREL) 50 mg, Oral, NIGHTLY    valsartan-hydroCHLOROthiazide (DIOVAN-HCT) 320-25 MG per tablet TAKE ONE Tablet BY MOUTH ONCE A DAY         Objective:   Patient Vitals for the past 24 hrs:   Temp Pulse Resp BP SpO2   12/25/22 1723 99.1 °F (37.3 °C) 93 19 130/86 98 %   12/25/22 1600 -- -- -- 110/82 94 %   12/25/22 1500 -- -- -- 116/79 96 %   12/25/22 1400 -- -- -- 97/71 93 %   12/25/22 1300 -- -- -- 106/76 97 %   12/25/22 1145 -- -- -- 108/62 --   12/25/22 1143 99.1 °F (37.3 °C) (!) 107 20 -- 100 %       Oxygen Therapy  SpO2: 98 %    Estimated body mass index is 30.83 kg/m² as calculated from the following:    Height as of this encounter: 6' 5\" (1.956 m). Weight as of this encounter: 260 lb (117.9 kg). No intake or output data in the 24 hours ending 12/25/22 1735      Physical Exam:    Blood pressure 130/86, pulse 93, temperature 99.1 °F (37.3 °C), temperature source Oral, resp. rate 19, height 6' 5\" (1.956 m), weight 260 lb (117.9 kg), SpO2 98 %. General:    Well nourished. Alert, no distress, pleasant   Head:  Normocephalic, atraumatic  Eyes:  Sclerae appear normal.  Pupils equally round. ENT:  Nares appear normal, no drainage.   Moist oral mucosa  Neck:  No restricted ROM. Trachea midline   CV:   RRR. No m/r/g. No jugular venous distension. No edema   Lungs:   CTAB. No wheezing, rhonchi, or rales. Symmetric expansion. Abdomen: Bowel sounds present. Soft, nontender, nondistended. PD cath looks ok, has nontender mildly reducible umbilical hernia  Extremities: No cyanosis or clubbing. No edema  Skin:     No rashes and normal coloration. Warm and dry. Neuro:   grossly intact. Psych:  Normal mood and affect.       I have personally reviewed labs and tests showing:  Recent Labs:  Recent Results (from the past 24 hour(s))   CBC with Auto Differential    Collection Time: 12/25/22 11:55 AM   Result Value Ref Range    WBC 11.5 (H) 4.3 - 11.1 K/uL    RBC 3.10 (L) 4.23 - 5.6 M/uL    Hemoglobin 9.8 (L) 13.6 - 17.2 g/dL    Hematocrit 29.6 (L) 41.1 - 50.3 %    MCV 95.5 82 - 102 FL    MCH 31.6 26.1 - 32.9 PG    MCHC 33.1 31.4 - 35.0 g/dL    RDW 15.9 (H) 11.9 - 14.6 %    Platelets 866 950 - 079 K/uL    MPV 10.6 9.4 - 12.3 FL    nRBC 0.00 0.0 - 0.2 K/uL    Differential Type AUTOMATED      Seg Neutrophils 84 (H) 43 - 78 %    Lymphocytes 8 (L) 13 - 44 %    Monocytes 8 4.0 - 12.0 %    Eosinophils % 0 (L) 0.5 - 7.8 %    Basophils 0 0.0 - 2.0 %    Immature Granulocytes 0 0.0 - 5.0 %    Segs Absolute 9.6 (H) 1.7 - 8.2 K/UL    Absolute Lymph # 1.0 0.5 - 4.6 K/UL    Absolute Mono # 0.9 0.1 - 1.3 K/UL    Absolute Eos # 0.1 0.0 - 0.8 K/UL    Basophils Absolute 0.0 0.0 - 0.2 K/UL    Absolute Immature Granulocyte 0.1 0.0 - 0.5 K/UL   Comprehensive Metabolic Panel    Collection Time: 12/25/22 11:55 AM   Result Value Ref Range    Sodium 136 133 - 143 mmol/L    Potassium 3.7 3.5 - 5.1 mmol/L    Chloride 97 (L) 101 - 110 mmol/L    CO2 29 21 - 32 mmol/L    Anion Gap 10 2 - 11 mmol/L    Glucose 109 (H) 65 - 100 mg/dL    BUN 49 (H) 8 - 23 MG/DL    Creatinine 15.40 (H) 0.8 - 1.5 MG/DL    Est, Glom Filt Rate 3 (L) >60 ml/min/1.73m2    Calcium 9.3 8.3 - 10.4 MG/DL    Total Bilirubin 0.8 0.2 - 1.1 MG/DL    ALT 15 12 - 65 U/L    AST 9 (L) 15 - 37 U/L    Alk Phosphatase 85 50 - 136 U/L    Total Protein 8.0 6.3 - 8.2 g/dL    Albumin 2.8 (L) 3.2 - 4.6 g/dL    Globulin 5.2 (H) 2.8 - 4.5 g/dL    Albumin/Globulin Ratio 0.5 0.4 - 1.6     Lipase    Collection Time: 12/25/22 11:55 AM   Result Value Ref Range    Lipase 88 73 - 393 U/L   Lactic Acid    Collection Time: 12/25/22 11:55 AM   Result Value Ref Range    Lactic Acid, Plasma 1.4 0.4 - 2.0 MMOL/L   Magnesium    Collection Time: 12/25/22 11:55 AM   Result Value Ref Range    Magnesium 1.5 (L) 1.8 - 2.4 mg/dL       I have personally reviewed imaging studies showing:  No results found. Echocardiogram:  No results found for this or any previous visit. Orders Placed This Encounter   Medications    ondansetron (ZOFRAN) injection 4 mg    morphine sulfate (PF) injection 4 mg    DISCONTD: piperacillin-tazobactam (ZOSYN) 3,375 mg in sodium chloride 0.9 % 50 mL IVPB (mini-bag)     Order Specific Question:   Antimicrobial Indications     Answer: Other     Order Specific Question:   Other Abx Indication     Answer:   peritonitis    DISCONTD: vancomycin (VANCOCIN) 1,000 mg in sodium chloride 0.9 % 250 mL IVPB ([id number])     Order Specific Question:   Antimicrobial Indications     Answer:   Sepsis of Unknown Etiology    piperacillin-tazobactam (ZOSYN) 4,500 mg in sodium chloride 0.9 % 100 mL IVPB (mini-bag)     Order Specific Question:   Antimicrobial Indications     Answer:    Other     Order Specific Question:   Other Abx Indication     Answer:   peritonitis    vancomycin (VANCOCIN) 2500 mg in sodium chloride 0.9 % 500 mL IVPB     Order Specific Question:   Antimicrobial Indications     Answer:   Sepsis of Unknown Etiology    sodium chloride flush 0.9 % injection 5-40 mL    sodium chloride flush 0.9 % injection 5-40 mL    0.9 % sodium chloride infusion    heparin (porcine) injection 5,000 Units    OR Linked Order Group     ondansetron (ZOFRAN-ODT) disintegrating tablet 4 mg     ondansetron (ZOFRAN) injection 4 mg    polyethylene glycol (GLYCOLAX) packet 17 g    OR Linked Order Group     acetaminophen (TYLENOL) tablet 650 mg     acetaminophen (TYLENOL) suppository 650 mg    DISCONTD: piperacillin-tazobactam (ZOSYN) 3,375 mg in sodium chloride 0.9 % 50 mL IVPB (mini-bag)     Suspected peritonitis     Order Specific Question:   Antimicrobial Indications     Answer:   Intra-Abdominal Infection    diatrizoate meglumine-sodium (GASTROGRAFIN) 66-10 % solution 15 mL    piperacillin-tazobactam (ZOSYN) 3,375 mg in sodium chloride 0.9 % 50 mL IVPB (mini-bag)     Suspected peritonitis     Order Specific Question:   Antimicrobial Indications     Answer:   Intra-Abdominal Infection    vancomycin (VANCOCIN) intermittent dosing (placeholder)     Order Specific Question:   Antimicrobial Indications     Answer: Other     Order Specific Question:   Other Abx Indication     Answer: Suspected peritonitis    morphine injection 2 mg    oxyCODONE (ROXICODONE) immediate release tablet 5 mg         Signed:  Tanmay Pitt MD    Part of this note may have been written by using a voice dictation software. The note has been proof read but may still contain some grammatical/other typographical errors.

## 2022-12-26 ENCOUNTER — ANESTHESIA EVENT (OUTPATIENT)
Dept: SURGERY | Age: 62
End: 2022-12-26
Payer: MEDICARE

## 2022-12-26 ENCOUNTER — ANESTHESIA (OUTPATIENT)
Dept: SURGERY | Age: 62
End: 2022-12-26
Payer: MEDICARE

## 2022-12-26 PROBLEM — R10.33 PERIUMBILICAL ABDOMINAL PAIN: Status: ACTIVE | Noted: 2022-12-26

## 2022-12-26 PROBLEM — M72.6 NECROTIZING FASCIITIS (HCC): Status: ACTIVE | Noted: 2022-12-26

## 2022-12-26 PROBLEM — L02.211 ABDOMINAL WALL ABSCESS: Status: ACTIVE | Noted: 2022-12-26

## 2022-12-26 PROBLEM — K43.0 RECURRENT VENTRAL HERNIA WITH INCARCERATION: Status: ACTIVE | Noted: 2022-12-26

## 2022-12-26 PROBLEM — K65.9 PERITONITIS DUE TO INFECTED PERITONEAL DIALYSIS CATHETER (HCC): Status: ACTIVE | Noted: 2022-12-26

## 2022-12-26 PROBLEM — K65.9 LOCALIZED PERITONITIS (HCC): Status: ACTIVE | Noted: 2022-12-26

## 2022-12-26 PROBLEM — L98.493: Status: ACTIVE | Noted: 2022-12-26

## 2022-12-26 PROBLEM — T85.71XA PERITONITIS DUE TO INFECTED PERITONEAL DIALYSIS CATHETER (HCC): Status: ACTIVE | Noted: 2022-12-26

## 2022-12-26 LAB
ALBUMIN SERPL-MCNC: 2.2 G/DL (ref 3.2–4.6)
ALBUMIN/GLOB SERPL: 0.5 {RATIO} (ref 0.4–1.6)
ALP SERPL-CCNC: 73 U/L (ref 50–136)
ALT SERPL-CCNC: 12 U/L (ref 12–65)
ANION GAP SERPL CALC-SCNC: 8 MMOL/L (ref 2–11)
APPEARANCE FLD: NORMAL
AST SERPL-CCNC: 7 U/L (ref 15–37)
BASOPHILS # BLD: 0 K/UL (ref 0–0.2)
BASOPHILS NFR BLD: 0 % (ref 0–2)
BILIRUB SERPL-MCNC: 0.6 MG/DL (ref 0.2–1.1)
BUN SERPL-MCNC: 51 MG/DL (ref 8–23)
CALCIUM SERPL-MCNC: 9.2 MG/DL (ref 8.3–10.4)
CHLORIDE SERPL-SCNC: 100 MMOL/L (ref 101–110)
CO2 SERPL-SCNC: 28 MMOL/L (ref 21–32)
COLOR FLD: NORMAL
CREAT SERPL-MCNC: 14.6 MG/DL (ref 0.8–1.5)
DIFFERENTIAL METHOD BLD: ABNORMAL
EOSINOPHIL # BLD: 0.1 K/UL (ref 0–0.8)
EOSINOPHIL NFR BLD: 1 % (ref 0.5–7.8)
ERYTHROCYTE [DISTWIDTH] IN BLOOD BY AUTOMATED COUNT: 15.7 % (ref 11.9–14.6)
GLOBULIN SER CALC-MCNC: 4.7 G/DL (ref 2.8–4.5)
GLUCOSE SERPL-MCNC: 156 MG/DL (ref 65–100)
HCT VFR BLD AUTO: 28.1 % (ref 41.1–50.3)
HGB BLD-MCNC: 9.2 G/DL (ref 13.6–17.2)
IMM GRANULOCYTES # BLD AUTO: 0 K/UL (ref 0–0.5)
IMM GRANULOCYTES NFR BLD AUTO: 0 % (ref 0–5)
LYMPHOCYTES # BLD: 0.9 K/UL (ref 0.5–4.6)
LYMPHOCYTES NFR BLD: 12 % (ref 13–44)
LYMPHOCYTES NFR BRONCH MANUAL: 2 %
MACROPHAGES NFR BRONCH MANUAL: 2 %
MAGNESIUM SERPL-MCNC: 1.9 MG/DL (ref 1.8–2.4)
MCH RBC QN AUTO: 31.5 PG (ref 26.1–32.9)
MCHC RBC AUTO-ENTMCNC: 32.7 G/DL (ref 31.4–35)
MCV RBC AUTO: 96.2 FL (ref 82–102)
MONOCYTES # BLD: 0.7 K/UL (ref 0.1–1.3)
MONOCYTES NFR BLD: 9 % (ref 4–12)
NEUTROPHILS NFR BRONCH MANUAL: 96 %
NEUTS SEG # BLD: 5.9 K/UL (ref 1.7–8.2)
NEUTS SEG NFR BLD: 78 % (ref 43–78)
NRBC # BLD: 0 K/UL (ref 0–0.2)
NUC CELL # FLD: 977 /CU MM
PLATELET # BLD AUTO: 187 K/UL (ref 150–450)
PMV BLD AUTO: 11 FL (ref 9.4–12.3)
POTASSIUM SERPL-SCNC: 3.7 MMOL/L (ref 3.5–5.1)
PROT SERPL-MCNC: 6.9 G/DL (ref 6.3–8.2)
RBC # BLD AUTO: 2.92 M/UL (ref 4.23–5.6)
RBC # FLD: <1000 /CU MM
SODIUM SERPL-SCNC: 136 MMOL/L (ref 133–143)
SPECIMEN SOURCE FLD: NORMAL
WBC # BLD AUTO: 7.6 K/UL (ref 4.3–11.1)

## 2022-12-26 PROCEDURE — 87075 CULTR BACTERIA EXCEPT BLOOD: CPT

## 2022-12-26 PROCEDURE — 7100000001 HC PACU RECOVERY - ADDTL 15 MIN: Performed by: SURGERY

## 2022-12-26 PROCEDURE — 3700000000 HC ANESTHESIA ATTENDED CARE: Performed by: SURGERY

## 2022-12-26 PROCEDURE — 89050 BODY FLUID CELL COUNT: CPT

## 2022-12-26 PROCEDURE — A4216 STERILE WATER/SALINE, 10 ML: HCPCS | Performed by: SURGERY

## 2022-12-26 PROCEDURE — 6360000002 HC RX W HCPCS: Performed by: INTERNAL MEDICINE

## 2022-12-26 PROCEDURE — 6370000000 HC RX 637 (ALT 250 FOR IP): Performed by: INTERNAL MEDICINE

## 2022-12-26 PROCEDURE — C1781 MESH (IMPLANTABLE): HCPCS | Performed by: SURGERY

## 2022-12-26 PROCEDURE — 7100000000 HC PACU RECOVERY - FIRST 15 MIN: Performed by: SURGERY

## 2022-12-26 PROCEDURE — 87205 SMEAR GRAM STAIN: CPT

## 2022-12-26 PROCEDURE — 2500000003 HC RX 250 WO HCPCS: Performed by: ANESTHESIOLOGY

## 2022-12-26 PROCEDURE — C9113 INJ PANTOPRAZOLE SODIUM, VIA: HCPCS | Performed by: SURGERY

## 2022-12-26 PROCEDURE — 6360000002 HC RX W HCPCS: Performed by: SURGERY

## 2022-12-26 PROCEDURE — 2580000003 HC RX 258: Performed by: SURGERY

## 2022-12-26 PROCEDURE — 80053 COMPREHEN METABOLIC PANEL: CPT

## 2022-12-26 PROCEDURE — 6360000002 HC RX W HCPCS: Performed by: ANESTHESIOLOGY

## 2022-12-26 PROCEDURE — 3700000001 HC ADD 15 MINUTES (ANESTHESIA): Performed by: SURGERY

## 2022-12-26 PROCEDURE — 36415 COLL VENOUS BLD VENIPUNCTURE: CPT

## 2022-12-26 PROCEDURE — 83735 ASSAY OF MAGNESIUM: CPT

## 2022-12-26 PROCEDURE — 2580000003 HC RX 258: Performed by: ANESTHESIOLOGY

## 2022-12-26 PROCEDURE — 0WUF0JZ SUPPLEMENT ABDOMINAL WALL WITH SYNTHETIC SUBSTITUTE, OPEN APPROACH: ICD-10-PCS | Performed by: SURGERY

## 2022-12-26 PROCEDURE — 0JPT3XZ REMOVAL OF TUNNELED VASCULAR ACCESS DEVICE FROM TRUNK SUBCUTANEOUS TISSUE AND FASCIA, PERCUTANEOUS APPROACH: ICD-10-PCS | Performed by: SURGERY

## 2022-12-26 PROCEDURE — 3600000003 HC SURGERY LEVEL 3 BASE: Performed by: SURGERY

## 2022-12-26 PROCEDURE — 2580000003 HC RX 258: Performed by: INTERNAL MEDICINE

## 2022-12-26 PROCEDURE — 3600000013 HC SURGERY LEVEL 3 ADDTL 15MIN: Performed by: SURGERY

## 2022-12-26 PROCEDURE — 0WPG03Z REMOVAL OF INFUSION DEVICE FROM PERITONEAL CAVITY, OPEN APPROACH: ICD-10-PCS | Performed by: SURGERY

## 2022-12-26 PROCEDURE — 2709999900 HC NON-CHARGEABLE SUPPLY: Performed by: SURGERY

## 2022-12-26 PROCEDURE — 87070 CULTURE OTHR SPECIMN AEROBIC: CPT

## 2022-12-26 PROCEDURE — 6370000000 HC RX 637 (ALT 250 FOR IP): Performed by: SURGERY

## 2022-12-26 PROCEDURE — 85025 COMPLETE CBC W/AUTO DIFF WBC: CPT

## 2022-12-26 PROCEDURE — 1100000000 HC RM PRIVATE

## 2022-12-26 DEVICE — MESH HERN W30XL30CM POLYGLACTIN 910 WVN KNIT VCRL: Type: IMPLANTABLE DEVICE | Site: ABDOMEN | Status: FUNCTIONAL

## 2022-12-26 RX ORDER — LABETALOL HYDROCHLORIDE 5 MG/ML
10 INJECTION, SOLUTION INTRAVENOUS
Status: DISCONTINUED | OUTPATIENT
Start: 2022-12-26 | End: 2022-12-26 | Stop reason: HOSPADM

## 2022-12-26 RX ORDER — MORPHINE SULFATE 4 MG/ML
4 INJECTION INTRAVENOUS
Status: DISCONTINUED | OUTPATIENT
Start: 2022-12-26 | End: 2022-12-31 | Stop reason: HOSPADM

## 2022-12-26 RX ORDER — LIDOCAINE HYDROCHLORIDE 20 MG/ML
INJECTION, SOLUTION EPIDURAL; INFILTRATION; INTRACAUDAL; PERINEURAL PRN
Status: DISCONTINUED | OUTPATIENT
Start: 2022-12-26 | End: 2022-12-26 | Stop reason: SDUPTHER

## 2022-12-26 RX ORDER — HYDROMORPHONE HCL 110MG/55ML
0.25 PATIENT CONTROLLED ANALGESIA SYRINGE INTRAVENOUS EVERY 5 MIN PRN
Status: DISCONTINUED | OUTPATIENT
Start: 2022-12-26 | End: 2022-12-26 | Stop reason: HOSPADM

## 2022-12-26 RX ORDER — PROPOFOL 10 MG/ML
INJECTION, EMULSION INTRAVENOUS PRN
Status: DISCONTINUED | OUTPATIENT
Start: 2022-12-26 | End: 2022-12-26 | Stop reason: SDUPTHER

## 2022-12-26 RX ORDER — DEXAMETHASONE SODIUM PHOSPHATE 10 MG/ML
INJECTION INTRAMUSCULAR; INTRAVENOUS PRN
Status: DISCONTINUED | OUTPATIENT
Start: 2022-12-26 | End: 2022-12-26 | Stop reason: SDUPTHER

## 2022-12-26 RX ORDER — PROCHLORPERAZINE EDISYLATE 5 MG/ML
5 INJECTION INTRAMUSCULAR; INTRAVENOUS
Status: DISCONTINUED | OUTPATIENT
Start: 2022-12-26 | End: 2022-12-26 | Stop reason: HOSPADM

## 2022-12-26 RX ORDER — ROCURONIUM BROMIDE 10 MG/ML
INJECTION, SOLUTION INTRAVENOUS PRN
Status: DISCONTINUED | OUTPATIENT
Start: 2022-12-26 | End: 2022-12-26 | Stop reason: SDUPTHER

## 2022-12-26 RX ORDER — MORPHINE SULFATE 2 MG/ML
2 INJECTION, SOLUTION INTRAMUSCULAR; INTRAVENOUS
Status: DISCONTINUED | OUTPATIENT
Start: 2022-12-26 | End: 2022-12-31 | Stop reason: HOSPADM

## 2022-12-26 RX ORDER — ONDANSETRON 2 MG/ML
4 INJECTION INTRAMUSCULAR; INTRAVENOUS
Status: DISCONTINUED | OUTPATIENT
Start: 2022-12-26 | End: 2022-12-26 | Stop reason: HOSPADM

## 2022-12-26 RX ORDER — SODIUM CHLORIDE, SODIUM LACTATE, POTASSIUM CHLORIDE, CALCIUM CHLORIDE 600; 310; 30; 20 MG/100ML; MG/100ML; MG/100ML; MG/100ML
INJECTION, SOLUTION INTRAVENOUS CONTINUOUS PRN
Status: DISCONTINUED | OUTPATIENT
Start: 2022-12-26 | End: 2022-12-26 | Stop reason: SDUPTHER

## 2022-12-26 RX ORDER — HYDRALAZINE HYDROCHLORIDE 20 MG/ML
10 INJECTION INTRAMUSCULAR; INTRAVENOUS
Status: DISCONTINUED | OUTPATIENT
Start: 2022-12-26 | End: 2022-12-26 | Stop reason: HOSPADM

## 2022-12-26 RX ORDER — FENTANYL CITRATE 50 UG/ML
INJECTION, SOLUTION INTRAMUSCULAR; INTRAVENOUS PRN
Status: DISCONTINUED | OUTPATIENT
Start: 2022-12-26 | End: 2022-12-26 | Stop reason: SDUPTHER

## 2022-12-26 RX ORDER — HYDROMORPHONE HCL 110MG/55ML
0.5 PATIENT CONTROLLED ANALGESIA SYRINGE INTRAVENOUS EVERY 5 MIN PRN
Status: DISCONTINUED | OUTPATIENT
Start: 2022-12-26 | End: 2022-12-26 | Stop reason: HOSPADM

## 2022-12-26 RX ORDER — VANCOMYCIN HYDROCHLORIDE 1 G/20ML
INJECTION, POWDER, LYOPHILIZED, FOR SOLUTION INTRAVENOUS PRN
Status: DISCONTINUED | OUTPATIENT
Start: 2022-12-26 | End: 2022-12-26 | Stop reason: HOSPADM

## 2022-12-26 RX ORDER — EPHEDRINE SULFATE/0.9% NACL/PF 50 MG/5 ML
SYRINGE (ML) INTRAVENOUS PRN
Status: DISCONTINUED | OUTPATIENT
Start: 2022-12-26 | End: 2022-12-26 | Stop reason: SDUPTHER

## 2022-12-26 RX ADMIN — ROCURONIUM BROMIDE 50 MG: 50 INJECTION, SOLUTION INTRAVENOUS at 13:20

## 2022-12-26 RX ADMIN — PROPOFOL 200 MG: 10 INJECTION, EMULSION INTRAVENOUS at 13:20

## 2022-12-26 RX ADMIN — FINASTERIDE 5 MG: 5 TABLET, FILM COATED ORAL at 20:43

## 2022-12-26 RX ADMIN — FENTANYL CITRATE 50 MCG: 50 INJECTION, SOLUTION INTRAMUSCULAR; INTRAVENOUS at 13:12

## 2022-12-26 RX ADMIN — DEXAMETHASONE SODIUM PHOSPHATE 10 MG: 10 INJECTION INTRAMUSCULAR; INTRAVENOUS at 13:26

## 2022-12-26 RX ADMIN — PIPERACILLIN AND TAZOBACTAM 3375 MG: 3; .375 INJECTION, POWDER, LYOPHILIZED, FOR SOLUTION INTRAVENOUS at 08:12

## 2022-12-26 RX ADMIN — VALSARTAN 320 MG: 320 TABLET, FILM COATED ORAL at 08:06

## 2022-12-26 RX ADMIN — SODIUM CHLORIDE, PRESERVATIVE FREE 10 ML: 5 INJECTION INTRAVENOUS at 08:07

## 2022-12-26 RX ADMIN — CINACALCET HYDROCHLORIDE 90 MG: 30 TABLET, FILM COATED ORAL at 20:42

## 2022-12-26 RX ADMIN — ROCURONIUM BROMIDE 10 MG: 50 INJECTION, SOLUTION INTRAVENOUS at 14:29

## 2022-12-26 RX ADMIN — LIDOCAINE HYDROCHLORIDE 80 MG: 20 INJECTION, SOLUTION EPIDURAL; INFILTRATION; INTRACAUDAL; PERINEURAL at 13:20

## 2022-12-26 RX ADMIN — PIPERACILLIN AND TAZOBACTAM 3375 MG: 3; .375 INJECTION, POWDER, LYOPHILIZED, FOR SOLUTION INTRAVENOUS at 20:42

## 2022-12-26 RX ADMIN — ROCURONIUM BROMIDE 10 MG: 50 INJECTION, SOLUTION INTRAVENOUS at 14:04

## 2022-12-26 RX ADMIN — Medication 10 MG: at 13:45

## 2022-12-26 RX ADMIN — FENTANYL CITRATE 50 MCG: 50 INJECTION, SOLUTION INTRAMUSCULAR; INTRAVENOUS at 14:39

## 2022-12-26 RX ADMIN — Medication 10 MG: at 13:35

## 2022-12-26 RX ADMIN — Medication 10 MG: at 14:00

## 2022-12-26 RX ADMIN — PHENYLEPHRINE HYDROCHLORIDE 200 MCG: 10 INJECTION INTRAVENOUS at 13:32

## 2022-12-26 RX ADMIN — TRAZODONE HYDROCHLORIDE 50 MG: 50 TABLET ORAL at 20:43

## 2022-12-26 RX ADMIN — PHENYLEPHRINE HYDROCHLORIDE 200 MCG: 10 INJECTION INTRAVENOUS at 14:00

## 2022-12-26 RX ADMIN — SODIUM CHLORIDE 40 MG: 9 INJECTION, SOLUTION INTRAMUSCULAR; INTRAVENOUS; SUBCUTANEOUS at 16:34

## 2022-12-26 RX ADMIN — Medication 10 MG: at 13:33

## 2022-12-26 RX ADMIN — LIDOCAINE HYDROCHLORIDE 60 MG: 20 INJECTION, SOLUTION EPIDURAL; INFILTRATION; INTRACAUDAL; PERINEURAL at 13:12

## 2022-12-26 RX ADMIN — PHENYLEPHRINE HYDROCHLORIDE 300 MCG: 10 INJECTION INTRAVENOUS at 13:35

## 2022-12-26 RX ADMIN — PHENYLEPHRINE HYDROCHLORIDE 200 MCG: 10 INJECTION INTRAVENOUS at 13:45

## 2022-12-26 RX ADMIN — HYDROMORPHONE HYDROCHLORIDE 0.5 MG: 2 INJECTION, SOLUTION INTRAMUSCULAR; INTRAVENOUS; SUBCUTANEOUS at 15:10

## 2022-12-26 RX ADMIN — PANTOPRAZOLE SODIUM 40 MG: 40 TABLET, DELAYED RELEASE ORAL at 05:07

## 2022-12-26 RX ADMIN — FENTANYL CITRATE 50 MCG: 50 INJECTION, SOLUTION INTRAMUSCULAR; INTRAVENOUS at 13:20

## 2022-12-26 RX ADMIN — SODIUM CHLORIDE, SODIUM LACTATE, POTASSIUM CHLORIDE, AND CALCIUM CHLORIDE: 600; 310; 30; 20 INJECTION, SOLUTION INTRAVENOUS at 13:05

## 2022-12-26 RX ADMIN — SODIUM CHLORIDE, PRESERVATIVE FREE 10 ML: 5 INJECTION INTRAVENOUS at 20:43

## 2022-12-26 RX ADMIN — ROCURONIUM BROMIDE 10 MG: 50 INJECTION, SOLUTION INTRAVENOUS at 13:45

## 2022-12-26 RX ADMIN — MORPHINE SULFATE 2 MG: 2 INJECTION, SOLUTION INTRAMUSCULAR; INTRAVENOUS at 20:41

## 2022-12-26 RX ADMIN — FENTANYL CITRATE 50 MCG: 50 INJECTION, SOLUTION INTRAMUSCULAR; INTRAVENOUS at 13:48

## 2022-12-26 RX ADMIN — MORPHINE SULFATE 2 MG: 2 INJECTION, SOLUTION INTRAMUSCULAR; INTRAVENOUS at 08:06

## 2022-12-26 RX ADMIN — SUGAMMADEX 200 MG: 100 INJECTION, SOLUTION INTRAVENOUS at 14:53

## 2022-12-26 RX ADMIN — PROPOFOL 30 MG: 10 INJECTION, EMULSION INTRAVENOUS at 14:39

## 2022-12-26 RX ADMIN — OXYCODONE 5 MG: 5 TABLET ORAL at 10:28

## 2022-12-26 RX ADMIN — ONDANSETRON 4 MG: 2 INJECTION INTRAMUSCULAR; INTRAVENOUS at 13:26

## 2022-12-26 ASSESSMENT — PAIN SCALES - GENERAL
PAINLEVEL_OUTOF10: 7
PAINLEVEL_OUTOF10: 10
PAINLEVEL_OUTOF10: 6
PAINLEVEL_OUTOF10: 9
PAINLEVEL_OUTOF10: 0
PAINLEVEL_OUTOF10: 9
PAINLEVEL_OUTOF10: 5

## 2022-12-26 ASSESSMENT — PAIN - FUNCTIONAL ASSESSMENT: PAIN_FUNCTIONAL_ASSESSMENT: PREVENTS OR INTERFERES SOME ACTIVE ACTIVITIES AND ADLS

## 2022-12-26 ASSESSMENT — PAIN DESCRIPTION - ORIENTATION
ORIENTATION: MID
ORIENTATION: MID

## 2022-12-26 ASSESSMENT — PAIN DESCRIPTION - PAIN TYPE: TYPE: ACUTE PAIN

## 2022-12-26 ASSESSMENT — PAIN DESCRIPTION - LOCATION
LOCATION: ABDOMEN

## 2022-12-26 ASSESSMENT — PAIN DESCRIPTION - DESCRIPTORS: DESCRIPTORS: ACHING

## 2022-12-26 NOTE — ANESTHESIA POSTPROCEDURE EVALUATION
Department of Anesthesiology  Postprocedure Note    Patient: Katty Stephens  MRN: [id number]  YOB: 1960  Date of evaluation: 12/26/2022      Procedure Summary     Date: 12/26/22 Room / Location: [facility] MAIN OR 03 / [facility] MAIN OR    Anesthesia Start: 3393 Anesthesia Stop: 9234    Procedure: ABDOMEN INCISION AND DRAINAGE, VENTRAL HERNIA  REPAIRE WITH MESH. REMOVAL PD CATH (Abdomen) Diagnosis:       Umbilical hernia, incarcerated      (Umbilical hernia, incarcerated [K42.0])    Providers: Doyle Mcpherson MD Responsible Provider: Shaylee Lisa DO    Anesthesia Type: general ASA Status: 3          Anesthesia Type: No value filed.     Chico Phase I: Chico Score: 9    [place] Phase II:        Anesthesia Post Evaluation    Patient location during evaluation: PACU  Level of consciousness: awake and alert  Airway patency: patent  Nausea & Vomiting: no nausea  Complications: no  Cardiovascular status: hemodynamically stable  Respiratory status: acceptable  Hydration status: euvolemic

## 2022-12-26 NOTE — OP NOTE
[address] [place] [address]  OPERATIVE REPORT    Name:  Corie Vazquez  MR#:  [id number]  :  1960  ACCOUNT #:  [de-identified]  DATE OF SERVICE:  2022    PREOPERATIVE DIAGNOSES:  1. Abdominal pain. 2.  Sepsis. 3.  Suspected infected peritoneal dialysis catheter. 4.  Abdominal wall infection. 5.  Localized peritonitis. 6.  Recurrent incarcerated ventral incisional hernia. POSTOPERATIVE DIAGNOSES:  1. Abdominal pain. 2.  Sepsis. 3.  Suspected infected peritoneal dialysis catheter. 4.  Abdominal wall infection. 5.  Localized peritonitis. 6.  Recurrent incarcerated ventral incisional hernia. PROCEDURES PERFORMED:  1. Open drainage of localized peritonitis (CPT 50300). 2.  Open repair of recurrent incarcerated ventral hernia with mesh (CPT 45905 -51 -59, 30104+)  3. Removal of tunneled intraperitoneal dialysis catheter (CPT 87220 -51 -59). 4.  Debridement of skin, subcutaneous adipose, muscle, and fascia for abdominal wall necrotizing infection (CPT 50529 -51 -59). SURGEON:  Lindy Bean. Abdirahman Mcgowan MD    ASSISTANT:  None. ANESTHESIA:  General.    COMPLICATIONS:  None. SPECIMENS REMOVED:  Culture sent to Microbiology as well as PD cath tip. IMPLANTS:  vicryl mesh    ESTIMATED BLOOD LOSS:  Less than 50 mL. OPERATIVE PROCEDURE:  The patient was taken emergently to the operating room, placed in a supine position, and after adequate anesthesia was given, his abdomen was prepped and draped in a sterile fashion with multiple layers of Betadine scrub and Betadine solution. Time-out protocol was followed. An Deette Opitz was placed to avoid any skin contact during the case. Therapeutic antibiotics had been given. He was opened through a midline incision. This was curved around the umbilical skin which had a necrotic center. The subcutaneous tissues were indurated and ischemic. Dissection was very difficult. The umbilicus was reflected laterally.   The incision was carried inferiorly as well.  We are able find a safe location to enter the fascia through an incarcerated recurrent ventral hernia which was quite large and complex with multiple lobulations. Dissection was tedious and difficult because the tissues were infected. Once we entered the abdomen, localized peritonitis was encountered. A culture was sent to Microbiology partially diluted by residual dialysate still in abdomen coming to wound surface as pt was exerting abd wall pressure as his anesthesia was light. The fluid was suctioned. Fibrinopurulent debris and necrotic peritoneum was debrided. Bowel surfaces were ischemic from chronic infection and were infected in their appearance. Debridement of cirrhosis and omentum was performed in the localized area of peritonitis involving the anterior abdomen. Once the open drainage of localized peritonitis was performed and we could identify no bowel perforations, we proceeded with open repair of the incarcerated ventral incisional hernia. This was very difficult. The hernia had several lobulations. The patient had prior hernia surgery. Old Prolene suture was removed. The fascia was splayed quite a bit and was tenuous. There was evidence for necrotizing fasciitis involving the abdominal wall. Sharp debridement was performed of skin, subcutaneous adipose, muscle, and fascia to remove necrosis from the abdominal wall infection. Once the necrotizing fasciitis was debrided, we noticed that much of this infection seemed to be centered around the catheter site. The patient had a peritoneal dialysis catheter placed in 2014 and noticed effluent color change recently associated with his abdominal pain. We freed up the cuff from the subcutaneous tissues. The second cuff had migrated intra-abdominally and had to be lysed from bowel and omentum. No bowel injury was encountered although the bowels were ischemic related secondary to the infection noted.   Once the cuffs were freed up, we were able to remove the tunneled intraperitoneal dialysis catheter completely. The catheter tip was sent to Microbiology. We then performed additional debridement and closed as much of the fascia as possible with interrupted 0 PDS suture. The remaining portion of the hernia defect, which could not be closed primarily, was closed with Vicryl mesh placed in the subfascial location and sutured peripherally with 0 PDS suture. The subcutaneous tissues were irrigated. The umbilical skin was debrided. We left the wound open, but only closed a portion of it around breanne which were packed in the openings inferiorly along the incision and through the umbilicus. Of note, the patient had infection present at the time of surgery (PATOS) in all layers involving superficial and deep abdominal wall as well as the deep organ space which was also infected at time of surgery    Sterile 4x4's were placed to cover the incision and the remaining portion of Asbury Schlatter was placed across the abdominal wall as well as an abdominal binder to help support the fascial closure. The patient was taken to recovery in good condition. He tolerated the procedure well.         Woodrow Pereira MD [id number]  D:  12/26/2022 15:35  T:  12/26/2022 16:51  JOB #:  [id number]

## 2022-12-26 NOTE — PROGRESS NOTES
CT AP resulted as possible abscess vs seroma at umbilical hernia site.  Messaged surgery and placed consult  Donna Vieira MD

## 2022-12-26 NOTE — PLAN OF CARE
Problem: Safety - Adult  Goal: Free from fall injury  12/26/2022 0905 by Amaris Styles RN  Outcome: Progressing  12/25/2022 2156 by Maurice Robb RN  Outcome: Progressing

## 2022-12-26 NOTE — PROGRESS NOTES
VANCO DAILY FOLLOW UP RENAL INSUFFICIENCY PATIENT   [address] [facility] Pharmacokinetic Monitoring Service - Vancomycin    Consulting Provider: Dr. Rebel Scott   Indication: Suspected peritonitis  Target Concentration: Random level ? 15 mg/L  Day of Therapy: 2  Additional Antimicrobials: Zosyn    Patient eligible for piperacillin-tazobactam to cefepime auto-substitution per P&T approved protocol? NO    Pertinent Laboratory Values: Wt Readings from Last 1 Encounters:   12/25/22 260 lb (117.9 kg)     Temp Readings from Last 1 Encounters:   12/26/22 98.6 °F (37 °C) (Oral)     Recent Labs     12/25/22  1155 12/26/22  0609   BUN 49* 51*   CREATININE 15.40* 14.60*   WBC 11.5* 7.6   LACACIDPL 1.4  --        No results found for: Syeda Kins    MRSA Nasal Swab: N/A. Non-respiratory infection. Assessment:  Date:  Dose/Freq Admin Times Level/Time:   12/25 2500mg x1 1402    12/26 12/27                    Plan:  Concentration-guided dosing due to peritoneal dialysis  No dose needed today.     Vancomycin concentrations will be ordered as clinically appropriate   Pharmacy will continue to monitor patient and adjust therapy as indicated    Thank you for the consult,  Deyanira Ramirez, PharmD, BCOP  Clinical Pharmacist  Contact [employer]

## 2022-12-26 NOTE — CONSULTS
Nephrology consult    Admission Date:  12/25/2022    Admission Diagnosis  Peritonitis ([facility] [K65.9]  Peritonitis, dialysis-associated, initial encounter ([facility] Teresa Fritz        History of Present Illness:  58-year-old male with past medical history of ESRD on PD at [facility] PD unit, hypertension, history of peritonitis was admitted for acute onset of diffuse abdominal pain. He received Zosyn in ED. PD fluid for culture sent for analysis but fluid cell count has not been sent yesterday. He received PD last night. Seen and examined in room. He is feeling a little better with his abdominal pain. CT scan of the abdomen showed moderate sized umbilical hernia with herniated loops of bowel and fluid. Questionable seroma versus abscess. Polycystic kidney disease and no hydronephrosis seen    Sitting up and talking well. Past Medical History:   Diagnosis Date    Anemia     Anxiety     Arthritis     CAD (coronary artery disease)     per pt- no MI- \"They said the back of my heart was something. ..like 40%\"    Chronic kidney disease     followed by [facility] in [facility]; stage 5; pt has AV fistula to left arm; PERITONEAL DIALYSIS daily since 2014    Chronic pain     CKD (chronic kidney disease)     Former smoker     Fracture, tibial plateau 54/49/3080    GERD (gastroesophageal reflux disease)     Heart disease     Hyperlipidemia     on med for control    Hypertension     controlled with med    Hypertensive nephrosclerosis     per nephrology note 4/14/14    Hypoactive thyroid     Kidney stone     Peritoneal dialysis catheter in place [facility])     Peritoneal dialysis-associated peritonitis ([facility] 06/24/2017    during hs    PUD (peptic ulcer disease)     SBP (spontaneous bacterial peritonitis) ([facility] 09/24/2016    Sepsis ([facility] 09/24/2016    Tibial plateau fracture, left 98/61/1233    Umbilical hernia     Unspecified sleep apnea     pt does not have a CPAP      Past Surgical History:   Procedure Laterality Date ABDOMEN SURGERY Right 6/13/2022    RIGHT LOWER QUADRANT ABDOMEN LESION BIOPSY EXCISION performed by Jillian Azul MD at [address] [facility] Right 1995    tip of 5th finger    COLONOSCOPY N/A 3/23/2018    COLONOSCOPY  BMI 34 performed by Augustus Carreno MD at [facility] PYELOGRAMS    CYSTOSCOPY Left 11/1/2022    CYSTOSCOPY, LEFT URETEROSCOPY, BASKET STONE EXTRACTION, LEFT URETERAL STENT INSERTION performed by Heather Barone MD at [facility] Left as a child    thumb    ORTHOPEDIC SURGERY Left 1989    knee    ORTHOPEDIC SURGERY Left 2017    LEFT LATERAL TIBIAL PLATEAU OPEN REDUCTION INTERNAL FIXATION    [place] ABDOMEN SURGERY PROC UNLISTED  6/11/14    PD catheter placement    SKIN BIOPSY Left 6/13/2022    LEFT BUTTOCK MASS EXCISION performed by Jillian Azul MD at [facility] Left 2012    AV fistula to arm      Current Facility-Administered Medications   Medication Dose Route Frequency    sodium chloride flush 0.9 % injection 5-40 mL  5-40 mL IntraVENous 2 times per day    sodium chloride flush 0.9 % injection 5-40 mL  5-40 mL IntraVENous PRN    0.9 % sodium chloride infusion   IntraVENous PRN    heparin (porcine) injection 5,000 Units  5,000 Units SubCUTAneous 3 times per day    ondansetron (ZOFRAN-ODT) disintegrating tablet 4 mg  4 mg Oral Q8H PRN    Or    ondansetron (ZOFRAN) injection 4 mg  4 mg IntraVENous Q6H PRN    polyethylene glycol (GLYCOLAX) packet 17 g  17 g Oral Daily PRN    acetaminophen (TYLENOL) tablet 650 mg  650 mg Oral Q6H PRN    Or    acetaminophen (TYLENOL) suppository 650 mg  650 mg Rectal Q6H PRN    cinacalcet (SENSIPAR) tablet 90 mg  90 mg Oral Nightly    [Held by provider] NIFEdipine (PROCARDIA XL) extended release tablet 90 mg  90 mg Oral Nightly    finasteride (PROSCAR) tablet 5 mg  5 mg Oral Nightly    traZODone (DESYREL) tablet 50 mg  50 mg Oral Nightly pantoprazole (PROTONIX) tablet 40 mg  40 mg Oral QAM AC    diatrizoate meglumine-sodium (GASTROGRAFIN) 66-10 % solution 15 mL  15 mL Oral ONCE PRN    piperacillin-tazobactam (ZOSYN) 3,375 mg in sodium chloride 0.9 % 50 mL IVPB (mini-bag)  3,375 mg IntraVENous Q12H    vancomycin (VANCOCIN) intermittent dosing (placeholder)   Other RX Placeholder    morphine injection 2 mg  2 mg IntraVENous Q4H PRN    oxyCODONE (ROXICODONE) immediate release tablet 5 mg  5 mg Oral Q6H PRN    valsartan (DIOVAN) tablet 320 mg  320 mg Oral Daily    And    [Held by provider] hydroCHLOROthiazide (HYDRODIURIL) tablet 25 mg  25 mg Oral Daily    cloNIDine (CATAPRES) 0.3 MG/24HR 1 patch  1 patch TransDERmal Weekly     Allergies   Allergen Reactions    Iodine Other (See Comments)     Iodine Dye r/t kidney function      Social History     Tobacco Use    Smoking status: Former     Packs/day: 0.50     Years: 15.00     Pack years: 7.50     Types: Cigarettes     Quit date: 3/1/2014     Years since quittin.8    Smokeless tobacco: Never    Tobacco comments:     Quit smoking: pt states only smoked cigarettes when drinking alcohol   Substance Use Topics    Alcohol use: Yes      Family History   Problem Relation Age of Onset    COPD Father     Heart Disease Father     Hypertension Mother     Diabetes Mother     Hypertension Father         Review of Systems  Gen - no fever, no chills, appetite okay  HEENT - no sore throat, no decreased vision, no hearing loss  Neck - no neck mass  CV - no chest pain, no palpitation, no orthopnea  Lung - no shortness of breath, no cough, no hemoptysis  Abd - no tenderness, no nausea/vomiting, no bloody stool  Ext - no edema, no clubbing, no cyanosis  Musculoskeletal - no joint pain, no back pain  Neurologic - no headaches, no dizziness, no seizures  Psychiatric - no anxiety, no depression  Skin - no rashes, no pupura  Genitourinary - no decreased urine output, no hematuria, no foamy urine    Objective:     Vitals: 12/25/22 2255 12/26/22 0301 12/26/22 0750 12/26/22 0836   BP: 117/80 93/61 97/72    Pulse: (!) 113 88 93    Resp: 18 18 18 18   Temp: 99.4 °F (37.4 °C) 99.1 °F (37.3 °C) 98.6 °F (37 °C)    TempSrc: Oral Oral Oral    SpO2: 93% 95% 94%    Weight:       Height:           Intake/Output Summary (Last 24 hours) at 12/26/2022 1033  Last data filed at 12/26/2022 1032  Gross per 24 hour   Intake 300 ml   Output 400 ml   Net -100 ml       Physical Exam  GEN :in no distress, alert and oriented  HEENT: anicteric sclerae, Mucous membranes are moist.  Neck - supple without JVD  CV - regular rate and rhythm, no murmur, no rub  Lung - clear bilaterally, lungs expand symmetrically  Abd -distended with umbilical hernia  Ext - no clubbing, no cyanosis, no edema  Neurologic - nonfocal  Genitourinary - bladder nonpalpable  Skin - no rashes, no purpura  Psychiatric: Normal mood and affect. Access-left upper extremity AV fistula  Patient Active Problem List    Diagnosis Date Noted    Peritonitis ([facility] 12/25/2022    Peritoneal dialysis status ([facility] 12/25/2022    Anemia 12/25/2022    Hypomagnesemia 12/25/2022    DNR (do not resuscitate) 12/25/2022    Sepsis ([address] 12/25/2022    Soft tissue mass     Kidney stone 10/14/2022    Other hyperlipidemia 04/21/2022    ESRD (end stage renal disease) ([facility] 05/04/2021    Non-recurrent unilateral inguinal hernia without obstruction or gangrene 12/22/2020    Hypertensive urgency 06/24/2017    Essential hypertension 31/35/8662    Umbilical hernia 95/83/5705     XR CHEST PORTABLE   Final Result   Slightly under expanded lungs, otherwise no acute abnormality. CT ABDOMEN PELVIS WO CONTRAST Additional Contrast? Oral   Final Result   1. Moderate-sized umbilical hernia with herniated loops of bowel and fluid. The   fluid collection measures roughly 5.7 x 6.0 cm. Seroma or abscess could be   considered. 2. Polycystic kidney disease. There is no hydronephrosis.                   [unfilled]  No results found for this or any previous visit. Data Review:   Recent Labs     12/25/22  1155 12/26/22  0609   WBC 11.5* 7.6   HGB 9.8* 9.2*   HCT 29.6* 28.1*    187     Recent Labs     12/25/22  1155 12/26/22  0609    136   K 3.7 3.7   CL 97* 100*   CO2 29 28   BUN 49* 51*   CREATININE 15.40* 14.60*   MG 1.5* 1.9     No results for input(s): PH, PCO2, PO2, PCO2 in the last 72 hours. Plan:     1. ESRD on PD, questionable peritonitis  Had PD treatment last night. Yesterday fluid culture has been sent but fluid cell count was not sent. So far the culture is negative. Received Zosyn  With do cell count from his effluent from last night. Follow-up with cell count results to determine whether he has peritonitis  Will switch him to hemodialysis as there is a plan to do hernia repair. PD catheter will be removed during that session. Explained to him elaborately about the process of switching to hemodialysis and considering PD in the future once the hernia repair is healed. All questions answered. Most likely he will go for surgery tomorrow. No indication for PD tonight  He has a functioning left upper extremity AV fistula. Will time the HD depending on the surgical timing    2..  Umbilical hernia herniated loops of bowel, questionable seroma versus abscess  General surgery was consulted    3. Anemia  Been in target    4. MBD  On Sensipar    5.   Hypertension  On home medication

## 2022-12-26 NOTE — BRIEF OP NOTE
BRIEF OPERATIVE NOTE    Date of Procedure:  12/26/2022    Preoperative Diagnosis:   Sepsis  Abdominal pain  Recurrent incarcerated ventral incisional hernia  Localized peritonitis  Suspected infected peritoneal dialysis catheter. Postoperative Diagnosis: same      Procedure:   Open drainage of localized peritonitis  Open repair of recurrent incarcerated ventral hernia with Vicryl mesh  Removal of infected PD catheter  Debridement abdominal wall for necrotizing infection    Surgeon(s) and Role:     * Liza Logan MD - Primary  Anesthesia: General  Estimated Blood Loss: <100cc  Specimens:   ID Type Source Tests Collected by Time Destination   1 : Peritonitis  Swab Abdomen CULTURE, ANAEROBIC AND AEROBIC iLza Logan MD 12/26/2022 1351    2 : INFECTED PERTONEAL CATHETER Catheter Tip Abdomen CULTURE, TIP Liza Logan MD 12/26/2022 1413      Findings: see op note   Complications: none  Implants:   Implant Name Type Inv.  Item Serial No.  Lot No. LRB No. Used Action   MESH MOISE [id number] POLYGLACTIN 910 [employer] KNIT VCRL - [id number]  MESH MOISE [id number] POLYGLACTIN 910 [employer] KNIT VCRL  [employer]-WD [id number]  1 Implanted

## 2022-12-26 NOTE — ANESTHESIA PROCEDURE NOTES
Airway  Date/Time: 12/26/2022 1:23 PM  Urgency: urgent    Airway not difficult    General Information and Staff    Patient location during procedure: OR    Indications and Patient Condition  Indications for airway management: anesthesia  Spontaneous ventilation: present  Preoxygenated: yes  Patient position: sniffing  Mask difficulty assessment: vent by bag mask + OA or adjuvant +/- NMBA    Final Airway Details  Final airway type: endotracheal airway      Successful airway: ETT  Cuffed: yes   Successful intubation technique: direct laryngoscopy  Endotracheal tube insertion site: oral  Blade: Antoine  Blade size: #4  ETT size (mm): 7.5  Cormack-Lehane Classification: grade I - full view of glottis  Placement verified by: chest auscultation and capnometry   Measured from: teeth  ETT to teeth (cm): 22  Number of attempts at approach: 1    Additional Comments  Easy mask with OA. DL x 1, Mac 4, grade 1 view.    Atraumatic  no

## 2022-12-26 NOTE — DIALYSIS
Disconnected PD cycler from patient. Betadine cap intact. Patient tolerated well. UF amount: 110 ml removed. Effluent: yellow, cloudy.

## 2022-12-26 NOTE — PERIOP NOTE
TRANSFER - OUT REPORT:    Verbal report given to Sharon Leung on [facility]  being transferred to [address] [place] [address] for routine post-op       Report consisted of patients Situation, Background, Assessment and   Recommendations(SBAR). Information from the following report(s) Surgery Report, MAR, Cardiac Rhythm ST, and Neuro Assessment was reviewed with the receiving nurse. Lines:   Peripheral IV 12/25/22 Right Antecubital (Active)   Site Assessment Clean, dry & intact 12/26/22 1506   Line Status Infusing 12/26/22 [address] changed 12/26/22 0817   Phlebitis Assessment No symptoms 12/26/22 1506   Infiltration Assessment 0 12/26/22 1506   Alcohol Cap Used No 12/26/22 0817   Dressing Status Clean, dry & intact 12/26/22 1506   Dressing Type Transparent 12/26/22 1506   Dressing Intervention New 12/25/22 1156        Opportunity for questions and clarification was provided. Patient transported with:   O2 @ 4 liters  Tech    VTE prophylaxis orders have been written for [facility]. Patient and family given floor number and nurses name. Family updated re: pt status after security code verified.

## 2022-12-26 NOTE — CONSULTS
H&P/Consult Note/Progress Note/Office Note:   Cosmo Staton  MRN: [id number]  :1960  Age:62 y.o.    HPI: Cosmo Staton is a 65 y.o. male who was admitted by the hospitalist with sepsis on 22 after he presented to the ER with a 2-week history of progressive abdominal pain and bulging. He reported a prior recurrent ventral hernia for many years following open umbilical hernia repair with Prolene mesh by Dr. EATON [facility] on 2014. He had a PD cath placed at that time and is dialysis dependent. Recently however he developed progressive and severe abdominal pain associated with worsening bulging and tenderness. He also described an associated eschar of the umbilical skin recently  This was associated with a change in the color of the effluent from the PD catheter. He has had associated N/V, tachycardia, and leukocytosis on admission. He was placed on vancomycin and Zosyn. CT imaging was performed as shown below and general surgery consultation was obtained. He described a prior episode of peritonitis without abscess or hernia treated with Abx in approximately 2016. He described having an AV fistula for dialysis placed many years ago which has never been used. He wants to stop having peritoneal dialysis and convert to hemodialysis. 22 CT abd/pelvis withoral but no IV contrast  Hx: Yellowish solution after peritoneal dialysis, concern for peritonitis       FINDINGS: Evaluation of the hollow and solid viscera is limited by the lack of intravenous contrast.   Dependent atelectasis is present. CT ABDOMEN: The stomach is unremarkable. Innumerable cysts are noted throughout the bilateral kidneys unchanged from the prior exam in keeping with polycystic kidney disease. Multiple of the cysts are hyperdense and likely hemorrhagic. There is no hydronephrosis. No renal or ureteral stones evident.  The adrenal glands are normal. An umbilical hernia is present in the lower mid abdomen at the insertion site of the peritoneal dialysis catheter. There is a fluid collection within the hernia sac concerning for possible seroma or abscess that measures 5.7 x 3.2 x 6.0 cm. There are herniated bowel loops as well without bowel obstruction. The abdominal wall defect measures roughly 7.5 cm. CT PELVIS: The bowel is normal in course, caliber, and wall thickness. No bowel obstruction evident. The appendix is normal. The peritoneal dialysis catheter coils in the central pelvis and a small amount of ascitic fluid. The bladder is decompressed. There is no inguinal hernia. The rectum is unremarkable. No aggressive bone lesions identified. Impression:  1. Moderate-sized umbilical hernia with herniated loops of bowel and fluid. The fluid collection measures roughly 5.7 x 6.0 cm. Seroma or abscess could be considered. 2. Polycystic kidney disease. There is no hydronephrosis. Past Medical History:   Diagnosis Date    Anemia     Anxiety     Arthritis     CAD (coronary artery disease)     per pt- no MI- \"They said the back of my heart was something. ..like 40%\"    Chronic kidney disease     followed by .S.  Renal in [facility]; stage 5; pt has AV fistula to left arm; PERITONEAL DIALYSIS daily since 2014    Chronic pain     CKD (chronic kidney disease)     Former smoker     Fracture, tibial plateau 76/47/9758    GERD (gastroesophageal reflux disease)     Heart disease     Hyperlipidemia     on med for control    Hypertension     controlled with med    Hypertensive nephrosclerosis     per nephrology note 4/14/14    Hypoactive thyroid     Kidney stone     Peritoneal dialysis catheter in place [facility])     Peritoneal dialysis-associated peritonitis ([address] 06/24/2017    during hs    PUD (peptic ulcer disease)     SBP (spontaneous bacterial peritonitis) ([address] 09/24/2016    Sepsis ([address] 09/24/2016    Tibial plateau fracture, left 88/25/3922    Umbilical hernia     Unspecified sleep apnea     pt does not have a CPAP     Past Surgical History:   Procedure Laterality Date    ABDOMEN SURGERY Right 6/13/2022    RIGHT LOWER QUADRANT ABDOMEN LESION BIOPSY EXCISION performed by Jeremiah Diaz MD at [address] Right 1995    tip of 5th finger    COLONOSCOPY N/A 3/23/2018    COLONOSCOPY  BMI 34 performed by Conchita Ferrara MD at [address] [facility] PYELOGRAMS    CYSTOSCOPY Left 11/1/2022    CYSTOSCOPY, LEFT URETEROSCOPY, BASKET STONE EXTRACTION, LEFT URETERAL STENT INSERTION performed by Winsome Bush MD at [facility] Left as a child    thumb    ORTHOPEDIC SURGERY Left 1989    knee    ORTHOPEDIC SURGERY Left 2017    LEFT LATERAL TIBIAL PLATEAU OPEN REDUCTION INTERNAL FIXATION    OK ABDOMEN SURGERY PROC UNLISTED  6/11/14    PD catheter placement    SKIN BIOPSY Left 6/13/2022    LEFT BUTTOCK MASS EXCISION performed by Jeremiah Diaz MD at [address] [facility] Left 2012    AV fistula to arm     Current Facility-Administered Medications   Medication Dose Route Frequency    sodium chloride flush 0.9 % injection 5-40 mL  5-40 mL IntraVENous 2 times per day    sodium chloride flush 0.9 % injection 5-40 mL  5-40 mL IntraVENous PRN    0.9 % sodium chloride infusion   IntraVENous PRN    [Held by provider] heparin (porcine) injection 5,000 Units  5,000 Units SubCUTAneous 3 times per day    ondansetron (ZOFRAN-ODT) disintegrating tablet 4 mg  4 mg Oral Q8H PRN    Or    ondansetron (ZOFRAN) injection 4 mg  4 mg IntraVENous Q6H PRN    polyethylene glycol (GLYCOLAX) packet 17 g  17 g Oral Daily PRN    acetaminophen (TYLENOL) tablet 650 mg  650 mg Oral Q6H PRN    Or    acetaminophen (TYLENOL) suppository 650 mg  650 mg Rectal Q6H PRN    cinacalcet (SENSIPAR) tablet 90 mg  90 mg Oral Nightly    [Held by provider] NIFEdipine (PROCARDIA XL) extended release tablet 90 mg  90 mg Oral Nightly    finasteride (PROSCAR) tablet 5 mg  5 mg Oral Nightly    traZODone (DESYREL) tablet 50 mg  50 mg Oral Nightly    pantoprazole (PROTONIX) tablet 40 mg  40 mg Oral QAM AC    diatrizoate meglumine-sodium (GASTROGRAFIN) 66-10 % solution 15 mL  15 mL Oral ONCE PRN    piperacillin-tazobactam (ZOSYN) 3,375 mg in sodium chloride 0.9 % 50 mL IVPB (mini-bag)  3,375 mg IntraVENous Q12H    vancomycin (VANCOCIN) intermittent dosing (placeholder)   Other RX Placeholder    morphine injection 2 mg  2 mg IntraVENous Q4H PRN    oxyCODONE (ROXICODONE) immediate release tablet 5 mg  5 mg Oral Q6H PRN    valsartan (DIOVAN) tablet 320 mg  320 mg Oral Daily    And    [Held by provider] hydroCHLOROthiazide (HYDRODIURIL) tablet 25 mg  25 mg Oral Daily    cloNIDine (CATAPRES) 0.3 MG/24HR 1 patch  1 patch TransDERmal Weekly     ALLERGIES:  Iodine    Social History     Socioeconomic History    Marital status: Single   Tobacco Use    Smoking status: Former     Packs/day: 0.50     Years: 15.00     Pack years: 7.50     Types: Cigarettes     Quit date: 3/1/2014     Years since quittin.8    Smokeless tobacco: Never    Tobacco comments:     Quit smoking: pt states only smoked cigarettes when drinking alcohol   Substance and Sexual Activity    Alcohol use: Yes    Drug use: Yes     Types: Marijuana Maurita Handsome)     Comment: \"when I can afford it\"      Social Determinants of Health     Financial Resource Strain: Low Risk     Difficulty of Paying Living Expenses: Not hard at all   Food Insecurity: No Food Insecurity    Worried About Running Out of Food in the Last Year: Never true    Ran Out of Food in the Last Year: Never true   Physical Activity: Insufficiently Active    Days of Exercise per Week: 1 day    Minutes of Exercise per Session: 20 min     Social History     Tobacco Use   Smoking Status Former    Packs/day: 0.50    Years: 15.00    Pack years: 7.50    Types: Cigarettes    Quit date: 3/1/2014    Years since quittin.8   Smokeless Tobacco Never   Tobacco Comments    Quit smoking: pt states only smoked cigarettes when drinking alcohol     Family History   Problem Relation Age of Onset    COPD Father     Heart Disease Father     Hypertension Mother     Diabetes Mother     Hypertension Father      ROS: The patient has no difficulty with chest pain or shortness of breath. No fever or chills. Comprehensive review of systems was otherwise unremarkable except as noted above. Physical Exam:   /85   Pulse 98   Temp 98.6 °F (37 °C) (Oral)   Resp 18   Ht 6' 5\" (1.956 m)   Wt 260 lb (117.9 kg)   SpO2 94%   BMI 30.83 kg/m²   Vitals:    12/26/22 0301 12/26/22 0750 12/26/22 0836 12/26/22 1106   BP: 93/61 97/72  123/85   Pulse: 88 93  98   Resp: 18 18 18 18   Temp: 99.1 °F (37.3 °C) 98.6 °F (37 °C)  98.6 °F (37 °C)   TempSrc: Oral Oral  Oral   SpO2: 95% 94%  94%   Weight:       Height:         12/26 0701 - 12/26 1900  In: 0   Out: 110   12/24 1901 - 12/26 0700  In: 300 [P.O.:300]  Out: 400     Constitutional: Alert, oriented, cooperative patient in no acute distress; appears stated age    Eyes:Sclera are clear. EOMs intact  ENMT: no external lesions gross hearing normal; no obvious neck masses, no ear or lip lesions, nares normal  CV: RRR. Normal perfusion  Resp: No JVD. Breathing is  non-labored; no audible wheezing. GI: Tender and protuberant incarcerated midline ventral hernia with eschar over umbilical skin prior disrupted skin surface  The PD catheter exits the subcu to the left of midline. Musculoskeletal: unremarkable with normal function. No embolic signs or cyanosis.    Neuro:  Oriented; moves all 4; no focal deficits  Psychiatric: normal affect and mood, no memory impairment    Recent vitals (if inpt):  Patient Vitals for the past 24 hrs:   BP Temp Temp src Pulse Resp SpO2 Height   12/26/22 1106 123/85 98.6 °F (37 °C) Oral 98 18 94 % --   12/26/22 0836 -- -- -- -- 18 -- --   12/26/22 0750 97/72 98.6 °F (37 °C) Oral 93 18 94 % --   12/26/22 0301 93/61 99.1 °F (37.3 °C) Oral 88 18 95 % --   12/25/22 2255 117/80 99.4 °F (37.4 °C) Oral (!) 113 18 93 % --   12/25/22 1932 127/84 99.1 °F (37.3 °C) Oral (!) 101 20 95 % --   12/25/22 1730 -- -- -- -- -- -- 6' 5\" (1.956 m)   12/25/22 1723 130/86 99.1 °F (37.3 °C) Oral 93 19 98 % --   12/25/22 1600 110/82 -- -- -- -- 94 % --   12/25/22 1500 116/79 -- -- -- -- 96 % --   12/25/22 1400 97/71 -- -- -- -- 93 % --   12/25/22 1300 106/76 -- -- -- -- 97 % --       Amount and/or Complexity of Data Reviewed and Analyzed:  I reviewed and analyzed all of the unique labs and radiologic studies that are shown below as well as any that are in the HPI, and any that are in the expanded problem list below  *Each unique test, order, or document contributes to the combination of 2 or combination of 3 in Category 1 below. For this visit I also reviewed old records and prior notes.       Recent Labs     12/26/22  0609   WBC 7.6   HGB 9.2*         K 3.7   *   CO2 28   BUN 51*   ALT 12     Review of most recent CBC  Lab Results   Component Value Date    WBC 7.6 12/26/2022    HGB 9.2 (L) 12/26/2022    HCT 28.1 (L) 12/26/2022    MCV 96.2 12/26/2022     12/26/2022       Review of most recent BMP  Lab Results   Component Value Date/Time     12/26/2022 06:09 AM    K 3.7 12/26/2022 06:09 AM     12/26/2022 06:09 AM    CO2 28 12/26/2022 06:09 AM    BUN 51 12/26/2022 06:09 AM    CREATININE 14.60 12/26/2022 06:09 AM    GLUCOSE 156 12/26/2022 06:09 AM    CALCIUM 9.2 12/26/2022 06:09 AM        Review of most recent LFTs (and lipase if done)  Lab Results   Component Value Date    ALT 12 12/26/2022    AST 7 (L) 12/26/2022    ALKPHOS 73 12/26/2022    BILITOT 0.6 12/26/2022     Lab Results   Component Value Date    LIPASE 88 12/25/2022       No results found for: INR, APTT, LCAD    Review of most recent HgbA1c  No results found for: LABA1C    Nutritional assessment screen for wound healing issues:  Lab Results   Component Value Date LABALBU 2.2 (L) 12/26/2022       @lastcovr@    Xray Result (most recent):  XR CHEST PORTABLE 12/25/2022    Narrative  KUB    CLINICAL INDICATION: Sepsis    FINDINGS: Single AP view the chest compared to a similar exam dated 6/24/2017  show the lungs are slightly underexpanded but otherwise clear. No pleural  effusion or pneumothorax. The cardiac silhouette and mediastinum are  unremarkable. The bones are normal.    Impression  Slightly under expanded lungs, otherwise no acute abnormality. CT Result (most recent):  CT ABDOMEN PELVIS WO IV CONTRAST 12/25/2022    Narrative  CT of the abdomen and pelvis without contrast.    CLINICAL INDICATION: Yellowish solution after peritoneal dialysis, concern for  peritonitis    PROCEDURE: Serial thin-section axial images obtained from the upper abdomen  through the proximal femurs without the administration of intravenous contrast.  Oral contrast was given. Radiation dose reduction techniques were used for this  study. Our CT scanners use one or all of the following: Automated exposure  control, adjusted of the mA and/or kV according to patient size, iterative  reconstruction    COMPARISON: CT abdomen and pelvis dated 7/6/2022    FINDINGS:  Evaluation of the hollow and solid viscera is limited by the lack of  intravenous contrast.    Dependent atelectasis is present. CT ABDOMEN: The stomach is unremarkable. Innumerable cysts are noted throughout  the bilateral kidneys unchanged from the prior exam in keeping with polycystic  kidney disease. Multiple of the cysts are hyperdense and likely hemorrhagic. There is no hydronephrosis. No renal or ureteral stones evident. The adrenal  glands are normal. An umbilical hernia is present in the lower mid abdomen at  the insertion site of the peritoneal dialysis catheter. There is a fluid  collection within the hernia sac concerning for possible seroma or abscess that  measures 5.7 x 3.2 x 6.0 cm.  There are herniated bowel loops as well without  bowel obstruction. The abdominal wall defect measures roughly 7.5 cm. CT PELVIS: The bowel is normal in course, caliber, and wall thickness. No bowel  obstruction evident. The appendix is normal. The peritoneal dialysis catheter  coils in the central pelvis and a small amount of ascitic fluid. The bladder is  decompressed. There is no inguinal hernia. The rectum is unremarkable. No aggressive bone lesions identified. Impression  1. Moderate-sized umbilical hernia with herniated loops of bowel and fluid. The  fluid collection measures roughly 5.7 x 6.0 cm. Seroma or abscess could be  considered. 2. Polycystic kidney disease. There is no hydronephrosis. US Result (most recent):  US RETROPERITONEAL COMPLETE 01/10/2019    Narrative  Renal ultrasound, 1/10/2019    History: Kidney cyst.    Comparison: None. Technique: Grayscale and doppler images of the kidneys and bladder were obtained  using a 3-5 MHz linear transducer. Findings: The right kidney measures 11.3 cm, and the left kidney measures 10.7  cm in greatest longitudinal length. No stones or evidence of hydronephrosis is  seen. Renal parenchymal echogenicity is increased consistent with chronic  medical renal changes. Numerous simple and complex renal cortical lesions are  seen in the bilateral kidneys. Many of these appear cystic. The majority of  these are not well assessed given their very small size. No clearly worrisome  dominant solid lesion is seen. The urinary bladder is collapsed about a Rojas catheter and therefore not well  assessed. The visualized abdominal aorta is normal in caliber measuring up to  2.3 cm. The IVC is patent. Small to moderate ascites is seen in the right upper  quadrant, and bilateral lower quadrants. Impression  IMPRESSION:  1. Echogenic kidneys demonstrating numerous simple and complex likely cystic  lesions.  Many lesions are not well characterized given their very small size  although no clearly worrisome dominant solid mass is seen. This appearance can  be seen with multiple etiologies. However, given the patient's history of renal  failure and dialysis, these are favored to represent acquired cystic renal  disease. 2. Small to moderate ascites which can suggest some degree of fluid overload. Admission date (for inpatients): 12/25/2022   Day of Surgery  Procedure(s):  ABDOMEN INCISION AND DRAINAGE  HERNIA VENTRAL REPAIR  poss CATHETER REMOVAL PERITONEAL DIALYSIS        ASSESSMENT/PLAN:  [unfilled]  Principal Problem:    Peritonitis ([address]  Active Problems:    Peritoneal dialysis status ([address]    Anemia    Hypomagnesemia    DNR (do not resuscitate)    Sepsis ([address]    Essential hypertension    ESRD (end stage renal disease) ([address]  Resolved Problems:    * No resolved hospital problems.  *     Patient Active Problem List    Diagnosis Date Noted    Peritonitis ([address] 12/25/2022     Priority: Medium    Peritoneal dialysis status ([address] 12/25/2022     Priority: Medium    Anemia 12/25/2022     Priority: Medium    Hypomagnesemia 12/25/2022     Priority: Medium    DNR (do not resuscitate) 12/25/2022     Priority: Medium    Sepsis ([address] 12/25/2022     Priority: Medium    Soft tissue mass      Priority: Medium    Kidney stone 10/14/2022     Added automatically from request for surgery [id number]      Other hyperlipidemia 04/21/2022    ESRD (end stage renal disease) ([address] 05/04/2021    Non-recurrent unilateral inguinal hernia without obstruction or gangrene 12/22/2020    Hypertensive urgency 06/24/2017    Essential hypertension 50/77/4272    Umbilical hernia 42/43/6018          Number and Complexity of Problems addressed and   Risks of complications and/or morbidity of management        Recurrent, incarcerated ventral hernia  Localized peritonitis  Leukocytosis  Abdominal pain  Dialysis dependence  Suspected infected PD catheter placed in 2014    I reviewed the patient's CT imaging which show a complex lobulated incarcerated hernia with abnormal fluid. The PD catheter travels into this pocket of fluid from its entry site through the skin in the left mid abdomen. I discussed the case with Dr. Ho Sanchez after reviewing his operative note describing prior hernia repair without mesh and using Prolene suture. We also discussed the Ethibond suture placed to help keep the PD catheter in position. Dr. Ho Sanchez describes that this was not tied through or around the catheter was left loose and the PD catheter is expected to slide free if it is removed. Informed consent was obtained for open drainage of localized peritonitis, open repair of recurrent incarcerated ventral hernia with possible biologic mesh, and possible removal of peritoneal dialysis catheter. Procedure risks were discussed including bleeding, infection, recurrence, blood clot, risk of anesthesia, etc.. All his questions were answered. He is in favor proceeding. OR notified                  Level of MDM (2/3 elements below)  Number and Complexity of Problems Addressed Amount and/or Complexity of Data to be Reviewed and Analyzed  *Each unique test, order, or document contributes to the combination of 2 or combination of 3 in Category 1 below. Risk of Complications and/or Morbidity or Mortality of pt Management     34562  [id number]  Minimal  ?1self-limited or minor problem Minimal or none Minimal risk of morbidity from additional diagnostic testing or Rx   51025  [id number] Low Low  ? 2or more self-limited or minor problems;    or  ? 1stable chronic illness;    or  ?1acute, uncomplicated illness or injury   Limited  (Must meet the requirements of at least 1 of the 2 categories)  Category 1: Tests and documents   ? Any combination of 2 from the following:  ?Review of prior external note(s) from each unique source*;  ?review of the result(s) of each unique test*;   ?ordering of each unique test*    or   Category 2: Assessment requiring an independent historian(s)  (For the categories of independent interpretation of tests and discussion of management or test interpretation, see moderate or high) Low risk of morbidity from additional diagnostic testing or treatment     01283  39526 Mod Moderate  ? 1or more chronic illnesses with exacerbation, progression, or side effects of treatment;    or  ?2or more stable chronic illnesses;    or  ?1undiagnosed new problem with uncertain prognosis;    or  ?1acute illness with systemic symptoms;    or  ?1acute complicated injury   Moderate  (Must meet the requirements of at least 1 out of 3 categories)  Category 1: Tests, documents, or independent historian(s)  ? Any combination of 3 from the following:   ?Review of prior external note(s) from each unique source*;  ?Review of the result(s) of each unique test*;  ?Ordering of each unique test*;  ?Assessment requiring an independent historian(s)    or  Category 2: Independent interpretation of tests   ? Independent interpretation of a test performed by another physician/other qualified health care professional (not separately reported);     or  Category 3: Discussion of management or test interpretation  ? Discussion of management or test interpretation with external physician/other qualified health care professional/appropriate source (not separately reported)   Moderate risk of morbidity from additional diagnostic testing or treatment  Examples only:  ?Prescription drug management   ? Decision regarding minor surgery with identified patient or procedure risk factors  ? Decision regarding elective major surgery without identified patient or procedure risk factors   ? Diagnosis or treatment significantly limited by social determinants of health       [id number] High High  ? 1or more chronic illnesses with severe exacerbation, progression, or side effects of treatment;    or  ?1 acute or chronic illness or injury that poses a threat to life or bodily function   Extensive  (Must meet the requirements of at least 2 out of 3 categories)  Category 1: Tests, documents, or independent historian(s)  ? Any combination of 3 from the following:   ?Review of prior external note(s) from each unique source*;  ?Review of the result(s) of each unique test*;   ?Ordering of each unique test*;   ?Assessment requiring an independent historian(s)    or   Category 2: Independent interpretation of tests   ? Independent interpretation of a test performed by another physician/other qualified health care professional (not separately reported);     or  Category 3: Discussion of management or test interpretation  ? Discussion of management or test interpretation with external physician/other qualified health care professional/appropriate source (not separately reported)   High risk of morbidity from additional diagnostic testing or treatment  Examples only:  ?Drug therapy requiring intensive monitoring for toxicity  ? Decision regarding elective major surgery with identified patient or procedure risk factors  ? Decision regarding emergency major surgery  ? Decision regarding hospitalization  ? Decision not to resuscitate or to de-escalate care because of poor prognosis             I have personally performed a face-to-face diagnostic evaluation and management  service on this patient. I have independently seen the patient. I have independently obtained the above history from the patient/family. I have independently examined the patient with above findings. I have independently reviewed data/labs for this patient and developed the above plan of care (MDM).       Signed: Pepe Branch MD  12/26/2022    12:39 PM

## 2022-12-26 NOTE — ANESTHESIA PRE PROCEDURE
Department of Anesthesiology  Preprocedure Note       Name:  Luis Barba   Age:  65 y.o.  :  1960                                          MRN:  [id number]         Date:  2022      Surgeon: Ray Bruno):  Allison Mishra MD    Procedure: Procedure(s):  ABDOMEN INCISION AND DRAINAGE  HERNIA VENTRAL REPAIR  poss CATHETER REMOVAL PERITONEAL DIALYSIS    Medications prior to admission:   Prior to Admission medications    Medication Sig Start Date End Date Taking?  Authorizing Provider   omeprazole (PRILOSEC) 40 MG delayed release capsule Take by mouth daily    Historical Provider, MD   valsartan-hydroCHLOROthiazide (DIOVAN-HCT) 320-25 MG per tablet TAKE ONE Tablet BY MOUTH ONCE A DAY 10/24/22   Historical Provider, MD   Hyoscyamine Sulfate SL (LEVSIN/SL) 0.125 MG SUBL Place 0.125 mg under the tongue every 4 hours as needed (bladder spasms) 22   Carmita Beth MD   traZODone (DESYREL) 50 MG tablet Take 1 tablet by mouth nightly 22   Litzy Diaz MD   B Complex-C-Folic Acid (DIALYVITE PO) Take 1 tablet by mouth daily 3/2/21   Historical Provider, MD   LACTULOSE PO Take 15 mLs by mouth as needed    Ar Automatic Reconciliation   cinacalcet (SENSIPAR) 90 MG tablet Take 90 mg by mouth at bedtime    Ar Automatic Reconciliation   cloNIDine (CATAPRES) 0.3 MG/24HR PTWK Place 1 patch onto the skin every 3 days     Ar Automatic Reconciliation   cloNIDine (CATAPRES) 0.2 MG tablet Take 0.2 mg by mouth at bedtime    Ar Automatic Reconciliation   escitalopram (LEXAPRO) 10 MG tablet Take 10 mg by mouth daily as needed 21   Ar Automatic Reconciliation   esomeprazole (NEXIUM) 40 MG delayed release capsule Take 1 capsule by mouth daily as needed 21   Ar Automatic Reconciliation   finasteride (PROSCAR) 5 MG tablet Take 5 mg by mouth at bedtime 22   Ar Automatic Reconciliation   hydrALAZINE (APRESOLINE) 50 MG tablet Take 1 tablet by mouth daily as needed 21   Ar Automatic Reconciliation   NIFEdipine (PROCARDIA XL) 90 MG extended release tablet Take 90 mg by mouth at bedtime    Ar Automatic Reconciliation   ondansetron (ZOFRAN) 4 MG tablet Take 4 mg by mouth every 8 hours as needed    Ar Automatic Reconciliation   potassium chloride (KLOR-CON) 10 MEQ extended release tablet Take 10-20 mEq by mouth daily     Ar Automatic Reconciliation   rosuvastatin (CRESTOR) 10 MG tablet Take 10 mg by mouth at bedtime  9/13/21   Ar Automatic Reconciliation   sevelamer hcl (RENAGEL) 800 MG tablet Take 2,400 mg by mouth 3 times daily (with meals)     Ar Automatic Reconciliation       Current medications:    No current facility-administered medications for this visit. No current outpatient medications on file.      Facility-Administered Medications Ordered in Other Visits   Medication Dose Route Frequency Provider Last Rate Last Admin    sodium chloride flush 0.9 % injection 5-40 mL  5-40 mL IntraVENous 2 times per day Pippa Blackman MD   10 mL at 12/26/22 0807    sodium chloride flush 0.9 % injection 5-40 mL  5-40 mL IntraVENous PRN Pippa Blackman MD        0.9 % sodium chloride infusion   IntraVENous PRN Pippa Blackman MD        [Held by provider] heparin (porcine) injection 5,000 Units  5,000 Units SubCUTAneous 3 times per day Pippa Blackman MD        ondansetron (ZOFRAN-ODT) disintegrating tablet 4 mg  4 mg Oral Q8H PRN Pippa Blackman MD        Or    ondansetron (ZOFRAN) injection 4 mg  4 mg IntraVENous Q6H PRN Pippa Blackman MD   4 mg at 12/25/22 2234    polyethylene glycol (GLYCOLAX) packet 17 g  17 g Oral Daily PRN Pippa Blackman MD        acetaminophen (TYLENOL) tablet 650 mg  650 mg Oral Q6H PRN Pippa Blackman MD        Or    acetaminophen (TYLENOL) suppository 650 mg  650 mg Rectal Q6H PRN Pippa Blackman MD        cinacalcet (SENSIPAR) tablet 90 mg  90 mg Oral Nightly Pippa Blackman MD   90 mg at 12/25/22 2032    [Held by provider] NIFEdipine (PROCARDIA XL) extended release tablet 90 mg  90 mg Oral Nightly Mayda Solano MD   90 mg at 12/25/22 2032    finasteride (PROSCAR) tablet 5 mg  5 mg Oral Nightly Mayda Solano MD   5 mg at 12/25/22 2032    traZODone (DESYREL) tablet 50 mg  50 mg Oral Nightly Mayda Solano MD   50 mg at 12/25/22 2032    pantoprazole (PROTONIX) tablet 40 mg  40 mg Oral QAM AC Mayda Solano MD   40 mg at 12/26/22 0507    diatrizoate meglumine-sodium (GASTROGRAFIN) 66-10 % solution 15 mL  15 mL Oral ONCE PRN Mayda Solano MD   15 mL at 12/25/22 1617    piperacillin-tazobactam (ZOSYN) 3,375 mg in sodium chloride 0.9 % 50 mL IVPB (mini-bag)  3,375 mg IntraVENous Q12H Mayda Solano MD 12.5 mL/hr at 12/26/22 0812 3,375 mg at 12/26/22 2205    vancomycin (VANCOCIN) intermittent dosing (placeholder)   Other RX Placeholder Mayda Solano MD        morphine injection 2 mg  2 mg IntraVENous Q4H PRN Mayda Solano MD   2 mg at 12/26/22 0806    oxyCODONE (ROXICODONE) immediate release tablet 5 mg  5 mg Oral Q6H PRN Mayda Solano MD   5 mg at 12/26/22 1028    valsartan (DIOVAN) tablet 320 mg  320 mg Oral Daily Mayda Solano MD   320 mg at 12/26/22 0806    And    [Held by provider] hydroCHLOROthiazide (HYDRODIURIL) tablet 25 mg  25 mg Oral Daily Mayda Solano MD        cloNIDine (CATAPRES) 0.3 MG/24HR 1 patch  1 patch TransDERmal Weekly Mayda Solano MD   1 patch at 12/26/22 1021       Allergies:     Allergies   Allergen Reactions    Iodine Other (See Comments)     Iodine Dye r/t kidney function       Problem List:    Patient Active Problem List   Diagnosis Code    Umbilical hernia E50.6    Hypertensive urgency I16.0    Essential hypertension I10    ESRD (end stage renal disease) ([facility] N18.6    Non-recurrent unilateral inguinal hernia without obstruction or gangrene K40.90    Other hyperlipidemia E78.49    Soft tissue mass M79.89    Kidney stone N20.0    Peritonitis ([address] K65.9    Peritoneal dialysis status ([address] Z99.2    Anemia D64.9    Hypomagnesemia E83.42    DNR (do not resuscitate) Z66    Sepsis ([address] A41.9    Localized peritonitis ([address] K65.9    Recurrent ventral hernia with incarceration K43.0    Peritonitis due to infected peritoneal dialysis catheter ([address] T85.71XA, K65.9       Past Medical History:        Diagnosis Date    Anemia     Anxiety     Arthritis     CAD (coronary artery disease)     per pt- no MI- \"They said the back of my heart was something. ..like 40%\"    Chronic kidney disease     [facility]; stage 5; pt has AV fistula to left arm; PERITONEAL DIALYSIS daily since 2014    Chronic pain     CKD (chronic kidney disease)     Former smoker     Fracture, tibial plateau 81/99/3785    GERD (gastroesophageal reflux disease)     Heart disease     Hyperlipidemia     on med for control    Hypertension     controlled with med    Hypertensive nephrosclerosis     per nephrology note 4/14/14    Hypoactive thyroid     Kidney stone     Peritoneal dialysis catheter in place [facility])     Peritoneal dialysis-associated peritonitis ([address] 06/24/2017    during hs    PUD (peptic ulcer disease)     SBP (spontaneous bacterial peritonitis) ([address] 09/24/2016    Sepsis ([address] 09/24/2016    Tibial plateau fracture, left 62/53/0384    Umbilical hernia     Unspecified sleep apnea     pt does not have a CPAP       Past Surgical History:        Procedure Laterality Date    ABDOMEN SURGERY Right 6/13/2022    RIGHT LOWER QUADRANT ABDOMEN LESION BIOPSY EXCISION performed by Tevin Cruz MD at [facility] Right 1995    tip of 5th finger    COLONOSCOPY N/A 3/23/2018    COLONOSCOPY  BMI 34 performed by Jack Parekh MD at [facility]ELO[facility]AMS    CYSTOSCOPY Left 11/1/2022    CYSTOSCOPY, LEFT URETEROSCOPY, BASKET STONE EXTRACTION, LEFT URETERAL STENT INSERTION performed by Susan Rios MD at [address] [place] [address] Drive Left as a child    thumb    ORTHOPEDIC SURGERY Left     knee    ORTHOPEDIC SURGERY Left 2017    LEFT LATERAL TIBIAL PLATEAU OPEN REDUCTION INTERNAL FIXATION    [facility] ABDOMEN SURG[facility] PROC UNLISTED  14    PD catheter placement    SKIN BIOPSY Left 2022    LEFT BUTTOCK MASS EXCISION performed by Tevin Cruz MD at [facility] VASCULAR SURGERY Left     AV fistula to arm       Social History:    Social History     Tobacco Use    Smoking status: Former     Packs/day: 0.50     Years: 15.00     Pack years: 7.50     Types: Cigarettes     Quit date: 3/1/2014     Years since quittin.8    Smokeless tobacco: Never    Tobacco comments:     Quit smoking: pt states only smoked cigarettes when drinking alcohol   Substance Use Topics    Alcohol use: Yes                                Counseling given: Not Answered  Tobacco comments: Quit smoking: pt states only smoked cigarettes when drinking alcohol      Vital Signs (Current): There were no vitals filed for this visit.                                            BP Readings from Last 3 Encounters:   22 123/85   22 112/75   22 99/80       NPO Status:                                                                                 BMI:   Wt Readings from Last 3 Encounters:   22 260 lb (117.9 kg)   22 262 lb (118.8 kg)   22 258 lb (117 kg)     There is no height or weight on file to calculate BMI.    CBC:   Lab Results   Component Value Date/Time    WBC 7.6 2022 06:09 AM    RBC 2.92 2022 06:09 AM    HGB 9.2 2022 06:09 AM    HCT 28.1 2022 06:09 AM    MCV 96.2 2022 06:09 AM    RDW 15.7 2022 06:09 AM     2022 06:09 AM       CMP:   Lab Results   Component Value Date/Time     2022 06:09 AM    K 3.7 2022 06:09 AM     2022 06:09 AM CO2 28 12/26/2022 06:09 AM    BUN 51 12/26/2022 06:09 AM    CREATININE 14.60 12/26/2022 06:09 AM    GFRAA 4 07/06/2022 02:01 AM    LABGLOM 3 12/26/2022 06:09 AM    GLUCOSE 156 12/26/2022 06:09 AM    PROT 6.9 12/26/2022 06:09 AM    CALCIUM 9.2 12/26/2022 06:09 AM    BILITOT 0.6 12/26/2022 06:09 AM    ALKPHOS 73 12/26/2022 06:09 AM    ALKPHOS 71 04/21/2022 04:02 PM    AST 7 12/26/2022 06:09 AM    ALT 12 12/26/2022 06:09 AM       POC Tests:   No results for input(s): POCGLU, POCNA, POCK, POCCL, POCBUN, POCHEMO, POCHCT in the last 72 hours. Coags: No results found for: PROTIME, INR, APTT    HCG (If Applicable): No results found for: PREGTESTUR, PREGSERUM, HCG, HCGQUANT     ABGs: No results found for: PHART, PO2ART, HWA8SYO, OBL8KAF, BEART, S8UMKISR     Type & Screen (If Applicable):  No results found for: LABABO, LABRH    Drug/Infectious Status (If Applicable):  No results found for: HIV, HEPCAB    COVID-19 Screening (If Applicable): No results found for: COVID19        Anesthesia Evaluation  Patient summary reviewed and Nursing notes reviewed  Airway: Mallampati: II          Dental: normal exam         Pulmonary:normal exam    (+) sleep apnea: on noncompliant,                             Cardiovascular:  Exercise tolerance: good (>4 METS),   (+) hypertension:, CAD: no interval change,                   Neuro/Psych:   Negative Neuro/Psych ROS              GI/Hepatic/Renal:   (+) GERD: well controlled, PUD, renal disease: ESRD and dialysis,           Endo/Other:    (+) hypothyroidism::., .                 Abdominal:             Vascular: Other Findings: Pt presenting with suspected peritonitis being treated with IV abx. Noted to have sig fluid collection and hernia around umbilicus. , now for hernia repair             Anesthesia Plan      general     ASA 3     (ETT)  Induction: intravenous. MIPS: Postoperative opioids intended and Prophylactic antiemetics administered.   Anesthetic plan and risks discussed with patient.                         Jed Hickman,    12/26/2022

## 2022-12-26 NOTE — PROGRESS NOTES
Hospitalist Progress Note   Admit Date:  2022 11:46 AM   Name:  Cosmo Staton   Age:  65 y.o. Sex:  male  :  1960   MRN:  [id number]   Room:  2/    Presenting Complaint: abdominal pain  Reason(s) for Admission: Peritonitis ([address] [K65.9]  Peritonitis, dialysis-associated, initial encounter [facility] Course & Interval History:   76-year-old male with history of ESRD on peritoneal dialysis, HTN, peritonitis, admitted on  with diffuse abdominal pain, 10/10 intensity, which was secondary to possible peritonitis versus incarcerated umbilical hernia with abscess versus seroma. Patient noticed his PD fluid to be clinically, pain is mostly central and periumbilical.  He does report of having periumbilical hernia repair. Patient met sepsis criteria, was started on broad-spectrum antibiotic via PD catheter. Fluid culture was sent, fluid cell count was not. Patient is a DNR/DNI. CTAP showed moderate size umbilical hernia with herniated loops of bowel fluid, the fluid collection measures roughly 5.7 x 6.0 cm, seroma or abscess could be considered, PCKD appreciated with no evidence of hydronephrosis. Subjective/24hr Events (22): Patient seen at bedside, resting in bed comfortably. Is alert and awake, AOx3, on room air. Patient reports feeling better, denies any active abdominal pain, nausea vomiting. Discussed with patient about CT finding of umbilical hernia with herniated loops of bowel with concern for seroma versus abscess. No reported fever, chills, night sweats, lightheadedness, dizziness, chest pain, shortness of breath. ROS:  10 point review of system is negative except for what mentioned above. Assessment & Plan:     Principal Problem:    Sepsis ([address]    Peritonitis ([address]  Plan: -  Follow-up with fluid, blood culture. Ordered for peritoneal dialysis fluid cell count with differential this morning.   Continue empiric Zosyn and vancomycin via PD catheter. Nephrology on board  CT AP with findings of umbilical hernia and herniated loops of bowel with fluid collection measuring 5.7 x 6.0 cm with concerns for seroma versus abscess. General surgery consulted to evaluate patient for any urgent umbilical hernia repair. Abdominal pain is minimal.    Active Problems:    Peritoneal dialysis status ([facility]    ESRD (end stage renal disease) ([facility]  Plan: 12/26-  Nephrology on board, continue PD      Anemia  Plan: 12/26-  Hemoglobin 9.2, stable, continue to monitor. No signs of active bleed. Hypomagnesemia  Plan: 12/26-  Magnesium 1.9, up from 1.5. Continue replacement as needed. DNR (do not resuscitate)  Plan: Noted      Essential hypertension  Plan: 12/26-  BP is optimally controlled with clonidine patch 0.3 mg transdermal q. 7 days, valsartan 320 mg p.o. daily  HCTZ currently on hold. Nifedipine currently on hold. PT consulted      Discharge Planning:    Pending clinical course    Diet:  Diet NPO  DVT PPx: Heparin  Code status: DNR    I spent 36 minutes of time caring for this patient on the unit nearby or at bedside, and more than 50 percent was spent on coordination of care activities, and/or patient/family counseling regarding status and plan of care.      Hospital Problems             Last Modified POA    * (Principal) Peritonitis ([facility] 12/25/2022 Yes    Peritoneal dialysis status ([facility] (Chronic) 12/25/2022 Yes    Anemia 12/25/2022 Yes    Hypomagnesemia 12/25/2022 Yes    DNR (do not resuscitate) (Chronic) 12/25/2022 Yes    Sepsis ([facility] 12/25/2022 Yes    Essential hypertension 12/25/2022 Yes    ESRD (end stage renal disease) ([facility] 12/25/2022 Yes       Objective:   Patient Vitals for the past 24 hrs:   Temp Pulse Resp BP SpO2   12/26/22 0301 99.1 °F (37.3 °C) 88 18 93/61 95 %   12/25/22 2255 99.4 °F (37.4 °C) (!) 113 18 117/80 93 %   12/25/22 1932 99.1 °F (37.3 °C) (!) 101 20 127/84 95 %   12/25/22 1723 99.1 °F (37.3 °C) 93 19 130/86 98 %   12/25/22 1600 -- -- -- 110/82 94 %   12/25/22 1500 -- -- -- 116/79 96 %   12/25/22 1400 -- -- -- 97/71 93 %   12/25/22 1300 -- -- -- 106/76 97 %   12/25/22 1145 -- -- -- 108/62 --   12/25/22 1143 99.1 °F (37.3 °C) (!) 107 20 -- 100 %       Estimated body mass index is 30.83 kg/m² as calculated from the following:    Height as of this encounter: 6' 5\" (1.956 m). Weight as of this encounter: 260 lb (117.9 kg). Intake/Output Summary (Last 24 hours) at 12/26/2022 0731  Last data filed at 12/26/2022 0104  Gross per 24 hour   Intake 300 ml   Output 400 ml   Net -100 ml         Physical Exam:     Blood pressure 93/61, pulse 88, temperature 99.1 °F (37.3 °C), temperature source Oral, resp. rate 18, height 6' 5\" (1.956 m), weight 260 lb (117.9 kg), SpO2 95 %. General:    Alert, awake, well built, on room air, NAD  Head:  Normocephalic, atraumatic  Eyes:  Sclerae appear normal. Pupils equally round. ENT:  Nares appear normal, no drainage. Moist oral mucosa  Neck:  No restricted ROM. Trachea midline. CV:   RRR. No m/r/g. No jugular venous distension. Lungs:   CTAB. No wheezing, rhonchi, or rales. Respirations even, unlabored. Abdomen:   Soft, distended, umbilical hernia with partially reducible bowel loops, nontender, bowel sounds are normoactive, no guarding or rigidity, PD catheter intact, no drainage. Extremities: No cyanosis or clubbing. No edema. Skin:     No rashes and normal coloration. Warm and dry. Neuro:  GCS 15, cranial nerves intact, no motor or sensory deficit, cerebellar functions intact  Psych:  AOx3, mood and affect appropriate.       I have reviewed ordered lab tests and independently visualized imaging below:    Recent Labs:  Recent Results (from the past 48 hour(s))   CBC with Auto Differential    Collection Time: 12/25/22 11:55 AM   Result Value Ref Range    WBC 11.5 (H) 4.3 - 11.1 K/uL    RBC 3.10 (L) 4.23 - 5.6 M/uL    Hemoglobin 9.8 (L) 13.6 - 17.2 g/dL    Hematocrit 29.6 (L) 41.1 - 50.3 %    MCV 95.5 82 - 102 FL    MCH 31.6 26.1 - 32.9 PG    MCHC 33.1 31.4 - 35.0 g/dL    RDW 15.9 (H) 11.9 - 14.6 %    Platelets 786 664 - 833 K/uL    MPV 10.6 9.4 - 12.3 FL    nRBC 0.00 0.0 - 0.2 K/uL    Differential Type AUTOMATED      Seg Neutrophils 84 (H) 43 - 78 %    Lymphocytes 8 (L) 13 - 44 %    Monocytes 8 4.0 - 12.0 %    Eosinophils % 0 (L) 0.5 - 7.8 %    Basophils 0 0.0 - 2.0 %    Immature Granulocytes 0 0.0 - 5.0 %    Segs Absolute 9.6 (H) 1.7 - 8.2 K/UL    Absolute Lymph # 1.0 0.5 - 4.6 K/UL    Absolute Mono # 0.9 0.1 - 1.3 K/UL    Absolute Eos # 0.1 0.0 - 0.8 K/UL    Basophils Absolute 0.0 0.0 - 0.2 K/UL    Absolute Immature Granulocyte 0.1 0.0 - 0.5 K/UL   Comprehensive Metabolic Panel    Collection Time: 12/25/22 11:55 AM   Result Value Ref Range    Sodium 136 133 - 143 mmol/L    Potassium 3.7 3.5 - 5.1 mmol/L    Chloride 97 (L) 101 - 110 mmol/L    CO2 29 21 - 32 mmol/L    Anion Gap 10 2 - 11 mmol/L    Glucose 109 (H) 65 - 100 mg/dL    BUN 49 (H) 8 - 23 MG/DL    Creatinine 15.40 (H) 0.8 - 1.5 MG/DL    Est, Glom Filt Rate 3 (L) >60 ml/min/1.73m2    Calcium 9.3 8.3 - 10.4 MG/DL    Total Bilirubin 0.8 0.2 - 1.1 MG/DL    ALT 15 12 - 65 U/L    AST 9 (L) 15 - 37 U/L    Alk Phosphatase 85 50 - 136 U/L    Total Protein 8.0 6.3 - 8.2 g/dL    Albumin 2.8 (L) 3.2 - 4.6 g/dL    Globulin 5.2 (H) 2.8 - 4.5 g/dL    Albumin/Globulin Ratio 0.5 0.4 - 1.6     Lipase    Collection Time: 12/25/22 11:55 AM   Result Value Ref Range    Lipase 88 73 - 393 U/L   Lactic Acid    Collection Time: 12/25/22 11:55 AM   Result Value Ref Range    Lactic Acid, Plasma 1.4 0.4 - 2.0 MMOL/L   Culture, Blood 1    Collection Time: 12/25/22 11:55 AM    Specimen: Blood   Result Value Ref Range    Special Requests RIGHT  Antecubital        Culture NO GROWTH AFTER 17 HOURS     Magnesium    Collection Time: 12/25/22 11:55 AM   Result Value Ref Range    Magnesium 1.5 (L) 1.8 - 2.4 mg/dL   EKG 12 Lead    Collection Time: 12/25/22 5:56 PM   Result Value Ref Range    Ventricular Rate 99 BPM    Atrial Rate 99 BPM    P-R Interval 194 ms    QRS Duration 98 ms    Q-T Interval 366 ms    QTc Calculation (Bazett) 469 ms    P Axis 36 degrees    R Axis 1 degrees    T Axis 47 degrees    Diagnosis Normal sinus rhythm    Comprehensive Metabolic Panel w/ Reflex to MG    Collection Time: 12/26/22  6:09 AM   Result Value Ref Range    Sodium 136 133 - 143 mmol/L    Potassium 3.7 3.5 - 5.1 mmol/L    Chloride 100 (L) 101 - 110 mmol/L    CO2 28 21 - 32 mmol/L    Anion Gap 8 2 - 11 mmol/L    Glucose 156 (H) 65 - 100 mg/dL    BUN 51 (H) 8 - 23 MG/DL    Creatinine 14.60 (H) 0.8 - 1.5 MG/DL    Est, Glom Filt Rate 3 (L) >60 ml/min/1.73m2    Calcium 9.2 8.3 - 10.4 MG/DL    Total Bilirubin 0.6 0.2 - 1.1 MG/DL    ALT 12 12 - 65 U/L    AST 7 (L) 15 - 37 U/L    Alk Phosphatase 73 50 - 136 U/L    Total Protein 6.9 6.3 - 8.2 g/dL    Albumin 2.2 (L) 3.2 - 4.6 g/dL    Globulin 4.7 (H) 2.8 - 4.5 g/dL    Albumin/Globulin Ratio 0.5 0.4 - 1.6     CBC with Auto Differential    Collection Time: 12/26/22  6:09 AM   Result Value Ref Range    WBC 7.6 4.3 - 11.1 K/uL    RBC 2.92 (L) 4.23 - 5.6 M/uL    Hemoglobin 9.2 (L) 13.6 - 17.2 g/dL    Hematocrit 28.1 (L) 41.1 - 50.3 %    MCV 96.2 82 - 102 FL    MCH 31.5 26.1 - 32.9 PG    MCHC 32.7 31.4 - 35.0 g/dL    RDW 15.7 (H) 11.9 - 14.6 %    Platelets 556 440 - 364 K/uL    MPV 11.0 9.4 - 12.3 FL    nRBC 0.00 0.0 - 0.2 K/uL    Differential Type AUTOMATED      Seg Neutrophils 78 43 - 78 %    Lymphocytes 12 (L) 13 - 44 %    Monocytes 9 4.0 - 12.0 %    Eosinophils % 1 0.5 - 7.8 %    Basophils 0 0.0 - 2.0 %    Immature Granulocytes 0 0.0 - 5.0 %    Segs Absolute 5.9 1.7 - 8.2 K/UL    Absolute Lymph # 0.9 0.5 - 4.6 K/UL    Absolute Mono # 0.7 0.1 - 1.3 K/UL    Absolute Eos # 0.1 0.0 - 0.8 K/UL    Basophils Absolute 0.0 0.0 - 0.2 K/UL    Absolute Immature Granulocyte 0.0 0.0 - 0.5 K/UL   Magnesium    Collection Time: 12/26/22  6:09 AM   Result Value Ref Range    Magnesium 1.9 1.8 - 2.4 mg/dL         Other Studies:  CT ABDOMEN PELVIS WO CONTRAST Additional Contrast? Oral    Result Date: 12/25/2022  CT of the abdomen and pelvis without contrast. CLINICAL INDICATION: Yellowish solution after peritoneal dialysis, concern for peritonitis PROCEDURE: Serial thin-section axial images obtained from the upper abdomen through the proximal femurs without the administration of intravenous contrast. Oral contrast was given. Radiation dose reduction techniques were used for this study. Our CT scanners use one or all of the following: Automated exposure control, adjusted of the mA and/or kV according to patient size, iterative reconstruction COMPARISON: CT abdomen and pelvis dated 7/6/2022 FINDINGS:  Evaluation of the hollow and solid viscera is limited by the lack of intravenous contrast. Dependent atelectasis is present. CT ABDOMEN: The stomach is unremarkable. Innumerable cysts are noted throughout the bilateral kidneys unchanged from the prior exam in keeping with polycystic kidney disease. Multiple of the cysts are hyperdense and likely hemorrhagic. There is no hydronephrosis. No renal or ureteral stones evident. The adrenal glands are normal. An umbilical hernia is present in the lower mid abdomen at the insertion site of the peritoneal dialysis catheter. There is a fluid collection within the hernia sac concerning for possible seroma or abscess that measures 5.7 x 3.2 x 6.0 cm. There are herniated bowel loops as well without bowel obstruction. The abdominal wall defect measures roughly 7.5 cm. CT PELVIS: The bowel is normal in course, caliber, and wall thickness. No bowel obstruction evident. The appendix is normal. The peritoneal dialysis catheter coils in the central pelvis and a small amount of ascitic fluid. The bladder is decompressed. There is no inguinal hernia. The rectum is unremarkable. No aggressive bone lesions identified.      1. Moderate-sized umbilical hernia with herniated loops of bowel and fluid. The fluid collection measures roughly 5.7 x 6.0 cm. Seroma or abscess could be considered. 2. Polycystic kidney disease. There is no hydronephrosis. XR CHEST PORTABLE    Result Date: 12/25/2022  KUB CLINICAL INDICATION: Sepsis FINDINGS: Single AP view the chest compared to a similar exam dated 6/24/2017 show the lungs are slightly underexpanded but otherwise clear. No pleural effusion or pneumothorax. The cardiac silhouette and mediastinum are unremarkable. The bones are normal.     Slightly under expanded lungs, otherwise no acute abnormality.       Current Meds:  Current Facility-Administered Medications   Medication Dose Route Frequency    sodium chloride flush 0.9 % injection 5-40 mL  5-40 mL IntraVENous 2 times per day    sodium chloride flush 0.9 % injection 5-40 mL  5-40 mL IntraVENous PRN    0.9 % sodium chloride infusion   IntraVENous PRN    heparin (porcine) injection 5,000 Units  5,000 Units SubCUTAneous 3 times per day    ondansetron (ZOFRAN-ODT) disintegrating tablet 4 mg  4 mg Oral Q8H PRN    Or    ondansetron (ZOFRAN) injection 4 mg  4 mg IntraVENous Q6H PRN    polyethylene glycol (GLYCOLAX) packet 17 g  17 g Oral Daily PRN    acetaminophen (TYLENOL) tablet 650 mg  650 mg Oral Q6H PRN    Or    acetaminophen (TYLENOL) suppository 650 mg  650 mg Rectal Q6H PRN    cinacalcet (SENSIPAR) tablet 90 mg  90 mg Oral Nightly    [Held by provider] NIFEdipine (PROCARDIA XL) extended release tablet 90 mg  90 mg Oral Nightly    finasteride (PROSCAR) tablet 5 mg  5 mg Oral Nightly    traZODone (DESYREL) tablet 50 mg  50 mg Oral Nightly    pantoprazole (PROTONIX) tablet 40 mg  40 mg Oral QAM AC    diatrizoate meglumine-sodium (GASTROGRAFIN) 66-10 % solution 15 mL  15 mL Oral ONCE PRN    piperacillin-tazobactam (ZOSYN) 3,375 mg in sodium chloride 0.9 % 50 mL IVPB (mini-bag)  3,375 mg IntraVENous Q12H    vancomycin (VANCOCIN) intermittent dosing (placeholder)   Other RX Placeholder    morphine injection 2 mg  2 mg IntraVENous Q4H PRN    oxyCODONE (ROXICODONE) immediate release tablet 5 mg  5 mg Oral Q6H PRN    valsartan (DIOVAN) tablet 320 mg  320 mg Oral Daily    And    [Held by provider] hydroCHLOROthiazide (HYDRODIURIL) tablet 25 mg  25 mg Oral Daily    cloNIDine (CATAPRES) 0.3 MG/24HR 1 patch  1 patch TransDERmal Weekly       Signed:  Alfonso Bateman MD    Part of this note may have been written by using a voice dictation software. The note has been proof read but may still contain some grammatical/other typographical errors.

## 2022-12-26 NOTE — PROGRESS NOTES
END OF SHIFT SUMMARY:    Significant vitals this shift:  vss  Significant labs this shift:  no  Tests performed this shift:  Surgery on PD and hernia repair  Orders to be followed up on:  no  Blood products given this shift:  none  Additional events this shift:   Vss pt A&O X's 4 complain of pain X's 2 gave medication as ordered in mar pt gone to surgery back in bed no complaints of pain upon arrival from surgery resting in bed call light in reach     I/Os:  +/- this shift: yes  12/26 0701 - 12/26 1900  In: 500 [I.V.:500]  Out: 110   Occurrences this Shift:  Urine yes, BM no, Emesis no    Zackary Browne RN

## 2022-12-27 LAB
ALBUMIN SERPL-MCNC: 2.4 G/DL (ref 3.2–4.6)
ALBUMIN/GLOB SERPL: 0.4 {RATIO} (ref 0.4–1.6)
ALP SERPL-CCNC: 77 U/L (ref 50–136)
ALT SERPL-CCNC: 12 U/L (ref 12–65)
ANION GAP SERPL CALC-SCNC: 12 MMOL/L (ref 2–11)
AST SERPL-CCNC: 7 U/L (ref 15–37)
BACTERIA SPEC CULT: NORMAL
BASOPHILS # BLD: 0 K/UL (ref 0–0.2)
BASOPHILS NFR BLD: 0 % (ref 0–2)
BILIRUB SERPL-MCNC: 0.5 MG/DL (ref 0.2–1.1)
BUN SERPL-MCNC: 57 MG/DL (ref 8–23)
CALCIUM SERPL-MCNC: 10.1 MG/DL (ref 8.3–10.4)
CHLORIDE SERPL-SCNC: 98 MMOL/L (ref 101–110)
CO2 SERPL-SCNC: 27 MMOL/L (ref 21–32)
CREAT SERPL-MCNC: 16.3 MG/DL (ref 0.8–1.5)
DIFFERENTIAL METHOD BLD: ABNORMAL
EOSINOPHIL # BLD: 0 K/UL (ref 0–0.8)
EOSINOPHIL NFR BLD: 0 % (ref 0.5–7.8)
ERYTHROCYTE [DISTWIDTH] IN BLOOD BY AUTOMATED COUNT: 16 % (ref 11.9–14.6)
GLOBULIN SER CALC-MCNC: 5.5 G/DL (ref 2.8–4.5)
GLUCOSE SERPL-MCNC: 147 MG/DL (ref 65–100)
GRAM STN SPEC: NORMAL
GRAM STN SPEC: NORMAL
HAV IGM SER QL: NONREACTIVE
HBV CORE IGM SER QL: NONREACTIVE
HBV SURFACE AB SERPL IA-ACNC: 63.02 MIU/ML
HBV SURFACE AG SER QL: NONREACTIVE
HCT VFR BLD AUTO: 29.7 % (ref 41.1–50.3)
HCV AB SER QL: NONREACTIVE
HGB BLD-MCNC: 9.9 G/DL (ref 13.6–17.2)
IMM GRANULOCYTES # BLD AUTO: 0.1 K/UL (ref 0–0.5)
IMM GRANULOCYTES NFR BLD AUTO: 1 % (ref 0–5)
LYMPHOCYTES # BLD: 0.6 K/UL (ref 0.5–4.6)
LYMPHOCYTES NFR BLD: 5 % (ref 13–44)
MCH RBC QN AUTO: 31.6 PG (ref 26.1–32.9)
MCHC RBC AUTO-ENTMCNC: 33.3 G/DL (ref 31.4–35)
MCV RBC AUTO: 94.9 FL (ref 82–102)
MONOCYTES # BLD: 0.8 K/UL (ref 0.1–1.3)
MONOCYTES NFR BLD: 6 % (ref 4–12)
NEUTS SEG # BLD: 11.7 K/UL (ref 1.7–8.2)
NEUTS SEG NFR BLD: 88 % (ref 43–78)
NRBC # BLD: 0 K/UL (ref 0–0.2)
PLATELET # BLD AUTO: 242 K/UL (ref 150–450)
PMV BLD AUTO: 11.2 FL (ref 9.4–12.3)
POTASSIUM SERPL-SCNC: 4.4 MMOL/L (ref 3.5–5.1)
PROT SERPL-MCNC: 7.9 G/DL (ref 6.3–8.2)
RBC # BLD AUTO: 3.13 M/UL (ref 4.23–5.6)
SERVICE CMNT-IMP: NORMAL
SODIUM SERPL-SCNC: 137 MMOL/L (ref 133–143)
WBC # BLD AUTO: 13.2 K/UL (ref 4.3–11.1)

## 2022-12-27 PROCEDURE — 86706 HEP B SURFACE ANTIBODY: CPT

## 2022-12-27 PROCEDURE — 1100000000 HC RM PRIVATE

## 2022-12-27 PROCEDURE — 86704 HEP B CORE ANTIBODY TOTAL: CPT

## 2022-12-27 PROCEDURE — 36415 COLL VENOUS BLD VENIPUNCTURE: CPT

## 2022-12-27 PROCEDURE — 2580000003 HC RX 258: Performed by: SURGERY

## 2022-12-27 PROCEDURE — 6370000000 HC RX 637 (ALT 250 FOR IP): Performed by: INTERNAL MEDICINE

## 2022-12-27 PROCEDURE — 85025 COMPLETE CBC W/AUTO DIFF WBC: CPT

## 2022-12-27 PROCEDURE — 6360000002 HC RX W HCPCS: Performed by: SURGERY

## 2022-12-27 PROCEDURE — 80053 COMPREHEN METABOLIC PANEL: CPT

## 2022-12-27 PROCEDURE — A4216 STERILE WATER/SALINE, 10 ML: HCPCS | Performed by: SURGERY

## 2022-12-27 PROCEDURE — C9113 INJ PANTOPRAZOLE SODIUM, VIA: HCPCS | Performed by: SURGERY

## 2022-12-27 PROCEDURE — 5A1D70Z PERFORMANCE OF URINARY FILTRATION, INTERMITTENT, LESS THAN 6 HOURS PER DAY: ICD-10-PCS | Performed by: INTERNAL MEDICINE

## 2022-12-27 PROCEDURE — 8010000000 HC HEMODIALYSIS ACUTE INPT

## 2022-12-27 PROCEDURE — 2580000003 HC RX 258: Performed by: INTERNAL MEDICINE

## 2022-12-27 PROCEDURE — 2500000003 HC RX 250 WO HCPCS: Performed by: NURSE PRACTITIONER

## 2022-12-27 PROCEDURE — 80074 ACUTE HEPATITIS PANEL: CPT

## 2022-12-27 PROCEDURE — 6370000000 HC RX 637 (ALT 250 FOR IP): Performed by: SURGERY

## 2022-12-27 RX ADMIN — SODIUM CHLORIDE 40 MG: 9 INJECTION, SOLUTION INTRAMUSCULAR; INTRAVENOUS; SUBCUTANEOUS at 07:41

## 2022-12-27 RX ADMIN — MORPHINE SULFATE 2 MG: 2 INJECTION, SOLUTION INTRAMUSCULAR; INTRAVENOUS at 07:48

## 2022-12-27 RX ADMIN — TUBERCULIN PURIFIED PROTEIN DERIVATIVE 5 UNITS: 5 INJECTION, SOLUTION INTRADERMAL at 13:46

## 2022-12-27 RX ADMIN — MORPHINE SULFATE 2 MG: 2 INJECTION, SOLUTION INTRAMUSCULAR; INTRAVENOUS at 21:08

## 2022-12-27 RX ADMIN — VALSARTAN 320 MG: 320 TABLET, FILM COATED ORAL at 07:44

## 2022-12-27 RX ADMIN — PIPERACILLIN AND TAZOBACTAM 3375 MG: 3; .375 INJECTION, POWDER, LYOPHILIZED, FOR SOLUTION INTRAVENOUS at 21:03

## 2022-12-27 RX ADMIN — SODIUM CHLORIDE, PRESERVATIVE FREE 10 ML: 5 INJECTION INTRAVENOUS at 07:45

## 2022-12-27 RX ADMIN — TRAZODONE HYDROCHLORIDE 50 MG: 50 TABLET ORAL at 21:05

## 2022-12-27 RX ADMIN — FINASTERIDE 5 MG: 5 TABLET, FILM COATED ORAL at 21:05

## 2022-12-27 RX ADMIN — CINACALCET HYDROCHLORIDE 90 MG: 30 TABLET, FILM COATED ORAL at 21:05

## 2022-12-27 RX ADMIN — SODIUM CHLORIDE, PRESERVATIVE FREE 10 ML: 5 INJECTION INTRAVENOUS at 21:05

## 2022-12-27 RX ADMIN — PIPERACILLIN AND TAZOBACTAM 3375 MG: 3; .375 INJECTION, POWDER, LYOPHILIZED, FOR SOLUTION INTRAVENOUS at 07:40

## 2022-12-27 ASSESSMENT — PAIN SCALES - GENERAL
PAINLEVEL_OUTOF10: 6
PAINLEVEL_OUTOF10: 0
PAINLEVEL_OUTOF10: 3

## 2022-12-27 ASSESSMENT — PAIN DESCRIPTION - ORIENTATION: ORIENTATION: MID;LOWER

## 2022-12-27 ASSESSMENT — PAIN DESCRIPTION - LOCATION
LOCATION: ABDOMEN
LOCATION: ABDOMEN

## 2022-12-27 ASSESSMENT — PAIN DESCRIPTION - DESCRIPTORS: DESCRIPTORS: ACHING

## 2022-12-27 NOTE — PLAN OF CARE
Problem: Safety - Adult  Goal: Free from fall injury  12/27/2022 0940 by Bear Brewster RN  Outcome: Progressing  12/26/2022 2334 by Theodora Ramirez RN  Outcome: Progressing     Problem: Pain  Goal: Verbalizes/displays adequate comfort level or baseline comfort level  12/27/2022 0940 by Bear Brewster RN  Outcome: Progressing  12/26/2022 2334 by Theodora Ramirez RN  Outcome: Progressing

## 2022-12-27 NOTE — PROGRESS NOTES
[address]  Admission Date: 12/25/2022         [facility] Progress Note: 12/27/2022    Follow-up for: ESRD    The patient's chart is reviewed and the patient is discussed with the staff. Subjective:   Pt seen and examined on HD, dialyzing via LUE  Qb, UF 1500 tolerating UF, abdominal pain controlled, s/p PD catheter removed 12/26.      ROS:  Gen - no fever, no chills, appetite unchanged  CV - no chest pain, no palpitation  Lung - no shortness of breath, no cough  Abd - tenderness- controlled, no nausea/vomiting, no diarrhea  Ext - no edema    Current Facility-Administered Medications   Medication Dose Route Frequency    pantoprazole (PROTONIX) 40 mg in sodium chloride (PF) 0.9 % 10 mL injection  40 mg IntraVENous Daily    morphine (PF) injection 2 mg  2 mg IntraVENous Q2H PRN    morphine injection 4 mg  4 mg IntraVENous Q2H PRN    sodium chloride flush 0.9 % injection 5-40 mL  5-40 mL IntraVENous 2 times per day    sodium chloride flush 0.9 % injection 5-40 mL  5-40 mL IntraVENous PRN    0.9 % sodium chloride infusion   IntraVENous PRN    [Held by provider] heparin (porcine) injection 5,000 Units  5,000 Units SubCUTAneous 3 times per day    ondansetron (ZOFRAN-ODT) disintegrating tablet 4 mg  4 mg Oral Q8H PRN    Or    ondansetron (ZOFRAN) injection 4 mg  4 mg IntraVENous Q6H PRN    polyethylene glycol (GLYCOLAX) packet 17 g  17 g Oral Daily PRN    acetaminophen (TYLENOL) tablet 650 mg  650 mg Oral Q6H PRN    Or    acetaminophen (TYLENOL) suppository 650 mg  650 mg Rectal Q6H PRN    cinacalcet (SENSIPAR) tablet 90 mg  90 mg Oral Nightly    [Held by provider] NIFEdipine (PROCARDIA XL) extended release tablet 90 mg  90 mg Oral Nightly    finasteride (PROSCAR) tablet 5 mg  5 mg Oral Nightly    traZODone (DESYREL) tablet 50 mg  50 mg Oral Nightly    piperacillin-tazobactam (ZOSYN) 3,375 mg in sodium chloride 0.9 % 50 mL IVPB (mini-bag)  3,375 mg IntraVENous Q12H vancomycin (VANCOCIN) intermittent dosing (placeholder)   Other RX Placeholder    oxyCODONE (ROXICODONE) immediate release tablet 5 mg  5 mg Oral Q6H PRN    valsartan (DIOVAN) tablet 320 mg  320 mg Oral Daily    And    [Held by provider] hydroCHLOROthiazide (HYDRODIURIL) tablet 25 mg  25 mg Oral Daily    cloNIDine (CATAPRES) 0.3 MG/24HR 1 patch  1 patch TransDERmal Weekly         Objective:     Vitals:    12/27/22 0413 12/27/22 0728 12/27/22 0918 12/27/22 1000   BP:  131/84 136/89 (!) 141/95   Pulse:  99 97 99   Resp:  20     Temp:  98.1 °F (36.7 °C)     TempSrc:  Oral     SpO2:  93%     Weight: 257 lb 15 oz (117 kg)      Height:         Intake and Output:   12/25 1901 - 12/27 0700  In: 800 [P.O.:300; I.V.:500]  Out: 510   No intake/output data recorded. Physical Exam:   Constitutional:  the patient is well developed and in no acute distress, alert and oriented   HEENT:  Sclera clear, pupils equal, oral mucosa moist  Lungs: clear bilaterally   Cardiovascular:  Regular rate, S1, S2, no Rub   Abd/GI: soft and non-tender; with positive bowel sounds. Ext: warm without cyanosis. There is no lower leg edema. Skin:  no jaundice or rashes  Neuro: no gross neuro deficits   Psychiatric: Calm. Access: LUE AVF cannulated      LAB  Recent Labs     12/25/22  1155 12/26/22  0609 12/27/22  0705   WBC 11.5* 7.6 13.2*   HGB 9.8* 9.2* 9.9*   HCT 29.6* 28.1* 29.7*    187 242     Recent Labs     12/25/22  1155 12/26/22  0609 12/27/22  0705    136 137   K 3.7 3.7 4.4   CL 97* 100* 98*   CO2 29 28 27   BUN 49* 51* 57*   CREATININE 15.40* 14.60* 16.30*   MG 1.5* 1.9  --      No results for input(s): PH, PCO2, PO2, HCO3 in the last 72 hours.       Assessment/Plan:  (Medical Decision Making)   ESRD was on PD, transitioning to HD PD catheter was removed per gen surgery 12/26.   -suspected peritonitis on Abx, Cx pending   Pt seen on HD, using LUE AVF with 17 ga needles functioning well, DW pt he is very interested in HHD but would need to talk with his sister for possible care partner, in the mean time pt will need outpt HD slot, consulted [facility] for slot at [facility]- clinic closest to his home    2. Umbilical hernia herniated loops of bowel, questionable seroma versus abscess  -s/p PD catheter d/c 12/26     3. Anemia  Trending hgb s/p surgery     4. MBD  On Sensipar     5.   Hypertension- controlled   On home medication      Nathan Soto, APRN - [id number] [place]

## 2022-12-27 NOTE — DIALYSIS
TRANSFER IN - DIALYSIS    Received patient in dialysis unit from [facility] (unit) for ordered procedure. Consent verified for renal replacement therapy. Procedure explained to patient, opportunity for Q&A provided. Call light given. Patient a/ox3 and vital signs stable. /89 , P97 ,  0L  O2  via RA. Hemodialysis initiated using LUE AVF and 15 g needles. Machine settings per MD order. Heparin 0 unit bolus and 0 units/hr. Will monitor during treatment.

## 2022-12-27 NOTE — DIALYSIS
TRANSFER OUT - DIALYSIS    Hemodialysis treatment completed, pt ran 3.5 hours without complications. Patient alert and /91 , P 99       1 Kg removed. Needles x2 removed from access and manual pressure held until hemostasis complete and pressure dressing applied. Patient to [id number] after dialysis.

## 2022-12-27 NOTE — PROGRESS NOTES
Hospitalist Progress Note   Admit Date:  2022 11:46 AM   Name:  Tracy Mckeon   Age:  65 y.o. Sex:  male  :  1960   MRN:  [id number]   Room:  /    Presenting Complaint: abdominal pain  Reason(s) for Admission: Peritonitis ([facility] [address] [K65.9]  Peritonitis, dialysis-associated, initial encounter [facility] Course & Interval History:   71-year-old male with history of ESRD on peritoneal dialysis, HTN, peritonitis, admitted on  with diffuse abdominal pain, 10/10 intensity, which was secondary to possible peritonitis versus incarcerated umbilical hernia with abscess versus seroma. Patient noticed his PD fluid to be clinically, pain is mostly central and periumbilical.  He does report of having periumbilical hernia repair. Patient met sepsis criteria, was started on broad-spectrum antibiotic via PD catheter. Fluid culture was sent, fluid cell count was not. Patient is a DNR/DNI. CTAP showed moderate size umbilical hernia with herniated loops of bowel fluid, the fluid collection measures roughly 5.7 x 6.0 cm, seroma or abscess could be considered, PCKD appreciated with no evidence of hydronephrosis. Subjective/24hr Events (22): Patient seen at bedside, currently on HD. He is alert and awake, on room air, AOx3. Patient reports feeling better overall, only has mild abdominal pain after undergoing surgery on . Denies any nausea, vomiting, fever, chills, chest pain, shortness of breath, headache, lightheadedness or dizziness. ROS:  10 point review of system is negative except for what mentioned above.       Assessment & Plan:     Principal Problem:    Sepsis ([facility] [address]    Recurrent incarcerated ventral hernia with localized peritonitis  Plan: -  Status post incarcerated ventral hernia repair, POD #1  Postop care as per general surgery  N.p.o. except for meds and sips of water till 2022  Peritoneal fluid and blood culture negative to date.  Intra-Op culture positive for alpha strep  PD fluid with WBC of 977, segmented neutrophil 96%  Continue vancomycin and Zosyn  Nephrology on board  CT AP with findings of umbilical hernia and herniated loops of bowel with fluid collection measuring 5.7 x 6.0 cm with concerns for seroma versus abscess. Will defer antibiotic duration to general surgery. Active Problems:    Peritoneal dialysis status ([address]    ESRD (end stage renal disease) ([address]  Plan: 12/27-  Patient transition to HD via left upper extremity AV fistula   working on dialysis chair assignment      Anemia  Plan: 12/27-  Hemoglobin 9.9, stable, continue to monitor. No signs of active bleed. Hypomagnesemia  Plan: 12/27-  Magnesium 1.9    DNR (do not resuscitate)  Plan: Noted      Essential hypertension  Plan: 12/27-  BP is optimally controlled with clonidine patch 0.3 mg transdermal q. 7 days, valsartan 320 mg p.o. daily  HCTZ currently on hold. Nifedipine currently on hold. PT consulted      Discharge Planning:    Pending clearance from general surgery and nephrology    Diet:  Diet NPO Exceptions are: Ice Chips, Sips of Water with Meds, Sips of Clear Liquids  DVT PPx: Heparin  Code status: DNR    I spent 36 minutes of time caring for this patient on the unit nearby or at bedside, and more than 50 percent was spent on coordination of care activities, and/or patient/family counseling regarding status and plan of care.      Hospital Problems             Last Modified POA    * (Principal) Sepsis ([address] 12/26/2022 Yes    Peritonitis ([address] 12/26/2022 Yes    Peritoneal dialysis status ([address] (Chronic) 12/25/2022 Yes    Anemia 12/25/2022 Yes    Hypomagnesemia 12/25/2022 Yes    DNR (do not resuscitate) (Chronic) 12/25/2022 Yes    Localized peritonitis ([address] 12/26/2022 Yes    Recurrent ventral hernia with incarceration 12/26/2022 Yes    Infection due to peritoneal dialysis catheter ([address] 17/33/1629 Yes    Periumbilical abdominal pain 12/26/2022 Yes    Abdominal wall abscess 12/26/2022 Yes    Necrotizing fasciitis ([facility] [address] 12/26/2022 Yes    Abdominal wall ulcer, with necrosis of muscle ([facility] [address] 12/26/2022 Yes    Essential hypertension 12/25/2022 Yes    ESRD (end stage renal disease) ([facility] [address] 12/25/2022 Yes     Objective:   Patient Vitals for the past 24 hrs:   Temp Pulse Resp BP SpO2   12/27/22 0728 98.1 °F (36.7 °C) 99 20 131/84 93 %   12/27/22 0410 98.4 °F (36.9 °C) (!) 102 20 113/87 97 %   12/26/22 2333 98.3 °F (36.8 °C) (!) 106 18 114/86 94 %   12/26/22 1930 98.3 °F (36.8 °C) (!) 115 20 116/83 93 %   12/26/22 1610 97.9 °F (36.6 °C) (!) 110 18 139/89 92 %   12/26/22 1545 -- (!) 107 18 118/78 96 %   12/26/22 1540 -- (!) 104 16 119/81 94 %   12/26/22 1535 -- (!) 105 17 104/82 96 %   12/26/22 1530 98.4 °F (36.9 °C) (!) 103 17 112/81 96 %   12/26/22 1526 -- (!) 103 19 110/76 96 %   12/26/22 1521 -- (!) 101 18 107/73 95 %   12/26/22 1516 -- (!) 101 15 109/75 95 %   12/26/22 1510 -- (!) 101 17 105/70 95 %   12/26/22 1506 98.6 °F (37 °C) (!) 109 17 111/79 95 %   12/26/22 1106 98.6 °F (37 °C) 98 18 123/85 94 %   12/26/22 0836 -- -- 18 -- --         Estimated body mass index is 30.59 kg/m² as calculated from the following:    Height as of this encounter: 6' 5\" (1.956 m). Weight as of this encounter: 257 lb 15 oz (117 kg). Intake/Output Summary (Last 24 hours) at 12/27/2022 0806  Last data filed at 12/26/2022 2333  Gross per 24 hour   Intake 500 ml   Output 110 ml   Net 390 ml           Physical Exam:     Blood pressure 131/84, pulse 99, temperature 98.1 °F (36.7 °C), temperature source Oral, resp. rate 20, height 6' 5\" (1.956 m), weight 257 lb 15 oz (117 kg), SpO2 93 %. General:    Alert, awake, well built, on room air, NAD  Head:  Normocephalic, atraumatic  Eyes:  Sclerae appear normal. Pupils equally round. ENT:  Nares appear normal, no drainage. Moist oral mucosa  Neck:  No restricted ROM. Trachea midline. CV:   RRR. No m/r/g.  No jugular venous distension. Lungs:   CTAB. No wheezing, rhonchi, or rales. Respirations even, unlabored. Abdomen:   Surgical dressing in place not removed as recommended by general surgery. Extremities: No cyanosis or clubbing. No edema. Skin:     No rashes and normal coloration. Warm and dry. Neuro:  GCS 15, cranial nerves intact, no motor or sensory deficit, cerebellar functions intact  Psych:  AOx3, mood and affect appropriate.       I have reviewed ordered lab tests and independently visualized imaging below:    Recent Labs:  Recent Results (from the past 48 hour(s))   CBC with Auto Differential    Collection Time: 12/25/22 11:55 AM   Result Value Ref Range    WBC 11.5 (H) 4.3 - 11.1 K/uL    RBC 3.10 (L) 4.23 - 5.6 M/uL    Hemoglobin 9.8 (L) 13.6 - 17.2 g/dL    Hematocrit 29.6 (L) 41.1 - 50.3 %    MCV 95.5 82 - 102 FL    MCH 31.6 26.1 - 32.9 PG    MCHC 33.1 31.4 - 35.0 g/dL    RDW 15.9 (H) 11.9 - 14.6 %    Platelets 042 982 - 764 K/uL    MPV 10.6 9.4 - 12.3 FL    nRBC 0.00 0.0 - 0.2 K/uL    Differential Type AUTOMATED      Seg Neutrophils 84 (H) 43 - 78 %    Lymphocytes 8 (L) 13 - 44 %    Monocytes 8 4.0 - 12.0 %    Eosinophils % 0 (L) 0.5 - 7.8 %    Basophils 0 0.0 - 2.0 %    Immature Granulocytes 0 0.0 - 5.0 %    Segs Absolute 9.6 (H) 1.7 - 8.2 K/UL    Absolute Lymph # 1.0 0.5 - 4.6 K/UL    Absolute Mono # 0.9 0.1 - 1.3 K/UL    Absolute Eos # 0.1 0.0 - 0.8 K/UL    Basophils Absolute 0.0 0.0 - 0.2 K/UL    Absolute Immature Granulocyte 0.1 0.0 - 0.5 K/UL   Comprehensive Metabolic Panel    Collection Time: 12/25/22 11:55 AM   Result Value Ref Range    Sodium 136 133 - 143 mmol/L    Potassium 3.7 3.5 - 5.1 mmol/L    Chloride 97 (L) 101 - 110 mmol/L    CO2 29 21 - 32 mmol/L    Anion Gap 10 2 - 11 mmol/L    Glucose 109 (H) 65 - 100 mg/dL    BUN 49 (H) 8 - 23 MG/DL    Creatinine 15.40 (H) 0.8 - 1.5 MG/DL    Est, Glom Filt Rate 3 (L) >60 ml/min/1.73m2    Calcium 9.3 8.3 - 10.4 MG/DL    Total Bilirubin 0.8 0.2 - 1.1 MG/DL ALT 15 12 - 65 U/L    AST 9 (L) 15 - 37 U/L    Alk Phosphatase 85 50 - 136 U/L    Total Protein 8.0 6.3 - 8.2 g/dL    Albumin 2.8 (L) 3.2 - 4.6 g/dL    Globulin 5.2 (H) 2.8 - 4.5 g/dL    Albumin/Globulin Ratio 0.5 0.4 - 1.6     Lipase    Collection Time: 12/25/22 11:55 AM   Result Value Ref Range    Lipase 88 73 - 393 U/L   Lactic Acid    Collection Time: 12/25/22 11:55 AM   Result Value Ref Range    Lactic Acid, Plasma 1.4 0.4 - 2.0 MMOL/L   Culture, Blood 1    Collection Time: 12/25/22 11:55 AM    Specimen: Blood   Result Value Ref Range    Special Requests RIGHT  Antecubital        Culture NO GROWTH 2 DAYS     Magnesium    Collection Time: 12/25/22 11:55 AM   Result Value Ref Range    Magnesium 1.5 (L) 1.8 - 2.4 mg/dL   Culture, Body Fluid    Collection Time: 12/25/22  1:17 PM    Specimen: Peritoneal Dialysis Fluid;  Body Fluid   Result Value Ref Range    Special Requests NO SPECIAL REQUESTS      Gram stain 0 TO 20 WBCS/OIF     Gram stain NO DEFINITE ORGANISM SEEN      Culture NO GROWTH 1 DAY     EKG 12 Lead    Collection Time: 12/25/22  5:56 PM   Result Value Ref Range    Ventricular Rate 99 BPM    Atrial Rate 99 BPM    P-R Interval 194 ms    QRS Duration 98 ms    Q-T Interval 366 ms    QTc Calculation (Bazett) 469 ms    P Axis 36 degrees    R Axis 1 degrees    T Axis 47 degrees    Diagnosis Normal sinus rhythm    Comprehensive Metabolic Panel w/ Reflex to MG    Collection Time: 12/26/22  6:09 AM   Result Value Ref Range    Sodium 136 133 - 143 mmol/L    Potassium 3.7 3.5 - 5.1 mmol/L    Chloride 100 (L) 101 - 110 mmol/L    CO2 28 21 - 32 mmol/L    Anion Gap 8 2 - 11 mmol/L    Glucose 156 (H) 65 - 100 mg/dL    BUN 51 (H) 8 - 23 MG/DL    Creatinine 14.60 (H) 0.8 - 1.5 MG/DL    Est, Glom Filt Rate 3 (L) >60 ml/min/1.73m2    Calcium 9.2 8.3 - 10.4 MG/DL    Total Bilirubin 0.6 0.2 - 1.1 MG/DL    ALT 12 12 - 65 U/L    AST 7 (L) 15 - 37 U/L    Alk Phosphatase 73 50 - 136 U/L    Total Protein 6.9 6.3 - 8.2 g/dL Albumin 2.2 (L) 3.2 - 4.6 g/dL    Globulin 4.7 (H) 2.8 - 4.5 g/dL    Albumin/Globulin Ratio 0.5 0.4 - 1.6     CBC with Auto Differential    Collection Time: 12/26/22  6:09 AM   Result Value Ref Range    WBC 7.6 4.3 - 11.1 K/uL    RBC 2.92 (L) 4.23 - 5.6 M/uL    Hemoglobin 9.2 (L) 13.6 - 17.2 g/dL    Hematocrit 28.1 (L) 41.1 - 50.3 %    MCV 96.2 82 - 102 FL    MCH 31.5 26.1 - 32.9 PG    MCHC 32.7 31.4 - 35.0 g/dL    RDW 15.7 (H) 11.9 - 14.6 %    Platelets 065 672 - 548 K/uL    MPV 11.0 9.4 - 12.3 FL    nRBC 0.00 0.0 - 0.2 K/uL    Differential Type AUTOMATED      Seg Neutrophils 78 43 - 78 %    Lymphocytes 12 (L) 13 - 44 %    Monocytes 9 4.0 - 12.0 %    Eosinophils % 1 0.5 - 7.8 %    Basophils 0 0.0 - 2.0 %    Immature Granulocytes 0 0.0 - 5.0 %    Segs Absolute 5.9 1.7 - 8.2 K/UL    Absolute Lymph # 0.9 0.5 - 4.6 K/UL    Absolute Mono # 0.7 0.1 - 1.3 K/UL    Absolute Eos # 0.1 0.0 - 0.8 K/UL    Basophils Absolute 0.0 0.0 - 0.2 K/UL    Absolute Immature Granulocyte 0.0 0.0 - 0.5 K/UL   Magnesium    Collection Time: 12/26/22  6:09 AM   Result Value Ref Range    Magnesium 1.9 1.8 - 2.4 mg/dL   Body fluid cell count    Collection Time: 12/26/22 11:22 AM   Result Value Ref Range    Specimen PERITONEAL     Color, Fluid STRAW      Appearance, Fluid CLOUDY      RBC, Fluid <1,000 /cu mm    WBC, Fluid 977 /cu mm    Segmented Neutrophils, BAL 96 %    Lymphocytes, BAL 2 %    Macrophages, BAL 2 %   Culture, Wound Aerobic Only    Collection Time: 12/26/22  1:46 PM    Specimen: Abdomen   Result Value Ref Range    Special Requests NO SPECIAL REQUESTS      Gram stain 0 TO 1 WBCS/OIF     Gram stain NO DEFINITE ORGANISM SEEN      Culture PENDING          Other Studies:  CT ABDOMEN PELVIS WO CONTRAST Additional Contrast? Oral    Result Date: 12/25/2022  CT of the abdomen and pelvis without contrast. CLINICAL INDICATION: Yellowish solution after peritoneal dialysis, concern for peritonitis PROCEDURE: Serial thin-section axial images obtained from the upper abdomen through the proximal femurs without the administration of intravenous contrast. Oral contrast was given. Radiation dose reduction techniques were used for this study. Our CT scanners use one or all of the following: Automated exposure control, adjusted of the mA and/or kV according to patient size, iterative reconstruction COMPARISON: CT abdomen and pelvis dated 7/6/2022 FINDINGS:  Evaluation of the hollow and solid viscera is limited by the lack of intravenous contrast. Dependent atelectasis is present. CT ABDOMEN: The stomach is unremarkable. Innumerable cysts are noted throughout the bilateral kidneys unchanged from the prior exam in keeping with polycystic kidney disease. Multiple of the cysts are hyperdense and likely hemorrhagic. There is no hydronephrosis. No renal or ureteral stones evident. The adrenal glands are normal. An umbilical hernia is present in the lower mid abdomen at the insertion site of the peritoneal dialysis catheter. There is a fluid collection within the hernia sac concerning for possible seroma or abscess that measures 5.7 x 3.2 x 6.0 cm. There are herniated bowel loops as well without bowel obstruction. The abdominal wall defect measures roughly 7.5 cm. CT PELVIS: The bowel is normal in course, caliber, and wall thickness. No bowel obstruction evident. The appendix is normal. The peritoneal dialysis catheter coils in the central pelvis and a small amount of ascitic fluid. The bladder is decompressed. There is no inguinal hernia. The rectum is unremarkable. No aggressive bone lesions identified. 1. Moderate-sized umbilical hernia with herniated loops of bowel and fluid. The fluid collection measures roughly 5.7 x 6.0 cm. Seroma or abscess could be considered. 2. Polycystic kidney disease. There is no hydronephrosis.       XR CHEST PORTABLE    Result Date: 12/25/2022  KUB CLINICAL INDICATION: Sepsis FINDINGS: Single AP view the chest compared to a similar exam dated 6/24/2017 show the lungs are slightly underexpanded but otherwise clear. No pleural effusion or pneumothorax. The cardiac silhouette and mediastinum are unremarkable. The bones are normal.     Slightly under expanded lungs, otherwise no acute abnormality.       Current Meds:  Current Facility-Administered Medications   Medication Dose Route Frequency    pantoprazole (PROTONIX) 40 mg in sodium chloride (PF) 0.9 % 10 mL injection  40 mg IntraVENous Daily    morphine (PF) injection 2 mg  2 mg IntraVENous Q2H PRN    morphine injection 4 mg  4 mg IntraVENous Q2H PRN    sodium chloride flush 0.9 % injection 5-40 mL  5-40 mL IntraVENous 2 times per day    sodium chloride flush 0.9 % injection 5-40 mL  5-40 mL IntraVENous PRN    0.9 % sodium chloride infusion   IntraVENous PRN    [Held by provider] heparin (porcine) injection 5,000 Units  5,000 Units SubCUTAneous 3 times per day    ondansetron (ZOFRAN-ODT) disintegrating tablet 4 mg  4 mg Oral Q8H PRN    Or    ondansetron (ZOFRAN) injection 4 mg  4 mg IntraVENous Q6H PRN    polyethylene glycol (GLYCOLAX) packet 17 g  17 g Oral Daily PRN    acetaminophen (TYLENOL) tablet 650 mg  650 mg Oral Q6H PRN    Or    acetaminophen (TYLENOL) suppository 650 mg  650 mg Rectal Q6H PRN    cinacalcet (SENSIPAR) tablet 90 mg  90 mg Oral Nightly    [Held by provider] NIFEdipine (PROCARDIA XL) extended release tablet 90 mg  90 mg Oral Nightly    finasteride (PROSCAR) tablet 5 mg  5 mg Oral Nightly    traZODone (DESYREL) tablet 50 mg  50 mg Oral Nightly    piperacillin-tazobactam (ZOSYN) 3,375 mg in sodium chloride 0.9 % 50 mL IVPB (mini-bag)  3,375 mg IntraVENous Q12H    vancomycin (VANCOCIN) intermittent dosing (placeholder)   Other RX Placeholder    oxyCODONE (ROXICODONE) immediate release tablet 5 mg  5 mg Oral Q6H PRN    valsartan (DIOVAN) tablet 320 mg  320 mg Oral Daily    And    [Held by provider] hydroCHLOROthiazide (HYDRODIURIL) tablet 25 mg  25 mg Oral Daily    cloNIDine (CATAPRES) 0.3 MG/24HR 1 patch  1 patch TransDERmal Weekly       Signed:  Shon Schuster MD    Part of this note may have been written by using a voice dictation software. The note has been proof read but may still contain some grammatical/other typographical errors.

## 2022-12-27 NOTE — PROGRESS NOTES
[facility] DAILY FOLLOW UP RENAL INSUFFICIENCY PATIENT   [facility]   Pharmacy Pharmacokinetic Monitoring Service - Vancomycin    Consulting Provider: Dr. Da Silva   Indication: Suspected peritonitis  Target Concentration: Random level ?15 mg/L  Day of Therapy: 3  Additional Antimicrobials: Zosyn    Patient eligible for piperacillin-tazobactam to cefepime auto-substitution per P&T approved protocol? NO    Pertinent Laboratory Values:   Wt Readings from Last 1 Encounters:   12/27/22 257 lb 15 oz (117 kg)     Temp Readings from Last 1 Encounters:   12/27/22 98.1 °F (36.7 °C) (Oral)     Recent Labs     12/25/22  1155 12/26/22  0609 12/27/22  0705   BUN 49* 51* 57*   CREATININE 15.40* 14.60* 16.30*   WBC 11.5* 7.6 13.2*   LACACIDPL 1.4  --   --        No results found for: [address]    MRSA Nasal Swab: N/A. Non-respiratory infection.    Assessment:  Date:  Dose/Freq Admin Times Level/Time:   12/25 2500mg x1 1402    12/26 12/27 12/28   Rd @ 0400           Plan:  Concentration-guided dosing due to peritoneal dialysis  No dose needed today.    Vancomycin concentrations will be ordered as clinically appropriate   Pharmacy will continue to monitor patient and adjust therapy as indicated    Thank you for the consult,  Oral Pritchett, PharmD, BCOP  Clinical Pharmacist  Contact [employer]

## 2022-12-27 NOTE — PROGRESS NOTES
END OF SHIFT SUMMARY:    Significant vitals this shift:  vss  Significant labs this shift:  no  Tests performed this shift:  no  Orders to be followed up on:  no  Blood products given this shift:  no  Additional events this shift:   Vss A&O X's 4 no complaints of pain or discomfort pt received HD today back in room no complaints of pain or discomfort no BM during shift passing some flatus resting in bed call light in reach     I/Os:  +/- this shift: yes  12/27 0701 - 12/27 1900  In: 500   Out: 1500   Occurrences this Shift:  Urine yes, BM no, Emesis no    Roxy Avitia RN

## 2022-12-27 NOTE — PROGRESS NOTES
H&P/Consult Note/Progress Note/Office Note:   Jenni Franco  MRN: [id number]  :1960  Age:62 y.o.    HPI: Jenni Franco is a 65 y.o. male who is s/p open drainage of localized peritonitis, debridement of necrotizing fasciitis of abdominal wall infection, removal of infected PD cath, and repair of recurrent incarcerated ventral hernia with Vicryl mesh on 22. Prior to surgery he was admitted by the hospitalist with sepsis on 22 after he presented to the ER with a 2-week history of progressive abdominal pain and bulging. He reported a prior recurrent ventral hernia for many years following open umbilical hernia repair with Prolene mesh by Dr. [place] on 2014. He had a PD cath placed at that time and is dialysis dependent. Recently however he developed progressive and severe abdominal pain associated with worsening bulging and tenderness. He also described an associated eschar of the umbilical skin recently  This was associated with a change in the color of the effluent from the PD catheter. He has had associated N/V, tachycardia, and leukocytosis on admission. He was placed on vancomycin and Zosyn. CT imaging was performed as shown below and general surgery consultation was obtained. He described a prior episode of peritonitis without abscess or hernia treated with Abx in approximately 2016. He described having an AV fistula for dialysis placed many years ago which has never been used. He wants to stop having peritoneal dialysis and convert to hemodialysis. 22 CT abd/pelvis withoral but no IV contrast  Hx: Yellowish solution after peritoneal dialysis, concern for peritonitis       FINDINGS: Evaluation of the hollow and solid viscera is limited by the lack of intravenous contrast.   Dependent atelectasis is present. CT ABDOMEN: The stomach is unremarkable.  Innumerable cysts are noted throughout the bilateral kidneys unchanged from the prior exam in keeping with polycystic kidney disease. Multiple of the cysts are hyperdense and likely hemorrhagic. There is no hydronephrosis. No renal or ureteral stones evident. The adrenal glands are normal. An umbilical hernia is present in the lower mid abdomen at the insertion site of the peritoneal dialysis catheter. There is a fluid collection within the hernia sac concerning for possible seroma or abscess that measures 5.7 x 3.2 x 6.0 cm. There are herniated bowel loops as well without bowel obstruction. The abdominal wall defect measures roughly 7.5 cm. CT PELVIS: The bowel is normal in course, caliber, and wall thickness. No bowel obstruction evident. The appendix is normal. The peritoneal dialysis catheter coils in the central pelvis and a small amount of ascitic fluid. The bladder is decompressed. There is no inguinal hernia. The rectum is unremarkable. No aggressive bone lesions identified. Impression:  1. Moderate-sized umbilical hernia with herniated loops of bowel and fluid. The fluid collection measures roughly 5.7 x 6.0 cm. Seroma or abscess could be considered. 2. Polycystic kidney disease. There is no hydronephrosis. Additional hx:  12/27/22 POD1: Patient alert and conversant. Abdominal pain is minimal.  Binder in place with surgical dressing CDI. LUE AVF pulsatile. WBC 13.2. Cr 13.3 K+4.4 AF/VSS. -Flatus/-BM                          Past Medical History:   Diagnosis Date    Anemia     Anxiety     Arthritis     CAD (coronary artery disease)     per pt- no MI- \"They said the back of my heart was something. ..like 40%\"    Chronic kidney disease     [facility]; stage 5; pt has AV fistula to left arm; PERITONEAL DIALYSIS daily since 2014    Chronic pain     CKD (chronic kidney disease)     Former smoker     Fracture, tibial plateau 77/88/2827    GERD (gastroesophageal reflux disease)     Heart disease     Hyperlipidemia     on med for control    Hypertension     controlled with med    Hypertensive nephrosclerosis     per nephrology note 4/14/14    Hypoactive thyroid     Kidney stone     Peritoneal dialysis catheter in place [facility])     Peritoneal dialysis-associated peritonitis ([facility] 06/24/2017    during hs    PUD (peptic ulcer disease)     SBP (spontaneous bacterial peritonitis) ([facility] [address] 09/24/2016    Sepsis ([facility] [address] 09/24/2016    Tibial plateau fracture, left 10/38/7295    Umbilical hernia     Unspecified sleep apnea     pt does not have a CPAP     Past Surgical History:   Procedure Laterality Date    ABDOMEN SURGERY Right 6/13/2022    RIGHT LOWER QUADRANT ABDOMEN LESION BIOPSY EXCISION performed by Zakia Corona MD at [address] Right 1995    tip of 5th finger    COLONOSCOPY N/A 3/23/2018    COLONOSCOPY  BMI 34 performed by Zoey Duff MD at [facility] PYELOGRAMS    CYSTOSCOPY Left 11/1/2022    CYSTOSCOPY, LEFT URETEROSCOPY, BASKET STONE EXTRACTION, LEFT URETERAL STENT INSERTION performed by Lee Grace MD at [facility] Left as a child    thumb    ORTHOPEDIC SURGERY Left 1989    knee    ORTHOPEDIC SURGERY Left 2017    LEFT LATERAL TIBIAL PLATEAU OPEN REDUCTION INTERNAL FIXATION    WI ABDOMEN SURGERY PROC UNLISTED  6/11/14    PD catheter placement    SKIN BIOPSY Left 6/13/2022    LEFT BUTTOCK MASS EXCISION performed by Zakia Corona MD at [address] [place] [address] Left 2012    AV fistula to arm     Current Facility-Administered Medications   Medication Dose Route Frequency    pantoprazole (PROTONIX) 40 mg in sodium chloride (PF) 0.9 % 10 mL injection  40 mg IntraVENous Daily    morphine (PF) injection 2 mg  2 mg IntraVENous Q2H PRN    morphine injection 4 mg  4 mg IntraVENous Q2H PRN    sodium chloride flush 0.9 % injection 5-40 mL  5-40 mL IntraVENous 2 times per day    sodium chloride flush 0.9 % injection 5-40 mL  5-40 mL IntraVENous PRN    0.9 % sodium chloride infusion   IntraVENous PRN    [Held by provider] heparin (porcine) injection 5,000 Units  5,000 Units SubCUTAneous 3 times per day    ondansetron (ZOFRAN-ODT) disintegrating tablet 4 mg  4 mg Oral Q8H PRN    Or    ondansetron (ZOFRAN) injection 4 mg  4 mg IntraVENous Q6H PRN    polyethylene glycol (GLYCOLAX) packet 17 g  17 g Oral Daily PRN    acetaminophen (TYLENOL) tablet 650 mg  650 mg Oral Q6H PRN    Or    acetaminophen (TYLENOL) suppository 650 mg  650 mg Rectal Q6H PRN    cinacalcet (SENSIPAR) tablet 90 mg  90 mg Oral Nightly    [Held by provider] NIFEdipine (PROCARDIA XL) extended release tablet 90 mg  90 mg Oral Nightly    finasteride (PROSCAR) tablet 5 mg  5 mg Oral Nightly    traZODone (DESYREL) tablet 50 mg  50 mg Oral Nightly    piperacillin-tazobactam (ZOSYN) 3,375 mg in sodium chloride 0.9 % 50 mL IVPB (mini-bag)  3,375 mg IntraVENous Q12H    vancomycin (VANCOCIN) intermittent dosing (placeholder)   Other RX Placeholder    oxyCODONE (ROXICODONE) immediate release tablet 5 mg  5 mg Oral Q6H PRN    valsartan (DIOVAN) tablet 320 mg  320 mg Oral Daily    And    [Held by provider] hydroCHLOROthiazide (HYDRODIURIL) tablet 25 mg  25 mg Oral Daily    cloNIDine (CATAPRES) 0.3 MG/24HR 1 patch  1 patch TransDERmal Weekly     ALLERGIES:  Iodine    Social History     Socioeconomic History    Marital status: Single     Spouse name: None    Number of children: None    Years of education: None    Highest education level: None   Tobacco Use    Smoking status: Former     Packs/day: 0.50     Years: 15.00     Pack years: 7.50     Types: Cigarettes     Quit date: 3/1/2014     Years since quittin.8    Smokeless tobacco: Never    Tobacco comments:     Quit smoking: pt states only smoked cigarettes when drinking alcohol   Substance and Sexual Activity    Alcohol use: Yes    Drug use: Yes     Types: Marijuana Charmayne Stai)     Comment: \"when I can afford it\"      Social Determinants of Health     Financial Resource Strain: Low Risk     Difficulty of Paying Living Expenses: Not hard at all   Food Insecurity: No Food Insecurity    Worried About Running Out of Food in the Last Year: Never true    Ran Out of Food in the Last Year: Never true   Physical Activity: Insufficiently Active    Days of Exercise per Week: 1 day    Minutes of Exercise per Session: 20 min     Social History     Tobacco Use   Smoking Status Former    Packs/day: 0.50    Years: 15.00    Pack years: 7.50    Types: Cigarettes    Quit date: 3/1/2014    Years since quittin.8   Smokeless Tobacco Never   Tobacco Comments    Quit smoking: pt states only smoked cigarettes when drinking alcohol     Family History   Problem Relation Age of Onset    COPD Father     Heart Disease Father     Hypertension Mother     Diabetes Mother     Hypertension Father      ROS: The patient has no difficulty with chest pain or shortness of breath. No fever or chills. Comprehensive review of systems was otherwise unremarkable except as noted above. Physical Exam:   /87   Pulse (!) 102   Temp 98.4 °F (36.9 °C) (Oral)   Resp 20   Ht 6' 5\" (1.956 m)   Wt 257 lb 15 oz (117 kg)   SpO2 97%   BMI 30.59 kg/m²   Vitals:    22 1930 22 2333 22 0410 22 0413   BP: 116/83 114/86 113/87    Pulse: (!) 115 (!) 106 (!) 102    Resp: 20 18 20    Temp: 98.3 °F (36.8 °C) 98.3 °F (36.8 °C) 98.4 °F (36.9 °C)    TempSrc: Oral Oral Oral    SpO2: 93% 94% 97%    Weight:    257 lb 15 oz (117 kg)   Height:         No intake/output data recorded. 1901 -  0700  In: 800 [P.O.:300; I.V.:500]  Out: 510     Constitutional: Alert, oriented, cooperative patient in no acute distress; appears stated age    Eyes:Sclera are clear. EOMs intact  ENMT: no external lesions gross hearing normal; no obvious neck masses, no ear or lip lesions, nares normal  CV: RRR. Normal perfusion  Resp: No JVD. Breathing is  non-labored; no audible wheezing.       GI: firm with surgical dressing CDI covered with ioban. RLQ dry dressing CDI. Vallarie Jewel in place. Musculoskeletal: unremarkable with normal function. No embolic signs or cyanosis. LUE with AVF pulsitile  Neuro:  Oriented; moves all 4; no focal deficits  Psychiatric: normal affect and mood, no memory impairment    Recent vitals (if inpt):  Patient Vitals for the past 24 hrs:   BP Temp Temp src Pulse Resp SpO2 Weight   12/27/22 0413 -- -- -- -- -- -- 257 lb 15 oz (117 kg)   12/27/22 0410 113/87 98.4 °F (36.9 °C) Oral (!) 102 20 97 % --   12/26/22 2333 114/86 98.3 °F (36.8 °C) Oral (!) 106 18 94 % --   12/26/22 1930 116/83 98.3 °F (36.8 °C) Oral (!) 115 20 93 % --   12/26/22 1610 139/89 97.9 °F (36.6 °C) Oral (!) 110 18 92 % --   12/26/22 1545 118/78 -- -- (!) 107 18 96 % --   12/26/22 1540 119/81 -- -- (!) 104 16 94 % --   12/26/22 1535 104/82 -- -- (!) 105 17 96 % --   12/26/22 1530 112/81 98.4 °F (36.9 °C) Temporal (!) 103 17 96 % --   12/26/22 1526 110/76 -- -- (!) 103 19 96 % --   12/26/22 1521 107/73 -- -- (!) 101 18 95 % --   12/26/22 1516 109/75 -- -- (!) 101 15 95 % --   12/26/22 1510 105/70 -- -- (!) 101 17 95 % --   12/26/22 1506 111/79 98.6 °F (37 °C) Temporal (!) 109 17 95 % --   12/26/22 1106 123/85 98.6 °F (37 °C) Oral 98 18 94 % --   12/26/22 0836 -- -- -- -- 18 -- --       Amount and/or Complexity of Data Reviewed and Analyzed:  I reviewed and analyzed all of the unique labs and radiologic studies that are shown below as well as any that are in the HPI, and any that are in the expanded problem list below  *Each unique test, order, or document contributes to the combination of 2 or combination of 3 in Category 1 below. For this visit I also reviewed old records and prior notes.       Recent Labs     12/26/22  0609   WBC 7.6   HGB 9.2*         K 3.7   *   CO2 28   BUN 51*   ALT 12     Review of most recent CBC  Lab Results   Component Value Date    WBC 7.6 12/26/2022    HGB 9.2 (L) 12/26/2022 HCT 28.1 (L) 12/26/2022    MCV 96.2 12/26/2022     12/26/2022       Review of most recent BMP  Lab Results   Component Value Date/Time     12/26/2022 06:09 AM    K 3.7 12/26/2022 06:09 AM     12/26/2022 06:09 AM    CO2 28 12/26/2022 06:09 AM    BUN 51 12/26/2022 06:09 AM    CREATININE 14.60 12/26/2022 06:09 AM    GLUCOSE 156 12/26/2022 06:09 AM    CALCIUM 9.2 12/26/2022 06:09 AM        Review of most recent LFTs (and lipase if done)  Lab Results   Component Value Date    ALT 12 12/26/2022    AST 7 (L) 12/26/2022    ALKPHOS 73 12/26/2022    BILITOT 0.6 12/26/2022     Lab Results   Component Value Date    LIPASE 88 12/25/2022       No results found for: INR, APTT, LCAD    Review of most recent HgbA1c  No results found for: LABA1C    Nutritional assessment screen for wound healing issues:  Lab Results   Component Value Date    LABALBU 2.2 (L) 12/26/2022         Xray Result (most recent):  XR CHEST PORTABLE 12/25/2022    Narrative  KUB    CLINICAL INDICATION: Sepsis    FINDINGS: Single AP view the chest compared to a similar exam dated 6/24/2017  show the lungs are slightly underexpanded but otherwise clear. No pleural  effusion or pneumothorax. The cardiac silhouette and mediastinum are  unremarkable. The bones are normal.    Impression  Slightly under expanded lungs, otherwise no acute abnormality. CT Result (most recent):  CT ABDOMEN PELVIS WO IV CONTRAST 12/25/2022    Narrative  CT of the abdomen and pelvis without contrast.    CLINICAL INDICATION: Yellowish solution after peritoneal dialysis, concern for  peritonitis    PROCEDURE: Serial thin-section axial images obtained from the upper abdomen  through the proximal femurs without the administration of intravenous contrast.  Oral contrast was given. Radiation dose reduction techniques were used for this  study.  Our CT scanners use one or all of the following: Automated exposure  control, adjusted of the mA and/or kV according to patient size, iterative  reconstruction    COMPARISON: CT abdomen and pelvis dated 7/6/2022    FINDINGS:  Evaluation of the hollow and solid viscera is limited by the lack of  intravenous contrast.    Dependent atelectasis is present. CT ABDOMEN: The stomach is unremarkable. Innumerable cysts are noted throughout  the bilateral kidneys unchanged from the prior exam in keeping with polycystic  kidney disease. Multiple of the cysts are hyperdense and likely hemorrhagic. There is no hydronephrosis. No renal or ureteral stones evident. The adrenal  glands are normal. An umbilical hernia is present in the lower mid abdomen at  the insertion site of the peritoneal dialysis catheter. There is a fluid  collection within the hernia sac concerning for possible seroma or abscess that  measures 5.7 x 3.2 x 6.0 cm. There are herniated bowel loops as well without  bowel obstruction. The abdominal wall defect measures roughly 7.5 cm. CT PELVIS: The bowel is normal in course, caliber, and wall thickness. No bowel  obstruction evident. The appendix is normal. The peritoneal dialysis catheter  coils in the central pelvis and a small amount of ascitic fluid. The bladder is  decompressed. There is no inguinal hernia. The rectum is unremarkable. No aggressive bone lesions identified. Impression  1. Moderate-sized umbilical hernia with herniated loops of bowel and fluid. The  fluid collection measures roughly 5.7 x 6.0 cm. Seroma or abscess could be  considered. 2. Polycystic kidney disease. There is no hydronephrosis. US Result (most recent):  US RETROPERITONEAL COMPLETE 01/10/2019    Narrative  Renal ultrasound, 1/10/2019    History: Kidney cyst.    Comparison: None. Technique: Grayscale and doppler images of the kidneys and bladder were obtained  using a 3-5 MHz linear transducer. Findings: The right kidney measures 11.3 cm, and the left kidney measures 10.7  cm in greatest longitudinal length.   No stones or evidence of hydronephrosis is  seen. Renal parenchymal echogenicity is increased consistent with chronic  medical renal changes. Numerous simple and complex renal cortical lesions are  seen in the bilateral kidneys. Many of these appear cystic. The majority of  these are not well assessed given their very small size. No clearly worrisome  dominant solid lesion is seen. The urinary bladder is collapsed about a Rojas catheter and therefore not well  assessed. The visualized abdominal aorta is normal in caliber measuring up to  2.3 cm. The IVC is patent. Small to moderate ascites is seen in the right upper  quadrant, and bilateral lower quadrants. Impression  IMPRESSION:  1. Echogenic kidneys demonstrating numerous simple and complex likely cystic  lesions. Many lesions are not well characterized given their very small size  although no clearly worrisome dominant solid mass is seen. This appearance can  be seen with multiple etiologies. However, given the patient's history of renal  failure and dialysis, these are favored to represent acquired cystic renal  disease. 2. Small to moderate ascites which can suggest some degree of fluid overload. Admission date (for inpatients): 12/25/2022   1 Day Post-Op  Procedure(s):  ABDOMEN INCISION AND DRAINAGE  HERNIA VENTRAL REPAIR  poss CATHETER REMOVAL PERITONEAL DIALYSIS        ASSESSMENT/PLAN:  [unfilled]  Principal Problem:    Sepsis ([address]  Active Problems:    Peritonitis ([address]    Peritoneal dialysis status ([address]    Anemia    Hypomagnesemia    DNR (do not resuscitate)    Localized peritonitis ([address]    Recurrent ventral hernia with incarceration    Infection due to peritoneal dialysis catheter ([address]    Periumbilical abdominal pain    Abdominal wall abscess    Necrotizing fasciitis ([address]    Abdominal wall ulcer, with necrosis of muscle (HCC)    Essential hypertension    ESRD (end stage renal disease) ([address]  Resolved Problems:    * No resolved hospital problems.  *     Patient Active Problem List    Diagnosis Date Noted    Localized peritonitis ([address] 12/26/2022     Priority: Medium    Recurrent ventral hernia with incarceration 12/26/2022     Priority: Medium    Infection due to peritoneal dialysis catheter ([address] 12/26/2022     Priority: Medium    Periumbilical abdominal pain 12/26/2022     Priority: Medium    Abdominal wall abscess 12/26/2022     Priority: Medium    Necrotizing fasciitis ([address] 12/26/2022     Priority: Medium    Abdominal wall ulcer, with necrosis of muscle ([address] 12/26/2022     Priority: Medium    Peritonitis ([address] 12/25/2022     Priority: Medium    Peritoneal dialysis status ([address] 12/25/2022     Priority: Medium    Anemia 12/25/2022     Priority: Medium    Hypomagnesemia 12/25/2022     Priority: Medium    DNR (do not resuscitate) 12/25/2022     Priority: Medium    Sepsis ([address] 12/25/2022     Priority: Medium    Soft tissue mass      Priority: Medium    Kidney stone 10/14/2022     Added automatically from request for surgery [id number]      Other hyperlipidemia 04/21/2022    ESRD (end stage renal disease) ([address] 05/04/2021    Non-recurrent unilateral inguinal hernia without obstruction or gangrene 12/22/2020    Hypertensive urgency 06/24/2017    Essential hypertension 72/49/4895    Umbilical hernia 24/32/9310          Number and Complexity of Problems addressed and   Risks of complications and/or morbidity of management    Recurrent, incarcerated ventral hernia  Localized peritonitis  12/26/22 S/P (Dr Federico Calix) 1. Open drainage of localized peritonitis   2. Open repair of recurrent incarcerated ventral hernia with Vicryl mesh   3. Removal of tunneled intraperitoneal dialysis catheter  4.   Debridement of skin, subcutaneous adipose, muscle, and fascia for abdominal wall necrotizing infection    > Keep dressing in place, do not remove for now (will evaluate 2 packing sites under dressing/incision site later this week per Dr Federico Calix)  >Keep NPO until at least Saturday  >IVF PER NEPHROLOGY    He is sitting up and comfortable    Leave abd dressing and Colonel Salines today  Dr Samantha Gill may change tomorrow  High risk for evisceration as fascial closure is tenuous with necrotizing infection          Leukocytosis  Trend WBC  Monitor for fever and vital signs  Follow up cultures  On IV Vanc and Zosyn       Abdominal pain  Monitor and treat for pain    Dialysis dependence  Suspected infected PD catheter placed in 2014  >Nephrology following, pt has a patent LUE AVF backup  Nephrology to address                   Level of MDM (2/3 elements below)  Number and Complexity of Problems Addressed Amount and/or Complexity of Data to be Reviewed and Analyzed  *Each unique test, order, or document contributes to the combination of 2 or combination of 3 in Category 1 below. Risk of Complications and/or Morbidity or Mortality of pt Management     51575  68258 SF Minimal  ?1self-limited or minor problem Minimal or none Minimal risk of morbidity from additional diagnostic testing or Rx   22280  59372 Low Low  ? 2or more self-limited or minor problems;    or  ? 1stable chronic illness;    or  ?1acute, uncomplicated illness or injury   Limited  (Must meet the requirements of at least 1 of the 2 categories)  Category 1: Tests and documents   ? Any combination of 2 from the following:  ?Review of prior external note(s) from each unique source*;  ?review of the result(s) of each unique test*;   ?ordering of each unique test*    or   Category 2: Assessment requiring an independent historian(s)  (For the categories of independent interpretation of tests and discussion of management or test interpretation, see moderate or high) Low risk of morbidity from additional diagnostic testing or treatment     30850  05145 Mod Moderate  ? 1or more chronic illnesses with exacerbation, progression, or side effects of treatment;    or  ?2or more stable chronic illnesses;    or  ?1undiagnosed new problem with uncertain prognosis;    or  ?1acute illness with systemic symptoms;    or  ?1acute complicated injury   Moderate  (Must meet the requirements of at least 1 out of 3 categories)  Category 1: Tests, documents, or independent historian(s)  ? Any combination of 3 from the following:   ?Review of prior external note(s) from each unique source*;  ?Review of the result(s) of each unique test*;  ?Ordering of each unique test*;  ?Assessment requiring an independent historian(s)    or  Category 2: Independent interpretation of tests   ? Independent interpretation of a test performed by another physician/other qualified health care professional (not separately reported);     or  Category 3: Discussion of management or test interpretation  ? Discussion of management or test interpretation with external physician/other qualified health care professional/appropriate source (not separately reported)   Moderate risk of morbidity from additional diagnostic testing or treatment  Examples only:  ?Prescription drug management   ? Decision regarding minor surgery with identified patient or procedure risk factors  ? Decision regarding elective major surgery without identified patient or procedure risk factors   ? Diagnosis or treatment significantly limited by social determinants of health       07756  03017 High High  ? 1or more chronic illnesses with severe exacerbation, progression, or side effects of treatment;    or  ?1 acute or chronic illness or injury that poses a threat to life or bodily function   Extensive  (Must meet the requirements of at least 2 out of 3 categories)  Category 1: Tests, documents, or independent historian(s)  ? Any combination of 3 from the following:   ?Review of prior external note(s) from each unique source*;  ?Review of the result(s) of each unique test*;   ?Ordering of each unique test*;   ?Assessment requiring an independent historian(s)    or   Category 2: Independent interpretation of tests   ? Independent interpretation of a test performed by another physician/other qualified health care professional (not separately reported);     or  Category 3: Discussion of management or test interpretation  ? Discussion of management or test interpretation with external physician/other qualified health care professional/appropriate source (not separately reported)   High risk of morbidity from additional diagnostic testing or treatment  Examples only:  ?Drug therapy requiring intensive monitoring for toxicity  ? Decision regarding elective major surgery with identified patient or procedure risk factors  ? Decision regarding emergency major surgery  ? Decision regarding hospitalization  ? Decision not to resuscitate or to de-escalate care because of poor prognosis             I have personally performed a face-to-face diagnostic evaluation and management  service on this patient. I have independently seen the patient. I have independently obtained the above history from the patient/family. I have independently examined the patient with above findings. I have independently reviewed data/labs for this patient and developed the above plan of care (MDM).       Signed: LEROY Slater - CNP  12/27/2022    7:52 AM

## 2022-12-27 NOTE — PROGRESS NOTES
Physical Therapy Note:    Attempted to see patient this AM for physical therapy evaluation session. Patient is off the floor in dialysis. Will follow and re-attempt as schedule permits/patient available.  Thank you,    Lora Vieira 89

## 2022-12-28 LAB
ALBUMIN SERPL-MCNC: 2.2 G/DL (ref 3.2–4.6)
ALBUMIN/GLOB SERPL: 0.6 {RATIO} (ref 0.4–1.6)
ALP SERPL-CCNC: 63 U/L (ref 50–136)
ALT SERPL-CCNC: 10 U/L (ref 12–65)
ANION GAP SERPL CALC-SCNC: 6 MMOL/L (ref 2–11)
AST SERPL-CCNC: 11 U/L (ref 15–37)
BACTERIA SPEC CULT: ABNORMAL
BACTERIA SPEC CULT: NORMAL
BASOPHILS # BLD: 0 K/UL (ref 0–0.2)
BASOPHILS NFR BLD: 0 % (ref 0–2)
BILIRUB SERPL-MCNC: 0.5 MG/DL (ref 0.2–1.1)
BUN SERPL-MCNC: 46 MG/DL (ref 8–23)
CALCIUM SERPL-MCNC: 9 MG/DL (ref 8.3–10.4)
CHLORIDE SERPL-SCNC: 101 MMOL/L (ref 101–110)
CO2 SERPL-SCNC: 32 MMOL/L (ref 21–32)
CREAT SERPL-MCNC: 13.8 MG/DL (ref 0.8–1.5)
DIFFERENTIAL METHOD BLD: ABNORMAL
EOSINOPHIL # BLD: 0.2 K/UL (ref 0–0.8)
EOSINOPHIL NFR BLD: 3 % (ref 0.5–7.8)
ERYTHROCYTE [DISTWIDTH] IN BLOOD BY AUTOMATED COUNT: 16 % (ref 11.9–14.6)
GLOBULIN SER CALC-MCNC: 3.9 G/DL (ref 2.8–4.5)
GLUCOSE SERPL-MCNC: 95 MG/DL (ref 65–100)
GRAM STN SPEC: ABNORMAL
GRAM STN SPEC: ABNORMAL
HBV CORE AB SERPL QL IA: NEGATIVE
HCT VFR BLD AUTO: 25.9 % (ref 41.1–50.3)
HGB BLD-MCNC: 8.4 G/DL (ref 13.6–17.2)
IMM GRANULOCYTES # BLD AUTO: 0 K/UL (ref 0–0.5)
IMM GRANULOCYTES NFR BLD AUTO: 1 % (ref 0–5)
LYMPHOCYTES # BLD: 1.5 K/UL (ref 0.5–4.6)
LYMPHOCYTES NFR BLD: 18 % (ref 13–44)
MCH RBC QN AUTO: 31.2 PG (ref 26.1–32.9)
MCHC RBC AUTO-ENTMCNC: 32.4 G/DL (ref 31.4–35)
MCV RBC AUTO: 96.3 FL (ref 82–102)
MM INDURATION, POC: 0 MM (ref 0–5)
MONOCYTES # BLD: 0.8 K/UL (ref 0.1–1.3)
MONOCYTES NFR BLD: 9 % (ref 4–12)
NEUTS SEG # BLD: 5.8 K/UL (ref 1.7–8.2)
NEUTS SEG NFR BLD: 69 % (ref 43–78)
NRBC # BLD: 0 K/UL (ref 0–0.2)
PLATELET # BLD AUTO: 210 K/UL (ref 150–450)
PMV BLD AUTO: 10.7 FL (ref 9.4–12.3)
POTASSIUM SERPL-SCNC: 4.2 MMOL/L (ref 3.5–5.1)
PPD, POC: NEGATIVE
PROT SERPL-MCNC: 6.1 G/DL (ref 6.3–8.2)
RBC # BLD AUTO: 2.69 M/UL (ref 4.23–5.6)
SERVICE CMNT-IMP: ABNORMAL
SERVICE CMNT-IMP: NORMAL
SODIUM SERPL-SCNC: 139 MMOL/L (ref 133–143)
VANCOMYCIN SERPL-MCNC: 17.1 UG/ML
WBC # BLD AUTO: 8.4 K/UL (ref 4.3–11.1)

## 2022-12-28 PROCEDURE — 36415 COLL VENOUS BLD VENIPUNCTURE: CPT

## 2022-12-28 PROCEDURE — 2580000003 HC RX 258: Performed by: INTERNAL MEDICINE

## 2022-12-28 PROCEDURE — 99024 POSTOP FOLLOW-UP VISIT: CPT | Performed by: SURGERY

## 2022-12-28 PROCEDURE — 97530 THERAPEUTIC ACTIVITIES: CPT

## 2022-12-28 PROCEDURE — 1100000000 HC RM PRIVATE

## 2022-12-28 PROCEDURE — 6360000002 HC RX W HCPCS: Performed by: SURGERY

## 2022-12-28 PROCEDURE — 80053 COMPREHEN METABOLIC PANEL: CPT

## 2022-12-28 PROCEDURE — 80202 ASSAY OF VANCOMYCIN: CPT

## 2022-12-28 PROCEDURE — A4216 STERILE WATER/SALINE, 10 ML: HCPCS | Performed by: SURGERY

## 2022-12-28 PROCEDURE — 85025 COMPLETE CBC W/AUTO DIFF WBC: CPT

## 2022-12-28 PROCEDURE — 97161 PT EVAL LOW COMPLEX 20 MIN: CPT

## 2022-12-28 PROCEDURE — C9113 INJ PANTOPRAZOLE SODIUM, VIA: HCPCS | Performed by: SURGERY

## 2022-12-28 PROCEDURE — 6370000000 HC RX 637 (ALT 250 FOR IP): Performed by: SURGERY

## 2022-12-28 PROCEDURE — 2580000003 HC RX 258: Performed by: SURGERY

## 2022-12-28 PROCEDURE — 6370000000 HC RX 637 (ALT 250 FOR IP): Performed by: INTERNAL MEDICINE

## 2022-12-28 RX ADMIN — PIPERACILLIN AND TAZOBACTAM 3375 MG: 3; .375 INJECTION, POWDER, LYOPHILIZED, FOR SOLUTION INTRAVENOUS at 08:41

## 2022-12-28 RX ADMIN — TRAZODONE HYDROCHLORIDE 50 MG: 50 TABLET ORAL at 19:59

## 2022-12-28 RX ADMIN — CINACALCET HYDROCHLORIDE 90 MG: 30 TABLET, FILM COATED ORAL at 19:59

## 2022-12-28 RX ADMIN — PIPERACILLIN AND TAZOBACTAM 3375 MG: 3; .375 INJECTION, POWDER, LYOPHILIZED, FOR SOLUTION INTRAVENOUS at 20:30

## 2022-12-28 RX ADMIN — VALSARTAN 320 MG: 320 TABLET, FILM COATED ORAL at 08:41

## 2022-12-28 RX ADMIN — SODIUM CHLORIDE, PRESERVATIVE FREE 10 ML: 5 INJECTION INTRAVENOUS at 19:59

## 2022-12-28 RX ADMIN — MORPHINE SULFATE 2 MG: 2 INJECTION, SOLUTION INTRAMUSCULAR; INTRAVENOUS at 08:45

## 2022-12-28 RX ADMIN — SODIUM CHLORIDE, PRESERVATIVE FREE 10 ML: 5 INJECTION INTRAVENOUS at 08:41

## 2022-12-28 RX ADMIN — FINASTERIDE 5 MG: 5 TABLET, FILM COATED ORAL at 19:59

## 2022-12-28 RX ADMIN — MORPHINE SULFATE 2 MG: 2 INJECTION, SOLUTION INTRAMUSCULAR; INTRAVENOUS at 19:59

## 2022-12-28 RX ADMIN — SODIUM CHLORIDE 40 MG: 9 INJECTION, SOLUTION INTRAMUSCULAR; INTRAVENOUS; SUBCUTANEOUS at 08:41

## 2022-12-28 ASSESSMENT — PAIN SCALES - GENERAL
PAINLEVEL_OUTOF10: 7
PAINLEVEL_OUTOF10: 8
PAINLEVEL_OUTOF10: 9
PAINLEVEL_OUTOF10: 2

## 2022-12-28 ASSESSMENT — PAIN DESCRIPTION - PAIN TYPE: TYPE: ACUTE PAIN

## 2022-12-28 ASSESSMENT — PAIN DESCRIPTION - ORIENTATION: ORIENTATION: MID;LOWER

## 2022-12-28 ASSESSMENT — PAIN DESCRIPTION - LOCATION
LOCATION: ABDOMEN
LOCATION: ABDOMEN

## 2022-12-28 ASSESSMENT — PAIN DESCRIPTION - FREQUENCY: FREQUENCY: INTERMITTENT

## 2022-12-28 ASSESSMENT — PAIN - FUNCTIONAL ASSESSMENT: PAIN_FUNCTIONAL_ASSESSMENT: PREVENTS OR INTERFERES SOME ACTIVE ACTIVITIES AND ADLS

## 2022-12-28 ASSESSMENT — PAIN DESCRIPTION - ONSET: ONSET: GRADUAL

## 2022-12-28 ASSESSMENT — PAIN DESCRIPTION - DESCRIPTORS
DESCRIPTORS: ACHING
DESCRIPTORS: SORE

## 2022-12-28 NOTE — CARE COORDINATION
Transfer packet sent to [facility] for transfer to HD from home PD  Pending chair time.   Will continue to follow for discharge planning needs  Please consult  if any new issues arise

## 2022-12-28 NOTE — PROGRESS NOTES
ACUTE PHYSICAL THERAPY GOALS:   (Developed with and agreed upon by patient and/or caregiver.)  At baseline mobility, no needs    PHYSICAL THERAPY Initial Assessment, Daily Note, and Discharge  (Link to Caseload Tracking: PT Visit Days : 1  Acknowledge Orders  Time In/Out  PT Charge Capture  Rehab Caseload Tracker    Virginia Cullen is a 65 y.o. male   PRIMARY DIAGNOSIS: Sepsis ([facility] [address]  Peritonitis ([facility] [address] [K65.9]  Peritonitis, dialysis-associated, initial encounter ([facility] [address] Carmen Nearing  Procedure(s) (LRB):  ABDOMEN INCISION AND DRAINAGE, VENTRAL HERNIA  REPAIRE WITH MESH. REMOVAL PD CATH (N/A)  2 Days Post-Op  Reason for Referral: Other abnormalities of gait and mobility (R26.89)  Inpatient: Payor: [employer] / Plan: [address] / Product Type: *No Product type* /     ASSESSMENT:     REHAB RECOMMENDATIONS:   Recommendation to date pending progress:  Setting:  No further skilled therapy after discharge from hospital    Equipment:    None     ASSESSMENT:  Mr. Griffin Bauer presents s/p ventral hernia repair on 12/26 with a debridement of necrotizing fasciitis. He now has an abdominal binder on and is being transitioned to HD rather than PD. At baseline he lives alone and is independent with all functional mobility without a history of falls. Today he is able to ambulate 600' with supervision while pushing his IV pole without any LOB or instability. His greatest limiting factor is the soreness/pain in his abdomen. Pt is able to safely perform functional mobility and is not in need of continued acute care PT at this time. Pt will be DC from caseload.       [address] [facility] [address] [id number] AM-PAC 6 Clicks Basic Mobility Inpatient Short Form  AM-PAC Mobility Inpatient   How much difficulty turning over in bed?: None  How much difficulty sitting down on / standing up from a chair with arms?: None  How much difficulty moving from lying on back to sitting on side of bed?: None  How much help from another person moving to and from a bed to a chair?: None  How much help from another person needed to walk in hospital room?: None  How much help from another person for climbing 3-5 steps with a railing?: A Little  AM-[facility] Inpatient Mobility Raw Score : 23  AM-PAC Inpatient T-Scale Score : 56.93  Mobility Inpatient CMS 0-100% Score: 11.2  Mobility Inpatient CMS G-Code Modifier : CI    SUBJECTIVE:   Mr. Julienne Langston states, \"I have two dogs, it's their house I just live in it. \"     Social/Functional Lives With: Alone  Type of Home: Mobile home  Home Access: Stairs to enter without rails  Entrance Stairs - Number of Steps: 3  Has the patient had two or more falls in the past year or any fall with injury in the past year?: No  ADL Assistance: Independent  Ambulation Assistance: Independent  Transfer Assistance: Independent  Active : Yes    OBJECTIVE:     PAIN: Christa Chimera / O2: Thermon Duncan / Flavio Frames / Lon Stapler:   Pre Treatment:   Pain Assessment: 0-10  Pain Level: 7  Pain Location: Abdomen  Pain Descriptors: Sore      Post Treatment: 7/10 Vitals        Oxygen      IV    RESTRICTIONS/PRECAUTIONS:                    GROSS EVALUATION: Intact Impaired (Comments):   AROM [x]     PROM []    Strength [x]     Balance [x]     Posture [] Forward Head  Rounded Shoulders   Sensation [x]     Coordination []      Tone []     Edema []    Activity Tolerance [x]      []      COGNITION/  PERCEPTION: Intact Impaired (Comments):   Orientation [x]     Vision [x]     Hearing [x]     Cognition  [x]       MOBILITY: I Mod I S SBA CGA Min Mod Max Total  NT x2 Comments:   Bed Mobility    Rolling [] [] [x] [] [] [] [] [] [] [] []    Supine to Sit [] [] [x] [] [] [] [] [] [] [] []    Scooting [] [] [x] [] [] [] [] [] [] [] []    Sit to Supine [] [] [] [] [] [] [] [] [] [x] []    Transfers    Sit to Stand [] [] [x] [] [] [] [] [] [] [] []    Bed to Chair [] [] [x] [] [] [] [] [] [] [] []    Stand to Sit [] [] [x] [] [] [] [] [] [] [] []     [] [] [] [] [] [] [] [] [] [] []    I=Independent, Mod I=Modified Independent, S=Supervision, SBA=Standby Assistance, CGA=Contact Guard Assistance,   Min=Minimal Assistance, Mod=Moderate Assistance, Max=Maximal Assistance, Total=Total Assistance, NT=Not Tested    GAIT: I Mod I S SBA CGA Min Mod Max Total  NT x2 Comments:   Level of Assistance [] [] [x] [] [] [] [] [] [] [] []    Distance 600 feet    DME None and is pushing IV pole    Gait Quality WFL, slight trunk flexion    Weightbearing Status      Stairs      I=Independent, Mod I=Modified Independent, S=Supervision, SBA=Standby Assistance, CGA=Contact Guard Assistance,   Min=Minimal Assistance, Mod=Moderate Assistance, Max=Maximal Assistance, Total=Total Assistance, NT=Not Tested    PLAN:   FREQUENCY AND DURATION:  (no continued needs) for duration of hospital stay or until stated goals are met, whichever comes first.    THERAPY PROGNOSIS: Excellent    PROBLEM LIST:   (Skilled intervention is medically necessary to address:)  none INTERVENTIONS PLANNED:   (Benefits and precautions of physical therapy have been discussed with the patient.)  NA       TREATMENT:   EVALUATION: LOW COMPLEXITY: (Untimed Charge)    TREATMENT:   Therapeutic Activity (23 Minutes): Therapeutic activity included Supine to Sit, Scooting, Transfer Training, Ambulation on level ground, Sitting balance , and Standing balance to improve functional Activity tolerance, Balance, Coordination, Mobility, and Strength.     TREATMENT GRID:  N/A    AFTER TREATMENT PRECAUTIONS: Bed/Chair Locked, Call light within reach, Chair, and Needs within reach    INTERDISCIPLINARY COLLABORATION:  RN/ PCT and PT/ PTA    EDUCATION: Education Given To: Patient  Education Provided: Role of Therapy;Plan of Care  Education Method: Verbal  Barriers to Learning: None  Education Outcome: Verbalized understanding    TIME IN/OUT:  Time In: 0916  Time Out: 1285  Minutes: [address] [place], [address]

## 2022-12-28 NOTE — PROGRESS NOTES
H&P/Consult Note/Progress Note/Office Note:   Fabian Lomeli  MRN: [id number]  :1960  Age:62 y.o.    HPI: Fabian Lomeli is a 65 y.o. male who is s/p open drainage of localized peritonitis, debridement of necrotizing fasciitis of abdominal wall infection, removal of infected PD cath, and repair of recurrent incarcerated ventral hernia with Vicryl mesh on 22. Prior to surgery he was admitted by the hospitalist with sepsis on 22 after he presented to the ER with a 2-week history of progressive abdominal pain and bulging. He reported a prior recurrent ventral hernia for many years following open umbilical hernia repair with Prolene mesh by Dr. Radha Blunt on 2014. He had a PD cath placed at that time and is dialysis dependent. Recently however he developed progressive and severe abdominal pain associated with worsening bulging and tenderness. He also described an associated eschar of the umbilical skin recently  This was associated with a change in the color of the effluent from the PD catheter. He has had associated N/V, tachycardia, and leukocytosis on admission. He was placed on vancomycin and Zosyn. CT imaging was performed as shown below and general surgery consultation was obtained. He described a prior episode of peritonitis without abscess or hernia treated with Abx in approximately 2016. He described having an AV fistula for dialysis placed many years ago which has never been used. He wants to stop having peritoneal dialysis and convert to hemodialysis. 22 CT abd/pelvis withoral but no IV contrast  Hx: Yellowish solution after peritoneal dialysis, concern for peritonitis       FINDINGS: Evaluation of the hollow and solid viscera is limited by the lack of intravenous contrast.   Dependent atelectasis is present. CT ABDOMEN: The stomach is unremarkable.  Innumerable cysts are noted throughout the bilateral kidneys unchanged from the prior exam in keeping with polycystic kidney disease. Multiple of the cysts are hyperdense and likely hemorrhagic. There is no hydronephrosis. No renal or ureteral stones evident. The adrenal glands are normal. An umbilical hernia is present in the lower mid abdomen at the insertion site of the peritoneal dialysis catheter. There is a fluid collection within the hernia sac concerning for possible seroma or abscess that measures 5.7 x 3.2 x 6.0 cm. There are herniated bowel loops as well without bowel obstruction. The abdominal wall defect measures roughly 7.5 cm. CT PELVIS: The bowel is normal in course, caliber, and wall thickness. No bowel obstruction evident. The appendix is normal. The peritoneal dialysis catheter coils in the central pelvis and a small amount of ascitic fluid. The bladder is decompressed. There is no inguinal hernia. The rectum is unremarkable. No aggressive bone lesions identified. Impression:  1. Moderate-sized umbilical hernia with herniated loops of bowel and fluid. The fluid collection measures roughly 5.7 x 6.0 cm. Seroma or abscess could be considered. 2. Polycystic kidney disease. There is no hydronephrosis. Additional hx:  12/27/22 POD1: Patient alert and conversant. Abdominal pain is minimal.  Binder in place with surgical dressing CDI. LUE AVF pulsatile. WBC 13.2. Cr 13.3 K+4.4 AF/VSS. -Flatus/-BM  12/28/22 POD2 up in chair; comfortable; AF; Hg lower and subq Heparin on hold; await return of bowel function                        Past Medical History:   Diagnosis Date    Anemia     Anxiety     Arthritis     CAD (coronary artery disease)     per pt- no MI- \"They said the back of my heart was something. ..like 40%\"    Chronic kidney disease     [facility]; stage 5; pt has AV fistula to left arm; PERITONEAL DIALYSIS daily since 2014    Chronic pain     CKD (chronic kidney disease)     Former smoker     Fracture, tibial plateau 79/43/2815    GERD (gastroesophageal reflux disease)     Heart disease     Hyperlipidemia     on med for control    Hypertension     controlled with med    Hypertensive nephrosclerosis     per nephrology note 4/14/14    Hypoactive thyroid     Kidney stone     Peritoneal dialysis catheter in place [facility])     Peritoneal dialysis-associated peritonitis ([address] 06/24/2017    during hs    PUD (peptic ulcer disease)     SBP (spontaneous bacterial peritonitis) ([address] 09/24/2016    Sepsis ([address] 09/24/2016    Tibial plateau fracture, left 21/68/9021    Umbilical hernia     Unspecified sleep apnea     pt does not have a CPAP     Past Surgical History:   Procedure Laterality Date    ABDOMEN SURGERY Right 6/13/2022    RIGHT LOWER QUADRANT ABDOMEN LESION BIOPSY EXCISION performed by Yecenia Crockett MD at [address] [place] [address] 280 N/A 12/26/2022    ABDOMEN INCISION AND DRAINAGE, VENTRAL HERNIA  REPAIRE WITH MESH.  REMOVAL PD CATH performed by Bronwen Boeck, MD at [address] [place] [address] Right 1995    tip of 5th finger    COLONOSCOPY N/A 3/23/2018    COLONOSCOPY  BMI 34 performed by Hitesh Wilkinson MD at [address] [facility]    CYSTOSCOPY Left 11/1/2022    CYSTOSCOPY, LEFT URETEROSCOPY, BASKET STONE EXTRACTION, LEFT URETERAL STENT INSERTION performed by Liza Vasquez MD at [facility] Left as a child    thumb    ORTHOPEDIC SURGERY Left 1989    knee    ORTHOPEDIC SURGERY Left 2017    LEFT LATERAL TIBIAL PLATEAU OPEN REDUCTION INTERNAL FIXATION    UT ABDOMEN SURGERY PROC UNLISTED  6/11/14    PD catheter placement    SKIN BIOPSY Left 6/13/2022    LEFT BUTTOCK MASS EXCISION performed by Yecenia Crockett MD at [address] [facility] Left 2012    AV fistula to arm     Current Facility-Administered Medications   Medication Dose Route Frequency    pantoprazole (PROTONIX) 40 mg in sodium chloride (PF) 0.9 % 10 mL injection  40 mg IntraVENous Daily    morphine (PF) injection 2 mg  2 mg IntraVENous Q2H PRN    morphine injection 4 mg  4 mg IntraVENous Q2H PRN    sodium chloride flush 0.9 % injection 5-40 mL  5-40 mL IntraVENous 2 times per day    sodium chloride flush 0.9 % injection 5-40 mL  5-40 mL IntraVENous PRN    0.9 % sodium chloride infusion   IntraVENous PRN    [Held by provider] heparin (porcine) injection 5,000 Units  5,000 Units SubCUTAneous 3 times per day    ondansetron (ZOFRAN-ODT) disintegrating tablet 4 mg  4 mg Oral Q8H PRN    Or    ondansetron (ZOFRAN) injection 4 mg  4 mg IntraVENous Q6H PRN    acetaminophen (TYLENOL) tablet 650 mg  650 mg Oral Q6H PRN    Or    acetaminophen (TYLENOL) suppository 650 mg  650 mg Rectal Q6H PRN    cinacalcet (SENSIPAR) tablet 90 mg  90 mg Oral Nightly    [Held by provider] NIFEdipine (PROCARDIA XL) extended release tablet 90 mg  90 mg Oral Nightly    finasteride (PROSCAR) tablet 5 mg  5 mg Oral Nightly    traZODone (DESYREL) tablet 50 mg  50 mg Oral Nightly    piperacillin-tazobactam (ZOSYN) 3,375 mg in sodium chloride 0.9 % 50 mL IVPB (mini-bag)  3,375 mg IntraVENous Q12H    vancomycin (VANCOCIN) intermittent dosing (placeholder)   Other RX Placeholder    oxyCODONE (ROXICODONE) immediate release tablet 5 mg  5 mg Oral Q6H PRN    valsartan (DIOVAN) tablet 320 mg  320 mg Oral Daily    And    [Held by provider] hydroCHLOROthiazide (HYDRODIURIL) tablet 25 mg  25 mg Oral Daily    cloNIDine (CATAPRES) 0.3 MG/24HR 1 patch  1 patch TransDERmal Weekly     ALLERGIES:  Iodine    Social History     Socioeconomic History    Marital status: Single     Spouse name: None    Number of children: None    Years of education: None    Highest education level: None   Tobacco Use    Smoking status: Former     Packs/day: 0.50     Years: 15.00     Pack years: 7.50     Types: Cigarettes     Quit date: 3/1/2014     Years since quittin.8    Smokeless tobacco: Never    Tobacco comments:     Quit smoking: pt states only smoked cigarettes when drinking alcohol   Substance and Sexual Activity    Alcohol use: Yes    Drug use: Yes     Types: Marijuana Hulon Concepcion)     Comment: \"when I can afford it\"      Social Determinants of Health     Financial Resource Strain: Low Risk     Difficulty of Paying Living Expenses: Not hard at all   Food Insecurity: No Food Insecurity    Worried About Running Out of Food in the Last Year: Never true    Ran Out of Food in the Last Year: Never true   Physical Activity: Insufficiently Active    Days of Exercise per Week: 1 day    Minutes of Exercise per Session: 20 min     Social History     Tobacco Use   Smoking Status Former    Packs/day: 0.50    Years: 15.00    Pack years: 7.50    Types: Cigarettes    Quit date: 3/1/2014    Years since quittin.8   Smokeless Tobacco Never   Tobacco Comments    Quit smoking: pt states only smoked cigarettes when drinking alcohol     Family History   Problem Relation Age of Onset    COPD Father     Heart Disease Father     Hypertension Mother     Diabetes Mother     Hypertension Father      ROS: The patient has no difficulty with chest pain or shortness of breath. No fever or chills. Comprehensive review of systems was otherwise unremarkable except as noted above. Physical Exam:   /88   Pulse 98   Temp 98.2 °F (36.8 °C) (Oral)   Resp 19   Ht 6' 5\" (1.956 m)   Wt 257 lb 15 oz (117 kg)   SpO2 98%   BMI 30.59 kg/m²   Vitals:    22 0315 22 0813 22 1112 22 1602   BP: (!) 124/90 125/81 116/80 113/88   Pulse: 94 81 (!) 105 98   Resp: 18 19 19 19   Temp: 98.2 °F (36.8 °C) 98 °F (36.7 °C) 98.5 °F (36.9 °C) 98.2 °F (36.8 °C)   TempSrc: Oral Oral Oral Oral   SpO2: 90% 96% 98% 98%   Weight:       Height:         No intake/output data recorded.  1901 -  0700  In: 600 [P.O.:100]  Out: 1500     Constitutional: Alert, oriented, cooperative patient in no acute distress; appears stated age    Eyes:Sclera are clear.  EOMs intact  ENMT: no external lesions gross hearing normal; no obvious neck masses, no ear or lip lesions, nares normal  CV: RRR. Normal perfusion  Resp: No JVD. Breathing is  non-labored; no audible wheezing. GI: surgical dressing with some serous drainage   He has large ioban over abdomen covering 4x4s which cover 2 wicked sites with 1/2 iodoform  Velcro abd Binder in place. Musculoskeletal: unremarkable with normal function. No embolic signs or cyanosis. LUE with AVF pulsitile  Neuro:  Oriented; moves all 4; no focal deficits  Psychiatric: normal affect and mood, no memory impairment    Recent vitals (if inpt):  Patient Vitals for the past 24 hrs:   BP Temp Temp src Pulse Resp SpO2   12/28/22 1602 113/88 98.2 °F (36.8 °C) Oral 98 19 98 %   12/28/22 1112 116/80 98.5 °F (36.9 °C) Oral (!) 105 19 98 %   12/28/22 0813 125/81 98 °F (36.7 °C) Oral 81 19 96 %   12/28/22 0315 (!) 124/90 98.2 °F (36.8 °C) Oral 94 18 90 %   12/27/22 2335 92/72 98.8 °F (37.1 °C) Oral 95 16 95 %   12/27/22 2000 (!) 110/90 98.9 °F (37.2 °C) Oral (!) 103 18 97 %       Amount and/or Complexity of Data Reviewed and Analyzed:  I reviewed and analyzed all of the unique labs and radiologic studies that are shown below as well as any that are in the HPI, and any that are in the expanded problem list below  *Each unique test, order, or document contributes to the combination of 2 or combination of 3 in Category 1 below. For this visit I also reviewed old records and prior notes.       Recent Labs     12/28/22  0758   WBC 8.4   HGB 8.4*         K 4.2      CO2 32   BUN 46*   ALT 10*     Review of most recent CBC  Lab Results   Component Value Date    WBC 8.4 12/28/2022    HGB 8.4 (L) 12/28/2022    HCT 25.9 (L) 12/28/2022    MCV 96.3 12/28/2022     12/28/2022       Review of most recent BMP  Lab Results   Component Value Date/Time     12/28/2022 07:58 AM    K 4.2 12/28/2022 07:58 AM     12/28/2022 07:58 AM    CO2 32 12/28/2022 07:58 AM    BUN 46 12/28/2022 07:58 AM    CREATININE 13.80 12/28/2022 07:58 AM    GLUCOSE 95 12/28/2022 07:58 AM    CALCIUM 9.0 12/28/2022 07:58 AM        Review of most recent LFTs (and lipase if done)  Lab Results   Component Value Date    ALT 10 (L) 12/28/2022    AST 11 (L) 12/28/2022    ALKPHOS 63 12/28/2022    BILITOT 0.5 12/28/2022     Lab Results   Component Value Date    LIPASE 88 12/25/2022       No results found for: INR, APTT, LCAD    Review of most recent HgbA1c  No results found for: LABA1C    Nutritional assessment screen for wound healing issues:  Lab Results   Component Value Date    LABALBU 2.2 (L) 12/28/2022         Xray Result (most recent):  XR CHEST PORTABLE 12/25/2022    Narrative  KUB    CLINICAL INDICATION: Sepsis    FINDINGS: Single AP view the chest compared to a similar exam dated 6/24/2017  show the lungs are slightly underexpanded but otherwise clear. No pleural  effusion or pneumothorax. The cardiac silhouette and mediastinum are  unremarkable. The bones are normal.    Impression  Slightly under expanded lungs, otherwise no acute abnormality. CT Result (most recent):  CT ABDOMEN PELVIS WO IV CONTRAST 12/25/2022    Narrative  CT of the abdomen and pelvis without contrast.    CLINICAL INDICATION: Yellowish solution after peritoneal dialysis, concern for  peritonitis    PROCEDURE: Serial thin-section axial images obtained from the upper abdomen  through the proximal femurs without the administration of intravenous contrast.  Oral contrast was given. Radiation dose reduction techniques were used for this  study. Our CT scanners use one or all of the following: Automated exposure  control, adjusted of the mA and/or kV according to patient size, iterative  reconstruction    COMPARISON: CT abdomen and pelvis dated 7/6/2022    FINDINGS:  Evaluation of the hollow and solid viscera is limited by the lack of  intravenous contrast.    Dependent atelectasis is present.     CT ABDOMEN: The stomach is unremarkable. Innumerable cysts are noted throughout  the bilateral kidneys unchanged from the prior exam in keeping with polycystic  kidney disease. Multiple of the cysts are hyperdense and likely hemorrhagic. There is no hydronephrosis. No renal or ureteral stones evident. The adrenal  glands are normal. An umbilical hernia is present in the lower mid abdomen at  the insertion site of the peritoneal dialysis catheter. There is a fluid  collection within the hernia sac concerning for possible seroma or abscess that  measures 5.7 x 3.2 x 6.0 cm. There are herniated bowel loops as well without  bowel obstruction. The abdominal wall defect measures roughly 7.5 cm. CT PELVIS: The bowel is normal in course, caliber, and wall thickness. No bowel  obstruction evident. The appendix is normal. The peritoneal dialysis catheter  coils in the central pelvis and a small amount of ascitic fluid. The bladder is  decompressed. There is no inguinal hernia. The rectum is unremarkable. No aggressive bone lesions identified. Impression  1. Moderate-sized umbilical hernia with herniated loops of bowel and fluid. The  fluid collection measures roughly 5.7 x 6.0 cm. Seroma or abscess could be  considered. 2. Polycystic kidney disease. There is no hydronephrosis. US Result (most recent):  US RETROPERITONEAL COMPLETE 01/10/2019    Narrative  Renal ultrasound, 1/10/2019    History: Kidney cyst.    Comparison: None. Technique: Grayscale and doppler images of the kidneys and bladder were obtained  using a 3-5 MHz linear transducer. Findings: The right kidney measures 11.3 cm, and the left kidney measures 10.7  cm in greatest longitudinal length. No stones or evidence of hydronephrosis is  seen. Renal parenchymal echogenicity is increased consistent with chronic  medical renal changes. Numerous simple and complex renal cortical lesions are  seen in the bilateral kidneys. Many of these appear cystic.  The majority of  these are not well assessed given their very small size. No clearly worrisome  dominant solid lesion is seen. The urinary bladder is collapsed about a Rojas catheter and therefore not well  assessed. The visualized abdominal aorta is normal in caliber measuring up to  2.3 cm. The IVC is patent. Small to moderate ascites is seen in the right upper  quadrant, and bilateral lower quadrants. Impression  IMPRESSION:  1. Echogenic kidneys demonstrating numerous simple and complex likely cystic  lesions. Many lesions are not well characterized given their very small size  although no clearly worrisome dominant solid mass is seen. This appearance can  be seen with multiple etiologies. However, given the patient's history of renal  failure and dialysis, these are favored to represent acquired cystic renal  disease. 2. Small to moderate ascites which can suggest some degree of fluid overload. Admission date (for inpatients): 12/25/2022   2 Days Post-Op  Procedure(s):  ABDOMEN INCISION AND DRAINAGE  HERNIA VENTRAL REPAIR  poss CATHETER REMOVAL PERITONEAL DIALYSIS        ASSESSMENT/PLAN:  [unfilled]  Principal Problem:    Sepsis ([address]  Active Problems:    Peritonitis ([address]    Peritoneal dialysis status ([address]    Anemia    Hypomagnesemia    DNR (do not resuscitate)    Localized peritonitis ([address]    Recurrent ventral hernia with incarceration    Infection due to peritoneal dialysis catheter ([address]    Periumbilical abdominal pain    Abdominal wall abscess    Necrotizing fasciitis ([address]    Abdominal wall ulcer, with necrosis of muscle (HCC)    Essential hypertension    ESRD (end stage renal disease) ([address]  Resolved Problems:    * No resolved hospital problems.  *     Patient Active Problem List    Diagnosis Date Noted    Localized peritonitis ([address] 12/26/2022     Priority: Medium    Recurrent ventral hernia with incarceration 12/26/2022     Priority: Medium    Infection due to peritoneal dialysis catheter ([address] 12/26/2022     Priority: Medium    Periumbilical abdominal pain 12/26/2022     Priority: Medium    Abdominal wall abscess 12/26/2022     Priority: Medium    Necrotizing fasciitis ([address] 12/26/2022     Priority: Medium    Abdominal wall ulcer, with necrosis of muscle ([address] 12/26/2022     Priority: Medium    Peritonitis ([address] 12/25/2022     Priority: Medium    Peritoneal dialysis status ([address] 12/25/2022     Priority: Medium    Anemia 12/25/2022     Priority: Medium    Hypomagnesemia 12/25/2022     Priority: Medium    DNR (do not resuscitate) 12/25/2022     Priority: Medium    Sepsis ([address] 12/25/2022     Priority: Medium    Soft tissue mass      Priority: Medium    Kidney stone 10/14/2022     Added automatically from request for surgery [id number]      Other hyperlipidemia 04/21/2022    ESRD (end stage renal disease) ([address] 05/04/2021    Non-recurrent unilateral inguinal hernia without obstruction or gangrene 12/22/2020    Hypertensive urgency 06/24/2017    Essential hypertension 20/36/1828    Umbilical hernia 32/97/4672          Number and Complexity of Problems addressed and   Risks of complications and/or morbidity of management    Recurrent, incarcerated ventral hernia  Localized peritonitis  12/26/22 S/P (Dr He Mention) 1. Open drainage of localized peritonitis   2. Open repair of recurrent incarcerated ventral hernia with Vicryl mesh   3. Removal of tunneled intraperitoneal dialysis catheter  4.   Debridement of skin, subcutaneous adipose, muscle, and fascia for abdominal wall necrotizing infection    > Keep dressing in place, do not remove for now (will evaluate 2 packing sites under dressing/incision site later this week per Dr He Mention)  >Keep NPO until at least Saturday  >IVF PER NEPHROLOGY    He is sitting up and comfortable    Dressing change tomorrow; no supplies in room as ordered yesterday  I spoke to nurse  High risk for evisceration as fascial closure is tenuous with necrotizing infection          Leukocytosis  Trend WBC  Monitor for fever and vital signs  Cxs from OR 12/26/22 growing alpha Strep so far  On IV Vanc and Zosyn       Abdominal pain  Monitor and treat for pain    Dialysis dependence  Suspected infected PD catheter placed in 2014  >Nephrology following, pt has a patent LUE AVF backup  Nephrology to address                   Level of MDM (2/3 elements below)  Number and Complexity of Problems Addressed Amount and/or Complexity of Data to be Reviewed and Analyzed  *Each unique test, order, or document contributes to the combination of 2 or combination of 3 in Category 1 below. Risk of Complications and/or Morbidity or Mortality of pt Management     71856  52611 SF Minimal  ?1self-limited or minor problem Minimal or none Minimal risk of morbidity from additional diagnostic testing or Rx   53190  57760 Low Low  ? 2or more self-limited or minor problems;    or  ? 1stable chronic illness;    or  ?1acute, uncomplicated illness or injury   Limited  (Must meet the requirements of at least 1 of the 2 categories)  Category 1: Tests and documents   ? Any combination of 2 from the following:  ?Review of prior external note(s) from each unique source*;  ?review of the result(s) of each unique test*;   ?ordering of each unique test*    or   Category 2: Assessment requiring an independent historian(s)  (For the categories of independent interpretation of tests and discussion of management or test interpretation, see moderate or high) Low risk of morbidity from additional diagnostic testing or treatment     46022  04371 Mod Moderate  ? 1or more chronic illnesses with exacerbation, progression, or side effects of treatment;    or  ?2or more stable chronic illnesses;    or  ?1undiagnosed new problem with uncertain prognosis;    or  ?1acute illness with systemic symptoms;    or  ?1acute complicated injury   Moderate  (Must meet the requirements of at least 1 out of 3 categories)  Category 1: Tests, documents, or independent historian(s)  ? Any combination of 3 from the following:   ?Review of prior external note(s) from each unique source*;  ?Review of the result(s) of each unique test*;  ?Ordering of each unique test*;  ?Assessment requiring an independent historian(s)    or  Category 2: Independent interpretation of tests   ? Independent interpretation of a test performed by another physician/other qualified health care professional (not separately reported);     or  Category 3: Discussion of management or test interpretation  ? Discussion of management or test interpretation with external physician/other qualified health care professional/appropriate source (not separately reported)   Moderate risk of morbidity from additional diagnostic testing or treatment  Examples only:  ?Prescription drug management   ? Decision regarding minor surgery with identified patient or procedure risk factors  ? Decision regarding elective major surgery without identified patient or procedure risk factors   ? Diagnosis or treatment significantly limited by social determinants of health       06211  96807 High High  ? 1or more chronic illnesses with severe exacerbation, progression, or side effects of treatment;    or  ?1 acute or chronic illness or injury that poses a threat to life or bodily function   Extensive  (Must meet the requirements of at least 2 out of 3 categories)  Category 1: Tests, documents, or independent historian(s)  ? Any combination of 3 from the following:   ?Review of prior external note(s) from each unique source*;  ?Review of the result(s) of each unique test*;   ?Ordering of each unique test*;   ?Assessment requiring an independent historian(s)    or   Category 2: Independent interpretation of tests   ? Independent interpretation of a test performed by another physician/other qualified health care professional (not separately reported);     or  Category 3: Discussion of management or test interpretation  ? Discussion of management or test interpretation with external physician/other qualified health care professional/appropriate source (not separately reported)   High risk of morbidity from additional diagnostic testing or treatment  Examples only:  ?Drug therapy requiring intensive monitoring for toxicity  ? Decision regarding elective major surgery with identified patient or procedure risk factors  ? Decision regarding emergency major surgery  ? Decision regarding hospitalization  ? Decision not to resuscitate or to de-escalate care because of poor prognosis             I have personally performed a face-to-face diagnostic evaluation and management  service on this patient. I have independently seen the patient. I have independently obtained the above history from the patient/family. I have independently examined the patient with above findings. I have independently reviewed data/labs for this patient and developed the above plan of care (MDM).       Signed: Rahul Hernandez MD  12/28/2022    4:09 PM

## 2022-12-28 NOTE — CARE COORDINATION
Case Management Assessment  Initial Evaluation    Date/Time of Evaluation: 12/28/2022 3:40 PM  Assessment Completed by: Christel Mendoza RN    If patient is discharged prior to next notation, then this note serves as note for discharge by case management. Patient Name: Jenni Franco                   YOB: 1960  Diagnosis: Peritonitis [facility]) [K65.9]  [facility]                   Date / Time: 12/25/2022 11:46 AM    Patient Admission Status: Inpatient     Current PCP: Aydee Ortiz MD  PCP verified by CM? (P) Yes    Chart Reviewed: Yes      History Provided by: (P) Medical Record  Patient Orientation: (P) Alert and Oriented    Patient Cognition: (P) Alert    Hospitalization in the last 30 days (Readmission):  No    If yes, Readmission Assessment in CM Navigator will be completed. Advance Directives:     Code Status: DNR     Primary Decision Makecristino Branch [facility] - [phone]    Discharge Planning  Patient lives with: (P) Alone Type of Home: Trailer/Mobile Home  Primary Care Giver: (P) Self  Patient Support Systems include: Children   Current Financial resources: (P) Medicare  Current community resources:    Current services prior to admission: None  Type of Home Care services:  (P) None    ADLS  Prior functional level: (P) Independent in ADLs/IADLs  Current functional level: (P) Independent in ADLs/IADLs    PT AM-PAC: 23 /24  OT AM-PAC:   /24    Family can provide assistance at DC: (P) Yes  Would you like Case Management to discuss the discharge plan with any other family members/significant others, and if so, who? (P) No  Plans to Return to Present Housing: (P) Yes  Other Identified Issues/Barriers to RETURNING to current housing: Patient does live alone and  is now transitioning from home PD to OP HD at the [facility] [address].     Potential Assistance needed at discharge: (P) Other (Comment) (Patient is transitioning from PD to HD)  Patient expects to discharge to: (P) Trailer/mobile home  Plan for transportation at discharge:      Financial  Payor: Jessica Parsons / Plan: [facility] [address] [place] [address] [address] / Product Type: *No Product type* /       Does insurance require precert for SNF: Yes    Potential assistance Purchasing Medications: (P) No      Faisal, [facility], [address] [place] [address] [address] [phone] Nima Kevin [phone]  Fritz Bentley  #F  [address] [place] [address]  Phone: [phone] Fax: [phone]      Factors facilitating achievement of predicted outcomes: Friend support, Cooperative, and Pleasant    Barriers to discharge: Limited family support and Medical complications    Additional Case Management Notes: Patient lives in a mobile home alone   Discharge plan is Home with change to HD chair slot  [facility]  Transfer request sent to [facility]    The Plan for Transition of Care is related to the following treatment goals of Peritonitis ([address] [K65.9]  Peritonitis, dialysis-associated, initial encounter ([address] [U79.82KK]    IF APPLICABLE: The Patient and/or patient representative Dre Pruett and his family were provided with a choice of provider and agrees with the discharge plan. Freedom of choice list with basic dialogue that supports the patient's individualized plan of care/goals and shares the quality data associated with the providers was provided to: (P) Patient   Patient Representative Name:       The Patient and/or Patient Representative Agree with the Discharge Plan?  (P) Yes    Sue Hernandez RN  Case Management Department

## 2022-12-28 NOTE — PLAN OF CARE
Problem: Safety - Adult  Goal: Free from fall injury  12/28/2022 0935 by Ryan Ferrara. Marques Castañeda RN  Outcome: Progressing  12/27/2022 2231 by Lieutenant Kirti RN  Outcome: Progressing     Problem: Pain  Goal: Verbalizes/displays adequate comfort level or baseline comfort level  12/28/2022 0935 by Ryan Ferarra.  Marques Castañeda RN  Outcome: Progressing  12/27/2022 2231 by Lieutenant Kirti RN  Outcome: Progressing

## 2022-12-28 NOTE — PROGRESS NOTES
Hospitalist Progress Note   Admit Date:  2022 11:46 AM   Name:  Santhosh Gallardo   Age:  65 y.o. Sex:  male  :  1960   MRN:  [id number]   Room:  2/    Presenting Complaint: abdominal pain  Reason(s) for Admission: Peritonitis ([address] [K65.9]  Peritonitis, dialysis-associated, initial encounter [facility] Course & Interval History:   Jeffery Wintres is a 62-year-old male with a PMH of ESRD on peritoneal dialysis, HTN, peritonitis, admitted on  with diffuse abdominal pain, secondary to possible peritonitis versus incarcerated umbilical hernia with abscess versus seroma. He did report having periumbilical hernia repair in the past. Patient met sepsis criteria, was started on broad-spectrum antibiotic via PD catheter. CTAP showed moderate size umbilical hernia with herniated loops of bowel fluid, the fluid collection measures roughly 5.7 x 6.0 cm, seroma or abscess could be considered, PCKD appreciated with no evidence of hydronephrosis. Subjective/24hr Events (22): Denies abdominal pain, N/V. States he wants to eat    Assessment & Plan:     Principal Problem:    Sepsis ([address]    Recurrent incarcerated ventral hernia with localized peritonitis  Plan:   -Status post incarcerated ventral hernia repair  with Dr. He Mention  -N.p.o. except for meds and sips of water till 2022. Monitor electrolytes  -Consider fluids   -Peritoneal fluid and blood culture negative to date.  -Intra-Op culture positive for alpha strep  -Continue vancomycin and Zosyn. GS to determine EOT  -Nephrology on board  -PT? OT with no further recs for skilled therapies       Active Problems:    Peritoneal dialysis status ([address]    ESRD (end stage renal disease) ([address]  Plan:   -Continue  HD via left upper extremity AV fistula.  Patient interested in [address] [place] [address]  - working on dialysis chair assignment      Anemia  Plan:   -Hemoglobin stable, continue to monitor.  -No signs of active bleed.      Hypomagnesemia(Resolved)      DNR (do not resuscitate)  Plan: Noted      Essential hypertension  Plan:   -Controlled on Clonidine patch  -Continue holding Procardia and HCTZ. Reintroduce if appropriate        Discharge Planning:    Pending clearance from general surgery and nephrology    Diet:  Diet NPO Exceptions are: Ice Chips, Sips of Water with Meds, Sips of Clear Liquids  DVT PPx: Heparin  Code status: DNR    Hospital Problems             Last Modified POA    * (Principal) Sepsis ([address] 12/26/2022 Yes    Peritonitis ([address] 12/26/2022 Yes    Peritoneal dialysis status ([address] (Chronic) 12/25/2022 Yes    Anemia 12/25/2022 Yes    Hypomagnesemia 12/25/2022 Yes    DNR (do not resuscitate) (Chronic) 12/25/2022 Yes    Localized peritonitis ([address] 12/26/2022 Yes    Recurrent ventral hernia with incarceration 12/26/2022 Yes    Infection due to peritoneal dialysis catheter ([address] 07/16/1160 Yes    Periumbilical abdominal pain 12/26/2022 Yes    Abdominal wall abscess 12/26/2022 Yes    Necrotizing fasciitis ([address] 12/26/2022 Yes    Abdominal wall ulcer, with necrosis of muscle ([address] 12/26/2022 Yes    Essential hypertension 12/25/2022 Yes    ESRD (end stage renal disease) ([address] 12/25/2022 Yes     Objective:   Patient Vitals for the past 24 hrs:   Temp Pulse Resp BP SpO2   12/28/22 1112 98.5 °F (36.9 °C) (!) 105 19 116/80 98 %   12/28/22 0813 98 °F (36.7 °C) 81 19 125/81 96 %   12/28/22 0315 98.2 °F (36.8 °C) 94 18 (!) 124/90 90 %   12/27/22 2335 98.8 °F (37.1 °C) 95 16 92/72 95 %   12/27/22 2000 98.9 °F (37.2 °C) (!) 103 18 (!) 110/90 97 %   12/27/22 1544 99.1 °F (37.3 °C) (!) 105 18 99/74 97 %       Estimated body mass index is 30.59 kg/m² as calculated from the following:    Height as of this encounter: 6' 5\" (1.956 m). Weight as of this encounter: 257 lb 15 oz (117 kg).     Intake/Output Summary (Last 24 hours) at 12/28/2022 1335  Last data filed at 12/27/2022 2242  Gross per 24 hour   Intake 100 ml   Output -- Net 100 ml         Physical Exam:     Blood pressure 116/80, pulse (!) 105, temperature 98.5 °F (36.9 °C), temperature source Oral, resp. rate 19, height 6' 5\" (1.956 m), weight 257 lb 15 oz (117 kg), SpO2 98 %. General:    Alert, awake, well built, on room air, NAD  Head:  Normocephalic, atraumatic  Eyes:  Sclerae appear normal. Pupils equally round. ENT:  Nares appear normal, no drainage. Moist oral mucosa  Neck:  No restricted ROM. Trachea midline. CV:   RRR. No m/r/g. No jugular venous distension. Lungs:   CTAB. No wheezing, rhonchi, or rales. Respirations even, unlabored. Abdomen:   Surgical dressing in place not removed as recommended by general surgery. Extremities: No cyanosis or clubbing. No edema. Skin:     No rashes and normal coloration. Warm and dry. Neuro:  GCS 15, cranial nerves intact, no motor or sensory deficit, cerebellar functions intact  Psych:  AOx3, mood and affect appropriate. I have reviewed ordered lab tests and independently visualized imaging below:    Recent Labs:  Recent Results (from the past 48 hour(s))   Culture, Anaerobic    Collection Time: 12/26/22  1:46 PM    Specimen: Abdomen   Result Value Ref Range    Special Requests NO SPECIAL REQUESTS      Culture        SUBCULTURE IS NECESSARY TO DETERMINE PRESENCE OR ABSENCE OF ANAEROBIC BACTERIA IN THIS CULTURE. FURTHER REPORT TO FOLLOW AFTER INCUBATION OF SUBCULTURE.    Culture, Wound Aerobic Only    Collection Time: 12/26/22  1:46 PM    Specimen: Abdomen   Result Value Ref Range    Special Requests NO SPECIAL REQUESTS      Gram stain 0 TO 1 WBCS/OIF     Gram stain NO DEFINITE ORGANISM SEEN      Culture SCANT ALPHA STREPTOCOCCUS (A)     Culture, Catheter Tip    Collection Time: 12/26/22  2:13 PM    Specimen: Catheter Tip    PERITONEAL   Result Value Ref Range    Special Requests NO SPECIAL REQUESTS      Culture NO GROWTH 2 DAYS     CBC with Auto Differential    Collection Time: 12/27/22  7:05 AM   Result Value Ref Range    WBC 13.2 (H) 4.3 - 11.1 K/uL    RBC 3.13 (L) 4.23 - 5.6 M/uL    Hemoglobin 9.9 (L) 13.6 - 17.2 g/dL    Hematocrit 29.7 (L) 41.1 - 50.3 %    MCV 94.9 82 - 102 FL    MCH 31.6 26.1 - 32.9 PG    MCHC 33.3 31.4 - 35.0 g/dL    RDW 16.0 (H) 11.9 - 14.6 %    Platelets 310 769 - 728 K/uL    MPV 11.2 9.4 - 12.3 FL    nRBC 0.00 0.0 - 0.2 K/uL    Differential Type AUTOMATED      Seg Neutrophils 88 (H) 43 - 78 %    Lymphocytes 5 (L) 13 - 44 %    Monocytes 6 4.0 - 12.0 %    Eosinophils % 0 (L) 0.5 - 7.8 %    Basophils 0 0.0 - 2.0 %    Immature Granulocytes 1 0.0 - 5.0 %    Segs Absolute 11.7 (H) 1.7 - 8.2 K/UL    Absolute Lymph # 0.6 0.5 - 4.6 K/UL    Absolute Mono # 0.8 0.1 - 1.3 K/UL    Absolute Eos # 0.0 0.0 - 0.8 K/UL    Basophils Absolute 0.0 0.0 - 0.2 K/UL    Absolute Immature Granulocyte 0.1 0.0 - 0.5 K/UL   Comprehensive Metabolic Panel w/ Reflex to MG    Collection Time: 12/27/22  7:05 AM   Result Value Ref Range    Sodium 137 133 - 143 mmol/L    Potassium 4.4 3.5 - 5.1 mmol/L    Chloride 98 (L) 101 - 110 mmol/L    CO2 27 21 - 32 mmol/L    Anion Gap 12 (H) 2 - 11 mmol/L    Glucose 147 (H) 65 - 100 mg/dL    BUN 57 (H) 8 - 23 MG/DL    Creatinine 16.30 (H) 0.8 - 1.5 MG/DL    Est, Glom Filt Rate 3 (L) >60 ml/min/1.73m2    Calcium 10.1 8.3 - 10.4 MG/DL    Total Bilirubin 0.5 0.2 - 1.1 MG/DL    ALT 12 12 - 65 U/L    AST 7 (L) 15 - 37 U/L    Alk Phosphatase 77 50 - 136 U/L    Total Protein 7.9 6.3 - 8.2 g/dL    Albumin 2.4 (L) 3.2 - 4.6 g/dL    Globulin 5.5 (H) 2.8 - 4.5 g/dL    Albumin/Globulin Ratio 0.4 0.4 - 1.6     Hepatitis Panel, Acute    Collection Time: 12/27/22  8:48 AM   Result Value Ref Range    Hep A IgM NONREACTIVE NR      Hep B Core Ab, IgM NONREACTIVE NR      Hepatitis B Surface Ag NONREACTIVE NR      Hepatitis C Ab NONREACTIVE NR     Hepatitis B Core Antibody, Total    Collection Time: 12/27/22  8:48 AM   Result Value Ref Range    Hep B Core Total Ab Negative Negative     Hepatitis B Surface Antibody Collection Time: 12/27/22  9:31 AM   Result Value Ref Range    Hep B S Ab 63.02 mIU/mL   CBC with Auto Differential    Collection Time: 12/28/22  7:58 AM   Result Value Ref Range    WBC 8.4 4.3 - 11.1 K/uL    RBC 2.69 (L) 4.23 - 5.6 M/uL    Hemoglobin 8.4 (L) 13.6 - 17.2 g/dL    Hematocrit 25.9 (L) 41.1 - 50.3 %    MCV 96.3 82 - 102 FL    MCH 31.2 26.1 - 32.9 PG    MCHC 32.4 31.4 - 35.0 g/dL    RDW 16.0 (H) 11.9 - 14.6 %    Platelets 913 292 - 168 K/uL    MPV 10.7 9.4 - 12.3 FL    nRBC 0.00 0.0 - 0.2 K/uL    Differential Type AUTOMATED      Seg Neutrophils 69 43 - 78 %    Lymphocytes 18 13 - 44 %    Monocytes 9 4.0 - 12.0 %    Eosinophils % 3 0.5 - 7.8 %    Basophils 0 0.0 - 2.0 %    Immature Granulocytes 1 0.0 - 5.0 %    Segs Absolute 5.8 1.7 - 8.2 K/UL    Absolute Lymph # 1.5 0.5 - 4.6 K/UL    Absolute Mono # 0.8 0.1 - 1.3 K/UL    Absolute Eos # 0.2 0.0 - 0.8 K/UL    Basophils Absolute 0.0 0.0 - 0.2 K/UL    Absolute Immature Granulocyte 0.0 0.0 - 0.5 K/UL   Comprehensive Metabolic Panel w/ Reflex to MG    Collection Time: 12/28/22  7:58 AM   Result Value Ref Range    Sodium 139 133 - 143 mmol/L    Potassium 4.2 3.5 - 5.1 mmol/L    Chloride 101 101 - 110 mmol/L    CO2 32 21 - 32 mmol/L    Anion Gap 6 2 - 11 mmol/L    Glucose 95 65 - 100 mg/dL    BUN 46 (H) 8 - 23 MG/DL    Creatinine 13.80 (H) 0.8 - 1.5 MG/DL    Est, Glom Filt Rate 4 (L) >60 ml/min/1.73m2    Calcium 9.0 8.3 - 10.4 MG/DL    Total Bilirubin 0.5 0.2 - 1.1 MG/DL    ALT 10 (L) 12 - 65 U/L    AST 11 (L) 15 - 37 U/L    Alk Phosphatase 63 50 - 136 U/L    Total Protein 6.1 (L) 6.3 - 8.2 g/dL    Albumin 2.2 (L) 3.2 - 4.6 g/dL    Globulin 3.9 2.8 - 4.5 g/dL    Albumin/Globulin Ratio 0.6 0.4 - 1.6     Vancomycin Level, Random    Collection Time: 12/28/22  7:58 AM   Result Value Ref Range    Vancomycin Rm 17.1 UG/ML         Other Studies:  CT ABDOMEN PELVIS WO CONTRAST Additional Contrast? Oral    Result Date: 12/25/2022  CT of the abdomen and pelvis without contrast. CLINICAL INDICATION: Yellowish solution after peritoneal dialysis, concern for peritonitis PROCEDURE: Serial thin-section axial images obtained from the upper abdomen through the proximal femurs without the administration of intravenous contrast. Oral contrast was given. Radiation dose reduction techniques were used for this study. Our CT scanners use one or all of the following: Automated exposure control, adjusted of the mA and/or kV according to patient size, iterative reconstruction COMPARISON: CT abdomen and pelvis dated 7/6/2022 FINDINGS:  Evaluation of the hollow and solid viscera is limited by the lack of intravenous contrast. Dependent atelectasis is present. CT ABDOMEN: The stomach is unremarkable. Innumerable cysts are noted throughout the bilateral kidneys unchanged from the prior exam in keeping with polycystic kidney disease. Multiple of the cysts are hyperdense and likely hemorrhagic. There is no hydronephrosis. No renal or ureteral stones evident. The adrenal glands are normal. An umbilical hernia is present in the lower mid abdomen at the insertion site of the peritoneal dialysis catheter. There is a fluid collection within the hernia sac concerning for possible seroma or abscess that measures 5.7 x 3.2 x 6.0 cm. There are herniated bowel loops as well without bowel obstruction. The abdominal wall defect measures roughly 7.5 cm. CT PELVIS: The bowel is normal in course, caliber, and wall thickness. No bowel obstruction evident. The appendix is normal. The peritoneal dialysis catheter coils in the central pelvis and a small amount of ascitic fluid. The bladder is decompressed. There is no inguinal hernia. The rectum is unremarkable. No aggressive bone lesions identified. 1. Moderate-sized umbilical hernia with herniated loops of bowel and fluid. The fluid collection measures roughly 5.7 x 6.0 cm. Seroma or abscess could be considered. 2. Polycystic kidney disease.  There is no hydronephrosis. XR CHEST PORTABLE    Result Date: 12/25/2022  KUB CLINICAL INDICATION: Sepsis FINDINGS: Single AP view the chest compared to a similar exam dated 6/24/2017 show the lungs are slightly underexpanded but otherwise clear. No pleural effusion or pneumothorax. The cardiac silhouette and mediastinum are unremarkable. The bones are normal.     Slightly under expanded lungs, otherwise no acute abnormality.       Current Meds:  Current Facility-Administered Medications   Medication Dose Route Frequency    tuberculin injection 5 Units  5 Units IntraDERmal Once    pantoprazole (PROTONIX) 40 mg in sodium chloride (PF) 0.9 % 10 mL injection  40 mg IntraVENous Daily    morphine (PF) injection 2 mg  2 mg IntraVENous Q2H PRN    morphine injection 4 mg  4 mg IntraVENous Q2H PRN    sodium chloride flush 0.9 % injection 5-40 mL  5-40 mL IntraVENous 2 times per day    sodium chloride flush 0.9 % injection 5-40 mL  5-40 mL IntraVENous PRN    0.9 % sodium chloride infusion   IntraVENous PRN    [Held by provider] heparin (porcine) injection 5,000 Units  5,000 Units SubCUTAneous 3 times per day    ondansetron (ZOFRAN-ODT) disintegrating tablet 4 mg  4 mg Oral Q8H PRN    Or    ondansetron (ZOFRAN) injection 4 mg  4 mg IntraVENous Q6H PRN    acetaminophen (TYLENOL) tablet 650 mg  650 mg Oral Q6H PRN    Or    acetaminophen (TYLENOL) suppository 650 mg  650 mg Rectal Q6H PRN    cinacalcet (SENSIPAR) tablet 90 mg  90 mg Oral Nightly    [Held by provider] NIFEdipine (PROCARDIA XL) extended release tablet 90 mg  90 mg Oral Nightly    finasteride (PROSCAR) tablet 5 mg  5 mg Oral Nightly    traZODone (DESYREL) tablet 50 mg  50 mg Oral Nightly    piperacillin-tazobactam (ZOSYN) 3,375 mg in sodium chloride 0.9 % 50 mL IVPB (mini-bag)  3,375 mg IntraVENous Q12H    vancomycin (VANCOCIN) intermittent dosing (placeholder)   Other RX Placeholder    oxyCODONE (ROXICODONE) immediate release tablet 5 mg  5 mg Oral Q6H PRN    valsartan (DIOVAN) tablet 320 mg  320 mg Oral Daily    And    [Held by provider] hydroCHLOROthiazide (HYDRODIURIL) tablet 25 mg  25 mg Oral Daily    cloNIDine (CATAPRES) 0.3 MG/24HR 1 patch  1 patch TransDERmal Weekly       Signed:  LEROY Garrison - CNP    Part of this note may have been written by using a voice dictation software. The note has been proof read but may still contain some grammatical/other typographical errors.

## 2022-12-28 NOTE — PROGRESS NOTES
VANCO DAILY FOLLOW UP RENAL INSUFFICIENCY PATIENT   [address] [facility] Pharmacokinetic Monitoring Service - Vancomycin    Consulting Provider: Dr. Jessica Arroyo   Indication: Suspected peritonitis  Target Concentration: Random level ? 15 mg/L  Day of Therapy: 4  Additional Antimicrobials: Zosyn    Patient eligible for piperacillin-tazobactam to cefepime auto-substitution per P&T approved protocol? NO    Pertinent Laboratory Values: Wt Readings from Last 1 Encounters:   12/27/22 257 lb 15 oz (117 kg)     Temp Readings from Last 1 Encounters:   12/28/22 98.5 °F (36.9 °C) (Oral)     Recent Labs     12/25/22  1155 12/26/22  0609 12/27/22  0705 12/28/22  0758   BUN 49* 51* 57* 46*   CREATININE 15.40* 14.60* 16.30* 13.80*   WBC 11.5* 7.6 13.2* 8.4   LACACIDPL 1.4  --   --   --        Lab Results   Component Value Date/Time    VANCORANDOM 17.1 12/28/2022 07:58 AM       MRSA Nasal Swab: N/A. Non-respiratory infection. Assessment:  Date:  Dose/Freq Admin Times Level/Time:   12/25 2500mg x1 1402    12/26 12/27 12/28   Rd @ 3738 = 17.1           Plan:  Concentration-guided dosing due to peritoneal dialysis  No dose needed today.     Vancomycin concentrations will be ordered as clinically appropriate   Pharmacy will continue to monitor patient and adjust therapy as indicated    Thank you for the consult,  Mati Kiran, PharmD, BCOP  Clinical Pharmacist  Contact [employer]

## 2022-12-29 LAB
ALBUMIN SERPL-MCNC: 1.8 G/DL (ref 3.2–4.6)
ALBUMIN/GLOB SERPL: 0.5 {RATIO} (ref 0.4–1.6)
ALP SERPL-CCNC: 51 U/L (ref 50–136)
ALT SERPL-CCNC: 9 U/L (ref 12–65)
ANION GAP SERPL CALC-SCNC: 6 MMOL/L (ref 2–11)
AST SERPL-CCNC: 10 U/L (ref 15–37)
BASOPHILS # BLD: 0 K/UL (ref 0–0.2)
BASOPHILS NFR BLD: 1 % (ref 0–2)
BILIRUB SERPL-MCNC: 1.1 MG/DL (ref 0.2–1.1)
BUN SERPL-MCNC: 27 MG/DL (ref 8–23)
CALCIUM SERPL-MCNC: 7.1 MG/DL (ref 8.3–10.4)
CHLORIDE SERPL-SCNC: 112 MMOL/L (ref 101–110)
CO2 SERPL-SCNC: 26 MMOL/L (ref 21–32)
CREAT SERPL-MCNC: 8 MG/DL (ref 0.8–1.5)
DIFFERENTIAL METHOD BLD: ABNORMAL
EOSINOPHIL # BLD: 0.4 K/UL (ref 0–0.8)
EOSINOPHIL NFR BLD: 5 % (ref 0.5–7.8)
ERYTHROCYTE [DISTWIDTH] IN BLOOD BY AUTOMATED COUNT: 15.9 % (ref 11.9–14.6)
GLOBULIN SER CALC-MCNC: 3.7 G/DL (ref 2.8–4.5)
GLUCOSE SERPL-MCNC: 62 MG/DL (ref 65–100)
HCT VFR BLD AUTO: 28.5 % (ref 41.1–50.3)
HGB BLD-MCNC: 9.5 G/DL (ref 13.6–17.2)
IMM GRANULOCYTES # BLD AUTO: 0 K/UL (ref 0–0.5)
IMM GRANULOCYTES NFR BLD AUTO: 0 % (ref 0–5)
LYMPHOCYTES # BLD: 1.5 K/UL (ref 0.5–4.6)
LYMPHOCYTES NFR BLD: 18 % (ref 13–44)
MAGNESIUM SERPL-MCNC: 1.5 MG/DL (ref 1.8–2.4)
MCH RBC QN AUTO: 31.6 PG (ref 26.1–32.9)
MCHC RBC AUTO-ENTMCNC: 33.3 G/DL (ref 31.4–35)
MCV RBC AUTO: 94.7 FL (ref 82–102)
MM INDURATION, POC: 0 MM (ref 0–5)
MONOCYTES # BLD: 1 K/UL (ref 0.1–1.3)
MONOCYTES NFR BLD: 13 % (ref 4–12)
NEUTS SEG # BLD: 5 K/UL (ref 1.7–8.2)
NEUTS SEG NFR BLD: 63 % (ref 43–78)
NRBC # BLD: 0 K/UL (ref 0–0.2)
PLATELET # BLD AUTO: 241 K/UL (ref 150–450)
PMV BLD AUTO: 10.5 FL (ref 9.4–12.3)
POTASSIUM SERPL-SCNC: 3.2 MMOL/L (ref 3.5–5.1)
PPD, POC: NEGATIVE
PROT SERPL-MCNC: 5.5 G/DL (ref 6.3–8.2)
RBC # BLD AUTO: 3.01 M/UL (ref 4.23–5.6)
SODIUM SERPL-SCNC: 144 MMOL/L (ref 133–143)
WBC # BLD AUTO: 7.9 K/UL (ref 4.3–11.1)

## 2022-12-29 PROCEDURE — 6360000002 HC RX W HCPCS: Performed by: SURGERY

## 2022-12-29 PROCEDURE — 1100000000 HC RM PRIVATE

## 2022-12-29 PROCEDURE — A4216 STERILE WATER/SALINE, 10 ML: HCPCS | Performed by: SURGERY

## 2022-12-29 PROCEDURE — 6370000000 HC RX 637 (ALT 250 FOR IP): Performed by: SURGERY

## 2022-12-29 PROCEDURE — 2580000003 HC RX 258: Performed by: SURGERY

## 2022-12-29 PROCEDURE — C9113 INJ PANTOPRAZOLE SODIUM, VIA: HCPCS | Performed by: SURGERY

## 2022-12-29 PROCEDURE — 6370000000 HC RX 637 (ALT 250 FOR IP): Performed by: INTERNAL MEDICINE

## 2022-12-29 PROCEDURE — 8010000000 HC HEMODIALYSIS ACUTE INPT

## 2022-12-29 PROCEDURE — 2580000003 HC RX 258: Performed by: NURSE PRACTITIONER

## 2022-12-29 PROCEDURE — 85025 COMPLETE CBC W/AUTO DIFF WBC: CPT

## 2022-12-29 PROCEDURE — 2580000003 HC RX 258: Performed by: INTERNAL MEDICINE

## 2022-12-29 PROCEDURE — 83735 ASSAY OF MAGNESIUM: CPT

## 2022-12-29 PROCEDURE — 36415 COLL VENOUS BLD VENIPUNCTURE: CPT

## 2022-12-29 PROCEDURE — 80053 COMPREHEN METABOLIC PANEL: CPT

## 2022-12-29 RX ORDER — DEXTROSE AND SODIUM CHLORIDE 5; .45 G/100ML; G/100ML
INJECTION, SOLUTION INTRAVENOUS CONTINUOUS
Status: DISCONTINUED | OUTPATIENT
Start: 2022-12-29 | End: 2022-12-30

## 2022-12-29 RX ADMIN — FINASTERIDE 5 MG: 5 TABLET, FILM COATED ORAL at 20:50

## 2022-12-29 RX ADMIN — PIPERACILLIN AND TAZOBACTAM 3375 MG: 3; .375 INJECTION, POWDER, LYOPHILIZED, FOR SOLUTION INTRAVENOUS at 13:42

## 2022-12-29 RX ADMIN — VANCOMYCIN HYDROCHLORIDE 1000 MG: 1 INJECTION, POWDER, LYOPHILIZED, FOR SOLUTION INTRAVENOUS at 13:43

## 2022-12-29 RX ADMIN — MORPHINE SULFATE 4 MG: 4 INJECTION INTRAVENOUS at 18:51

## 2022-12-29 RX ADMIN — DEXTROSE AND SODIUM CHLORIDE: 5; 450 INJECTION, SOLUTION INTRAVENOUS at 16:47

## 2022-12-29 RX ADMIN — VALSARTAN 320 MG: 320 TABLET, FILM COATED ORAL at 13:43

## 2022-12-29 RX ADMIN — SODIUM CHLORIDE, PRESERVATIVE FREE 10 ML: 5 INJECTION INTRAVENOUS at 20:38

## 2022-12-29 RX ADMIN — SODIUM CHLORIDE 40 MG: 9 INJECTION, SOLUTION INTRAMUSCULAR; INTRAVENOUS; SUBCUTANEOUS at 13:43

## 2022-12-29 RX ADMIN — MORPHINE SULFATE 4 MG: 4 INJECTION INTRAVENOUS at 03:24

## 2022-12-29 RX ADMIN — CINACALCET HYDROCHLORIDE 90 MG: 30 TABLET, FILM COATED ORAL at 20:50

## 2022-12-29 RX ADMIN — TRAZODONE HYDROCHLORIDE 50 MG: 50 TABLET ORAL at 20:50

## 2022-12-29 ASSESSMENT — PAIN SCALES - GENERAL
PAINLEVEL_OUTOF10: 7
PAINLEVEL_OUTOF10: 7
PAINLEVEL_OUTOF10: 4
PAINLEVEL_OUTOF10: 4

## 2022-12-29 ASSESSMENT — PAIN DESCRIPTION - DESCRIPTORS
DESCRIPTORS: ACHING
DESCRIPTORS: ACHING

## 2022-12-29 ASSESSMENT — PAIN DESCRIPTION - ORIENTATION: ORIENTATION: LOWER

## 2022-12-29 ASSESSMENT — PAIN DESCRIPTION - LOCATION
LOCATION: ABDOMEN
LOCATION: ABDOMEN

## 2022-12-29 ASSESSMENT — PAIN - FUNCTIONAL ASSESSMENT: PAIN_FUNCTIONAL_ASSESSMENT: PREVENTS OR INTERFERES SOME ACTIVE ACTIVITIES AND ADLS

## 2022-12-29 NOTE — DIALYSIS
TRANSFER OUT - DIALYSIS    Hemodialysis treatment completed, pt ran 3.5 hours without complications. Patient a/ox4 and /86 , P 97       1 Kg removed. Needles x2 removed from access and manual pressure held until hemostasis complete and pressure dressing applied. Patient to [id number] after dialysis.

## 2022-12-29 NOTE — PROGRESS NOTES
H&P/Consult Note/Progress Note/Office Note:   Deng Heath  MRN: [id number]  :1960  Age:62 y.o.    HPI: Deng Heath is a 65 y.o. male who is s/p open drainage of localized peritonitis, debridement of necrotizing fasciitis of abdominal wall infection, removal of infected PD cath, and repair of recurrent incarcerated ventral hernia with Vicryl mesh on 22. Prior to surgery he was admitted by the hospitalist with sepsis on 22 after he presented to the ER with a 2-week history of progressive abdominal pain and bulging. He reported a prior recurrent ventral hernia for many years following open umbilical hernia repair with Prolene mesh by Dr. EATON [facility] on 2014. He had a PD cath placed at that time and is dialysis dependent. Recently however he developed progressive and severe abdominal pain associated with worsening bulging and tenderness. He also described an associated eschar of the umbilical skin recently  This was associated with a change in the color of the effluent from the PD catheter. He has had associated N/V, tachycardia, and leukocytosis on admission. He was placed on vancomycin and Zosyn. CT imaging was performed as shown below and general surgery consultation was obtained. He described a prior episode of peritonitis without abscess or hernia treated with Abx in approximately . He described having an AV fistula for dialysis placed many years ago which has never been used. He wants to stop having peritoneal dialysis and convert to hemodialysis. 22 CT abd/pelvis withoral but no IV contrast  Hx: Yellowish solution after peritoneal dialysis, concern for peritonitis       FINDINGS: Evaluation of the hollow and solid viscera is limited by the lack of intravenous contrast.   Dependent atelectasis is present. CT ABDOMEN: The stomach is unremarkable.  Innumerable cysts are noted throughout the bilateral kidneys unchanged from the prior exam in keeping with polycystic kidney disease. Multiple of the cysts are hyperdense and likely hemorrhagic. There is no hydronephrosis. No renal or ureteral stones evident. The adrenal glands are normal. An umbilical hernia is present in the lower mid abdomen at the insertion site of the peritoneal dialysis catheter. There is a fluid collection within the hernia sac concerning for possible seroma or abscess that measures 5.7 x 3.2 x 6.0 cm. There are herniated bowel loops as well without bowel obstruction. The abdominal wall defect measures roughly 7.5 cm. CT PELVIS: The bowel is normal in course, caliber, and wall thickness. No bowel obstruction evident. The appendix is normal. The peritoneal dialysis catheter coils in the central pelvis and a small amount of ascitic fluid. The bladder is decompressed. There is no inguinal hernia. The rectum is unremarkable. No aggressive bone lesions identified. Impression:  1. Moderate-sized umbilical hernia with herniated loops of bowel and fluid. The fluid collection measures roughly 5.7 x 6.0 cm. Seroma or abscess could be considered. 2. Polycystic kidney disease. There is no hydronephrosis. Additional hx:  12/27/22 POD1: Patient alert and conversant. Abdominal pain is minimal.  Binder in place with surgical dressing CDI. LUE AVF pulsatile. WBC 13.2. Cr 13.3 K+4.4 AF/VSS. -Flatus/-BM  12/28/22 POD2 up in chair; comfortable; AF; Hg lower and subq Heparin on hold; await return of bowel function  12/29/22 POD3 feels better each day; abd dressing changes; 2 breanne left in place                      Past Medical History:   Diagnosis Date    Anemia     Anxiety     Arthritis     CAD (coronary artery disease)     per pt- no MI- \"They said the back of my heart was something. ..like 40%\"    Chronic kidney disease     [facility]; stage 5; pt has AV fistula to left arm; PERITONEAL DIALYSIS daily since 2014    Chronic pain     CKD (chronic kidney disease) Former smoker     Fracture, tibial plateau 82/38/3437    GERD (gastroesophageal reflux disease)     Heart disease     Hyperlipidemia     on med for control    Hypertension     controlled with med    Hypertensive nephrosclerosis     per nephrology note 4/14/14    Hypoactive thyroid     Kidney stone     Peritoneal dialysis catheter in place [facility])     Peritoneal dialysis-associated peritonitis ([address] 06/24/2017    during hs    PUD (peptic ulcer disease)     SBP (spontaneous bacterial peritonitis) ([facility] 09/24/2016    Sepsis ([facility] 09/24/2016    Tibial plateau fracture, left 56/40/1430    Umbilical hernia     Unspecified sleep apnea     pt does not have a CPAP     Past Surgical History:   Procedure Laterality Date    ABDOMEN SURGERY Right 6/13/2022    RIGHT LOWER QUADRANT ABDOMEN LESION BIOPSY EXCISION performed by Peter Blackwell MD at [address] [place] [address] 280 N/A 12/26/2022    ABDOMEN INCISION AND DRAINAGE, VENTRAL HERNIA  REPAIRE WITH MESH.  REMOVAL PD CATH performed by Jany Farfan MD at [address] [place] [address] Right 1995    tip of 5th finger    COLONOSCOPY N/A 3/23/2018    COLONOSCOPY  BMI 34 performed by Lit Mosley MD at [address] [place] [address] PYELOGRAMS    CYSTOSCOPY Left 11/1/2022    CYSTOSCOPY, LEFT URETEROSCOPY, BASKET STONE EXTRACTION, LEFT URETERAL STENT INSERTION performed by Veronica Dillard MD at [facility] Left as a child    thumb    ORTHOPEDIC SURGERY Left 1989    knee    ORTHOPEDIC SURGERY Left 2017    LEFT LATERAL TIBIAL PLATEAU OPEN REDUCTION INTERNAL FIXATION    WI ABDOMEN SURGERY PROC UNLISTED  6/11/14    PD catheter placement    SKIN BIOPSY Left 6/13/2022    LEFT BUTTOCK MASS EXCISION performed by Peter Blackwell MD at [address] [place] [address] Left 2012    AV fistula to arm     Current Facility-Administered Medications   Medication Dose Route Frequency    dextrose 5 % and 0.45 % sodium chloride infusion   IntraVENous Continuous    pantoprazole (PROTONIX) 40 mg in sodium chloride (PF) 0.9 % 10 mL injection  40 mg IntraVENous Daily    morphine (PF) injection 2 mg  2 mg IntraVENous Q2H PRN    morphine injection 4 mg  4 mg IntraVENous Q2H PRN    sodium chloride flush 0.9 % injection 5-40 mL  5-40 mL IntraVENous 2 times per day    sodium chloride flush 0.9 % injection 5-40 mL  5-40 mL IntraVENous PRN    0.9 % sodium chloride infusion   IntraVENous PRN    [Held by provider] heparin (porcine) injection 5,000 Units  5,000 Units SubCUTAneous 3 times per day    ondansetron (ZOFRAN-ODT) disintegrating tablet 4 mg  4 mg Oral Q8H PRN    Or    ondansetron (ZOFRAN) injection 4 mg  4 mg IntraVENous Q6H PRN    acetaminophen (TYLENOL) tablet 650 mg  650 mg Oral Q6H PRN    Or    acetaminophen (TYLENOL) suppository 650 mg  650 mg Rectal Q6H PRN    cinacalcet (SENSIPAR) tablet 90 mg  90 mg Oral Nightly    [Held by provider] NIFEdipine (PROCARDIA XL) extended release tablet 90 mg  90 mg Oral Nightly    finasteride (PROSCAR) tablet 5 mg  5 mg Oral Nightly    traZODone (DESYREL) tablet 50 mg  50 mg Oral Nightly    piperacillin-tazobactam (ZOSYN) 3,375 mg in sodium chloride 0.9 % 50 mL IVPB (mini-bag)  3,375 mg IntraVENous Q12H    vancomycin (VANCOCIN) intermittent dosing (placeholder)   Other RX Placeholder    oxyCODONE (ROXICODONE) immediate release tablet 5 mg  5 mg Oral Q6H PRN    valsartan (DIOVAN) tablet 320 mg  320 mg Oral Daily    And    [Held by provider] hydroCHLOROthiazide (HYDRODIURIL) tablet 25 mg  25 mg Oral Daily    cloNIDine (CATAPRES) 0.3 MG/24HR 1 patch  1 patch TransDERmal Weekly     ALLERGIES:  Iodine    Social History     Socioeconomic History    Marital status: Single     Spouse name: None    Number of children: None    Years of education: None    Highest education level: None   Tobacco Use    Smoking status: Former     Packs/day: 0.50     Years: 15.00     Pack years: 7.50     Types: Cigarettes     Quit date: 3/1/2014     Years since quittin.8    Smokeless tobacco: Never    Tobacco comments:     Quit smoking: pt states only smoked cigarettes when drinking alcohol   Substance and Sexual Activity    Alcohol use: Yes    Drug use: Yes     Types: Marijuana (Weed)     Comment: \"when I can afford it\"      Social Determinants of Health     Financial Resource Strain: Low Risk     Difficulty of Paying Living Expenses: Not hard at all   Food Insecurity: No Food Insecurity    Worried About Running Out of Food in the Last Year: Never true    Ran Out of Food in the Last Year: Never true   Physical Activity: Insufficiently Active    Days of Exercise per Week: 1 day    Minutes of Exercise per Session: 20 min     Social History     Tobacco Use   Smoking Status Former    Packs/day: 0.50    Years: 15.00    Pack years: 7.50    Types: Cigarettes    Quit date: 3/1/2014    Years since quittin.8   Smokeless Tobacco Never   Tobacco Comments    Quit smoking: pt states only smoked cigarettes when drinking alcohol     Family History   Problem Relation Age of Onset    COPD Father     Heart Disease Father     Hypertension Mother     Diabetes Mother     Hypertension Father      ROS: The patient has no difficulty with chest pain or shortness of breath. No fever or chills. Comprehensive review of systems was otherwise unremarkable except as noted above.     Physical Exam:   /88   Pulse (!) 106   Temp 98.2 °F (36.8 °C) (Oral)   Resp 18   Ht 6' 5\" (1.956 m)   Wt 257 lb 15 oz (117 kg)   SpO2 99%   BMI 30.59 kg/m²   Vitals:    22 1130 22 1201 22 1212 22 1253   BP: 122/87 (!) 143/79 (!) 156/86 129/88   Pulse: 95 97 97 (!) 106   Resp:   18 18   Temp:    98.2 °F (36.8 °C)   TempSrc:    Oral   SpO2:    99%   Weight:       Height:          0701 -  1900  In: 500   Out: 1500   1901 -  0700  In: 100 [P.O.:100]  Out: -     Constitutional: Alert, oriented, cooperative patient in no acute distress; appears stated age    Eyes:Sclera are clear. EOMs intact  ENMT: no external lesions gross hearing normal; no obvious neck masses, no ear or lip lesions, nares normal  CV: RRR. Normal perfusion  Resp: No JVD. Breathing is  non-labored; no audible wheezing. GI: surgical dressing with some serous drainage   He has large ioban and tegaderms over abdomen covering 4x4s which cover 2 wicked sites with 1/2\" iodoform packing  Velcro abd Binder in place. Musculoskeletal: unremarkable with normal function. No embolic signs or cyanosis. LUE with AVF pulsitile  Neuro:  Oriented; moves all 4; no focal deficits  Psychiatric: normal affect and mood, no memory impairment    Recent vitals (if inpt):  Patient Vitals for the past 24 hrs:   BP Temp Temp src Pulse Resp SpO2   12/29/22 1253 129/88 98.2 °F (36.8 °C) Oral (!) 106 18 99 %   12/29/22 1212 (!) 156/86 -- -- 97 18 --   12/29/22 1201 (!) 143/79 -- -- 97 -- --   12/29/22 1130 122/87 -- -- 95 -- --   12/29/22 1100 121/80 -- -- 97 -- --   12/29/22 1030 128/76 -- -- 97 -- --   12/29/22 1000 120/88 -- -- 98 -- --   12/29/22 0930 117/85 -- -- 97 -- --   12/29/22 0900 118/79 -- -- 92 -- --   12/29/22 0826 138/83 -- -- 96 -- --   12/29/22 0754 121/79 98.3 °F (36.8 °C) Oral 87 18 95 %   12/29/22 0341 (!) 117/92 98.2 °F (36.8 °C) Oral 98 19 96 %   12/28/22 2241 (!) 124/91 98.4 °F (36.9 °C) Oral (!) 110 18 96 %   12/28/22 1938 (!) 142/86 98.6 °F (37 °C) Oral (!) 108 18 94 %   12/28/22 1602 113/88 98.2 °F (36.8 °C) Oral 98 19 98 %       Amount and/or Complexity of Data Reviewed and Analyzed:  I reviewed and analyzed all of the unique labs and radiologic studies that are shown below as well as any that are in the HPI, and any that are in the expanded problem list below  *Each unique test, order, or document contributes to the combination of 2 or combination of 3 in Category 1 below. For this visit I also reviewed old records and prior notes.       Recent Labs 12/28/22  0758 12/29/22  0811   WBC 8.4 7.9   HGB 8.4* 9.5*    241     --    K 4.2  --      --    CO2 32  --    BUN 46*  --    ALT 10*  --      Review of most recent CBC  Lab Results   Component Value Date    WBC 7.9 12/29/2022    HGB 9.5 (L) 12/29/2022    HCT 28.5 (L) 12/29/2022    MCV 94.7 12/29/2022     12/29/2022       Review of most recent BMP  Lab Results   Component Value Date/Time     12/28/2022 07:58 AM    K 4.2 12/28/2022 07:58 AM     12/28/2022 07:58 AM    CO2 32 12/28/2022 07:58 AM    BUN 46 12/28/2022 07:58 AM    CREATININE 13.80 12/28/2022 07:58 AM    GLUCOSE 95 12/28/2022 07:58 AM    CALCIUM 9.0 12/28/2022 07:58 AM        Review of most recent LFTs (and lipase if done)  Lab Results   Component Value Date    ALT 10 (L) 12/28/2022    AST 11 (L) 12/28/2022    ALKPHOS 63 12/28/2022    BILITOT 0.5 12/28/2022     Lab Results   Component Value Date    LIPASE 88 12/25/2022       No results found for: INR, APTT, LCAD    Review of most recent HgbA1c  No results found for: LABA1C    Nutritional assessment screen for wound healing issues:  Lab Results   Component Value Date    LABALBU 2.2 (L) 12/28/2022         Xray Result (most recent):  XR CHEST PORTABLE 12/25/2022    Narrative  KUB    CLINICAL INDICATION: Sepsis    FINDINGS: Single AP view the chest compared to a similar exam dated 6/24/2017  show the lungs are slightly underexpanded but otherwise clear. No pleural  effusion or pneumothorax. The cardiac silhouette and mediastinum are  unremarkable. The bones are normal.    Impression  Slightly under expanded lungs, otherwise no acute abnormality.     CT Result (most recent):  CT ABDOMEN PELVIS WO IV CONTRAST 12/25/2022    Narrative  CT of the abdomen and pelvis without contrast.    CLINICAL INDICATION: Yellowish solution after peritoneal dialysis, concern for  peritonitis    PROCEDURE: Serial thin-section axial images obtained from the upper abdomen  through the proximal femurs without the administration of intravenous contrast.  Oral contrast was given. Radiation dose reduction techniques were used for this  study. Our CT scanners use one or all of the following: Automated exposure  control, adjusted of the mA and/or kV according to patient size, iterative  reconstruction    COMPARISON: CT abdomen and pelvis dated 7/6/2022    FINDINGS:  Evaluation of the hollow and solid viscera is limited by the lack of  intravenous contrast.    Dependent atelectasis is present. CT ABDOMEN: The stomach is unremarkable. Innumerable cysts are noted throughout  the bilateral kidneys unchanged from the prior exam in keeping with polycystic  kidney disease. Multiple of the cysts are hyperdense and likely hemorrhagic. There is no hydronephrosis. No renal or ureteral stones evident. The adrenal  glands are normal. An umbilical hernia is present in the lower mid abdomen at  the insertion site of the peritoneal dialysis catheter. There is a fluid  collection within the hernia sac concerning for possible seroma or abscess that  measures 5.7 x 3.2 x 6.0 cm. There are herniated bowel loops as well without  bowel obstruction. The abdominal wall defect measures roughly 7.5 cm. CT PELVIS: The bowel is normal in course, caliber, and wall thickness. No bowel  obstruction evident. The appendix is normal. The peritoneal dialysis catheter  coils in the central pelvis and a small amount of ascitic fluid. The bladder is  decompressed. There is no inguinal hernia. The rectum is unremarkable. No aggressive bone lesions identified. Impression  1. Moderate-sized umbilical hernia with herniated loops of bowel and fluid. The  fluid collection measures roughly 5.7 x 6.0 cm. Seroma or abscess could be  considered. 2. Polycystic kidney disease. There is no hydronephrosis.     US Result (most recent):  US RETROPERITONEAL COMPLETE 01/10/2019    Narrative  Renal ultrasound, 1/10/2019    History: Kidney cyst.    Comparison: None. Technique: Grayscale and doppler images of the kidneys and bladder were obtained  using a 3-5 MHz linear transducer. Findings: The right kidney measures 11.3 cm, and the left kidney measures 10.7  cm in greatest longitudinal length. No stones or evidence of hydronephrosis is  seen. Renal parenchymal echogenicity is increased consistent with chronic  medical renal changes. Numerous simple and complex renal cortical lesions are  seen in the bilateral kidneys. Many of these appear cystic. The majority of  these are not well assessed given their very small size. No clearly worrisome  dominant solid lesion is seen. The urinary bladder is collapsed about a Rojas catheter and therefore not well  assessed. The visualized abdominal aorta is normal in caliber measuring up to  2.3 cm. The IVC is patent. Small to moderate ascites is seen in the right upper  quadrant, and bilateral lower quadrants. Impression  IMPRESSION:  1. Echogenic kidneys demonstrating numerous simple and complex likely cystic  lesions. Many lesions are not well characterized given their very small size  although no clearly worrisome dominant solid mass is seen. This appearance can  be seen with multiple etiologies. However, given the patient's history of renal  failure and dialysis, these are favored to represent acquired cystic renal  disease. 2. Small to moderate ascites which can suggest some degree of fluid overload.       Admission date (for inpatients): 12/25/2022   3 Days Post-Op  Procedure(s):  ABDOMEN INCISION AND DRAINAGE  HERNIA VENTRAL REPAIR  poss CATHETER REMOVAL PERITONEAL DIALYSIS        ASSESSMENT/PLAN:  [unfilled]  Principal Problem:    Sepsis ([address]  Active Problems:    Peritonitis ([address]    Peritoneal dialysis status ([address]    Anemia    Hypomagnesemia    DNR (do not resuscitate)    Localized peritonitis ([address]    Recurrent ventral hernia with incarceration    Infection due to peritoneal dialysis catheter ([address]    Periumbilical abdominal pain    Abdominal wall abscess    Necrotizing fasciitis ([address]    Abdominal wall ulcer, with necrosis of muscle (HCC)    Essential hypertension    ESRD (end stage renal disease) ([address]  Resolved Problems:    * No resolved hospital problems. *     Patient Active Problem List    Diagnosis Date Noted    Localized peritonitis ([address] 12/26/2022     Priority: Medium    Recurrent ventral hernia with incarceration 12/26/2022     Priority: Medium    Infection due to peritoneal dialysis catheter ([address] 12/26/2022     Priority: Medium    Periumbilical abdominal pain 12/26/2022     Priority: Medium    Abdominal wall abscess 12/26/2022     Priority: Medium    Necrotizing fasciitis ([address] 12/26/2022     Priority: Medium    Abdominal wall ulcer, with necrosis of muscle ([address] 12/26/2022     Priority: Medium    Peritonitis ([address] 12/25/2022     Priority: Medium    Peritoneal dialysis status ([address] 12/25/2022     Priority: Medium    Anemia 12/25/2022     Priority: Medium    Hypomagnesemia 12/25/2022     Priority: Medium    DNR (do not resuscitate) 12/25/2022     Priority: Medium    Sepsis ([address] 12/25/2022     Priority: Medium    Soft tissue mass      Priority: Medium    Kidney stone 10/14/2022     Added automatically from request for surgery [id number]      Other hyperlipidemia 04/21/2022    ESRD (end stage renal disease) ([address] 05/04/2021    Non-recurrent unilateral inguinal hernia without obstruction or gangrene 12/22/2020    Hypertensive urgency 06/24/2017    Essential hypertension 15/39/0809    Umbilical hernia 56/64/2429          Number and Complexity of Problems addressed and   Risks of complications and/or morbidity of management        Recurrent, incarcerated ventral hernia  Localized peritonitis  12/26/22    1. Open drainage of localized peritonitis   2. Open repair of recurrent incarcerated ventral hernia with Vicryl mesh   3. Removal of tunneled intraperitoneal dialysis catheter  4. Debridement of skin, subcutaneous adipose, muscle, and fascia for abdominal wall necrotizing infection     POD3     Outer part of abd dressing changed today by Dr Angel Chacko --2 wicked sites remain    NPO   Likely start clears Friday am    IVF PER NEPHROLOGY  High risk for evisceration as fascial closure is tenuous with necrotizing infection          Leukocytosis  WBC 7.9k  Monitor for fever and vital signs  Cxs from OR 12/26/22 growing alpha Strep so far  On IV Vanc and Zosyn       Abdominal pain  Monitor and treat for pain    Dialysis dependence  PD cath (originally placed in 2014 by Dr Jonathan Robles) was removed for infection on 12/26/22  Nephrology following for HD, pt has a patent LUE AVF                     Level of MDM (2/3 elements below)  Number and Complexity of Problems Addressed Amount and/or Complexity of Data to be Reviewed and Analyzed  *Each unique test, order, or document contributes to the combination of 2 or combination of 3 in Category 1 below. Risk of Complications and/or Morbidity or Mortality of pt Management     29676  81861 SF Minimal  ?1self-limited or minor problem Minimal or none Minimal risk of morbidity from additional diagnostic testing or Rx   16020  16488 Low Low  ? 2or more self-limited or minor problems;    or  ? 1stable chronic illness;    or  ?1acute, uncomplicated illness or injury   Limited  (Must meet the requirements of at least 1 of the 2 categories)  Category 1: Tests and documents   ? Any combination of 2 from the following:  ?Review of prior external note(s) from each unique source*;  ?review of the result(s) of each unique test*;   ?ordering of each unique test*    or   Category 2: Assessment requiring an independent historian(s)  (For the categories of independent interpretation of tests and discussion of management or test interpretation, see moderate or high) Low risk of morbidity from additional diagnostic testing or treatment     10847  69782 Mod Moderate  ? 1or more chronic illnesses with exacerbation, progression, or side effects of treatment;    or  ?2or more stable chronic illnesses;    or  ?1undiagnosed new problem with uncertain prognosis;    or  ?1acute illness with systemic symptoms;    or  ?1acute complicated injury   Moderate  (Must meet the requirements of at least 1 out of 3 categories)  Category 1: Tests, documents, or independent historian(s)  ? Any combination of 3 from the following:   ?Review of prior external note(s) from each unique source*;  ?Review of the result(s) of each unique test*;  ?Ordering of each unique test*;  ?Assessment requiring an independent historian(s)    or  Category 2: Independent interpretation of tests   ? Independent interpretation of a test performed by another physician/other qualified health care professional (not separately reported);     or  Category 3: Discussion of management or test interpretation  ? Discussion of management or test interpretation with external physician/other qualified health care professional/appropriate source (not separately reported)   Moderate risk of morbidity from additional diagnostic testing or treatment  Examples only:  ?Prescription drug management   ? Decision regarding minor surgery with identified patient or procedure risk factors  ? Decision regarding elective major surgery without identified patient or procedure risk factors   ? Diagnosis or treatment significantly limited by social determinants of health       20377  53239 High High  ? 1or more chronic illnesses with severe exacerbation, progression, or side effects of treatment;    or  ?1 acute or chronic illness or injury that poses a threat to life or bodily function   Extensive  (Must meet the requirements of at least 2 out of 3 categories)  Category 1: Tests, documents, or independent historian(s)  ? Any combination of 3 from the following:   ?Review of prior external note(s) from each unique source*;  ?Review of the result(s) of each unique test*; ?Ordering of each unique test*;   ?Assessment requiring an independent historian(s)    or   Category 2: Independent interpretation of tests   ? Independent interpretation of a test performed by another physician/other qualified health care professional (not separately reported);     or  Category 3: Discussion of management or test interpretation  ? Discussion of management or test interpretation with external physician/other qualified health care professional/appropriate source (not separately reported)   High risk of morbidity from additional diagnostic testing or treatment  Examples only:  ?Drug therapy requiring intensive monitoring for toxicity  ? Decision regarding elective major surgery with identified patient or procedure risk factors  ? Decision regarding emergency major surgery  ? Decision regarding hospitalization  ? Decision not to resuscitate or to de-escalate care because of poor prognosis             I have personally performed a face-to-face diagnostic evaluation and management  service on this patient. I have independently seen the patient. I have independently obtained the above history from the patient/family. I have independently examined the patient with above findings. I have independently reviewed data/labs for this patient and developed the above plan of care (MDM).       Signed: Daniel Pleitez MD  12/29/2022    3:27 PM

## 2022-12-29 NOTE — DIALYSIS
TRANSFER IN - DIALYSIS    Received patient in dialysis unit from [facility] (unit) for ordered procedure. Consent verified for renal replacement therapy. Procedure explained to patient, opportunity for Q&A provided. Call light given. Patient alert and vital signs stable. /83 , P96 ,  room air. Hemodialysis initiated using left AVF and 16  g needles. Machine settings per MD order. Will monitor during treatment.

## 2022-12-29 NOTE — PROGRESS NOTES
VANCO DAILY FOLLOW UP RENAL INSUFFICIENCY PATIENT   [address] [facility] Pharmacokinetic Monitoring Service - Vancomycin    Consulting Provider: Dr. Fausto Hartley   Indication: Suspected peritonitis  Target Concentration: Random level ? 15 mg/L  Day of Therapy: 5  Additional Antimicrobials: Zosyn    Patient eligible for piperacillin-tazobactam to cefepime auto-substitution per P&T approved protocol? NO    Pertinent Laboratory Values: Wt Readings from Last 1 Encounters:   12/27/22 257 lb 15 oz (117 kg)     Temp Readings from Last 1 Encounters:   12/29/22 98.3 °F (36.8 °C) (Oral)     Recent Labs     12/27/22  0705 12/28/22  0758 12/29/22  0811   BUN 57* 46*  --    CREATININE 16.30* 13.80*  --    WBC 13.2* 8.4 7.9       Lab Results   Component Value Date/Time    VANCORANDOM 17.1 12/28/2022 07:58 AM       MRSA Nasal Swab: N/A. Non-respiratory infection.     Assessment:  Date:  Dose/Freq Admin Times Level/Time:   12/25 2500mg x1 1402    12/26 12/27 12/28   Rd @ 0758 = 17.1   12/29 1000 mg x 1 (10)      Plan:  Concentration-guided dosing due to peritoneal dialysis  Will redose with 1000 mg today   Vancomycin concentrations will be ordered as clinically appropriate   Pharmacy will continue to monitor patient and adjust therapy as indicated    Thank you for the consult,  Leola Resendiz, [facility]

## 2022-12-29 NOTE — PROGRESS NOTES
Hospitalist Progress Note   Admit Date:  2022 11:46 AM   Name:  Jair Gavin   Age:  65 y.o. Sex:  male  :  1960   MRN:  [id number]   Room:  52/    Presenting Complaint: abdominal pain  Reason(s) for Admission: Peritonitis ([address] [K65.9]  Peritonitis, dialysis-associated, initial encounter [facility] Course & Interval History:   Suellen Bazzi is a 62-year-old male with a PMH of ESRD on peritoneal dialysis, HTN, peritonitis, admitted on  with diffuse abdominal pain, secondary to possible peritonitis versus incarcerated umbilical hernia with abscess versus seroma. He did report having periumbilical hernia repair in the past. Patient met sepsis criteria, was started on broad-spectrum antibiotic via PD catheter. CTAP showed moderate size umbilical hernia with herniated loops of bowel fluid, the fluid collection measures roughly 5.7 x 6.0 cm, seroma or abscess could be considered, PCKD appreciated with no evidence of hydronephrosis. Subjective/24hr Events (22): Denies abdominal pain, dressing changed today by surgery, +flatus    Assessment & Plan:     Principal Problem:    Sepsis ([address]    Recurrent incarcerated ventral hernia with localized peritonitis  Plan:   -Status post incarcerated ventral hernia repair  with Dr. Samantha Gill  -N.p.o. except for meds and sips of water till 2022. Monitor electrolytes  -Consider fluids   -Peritoneal fluid and blood culture negative to date.  -Intra-Op culture positive for alpha strep  -Continue vancomycin and Zosyn. GS to determine EOT  -Nephrology on board  -PT/OT with no further recs for skilled therapies  22  -Patient to starts CLD tomorrow with likely discharge Saturday per GS; appreciate assistance    -Continue IVF    Active Problems:    Peritoneal dialysis status ([address]    ESRD (end stage renal disease) ([address]  Plan:   -Continue  HD via left upper extremity AV fistula.  Patient interested in HHD  - working on dialysis chair assignment      Anemia  Plan:   -Hemoglobin stable, continue to monitor.  -No signs of active bleed. Hypomagnesemia(Resolved)      DNR (do not resuscitate)  Plan: Noted      Essential hypertension  Plan:   -Controlled on Clonidine patch  -Continue holding Procardia and HCTZ.  Reintroduce if appropriate        Discharge Planning:    Pending clearance from general surgery and nephrology    Diet:  Diet NPO Exceptions are: Ice Chips, Sips of Water with Meds, Sips of Clear Liquids  DVT PPx: Heparin  Code status: DNR    Hospital Problems             Last Modified POA    * (Principal) Sepsis ([address] 12/26/2022 Yes    Peritonitis ([address] 12/26/2022 Yes    Peritoneal dialysis status ([address] (Chronic) 12/25/2022 Yes    Anemia 12/25/2022 Yes    Hypomagnesemia 12/25/2022 Yes    DNR (do not resuscitate) (Chronic) 12/25/2022 Yes    Localized peritonitis ([address] 12/26/2022 Yes    Recurrent ventral hernia with incarceration 12/26/2022 Yes    Infection due to peritoneal dialysis catheter ([address] 30/33/1564 Yes    Periumbilical abdominal pain 12/26/2022 Yes    Abdominal wall abscess 12/26/2022 Yes    Necrotizing fasciitis ([address] 12/26/2022 Yes    Abdominal wall ulcer, with necrosis of muscle ([address] 12/26/2022 Yes    Essential hypertension 12/25/2022 Yes    ESRD (end stage renal disease) ([address] 12/25/2022 Yes     Objective:   Patient Vitals for the past 24 hrs:   Temp Pulse Resp BP SpO2   12/29/22 1253 98.2 °F (36.8 °C) (!) 106 18 129/88 99 %   12/29/22 1212 -- 97 18 (!) 156/86 --   12/29/22 1201 -- 97 -- (!) 143/79 --   12/29/22 1130 -- 95 -- 122/87 --   12/29/22 1100 -- 97 -- 121/80 --   12/29/22 1030 -- 97 -- 128/76 --   12/29/22 1000 -- 98 -- 120/88 --   12/29/22 0930 -- 97 -- 117/85 --   12/29/22 0900 -- 92 -- 118/79 --   12/29/22 0826 -- 96 -- 138/83 --   12/29/22 0754 98.3 °F (36.8 °C) 87 18 121/79 95 %   12/29/22 0341 98.2 °F (36.8 °C) 98 19 (!) 117/92 96 %   12/28/22 2241 98.4 °F (36.9 °C) (!) 110 18 (!) 124/91 96 %   12/28/22 1938 98.6 °F (37 °C) (!) 108 18 (!) 142/86 94 %   12/28/22 1602 98.2 °F (36.8 °C) 98 19 113/88 98 %       Estimated body mass index is 30.59 kg/m² as calculated from the following:    Height as of this encounter: 6' 5\" (1.956 m). Weight as of this encounter: 257 lb 15 oz (117 kg). Intake/Output Summary (Last 24 hours) at 12/29/2022 1520  Last data filed at 12/29/2022 1212  Gross per 24 hour   Intake 500 ml   Output 1500 ml   Net -1000 ml         Physical Exam:     Blood pressure 129/88, pulse (!) 106, temperature 98.2 °F (36.8 °C), temperature source Oral, resp. rate 18, height 6' 5\" (1.956 m), weight 257 lb 15 oz (117 kg), SpO2 99 %. General:    Alert, awake, well built, on room air, NAD  Head:  Normocephalic, atraumatic  Eyes:  Sclerae appear normal. Pupils equally round. ENT:  Nares appear normal, no drainage. Moist oral mucosa  Neck:  No restricted ROM. Trachea midline. CV:   RRR. No m/r/g. No jugular venous distension. Lungs:   CTAB. No wheezing, rhonchi, or rales. Respirations even, unlabored. Abdomen:   Surgical dressing intact. BS active  Extremities: No cyanosis or clubbing. No edema. Skin:     No rashes and normal coloration. Warm and dry. Neuro:  GCS 15, cranial nerves intact, no motor or sensory deficit, cerebellar functions intact  Psych:  AOx3, mood and affect appropriate.       I have reviewed ordered lab tests and independently visualized imaging below:    Recent Labs:  Recent Results (from the past 48 hour(s))   CBC with Auto Differential    Collection Time: 12/28/22  7:58 AM   Result Value Ref Range    WBC 8.4 4.3 - 11.1 K/uL    RBC 2.69 (L) 4.23 - 5.6 M/uL    Hemoglobin 8.4 (L) 13.6 - 17.2 g/dL    Hematocrit 25.9 (L) 41.1 - 50.3 %    MCV 96.3 82 - 102 FL    MCH 31.2 26.1 - 32.9 PG    MCHC 32.4 31.4 - 35.0 g/dL    RDW 16.0 (H) 11.9 - 14.6 %    Platelets 659 464 - 885 K/uL    MPV 10.7 9.4 - 12.3 FL    nRBC 0.00 0.0 - 0.2 K/uL    Differential Type AUTOMATED      Seg Neutrophils 69 43 - 78 %    Lymphocytes 18 13 - 44 %    Monocytes 9 4.0 - 12.0 %    Eosinophils % 3 0.5 - 7.8 %    Basophils 0 0.0 - 2.0 %    Immature Granulocytes 1 0.0 - 5.0 %    Segs Absolute 5.8 1.7 - 8.2 K/UL    Absolute Lymph # 1.5 0.5 - 4.6 K/UL    Absolute Mono # 0.8 0.1 - 1.3 K/UL    Absolute Eos # 0.2 0.0 - 0.8 K/UL    Basophils Absolute 0.0 0.0 - 0.2 K/UL    Absolute Immature Granulocyte 0.0 0.0 - 0.5 K/UL   Comprehensive Metabolic Panel w/ Reflex to MG    Collection Time: 12/28/22  7:58 AM   Result Value Ref Range    Sodium 139 133 - 143 mmol/L    Potassium 4.2 3.5 - 5.1 mmol/L    Chloride 101 101 - 110 mmol/L    CO2 32 21 - 32 mmol/L    Anion Gap 6 2 - 11 mmol/L    Glucose 95 65 - 100 mg/dL    BUN 46 (H) 8 - 23 MG/DL    Creatinine 13.80 (H) 0.8 - 1.5 MG/DL    Est, Glom Filt Rate 4 (L) >60 ml/min/1.73m2    Calcium 9.0 8.3 - 10.4 MG/DL    Total Bilirubin 0.5 0.2 - 1.1 MG/DL    ALT 10 (L) 12 - 65 U/L    AST 11 (L) 15 - 37 U/L    Alk Phosphatase 63 50 - 136 U/L    Total Protein 6.1 (L) 6.3 - 8.2 g/dL    Albumin 2.2 (L) 3.2 - 4.6 g/dL    Globulin 3.9 2.8 - 4.5 g/dL    Albumin/Globulin Ratio 0.6 0.4 - 1.6     Vancomycin Level, Random    Collection Time: 12/28/22  7:58 AM   Result Value Ref Range    Vancomycin Rm 17.1 UG/ML   PLEASE READ & DOCUMENT PPD TEST IN 24 HRS    Collection Time: 12/28/22  1:55 PM   Result Value Ref Range    PPD, (POC) Negative Negative    mm Induration 0 0 - 5 mm   CBC with Auto Differential    Collection Time: 12/29/22  8:11 AM   Result Value Ref Range    WBC 7.9 4.3 - 11.1 K/uL    RBC 3.01 (L) 4.23 - 5.6 M/uL    Hemoglobin 9.5 (L) 13.6 - 17.2 g/dL    Hematocrit 28.5 (L) 41.1 - 50.3 %    MCV 94.7 82 - 102 FL    MCH 31.6 26.1 - 32.9 PG    MCHC 33.3 31.4 - 35.0 g/dL    RDW 15.9 (H) 11.9 - 14.6 %    Platelets 253 576 - 942 K/uL    MPV 10.5 9.4 - 12.3 FL    nRBC 0.00 0.0 - 0.2 K/uL    Differential Type AUTOMATED      Seg Neutrophils 63 43 - 78 %    Lymphocytes 18 13 - 44 %    Monocytes 13 (H) 4.0 - 12.0 %    Eosinophils % 5 0.5 - 7.8 %    Basophils 1 0.0 - 2.0 %    Immature Granulocytes 0 0.0 - 5.0 %    Segs Absolute 5.0 1.7 - 8.2 K/UL    Absolute Lymph # 1.5 0.5 - 4.6 K/UL    Absolute Mono # 1.0 0.1 - 1.3 K/UL    Absolute Eos # 0.4 0.0 - 0.8 K/UL    Basophils Absolute 0.0 0.0 - 0.2 K/UL    Absolute Immature Granulocyte 0.0 0.0 - 0.5 K/UL         Other Studies:  CT ABDOMEN PELVIS WO CONTRAST Additional Contrast? Oral    Result Date: 12/25/2022  CT of the abdomen and pelvis without contrast. CLINICAL INDICATION: Yellowish solution after peritoneal dialysis, concern for peritonitis PROCEDURE: Serial thin-section axial images obtained from the upper abdomen through the proximal femurs without the administration of intravenous contrast. Oral contrast was given. Radiation dose reduction techniques were used for this study. Our CT scanners use one or all of the following: Automated exposure control, adjusted of the mA and/or kV according to patient size, iterative reconstruction COMPARISON: CT abdomen and pelvis dated 7/6/2022 FINDINGS:  Evaluation of the hollow and solid viscera is limited by the lack of intravenous contrast. Dependent atelectasis is present. CT ABDOMEN: The stomach is unremarkable. Innumerable cysts are noted throughout the bilateral kidneys unchanged from the prior exam in keeping with polycystic kidney disease. Multiple of the cysts are hyperdense and likely hemorrhagic. There is no hydronephrosis. No renal or ureteral stones evident. The adrenal glands are normal. An umbilical hernia is present in the lower mid abdomen at the insertion site of the peritoneal dialysis catheter. There is a fluid collection within the hernia sac concerning for possible seroma or abscess that measures 5.7 x 3.2 x 6.0 cm. There are herniated bowel loops as well without bowel obstruction. The abdominal wall defect measures roughly 7.5 cm.  CT PELVIS: The bowel is normal in course, caliber, and wall thickness. No bowel obstruction evident. The appendix is normal. The peritoneal dialysis catheter coils in the central pelvis and a small amount of ascitic fluid. The bladder is decompressed. There is no inguinal hernia. The rectum is unremarkable. No aggressive bone lesions identified. 1. Moderate-sized umbilical hernia with herniated loops of bowel and fluid. The fluid collection measures roughly 5.7 x 6.0 cm. Seroma or abscess could be considered. 2. Polycystic kidney disease. There is no hydronephrosis. XR CHEST PORTABLE    Result Date: 12/25/2022  KUB CLINICAL INDICATION: Sepsis FINDINGS: Single AP view the chest compared to a similar exam dated 6/24/2017 show the lungs are slightly underexpanded but otherwise clear. No pleural effusion or pneumothorax. The cardiac silhouette and mediastinum are unremarkable. The bones are normal.     Slightly under expanded lungs, otherwise no acute abnormality.       Current Meds:  Current Facility-Administered Medications   Medication Dose Route Frequency    dextrose 5 % and 0.45 % sodium chloride infusion   IntraVENous Continuous    pantoprazole (PROTONIX) 40 mg in sodium chloride (PF) 0.9 % 10 mL injection  40 mg IntraVENous Daily    morphine (PF) injection 2 mg  2 mg IntraVENous Q2H PRN    morphine injection 4 mg  4 mg IntraVENous Q2H PRN    sodium chloride flush 0.9 % injection 5-40 mL  5-40 mL IntraVENous 2 times per day    sodium chloride flush 0.9 % injection 5-40 mL  5-40 mL IntraVENous PRN    0.9 % sodium chloride infusion   IntraVENous PRN    [Held by provider] heparin (porcine) injection 5,000 Units  5,000 Units SubCUTAneous 3 times per day    ondansetron (ZOFRAN-ODT) disintegrating tablet 4 mg  4 mg Oral Q8H PRN    Or    ondansetron (ZOFRAN) injection 4 mg  4 mg IntraVENous Q6H PRN    acetaminophen (TYLENOL) tablet 650 mg  650 mg Oral Q6H PRN    Or    acetaminophen (TYLENOL) suppository 650 mg  650 mg Rectal Q6H PRN    cinacalcet (SENSIPAR) tablet 90 mg  90 mg Oral Nightly    [Held by provider] NIFEdipine (PROCARDIA XL) extended release tablet 90 mg  90 mg Oral Nightly    finasteride (PROSCAR) tablet 5 mg  5 mg Oral Nightly    traZODone (DESYREL) tablet 50 mg  50 mg Oral Nightly    piperacillin-tazobactam (ZOSYN) 3,375 mg in sodium chloride 0.9 % 50 mL IVPB (mini-bag)  3,375 mg IntraVENous Q12H    vancomycin (VANCOCIN) intermittent dosing (placeholder)   Other RX Placeholder    oxyCODONE (ROXICODONE) immediate release tablet 5 mg  5 mg Oral Q6H PRN    valsartan (DIOVAN) tablet 320 mg  320 mg Oral Daily    And    [Held by provider] hydroCHLOROthiazide (HYDRODIURIL) tablet 25 mg  25 mg Oral Daily    cloNIDine (CATAPRES) 0.3 MG/24HR 1 patch  1 patch TransDERmal Weekly       Signed:  Mikal Friday, APRN - CNP    Part of this note may have been written by using a voice dictation software. The note has been proof read but may still contain some grammatical/other typographical errors.

## 2022-12-29 NOTE — PROGRESS NOTES
[address]  Admission Date: 12/25/2022         [facility] Progress Note: 12/29/2022    Follow-up for: ESRD    The patient's chart is reviewed and the patient is discussed with the staff. Subjective:   Pt seen and examined on HD, dialyzing via LUE  Qb, UF 1500 tolerating UF, abdominal pain controlled, s/p PD catheter removed 12/26.      ROS:  Gen - no fever, no chills, appetite unchanged  CV - no chest pain, no palpitation  Lung - no shortness of breath, no cough  Abd - tenderness- controlled, no nausea/vomiting, no diarrhea  Ext - no edema    Current Facility-Administered Medications   Medication Dose Route Frequency    dextrose 5 % and 0.45 % sodium chloride infusion   IntraVENous Continuous    pantoprazole (PROTONIX) 40 mg in sodium chloride (PF) 0.9 % 10 mL injection  40 mg IntraVENous Daily    morphine (PF) injection 2 mg  2 mg IntraVENous Q2H PRN    morphine injection 4 mg  4 mg IntraVENous Q2H PRN    sodium chloride flush 0.9 % injection 5-40 mL  5-40 mL IntraVENous 2 times per day    sodium chloride flush 0.9 % injection 5-40 mL  5-40 mL IntraVENous PRN    0.9 % sodium chloride infusion   IntraVENous PRN    [Held by provider] heparin (porcine) injection 5,000 Units  5,000 Units SubCUTAneous 3 times per day    ondansetron (ZOFRAN-ODT) disintegrating tablet 4 mg  4 mg Oral Q8H PRN    Or    ondansetron (ZOFRAN) injection 4 mg  4 mg IntraVENous Q6H PRN    acetaminophen (TYLENOL) tablet 650 mg  650 mg Oral Q6H PRN    Or    acetaminophen (TYLENOL) suppository 650 mg  650 mg Rectal Q6H PRN    cinacalcet (SENSIPAR) tablet 90 mg  90 mg Oral Nightly    [Held by provider] NIFEdipine (PROCARDIA XL) extended release tablet 90 mg  90 mg Oral Nightly    finasteride (PROSCAR) tablet 5 mg  5 mg Oral Nightly    traZODone (DESYREL) tablet 50 mg  50 mg Oral Nightly    piperacillin-tazobactam (ZOSYN) 3,375 mg in sodium chloride 0.9 % 50 mL IVPB (mini-bag)  3,375 mg IntraVENous Q12H    vancomycin (VANCOCIN) intermittent dosing (placeholder)   Other RX Placeholder    oxyCODONE (ROXICODONE) immediate release tablet 5 mg  5 mg Oral Q6H PRN    valsartan (DIOVAN) tablet 320 mg  320 mg Oral Daily    And    [Held by provider] hydroCHLOROthiazide (HYDRODIURIL) tablet 25 mg  25 mg Oral Daily    cloNIDine (CATAPRES) 0.3 MG/24HR 1 patch  1 patch TransDERmal Weekly         Objective:     Vitals:    12/29/22 0341 12/29/22 0754 12/29/22 0826 12/29/22 0900   BP: (!) 117/92 121/79 138/83 118/79   Pulse: 98 87 96 92   Resp: 19 18     Temp: 98.2 °F (36.8 °C) 98.3 °F (36.8 °C)     TempSrc: Oral Oral     SpO2: 96% 95%     Weight:       Height:         Intake and Output:   12/27 1901 - 12/29 0700  In: 100 [P.O.:100]  Out: -   No intake/output data recorded. Physical Exam:   Constitutional:  the patient is well developed and in no acute distress, alert and oriented   HEENT:  Sclera clear, pupils equal, oral mucosa moist  Lungs: clear bilaterally   Cardiovascular:  Regular rate, S1, S2, no Rub   Abd/GI: soft and non-tender; with positive bowel sounds. Ext: warm without cyanosis. There is no lower leg edema. Skin:  no jaundice or rashes  Neuro: no gross neuro deficits   Psychiatric: Calm. Access: LUE AVF cannulated      LAB  Recent Labs     12/27/22  0705 12/28/22  0758   WBC 13.2* 8.4   HGB 9.9* 8.4*   HCT 29.7* 25.9*    210       Recent Labs     12/27/22  0705 12/28/22  0758    139   K 4.4 4.2   CL 98* 101   CO2 27 32   BUN 57* 46*   CREATININE 16.30* 13.80*       No results for input(s): PH, PCO2, PO2, HCO3 in the last 72 hours.       Assessment/Plan:  (Medical Decision Making)   ESRD was on PD, transitioning to HD PD catheter was removed per gen surgery 12/26.   -suspected peritonitis on Abx, Cx pending   Pt seen on HD, using LUE AVF with 17 ga needles functioning well, DW pt he is very interested in HHD but would need to talk with his sister for possible care partner, in the mean time pt will need outpt HD slot, consulted [facility] for slot at [facility]- clinic closest to his home    2. Umbilical hernia herniated loops of bowel, questionable seroma versus abscess  -s/p PD catheter d/c 12/26     3. Anemia  Trending hgb s/p surgery     4. MBD  On Sensipar     5.   Hypertension- controlled   On home medication      LEROY Chance - NP  [facility]

## 2022-12-30 LAB
ANION GAP SERPL CALC-SCNC: 9 MMOL/L (ref 2–11)
BACTERIA SPEC CULT: NORMAL
BACTERIA SPEC CULT: NORMAL
BUN SERPL-MCNC: 38 MG/DL (ref 8–23)
CALCIUM SERPL-MCNC: 9 MG/DL (ref 8.3–10.4)
CHLORIDE SERPL-SCNC: 104 MMOL/L (ref 101–110)
CO2 SERPL-SCNC: 26 MMOL/L (ref 21–32)
CREAT SERPL-MCNC: 12.1 MG/DL (ref 0.8–1.5)
GLUCOSE SERPL-MCNC: 102 MG/DL (ref 65–100)
MM INDURATION, POC: 0 MM (ref 0–5)
POTASSIUM SERPL-SCNC: 3.7 MMOL/L (ref 3.5–5.1)
PPD, POC: NEGATIVE
SERVICE CMNT-IMP: NORMAL
SERVICE CMNT-IMP: NORMAL
SODIUM SERPL-SCNC: 139 MMOL/L (ref 133–143)

## 2022-12-30 PROCEDURE — 6360000002 HC RX W HCPCS: Performed by: NURSE PRACTITIONER

## 2022-12-30 PROCEDURE — 6370000000 HC RX 637 (ALT 250 FOR IP): Performed by: NURSE PRACTITIONER

## 2022-12-30 PROCEDURE — 1100000000 HC RM PRIVATE

## 2022-12-30 PROCEDURE — 2580000003 HC RX 258: Performed by: INTERNAL MEDICINE

## 2022-12-30 PROCEDURE — 6360000002 HC RX W HCPCS: Performed by: SURGERY

## 2022-12-30 PROCEDURE — 80048 BASIC METABOLIC PNL TOTAL CA: CPT

## 2022-12-30 PROCEDURE — 2580000003 HC RX 258: Performed by: SURGERY

## 2022-12-30 PROCEDURE — 6370000000 HC RX 637 (ALT 250 FOR IP): Performed by: SURGERY

## 2022-12-30 PROCEDURE — A4216 STERILE WATER/SALINE, 10 ML: HCPCS | Performed by: SURGERY

## 2022-12-30 PROCEDURE — 6370000000 HC RX 637 (ALT 250 FOR IP): Performed by: INTERNAL MEDICINE

## 2022-12-30 PROCEDURE — C9113 INJ PANTOPRAZOLE SODIUM, VIA: HCPCS | Performed by: SURGERY

## 2022-12-30 PROCEDURE — 36415 COLL VENOUS BLD VENIPUNCTURE: CPT

## 2022-12-30 RX ORDER — HYDROCODONE BITARTRATE AND ACETAMINOPHEN 5; 325 MG/1; MG/1
1-2 TABLET ORAL EVERY 8 HOURS PRN
Qty: 28 TABLET | Refills: 0 | Status: SHIPPED | OUTPATIENT
Start: 2022-12-30 | End: 2023-01-06

## 2022-12-30 RX ORDER — MAGNESIUM SULFATE IN WATER 40 MG/ML
2000 INJECTION, SOLUTION INTRAVENOUS ONCE
Status: COMPLETED | OUTPATIENT
Start: 2022-12-30 | End: 2022-12-30

## 2022-12-30 RX ORDER — POTASSIUM CHLORIDE 7.45 MG/ML
10 INJECTION INTRAVENOUS
Status: DISPENSED | OUTPATIENT
Start: 2022-12-30 | End: 2022-12-30

## 2022-12-30 RX ORDER — DEXTROSE, SODIUM CHLORIDE, AND POTASSIUM CHLORIDE 5; .45; .3 G/100ML; G/100ML; G/100ML
INJECTION INTRAVENOUS CONTINUOUS
Status: DISCONTINUED | OUTPATIENT
Start: 2022-12-30 | End: 2022-12-30

## 2022-12-30 RX ORDER — SULFAMETHOXAZOLE AND TRIMETHOPRIM 800; 160 MG/1; MG/1
1 TABLET ORAL EVERY 12 HOURS SCHEDULED
Status: DISCONTINUED | OUTPATIENT
Start: 2022-12-30 | End: 2022-12-30

## 2022-12-30 RX ORDER — AMOXICILLIN AND CLAVULANATE POTASSIUM 500; 125 MG/1; MG/1
1 TABLET, FILM COATED ORAL
Status: DISCONTINUED | OUTPATIENT
Start: 2022-12-30 | End: 2022-12-31 | Stop reason: HOSPADM

## 2022-12-30 RX ORDER — AMOXICILLIN AND CLAVULANATE POTASSIUM 500; 125 MG/1; MG/1
1 TABLET, FILM COATED ORAL
Status: DISCONTINUED | OUTPATIENT
Start: 2022-12-30 | End: 2022-12-30

## 2022-12-30 RX ADMIN — TRAZODONE HYDROCHLORIDE 50 MG: 50 TABLET ORAL at 20:10

## 2022-12-30 RX ADMIN — SODIUM CHLORIDE 40 MG: 9 INJECTION, SOLUTION INTRAMUSCULAR; INTRAVENOUS; SUBCUTANEOUS at 08:15

## 2022-12-30 RX ADMIN — MORPHINE SULFATE 4 MG: 4 INJECTION INTRAVENOUS at 23:15

## 2022-12-30 RX ADMIN — MAGNESIUM SULFATE HEPTAHYDRATE 2000 MG: 40 INJECTION, SOLUTION INTRAVENOUS at 08:15

## 2022-12-30 RX ADMIN — VALSARTAN 320 MG: 320 TABLET, FILM COATED ORAL at 08:15

## 2022-12-30 RX ADMIN — AMOXICILLIN AND CLAVULANATE POTASSIUM 1 TABLET: 500; 125 TABLET, FILM COATED ORAL at 18:40

## 2022-12-30 RX ADMIN — FINASTERIDE 5 MG: 5 TABLET, FILM COATED ORAL at 20:11

## 2022-12-30 RX ADMIN — PIPERACILLIN AND TAZOBACTAM 3375 MG: 3; .375 INJECTION, POWDER, LYOPHILIZED, FOR SOLUTION INTRAVENOUS at 02:19

## 2022-12-30 RX ADMIN — MORPHINE SULFATE 4 MG: 4 INJECTION INTRAVENOUS at 04:47

## 2022-12-30 RX ADMIN — POTASSIUM CHLORIDE 10 MEQ: 7.46 INJECTION, SOLUTION INTRAVENOUS at 08:15

## 2022-12-30 RX ADMIN — PIPERACILLIN AND TAZOBACTAM 3375 MG: 3; .375 INJECTION, POWDER, LYOPHILIZED, FOR SOLUTION INTRAVENOUS at 14:25

## 2022-12-30 RX ADMIN — SODIUM CHLORIDE, PRESERVATIVE FREE 10 ML: 5 INJECTION INTRAVENOUS at 08:16

## 2022-12-30 RX ADMIN — CINACALCET HYDROCHLORIDE 90 MG: 30 TABLET, FILM COATED ORAL at 20:11

## 2022-12-30 RX ADMIN — SODIUM CHLORIDE, PRESERVATIVE FREE 5 ML: 5 INJECTION INTRAVENOUS at 20:50

## 2022-12-30 ASSESSMENT — PAIN SCALES - GENERAL
PAINLEVEL_OUTOF10: 6
PAINLEVEL_OUTOF10: 6

## 2022-12-30 ASSESSMENT — PAIN DESCRIPTION - LOCATION
LOCATION: ABDOMEN
LOCATION: ABDOMEN

## 2022-12-30 ASSESSMENT — PAIN DESCRIPTION - DESCRIPTORS
DESCRIPTORS: ACHING
DESCRIPTORS: ACHING;DISCOMFORT

## 2022-12-30 ASSESSMENT — PAIN DESCRIPTION - ORIENTATION
ORIENTATION: MID
ORIENTATION: LOWER;MID

## 2022-12-30 ASSESSMENT — PAIN - FUNCTIONAL ASSESSMENT
PAIN_FUNCTIONAL_ASSESSMENT: ACTIVITIES ARE NOT PREVENTED
PAIN_FUNCTIONAL_ASSESSMENT: ACTIVITIES ARE NOT PREVENTED

## 2022-12-30 NOTE — DISCHARGE INSTRUCTIONS
Dressings/Wound Care  Wear the velcro abd binder at all times  Leave the abdominal dressing alone until home health comes Monday to change it (likely to a wound vac)      Activity  No lifting (>5lbs) for 8 weeks to reduce risk of developing a hernia in the incisions.   No driving for now    Pain prescription (Norco) electronically sent to your pharmacy  Follow-up with Dr He Mention in approx 2 weeks in the office at:  [address], [address]  (Call for an appt time unless one is already made for you by discharge nurse which is preferred->[phone])    Diet  Soups, Juices, Liquids, and Yogurts for 1 day   Then Soft diet until follow-up

## 2022-12-30 NOTE — PROGRESS NOTES
Per Dr Herrera Arauz;  Pts midline abdominal wounds are 1.5 x 1.5 x 5 cm and 2 x 2 x 5 cm.     Ketan Rojo, NP

## 2022-12-30 NOTE — PROGRESS NOTES
H&P/Consult Note/Progress Note/Office Note:   Erica Rodriguez  MRN: [id number]  :1960  Age:62 y.o.    HPI: Erica Rodriguez is a 65 y.o. male who is s/p open drainage of localized peritonitis, debridement of necrotizing fasciitis of abdominal wall infection, removal of infected PD cath, and repair of recurrent incarcerated ventral hernia with Vicryl mesh on 22. Prior to surgery he was admitted by the hospitalist with sepsis on 22 after he presented to the ER with a 2-week history of progressive abdominal pain and bulging. He reported a prior recurrent ventral hernia for many years following open umbilical hernia repair with Prolene mesh by Dr. Justina Macias on 2014. He had a PD cath placed at that time and is dialysis dependent. Recently however he developed progressive and severe abdominal pain associated with worsening bulging and tenderness. He also described an associated eschar of the umbilical skin recently  This was associated with a change in the color of the effluent from the PD catheter. He has had associated N/V, tachycardia, and leukocytosis on admission. He was placed on vancomycin and Zosyn. CT imaging was performed as shown below and general surgery consultation was obtained. He described a prior episode of peritonitis without abscess or hernia treated with Abx in approximately . He described having an AV fistula for dialysis placed many years ago which has never been used. He wants to stop having peritoneal dialysis and convert to hemodialysis. 22 CT abd/pelvis withoral but no IV contrast  Hx: Yellowish solution after peritoneal dialysis, concern for peritonitis       FINDINGS: Evaluation of the hollow and solid viscera is limited by the lack of intravenous contrast.   Dependent atelectasis is present. CT ABDOMEN: The stomach is unremarkable.  Innumerable cysts are noted throughout the bilateral kidneys unchanged from the prior exam in keeping with polycystic kidney disease. Multiple of the cysts are hyperdense and likely hemorrhagic. There is no hydronephrosis. No renal or ureteral stones evident. The adrenal glands are normal. An umbilical hernia is present in the lower mid abdomen at the insertion site of the peritoneal dialysis catheter. There is a fluid collection within the hernia sac concerning for possible seroma or abscess that measures 5.7 x 3.2 x 6.0 cm. There are herniated bowel loops as well without bowel obstruction. The abdominal wall defect measures roughly 7.5 cm. CT PELVIS: The bowel is normal in course, caliber, and wall thickness. No bowel obstruction evident. The appendix is normal. The peritoneal dialysis catheter coils in the central pelvis and a small amount of ascitic fluid. The bladder is decompressed. There is no inguinal hernia. The rectum is unremarkable. No aggressive bone lesions identified. Impression:  1. Moderate-sized umbilical hernia with herniated loops of bowel and fluid. The fluid collection measures roughly 5.7 x 6.0 cm. Seroma or abscess could be considered. 2. Polycystic kidney disease. There is no hydronephrosis. Additional hx:  12/27/22 POD1: Patient alert and conversant. Abdominal pain is minimal.  Binder in place with surgical dressing CDI. LUE AVF pulsatile. WBC 13.2. Cr 13.3 K+4.4 AF/VSS. -Flatus/-BM  12/28/22 POD2 up in chair; comfortable; AF; Hg lower and subq Heparin on hold; await return of bowel function  12/29/22 POD3 feels better each day; abd dressing changes; 2 breanne left in place  12/30/22 POD4 Tolerating clears so far this am;  Abd breanne changed by Dr Will Stark this am or tomorrow on PO Abx                    Past Medical History:   Diagnosis Date    Anemia     Anxiety     Arthritis     CAD (coronary artery disease)     per pt- no MI- \"They said the back of my heart was something. ..like 40%\"    Chronic kidney disease     [facility]; stage 5; pt has AV fistula to left arm; PERITONEAL DIALYSIS daily since 2014    Chronic pain     CKD (chronic kidney disease)     Former smoker     Fracture, tibial plateau 12/84/5399    GERD (gastroesophageal reflux disease)     Heart disease     Hyperlipidemia     on med for control    Hypertension     controlled with med    Hypertensive nephrosclerosis     per nephrology note 4/14/14    Hypoactive thyroid     Kidney stone     Peritoneal dialysis catheter in place [facility])     Peritoneal dialysis-associated peritonitis ([address] 06/24/2017    during hs    PUD (peptic ulcer disease)     SBP (spontaneous bacterial peritonitis) ([address] 09/24/2016    Sepsis ([address] 09/24/2016    Tibial plateau fracture, left 53/06/2455    Umbilical hernia     Unspecified sleep apnea     pt does not have a CPAP     Past Surgical History:   Procedure Laterality Date    ABDOMEN SURGERY Right 6/13/2022    RIGHT LOWER QUADRANT ABDOMEN LESION BIOPSY EXCISION performed by Parker Bhandari MD at [address] [place] [address] 280 N/A 12/26/2022    ABDOMEN INCISION AND DRAINAGE, VENTRAL HERNIA  REPAIRE WITH MESH.  REMOVAL PD CATH performed by Matthew Angel MD at [address] [place] [address] Right 1995    tip of 5th finger    COLONOSCOPY N/A 3/23/2018    COLONOSCOPY  BMI 34 performed by Berta Oliveros MD at [facility] PYELOGRAMS    CYSTOSCOPY Left 11/1/2022    CYSTOSCOPY, LEFT URETEROSCOPY, BASKET STONE EXTRACTION, LEFT URETERAL STENT INSERTION performed by Paulina Hager MD at [place] [address] Left as a child    thumb    ORTHOPEDIC SURGERY Left 1989    knee    ORTHOPEDIC SURGERY Left 2017    LEFT LATERAL TIBIAL PLATEAU OPEN REDUCTION INTERNAL FIXATION    MS ABDOMEN SURGERY PROC UNLISTED  6/11/14    PD catheter placement    SKIN BIOPSY Left 6/13/2022    LEFT BUTTOCK MASS EXCISION performed by Parker Bhadnari MD at [address] [place] [address] Left 2012    AV fistula to arm Current Facility-Administered Medications   Medication Dose Route Frequency    pantoprazole (PROTONIX) 40 mg in sodium chloride (PF) 0.9 % 10 mL injection  40 mg IntraVENous Daily    morphine (PF) injection 2 mg  2 mg IntraVENous Q2H PRN    morphine injection 4 mg  4 mg IntraVENous Q2H PRN    sodium chloride flush 0.9 % injection 5-40 mL  5-40 mL IntraVENous 2 times per day    sodium chloride flush 0.9 % injection 5-40 mL  5-40 mL IntraVENous PRN    0.9 % sodium chloride infusion   IntraVENous PRN    [Held by provider] heparin (porcine) injection 5,000 Units  5,000 Units SubCUTAneous 3 times per day    ondansetron (ZOFRAN-ODT) disintegrating tablet 4 mg  4 mg Oral Q8H PRN    Or    ondansetron (ZOFRAN) injection 4 mg  4 mg IntraVENous Q6H PRN    acetaminophen (TYLENOL) tablet 650 mg  650 mg Oral Q6H PRN    Or    acetaminophen (TYLENOL) suppository 650 mg  650 mg Rectal Q6H PRN    cinacalcet (SENSIPAR) tablet 90 mg  90 mg Oral Nightly    [Held by provider] NIFEdipine (PROCARDIA XL) extended release tablet 90 mg  90 mg Oral Nightly    finasteride (PROSCAR) tablet 5 mg  5 mg Oral Nightly    traZODone (DESYREL) tablet 50 mg  50 mg Oral Nightly    piperacillin-tazobactam (ZOSYN) 3,375 mg in sodium chloride 0.9 % 50 mL IVPB (mini-bag)  3,375 mg IntraVENous Q12H    vancomycin (VANCOCIN) intermittent dosing (placeholder)   Other RX Placeholder    oxyCODONE (ROXICODONE) immediate release tablet 5 mg  5 mg Oral Q6H PRN    valsartan (DIOVAN) tablet 320 mg  320 mg Oral Daily    And    [Held by provider] hydroCHLOROthiazide (HYDRODIURIL) tablet 25 mg  25 mg Oral Daily    cloNIDine (CATAPRES) 0.3 MG/24HR 1 patch  1 patch TransDERmal Weekly     ALLERGIES:  Iodine    Social History     Socioeconomic History    Marital status: Single     Spouse name: None    Number of children: None    Years of education: None    Highest education level: None   Tobacco Use    Smoking status: Former     Packs/day: 0.50     Years: 15.00     Pack years: 7.50     Types: Cigarettes     Quit date: 3/1/2014     Years since quittin.8    Smokeless tobacco: Never    Tobacco comments:     Quit smoking: pt states only smoked cigarettes when drinking alcohol   Substance and Sexual Activity    Alcohol use: Yes    Drug use: Yes     Types: Marijuana (Weed)     Comment: \"when I can afford it\"      Social Determinants of Health     Financial Resource Strain: Low Risk     Difficulty of Paying Living Expenses: Not hard at all   Food Insecurity: No Food Insecurity    Worried About Running Out of Food in the Last Year: Never true    Ran Out of Food in the Last Year: Never true   Physical Activity: Insufficiently Active    Days of Exercise per Week: 1 day    Minutes of Exercise per Session: 20 min     Social History     Tobacco Use   Smoking Status Former    Packs/day: 0.50    Years: 15.00    Pack years: 7.50    Types: Cigarettes    Quit date: 3/1/2014    Years since quittin.8   Smokeless Tobacco Never   Tobacco Comments    Quit smoking: pt states only smoked cigarettes when drinking alcohol     Family History   Problem Relation Age of Onset    COPD Father     Heart Disease Father     Hypertension Mother     Diabetes Mother     Hypertension Father      ROS: The patient has no difficulty with chest pain or shortness of breath. No fever or chills. Comprehensive review of systems was otherwise unremarkable except as noted above. Physical Exam:   BP (!) 131/98   Pulse 99   Temp 98.2 °F (36.8 °C) (Oral)   Resp 18   Ht 6' 5\" (1.956 m)   Wt 256 lb 5 oz (116.3 kg)   SpO2 96%   BMI 30.39 kg/m²   Vitals:    22 0517 22 0717 22 0959 22 1110   BP:  (!) 133/109  (!) 131/98   Pulse:  (!) 103  99   Resp: 19 18  18   Temp:  98.6 °F (37 °C)  98.2 °F (36.8 °C)   TempSrc:  Oral  Oral   SpO2:  96%  96%   Weight:       Height:   6' 5\" (1.956 m)      No intake/output data recorded.    1901 -  0700  In: 1165 [I.V.:665]  Out: 1500     Constitutional: Alert, oriented, cooperative patient in no acute distress; appears stated age    Eyes:Sclera are clear. EOMs intact  ENMT: no external lesions gross hearing normal; no obvious neck masses, no ear or lip lesions, nares normal  CV: RRR. Normal perfusion  Resp: No JVD. Breathing is  non-labored; no audible wheezing. GI:   Midline abd wounds are 1.5 x 1.5 x 5cm deep and 2 x 2 x 5cm deep. 2 wicked sites repacked (umbilicus and low midline)  Covered with 4x4s and abd pads and large tegaderms  +Velcro abd binder over above      Musculoskeletal: unremarkable with normal function. No embolic signs or cyanosis. LUE with AVF pulsitile  Neuro:  Oriented; moves all 4; no focal deficits  Psychiatric: normal affect and mood, no memory impairment    Recent vitals (if inpt):  Patient Vitals for the past 24 hrs:   BP Temp Temp src Pulse Resp SpO2 Height Weight   12/30/22 1110 (!) 131/98 98.2 °F (36.8 °C) Oral 99 18 96 % -- --   12/30/22 0959 -- -- -- -- -- -- 6' 5\" (1.956 m) --   12/30/22 0717 (!) 133/109 98.6 °F (37 °C) Oral (!) 103 18 96 % -- --   12/30/22 0517 -- -- -- -- 19 -- -- --   12/30/22 0447 (!) 133/97 99 °F (37.2 °C) Oral 97 18 94 % -- --   12/29/22 2313 115/87 99.1 °F (37.3 °C) Oral (!) 102 17 95 % -- --   12/29/22 1944 (!) 129/94 98.4 °F (36.9 °C) Oral (!) 109 20 97 % -- --   12/29/22 1931 -- -- -- -- -- -- -- 256 lb 5 oz (116.3 kg)   12/29/22 1542 106/82 100.3 °F (37.9 °C) Oral (!) 108 18 96 % -- --   12/29/22 1253 129/88 98.2 °F (36.8 °C) Oral (!) 106 18 99 % -- --       Amount and/or Complexity of Data Reviewed and Analyzed:  I reviewed and analyzed all of the unique labs and radiologic studies that are shown below as well as any that are in the HPI, and any that are in the expanded problem list below  *Each unique test, order, or document contributes to the combination of 2 or combination of 3 in Category 1 below. For this visit I also reviewed old records and prior notes.       Recent Labs     12/29/22  1117 12/29/22  1754 12/30/22  0815   WBC 7.9  --   --    HGB 9.5*  --   --      --   --    NA  --  144* 139   K  --  3.2* 3.7   CL  --  112* 104   CO2  --  26 26   BUN  --  27* 38*   ALT  --  9*  --      Review of most recent CBC  Lab Results   Component Value Date    WBC 7.9 12/29/2022    HGB 9.5 (L) 12/29/2022    HCT 28.5 (L) 12/29/2022    MCV 94.7 12/29/2022     12/29/2022       Review of most recent BMP  Lab Results   Component Value Date/Time     12/30/2022 08:15 AM    K 3.7 12/30/2022 08:15 AM     12/30/2022 08:15 AM    CO2 26 12/30/2022 08:15 AM    BUN 38 12/30/2022 08:15 AM    CREATININE 12.10 12/30/2022 08:15 AM    GLUCOSE 102 12/30/2022 08:15 AM    CALCIUM 9.0 12/30/2022 08:15 AM        Review of most recent LFTs (and lipase if done)  Lab Results   Component Value Date    ALT 9 (L) 12/29/2022    AST 10 (L) 12/29/2022    ALKPHOS 51 12/29/2022    BILITOT 1.1 12/29/2022     Lab Results   Component Value Date    LIPASE 88 12/25/2022       No results found for: INR, APTT, LCAD    Review of most recent HgbA1c  No results found for: LABA1C    Nutritional assessment screen for wound healing issues:  Lab Results   Component Value Date    LABALBU 1.8 (L) 12/29/2022         Xray Result (most recent):  XR CHEST PORTABLE 12/25/2022    Narrative  KUB    CLINICAL INDICATION: Sepsis    FINDINGS: Single AP view the chest compared to a similar exam dated 6/24/2017  show the lungs are slightly underexpanded but otherwise clear. No pleural  effusion or pneumothorax. The cardiac silhouette and mediastinum are  unremarkable. The bones are normal.    Impression  Slightly under expanded lungs, otherwise no acute abnormality.     CT Result (most recent):  CT ABDOMEN PELVIS WO IV CONTRAST 12/25/2022    Narrative  CT of the abdomen and pelvis without contrast.    CLINICAL INDICATION: Yellowish solution after peritoneal dialysis, concern for  peritonitis    PROCEDURE: Serial thin-section axial images obtained from the upper abdomen  through the proximal femurs without the administration of intravenous contrast.  Oral contrast was given. Radiation dose reduction techniques were used for this  study. Our CT scanners use one or all of the following: Automated exposure  control, adjusted of the mA and/or kV according to patient size, iterative  reconstruction    COMPARISON: CT abdomen and pelvis dated 7/6/2022    FINDINGS:  Evaluation of the hollow and solid viscera is limited by the lack of  intravenous contrast.    Dependent atelectasis is present. CT ABDOMEN: The stomach is unremarkable. Innumerable cysts are noted throughout  the bilateral kidneys unchanged from the prior exam in keeping with polycystic  kidney disease. Multiple of the cysts are hyperdense and likely hemorrhagic. There is no hydronephrosis. No renal or ureteral stones evident. The adrenal  glands are normal. An umbilical hernia is present in the lower mid abdomen at  the insertion site of the peritoneal dialysis catheter. There is a fluid  collection within the hernia sac concerning for possible seroma or abscess that  measures 5.7 x 3.2 x 6.0 cm. There are herniated bowel loops as well without  bowel obstruction. The abdominal wall defect measures roughly 7.5 cm. CT PELVIS: The bowel is normal in course, caliber, and wall thickness. No bowel  obstruction evident. The appendix is normal. The peritoneal dialysis catheter  coils in the central pelvis and a small amount of ascitic fluid. The bladder is  decompressed. There is no inguinal hernia. The rectum is unremarkable. No aggressive bone lesions identified. Impression  1. Moderate-sized umbilical hernia with herniated loops of bowel and fluid. The  fluid collection measures roughly 5.7 x 6.0 cm. Seroma or abscess could be  considered. 2. Polycystic kidney disease. There is no hydronephrosis.     US Result (most recent):  US RETROPERITONEAL COMPLETE 01/10/2019    Narrative  Renal ultrasound, 1/10/2019    History: Kidney cyst.    Comparison: None. Technique: Grayscale and doppler images of the kidneys and bladder were obtained  using a 3-5 MHz linear transducer. Findings: The right kidney measures 11.3 cm, and the left kidney measures 10.7  cm in greatest longitudinal length. No stones or evidence of hydronephrosis is  seen. Renal parenchymal echogenicity is increased consistent with chronic  medical renal changes. Numerous simple and complex renal cortical lesions are  seen in the bilateral kidneys. Many of these appear cystic. The majority of  these are not well assessed given their very small size. No clearly worrisome  dominant solid lesion is seen. The urinary bladder is collapsed about a Rojas catheter and therefore not well  assessed. The visualized abdominal aorta is normal in caliber measuring up to  2.3 cm. The IVC is patent. Small to moderate ascites is seen in the right upper  quadrant, and bilateral lower quadrants. Impression  IMPRESSION:  1. Echogenic kidneys demonstrating numerous simple and complex likely cystic  lesions. Many lesions are not well characterized given their very small size  although no clearly worrisome dominant solid mass is seen. This appearance can  be seen with multiple etiologies. However, given the patient's history of renal  failure and dialysis, these are favored to represent acquired cystic renal  disease. 2. Small to moderate ascites which can suggest some degree of fluid overload.       Admission date (for inpatients): 12/25/2022   4 Days Post-Op  Procedure(s):  ABDOMEN INCISION AND DRAINAGE  HERNIA VENTRAL REPAIR  poss CATHETER REMOVAL PERITONEAL DIALYSIS        ASSESSMENT/PLAN:  [unfilled]  Principal Problem:    Sepsis ([address]  Active Problems:    Peritonitis ([address]    Peritoneal dialysis status ([address]    Anemia    Hypomagnesemia    DNR (do not resuscitate)    Localized peritonitis ([address]    Recurrent ventral hernia with incarceration    Infection due to peritoneal dialysis catheter [facility])    Periumbilical abdominal pain    Abdominal wall abscess    Necrotizing fasciitis ([address]    Abdominal wall ulcer, with necrosis of muscle ([address]    Essential hypertension    ESRD (end stage renal disease) ([address]  Resolved Problems:    * No resolved hospital problems. *     Patient Active Problem List    Diagnosis Date Noted    Localized peritonitis ([address] 12/26/2022     Priority: Medium    Recurrent ventral hernia with incarceration 12/26/2022     Priority: Medium    Infection due to peritoneal dialysis catheter ([address] 12/26/2022     Priority: Medium    Periumbilical abdominal pain 12/26/2022     Priority: Medium    Abdominal wall abscess 12/26/2022     Priority: Medium    Necrotizing fasciitis ([address] 12/26/2022     Priority: Medium    Abdominal wall ulcer, with necrosis of muscle ([address] 12/26/2022     Priority: Medium    Peritonitis ([address] 12/25/2022     Priority: Medium    Peritoneal dialysis status ([address] 12/25/2022     Priority: Medium    Anemia 12/25/2022     Priority: Medium    Hypomagnesemia 12/25/2022     Priority: Medium    DNR (do not resuscitate) 12/25/2022     Priority: Medium    Sepsis ([address] 12/25/2022     Priority: Medium    Soft tissue mass      Priority: Medium    Kidney stone 10/14/2022     Added automatically from request for surgery [id number]      Other hyperlipidemia 04/21/2022    ESRD (end stage renal disease) ([address] 05/04/2021    Non-recurrent unilateral inguinal hernia without obstruction or gangrene 12/22/2020    Hypertensive urgency 06/24/2017    Essential hypertension 44/15/7593    Umbilical hernia 91/21/0137          Number and Complexity of Problems addressed and   Risks of complications and/or morbidity of management        Recurrent, incarcerated ventral hernia  Localized peritonitis    12/26/22    1. Open drainage of localized peritonitis   2. Open repair of recurrent incarcerated ventral hernia with Vicryl mesh   3.   Removal of tunneled intraperitoneal dialysis catheter  4. Debridement of skin, subcutaneous adipose, muscle, and fascia for abdominal wall necrotizing infection     POD4   Clears today tolerated so far;  Home tomorrow on Clears/Fulls (do not advance beyond that before discharge)  OK for discharge tomorrow from surgical perspective if tolerates liquids  Special diet instructions written in discharge instructions section  I electronically Rx's Norco PRN    He will need 7-10 more days of PO Abx (renal adjusted) as an outpt after discharge  Defer section to hospitalists --? Augmentin    Abd wound breanne changed under 4x4s/abd and tegaderms by Dr Amanda Weiss on 12/30/22  High risk for evisceration as fascial closure is tenuous with necrotizing infection  Velcro abd binder at all times    Next dressing change will be as outpt with home health on Monday 1/2/22 (which has been ordered) for conversion to a wound vac  He will see us in the office in approx 2 weeks  Surgery will sign off          Leukocytosis  WBC 7.9k  Cxs from OR 12/26/22 growing alpha Strep so far  On IV Vanc and Zosyn --> PO soon      Abdominal pain -->Largely resolved post-op      Dialysis dependence  PD cath (originally placed in 2014 by Dr Iona Leahy) was removed for infection on 12/26/22  Nephrology following for HD, pt has a patent LUE AVF                     Level of MDM (2/3 elements below)  Number and Complexity of Problems Addressed Amount and/or Complexity of Data to be Reviewed and Analyzed  *Each unique test, order, or document contributes to the combination of 2 or combination of 3 in Category 1 below. Risk of Complications and/or Morbidity or Mortality of pt Management     85361  06671 SF Minimal  ?1self-limited or minor problem Minimal or none Minimal risk of morbidity from additional diagnostic testing or Rx   97245  81314 Low Low  ? 2or more self-limited or minor problems;    or  ? 1stable chronic illness;    or  ?1acute, uncomplicated illness or injury   Limited  (Must meet the requirements of at least 1 of the 2 categories)  Category 1: Tests and documents   ? Any combination of 2 from the following:  ?Review of prior external note(s) from each unique source*;  ?review of the result(s) of each unique test*;   ?ordering of each unique test*    or   Category 2: Assessment requiring an independent historian(s)  (For the categories of independent interpretation of tests and discussion of management or test interpretation, see moderate or high) Low risk of morbidity from additional diagnostic testing or treatment     30138  15556 Mod Moderate  ? 1or more chronic illnesses with exacerbation, progression, or side effects of treatment;    or  ?2or more stable chronic illnesses;    or  ?1undiagnosed new problem with uncertain prognosis;    or  ?1acute illness with systemic symptoms;    or  ?1acute complicated injury   Moderate  (Must meet the requirements of at least 1 out of 3 categories)  Category 1: Tests, documents, or independent historian(s)  ? Any combination of 3 from the following:   ?Review of prior external note(s) from each unique source*;  ?Review of the result(s) of each unique test*;  ?Ordering of each unique test*;  ?Assessment requiring an independent historian(s)    or  Category 2: Independent interpretation of tests   ? Independent interpretation of a test performed by another physician/other qualified health care professional (not separately reported);     or  Category 3: Discussion of management or test interpretation  ? Discussion of management or test interpretation with external physician/other qualified health care professional/appropriate source (not separately reported)   Moderate risk of morbidity from additional diagnostic testing or treatment  Examples only:  ?Prescription drug management   ? Decision regarding minor surgery with identified patient or procedure risk factors  ? Decision regarding elective major surgery without identified patient or procedure risk factors ?Diagnosis or treatment significantly limited by social determinants of health       49864  98856 High High  ? 1or more chronic illnesses with severe exacerbation, progression, or side effects of treatment;    or  ?1 acute or chronic illness or injury that poses a threat to life or bodily function   Extensive  (Must meet the requirements of at least 2 out of 3 categories)  Category 1: Tests, documents, or independent historian(s)  ? Any combination of 3 from the following:   ?Review of prior external note(s) from each unique source*;  ?Review of the result(s) of each unique test*;   ?Ordering of each unique test*;   ?Assessment requiring an independent historian(s)    or   Category 2: Independent interpretation of tests   ? Independent interpretation of a test performed by another physician/other qualified health care professional (not separately reported);     or  Category 3: Discussion of management or test interpretation  ? Discussion of management or test interpretation with external physician/other qualified health care professional/appropriate source (not separately reported)   High risk of morbidity from additional diagnostic testing or treatment  Examples only:  ?Drug therapy requiring intensive monitoring for toxicity  ? Decision regarding elective major surgery with identified patient or procedure risk factors  ? Decision regarding emergency major surgery  ? Decision regarding hospitalization  ? Decision not to resuscitate or to de-escalate care because of poor prognosis             I have personally performed a face-to-face diagnostic evaluation and management  service on this patient. I have independently seen the patient. I have independently obtained the above history from the patient/family. I have independently examined the patient with above findings. I have independently reviewed data/labs for this patient and developed the above plan of care (MDM).       Signed: Esme Escobedo MD  12/30/2022    12:31 PM

## 2022-12-30 NOTE — PROGRESS NOTES
Comprehensive Nutrition Assessment    Type and Reason for Visit: Initial, NPO/Clear Liquid  NPO/CLD d4    Nutrition Recommendations/Plan:   Meals and Snacks:  Diet: Continue current diet and advance as medically appropriate  Nutrition Supplement Therapy:  Medical food supplement therapy:  Initiate Ensure Clear three times per day (this provides 240 kcal and 8 grams protein per bottle) and Azeem twice per day (this provides 90 kcal and 2.5 grams protein per packet)     Malnutrition Assessment:  Malnutrition Status: At risk for malnutrition (Comment) (chronic N/V with wt loss)    No antonio wasting  Nutrition Assessment:  Nutrition History: Patient reports 1-2 meals per day at baseline. He states that he gets Mom's meals. He states that he is unable to eat anything \"heavy\" after noon. When asked what he means by \"heavy\" he refers to chicken and meatloaf. He states that he eats \"light\" foods such as white rice, pasta, or salad in the evening. He states if he does not follow this pattern he will vomit. He states that he has had problems with vomiting after meals since an abdominal surgery in 2014 when they placed mesh in his abdomen. He endorses weight loss associated with this. He also reports protein supplement daily but cannot recall the name.  Wt history per outpt office visits: 12/27/21 284#, 5/10/22 269#, 8/24/22 258#     Do You Have Any Cultural, Druze, or Ethnic Food Preferences?: No   Nutrition Background:   Wound Type: Surgical Incision (wound vac pending)   Patient with PMH significant for ESRD on PD, HTN, peritonitis. He presented with abdominal pain. CT significant for possible abscess vs seroma. He is now s/p hernia repair, drainage of peritonitis, abdominal wall I&D abscess, removal of PD cath 12/26.  Nutrition Interval:  Patient seen sitting up in bed. Gives history as above. RN brought in broth and ginger ale during RD assessment. Patient states that he is starving. Discussed nutrition supplements for  more kcal/protein and also wound healing supplement and he is agreeable. Current Nutrition Therapies:  ADULT DIET; Clear Liquid; Low Potassium (Less than 3000 mg/day)    Current Intake:   Average Meal Intake: NPO (diet just advanced to CLD)        Anthropometric Measures:  Height: 6' 5\" (195.6 cm)  Current Body Wt: 256 lb 6.3 oz (116.3 kg) (12/29), Weight source: Bed Scale  BMI: 30.4, Obese Class 1 (BMI 30.0-34. 9)  Admission Body Weight: 257 lb 15 oz (117 kg) (12/27 bed scale)  Ideal Body Weight (Kg) (Calculated): 95 kg (208 lbs), 123.3 %  Usual Body Wt: 284 lb (128.8 kg) (12/27/21 office visit), Percent weight change: -9.7       Edema:    BMI Category Obese Class 1 (BMI 30.0-34. 9)  Estimated Daily Nutrient Needs:  Energy (kcal/day): 2192-8713 (Kcal/kg (25-30) Weight used: 94.5 kg Ideal  Protein (g/day): 113-142 (1.2-1.5 g/kg) Weight Used: (Ideal) 94.5 kg  Fluid (ml/day):   (Standard Renal)    Nutrition Diagnosis:   Inadequate oral intake related to altered GI function as evidenced by NPO or clear liquid status due to medical condition (s/p abdominal surgery)  Nutrition Interventions:   Food and/or Nutrient Delivery: Continue Current Diet, Start Oral Nutrition Supplement     Coordination of Nutrition Care: Continue to monitor while inpatient       Goals: Active Goal:  (Diet advance to meet at least 50% needs by RD f/u)       Nutrition Monitoring and Evaluation:      Food/Nutrient Intake Outcomes: Diet Advancement/Tolerance, Food and Nutrient Intake, Supplement Intake  Physical Signs/Symptoms Outcomes: GI Status, Meal Time Behavior, Weight    Discharge Planning:     Too soon to determine    SANDI NAJERA RD

## 2022-12-30 NOTE — PROGRESS NOTES
VANCO DAILY FOLLOW UP RENAL INSUFFICIENCY PATIENT   [address] [facility] Pharmacokinetic Monitoring Service - Vancomycin    Consulting Provider: Dr. Rebel Scott   Indication: Suspected peritonitis  Target Concentration: Random level ? 15 mg/L  Day of Therapy: 6  Additional Antimicrobials: Zosyn    Patient eligible for piperacillin-tazobactam to cefepime auto-substitution per P&T approved protocol? NO    Pertinent Laboratory Values: Wt Readings from Last 1 Encounters:   12/29/22 256 lb 5 oz (116.3 kg)     Temp Readings from Last 1 Encounters:   12/30/22 98.6 °F (37 °C) (Oral)     Recent Labs     12/28/22  0758 12/29/22  0811 12/29/22  1754   BUN 46*  --  27*   CREATININE 13.80*  --  8.00*   WBC 8.4 7.9  --        Lab Results   Component Value Date/Time    VANCORANDOM 17.1 12/28/2022 07:58 AM       MRSA Nasal Swab: N/A. Non-respiratory infection.     Assessment:  Date:  Dose/Freq Admin Times Level/Time:   12/25 2500mg x1 1402    12/26 12/27 12/28   Rd @ 4300 = 17.1   12/29 1000 mg x 1 1343    12/30        Plan:  Concentration-guided dosing due to peritoneal dialysis  No dose needed today  Vancomycin concentrations will be ordered as clinically appropriate   Pharmacy will continue to monitor patient and adjust therapy as indicated    Thank you for the consult,  Blessing Heredia, [address] [facility]
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
## 2022-12-30 NOTE — PROGRESS NOTES
Hospitalist Progress Note   Admit Date:  2022 11:46 AM   Name:  Duane Shires   Age:  65 y.o. Sex:  male  :  1960   MRN:  [id number]   Room:  /    Presenting Complaint: abdominal pain  Reason(s) for Admission: Peritonitis ([facility] [address] [K65.9]  Peritonitis, dialysis-associated, initial encounter [facility] Course & Interval History:   Arvell Gitelman is a 60-year-old male with a PMH of ESRD on peritoneal dialysis, HTN, peritonitis, admitted on  with diffuse abdominal pain, secondary to possible peritonitis versus incarcerated umbilical hernia with abscess versus seroma. He did report having periumbilical hernia repair in the past. Patient met sepsis criteria, was started on broad-spectrum antibiotic via PD catheter. CTAP showed moderate size umbilical hernia with herniated loops of bowel fluid, the fluid collection measures roughly 5.7 x 6.0 cm, seroma or abscess could be considered, PCKD appreciated with no evidence of hydronephrosis. Subjective/24hr Events (22): Denies abdominal pain, dressing changed today by surgery, +flatus. Excited to be able to eat. Tolerated well. Dressing changed today    Assessment & Plan:     Principal Problem:    Sepsis ([facility] [address]    Recurrent incarcerated ventral hernia with localized peritonitis  Plan:   -Status post incarcerated ventral hernia repair  with Dr. Angel Chacko  -N.p.o. except for meds and sips of water till 2022. Monitor electrolytes  -Consider fluids   -Peritoneal fluid and blood culture negative to date.  -Intra-Op culture positive for alpha strep  -Continue vancomycin and Zosyn.   to determine EOT  -Nephrology on board  -PT/OT with no further recs for skilled therapies  22  -Patient to starts CLD tomorrow with likely discharge Saturday per [address]; appreciate assistance    -Continue IVF  22  -Started on CLD today and home tomorrow on Clear to Full liquid diet only per GS; appreciate assistance  -Will change abx to Bactrim for 7 days  -Velcro binder at all times  -F/U with GS in 2 weeks  -HH for dressing changes. Next dressing change 1/2/22 Per GS    Active Problems:    Peritoneal dialysis status ([address]    ESRD (end stage renal disease) ([address]  Plan:   -Continue  HD via left upper extremity AV fistula. Patient interested in HHD  -Per CM patient has an OP slot      Anemia  Plan:   -Hemoglobin stable, continue to monitor.  -No signs of active bleed. Hypomagnesemia(Resolved)    Hypokalemia(Resolved)      DNR (do not resuscitate)  Plan: Noted      Essential hypertension  Plan:   -Controlled on Clonidine patch  -Continue holding Procardia and HCTZ. Reintroduce if appropriate        Discharge Planning:    Pending clearance from general surgery and nephrology    Diet:  ADULT DIET; Clear Liquid; Low Potassium (Less than 3000 mg/day)  ADULT ORAL NUTRITION SUPPLEMENT; Breakfast, Lunch, Dinner; Clear Liquid Oral Supplement  ADULT ORAL NUTRITION SUPPLEMENT; Breakfast, Dinner;  Wound Healing Oral Supplement  DVT PPx: Heparin  Code status: DNR    Hospital Problems             Last Modified POA    * (Principal) Sepsis ([address] 12/26/2022 Yes    Peritonitis ([address] 12/26/2022 Yes    Peritoneal dialysis status ([address] (Chronic) 12/25/2022 Yes    Anemia 12/25/2022 Yes    Hypomagnesemia 12/25/2022 Yes    DNR (do not resuscitate) (Chronic) 12/25/2022 Yes    Localized peritonitis ([address] 12/26/2022 Yes    Recurrent ventral hernia with incarceration 12/26/2022 Yes    Infection due to peritoneal dialysis catheter ([address] 24/48/7124 Yes    Periumbilical abdominal pain 12/26/2022 Yes    Abdominal wall abscess 12/26/2022 Yes    Necrotizing fasciitis ([address] 12/26/2022 Yes    Abdominal wall ulcer, with necrosis of muscle ([address] 12/26/2022 Yes    Essential hypertension 12/25/2022 Yes    ESRD (end stage renal disease) ([address] 12/25/2022 Yes     Objective:   Patient Vitals for the past 24 hrs:   Temp Pulse Resp BP SpO2   12/30/22 1110 98.2 °F (36.8 °C) 99 18 (!) 131/98 96 %   12/30/22 0717 98.6 °F (37 °C) (!) 103 18 (!) 133/109 96 %   12/30/22 0517 -- -- 19 -- --   12/30/22 0447 99 °F (37.2 °C) 97 18 (!) 133/97 94 %   12/29/22 2313 99.1 °F (37.3 °C) (!) 102 17 115/87 95 %   12/29/22 1944 98.4 °F (36.9 °C) (!) 109 20 (!) 129/94 97 %   12/29/22 1542 100.3 °F (37.9 °C) (!) 108 18 106/82 96 %       Estimated body mass index is 30.39 kg/m² as calculated from the following:    Height as of this encounter: 6' 5\" (1.956 m). Weight as of this encounter: 256 lb 5 oz (116.3 kg). Intake/Output Summary (Last 24 hours) at 12/30/2022 1424  Last data filed at 12/29/2022 1742  Gross per 24 hour   Intake 665 ml   Output --   Net 665 ml         Physical Exam:     Blood pressure (!) 131/98, pulse 99, temperature 98.2 °F (36.8 °C), temperature source Oral, resp. rate 18, height 6' 5\" (1.956 m), weight 256 lb 5 oz (116.3 kg), SpO2 96 %. General:    Alert, awake, well built, on room air, NAD  Head:  Normocephalic, atraumatic  Eyes:  Sclerae appear normal. Pupils equally round. ENT:  Nares appear normal, no drainage. Moist oral mucosa  Neck:  No restricted ROM. Trachea midline. CV:   RRR. No m/r/g. No jugular venous distension. Lungs:   CTAB. No wheezing, rhonchi, or rales. Respirations even, unlabored. Abdomen:   Surgical dressing intact. BS active  Extremities: No cyanosis or clubbing. No edema. Skin:     No rashes and normal coloration. Warm and dry. Neuro:  GCS 15, cranial nerves intact, no motor or sensory deficit, cerebellar functions intact  Psych:  AOx3, mood and affect appropriate.       I have reviewed ordered lab tests and independently visualized imaging below:    Recent Labs:  Recent Results (from the past 48 hour(s))   CBC with Auto Differential    Collection Time: 12/29/22  8:11 AM   Result Value Ref Range    WBC 7.9 4.3 - 11.1 K/uL    RBC 3.01 (L) 4.23 - 5.6 M/uL    Hemoglobin 9.5 (L) 13.6 - 17.2 g/dL    Hematocrit 28.5 (L) 41.1 - 50.3 % MCV 94.7 82 - 102 FL    MCH 31.6 26.1 - 32.9 PG    MCHC 33.3 31.4 - 35.0 g/dL    RDW 15.9 (H) 11.9 - 14.6 %    Platelets 179 028 - 675 K/uL    MPV 10.5 9.4 - 12.3 FL    nRBC 0.00 0.0 - 0.2 K/uL    Differential Type AUTOMATED      Seg Neutrophils 63 43 - 78 %    Lymphocytes 18 13 - 44 %    Monocytes 13 (H) 4.0 - 12.0 %    Eosinophils % 5 0.5 - 7.8 %    Basophils 1 0.0 - 2.0 %    Immature Granulocytes 0 0.0 - 5.0 %    Segs Absolute 5.0 1.7 - 8.2 K/UL    Absolute Lymph # 1.5 0.5 - 4.6 K/UL    Absolute Mono # 1.0 0.1 - 1.3 K/UL    Absolute Eos # 0.4 0.0 - 0.8 K/UL    Basophils Absolute 0.0 0.0 - 0.2 K/UL    Absolute Immature Granulocyte 0.0 0.0 - 0.5 K/UL   Comprehensive Metabolic Panel w/ Reflex to MG    Collection Time: 12/29/22  5:54 PM   Result Value Ref Range    Sodium 144 (H) 133 - 143 mmol/L    Potassium 3.2 (L) 3.5 - 5.1 mmol/L    Chloride 112 (H) 101 - 110 mmol/L    CO2 26 21 - 32 mmol/L    Anion Gap 6 2 - 11 mmol/L    Glucose 62 (L) 65 - 100 mg/dL    BUN 27 (H) 8 - 23 MG/DL    Creatinine 8.00 (H) 0.8 - 1.5 MG/DL    Est, Glom Filt Rate 7 (L) >60 ml/min/1.73m2    Calcium 7.1 (L) 8.3 - 10.4 MG/DL    Total Bilirubin 1.1 0.2 - 1.1 MG/DL    ALT 9 (L) 12 - 65 U/L    AST 10 (L) 15 - 37 U/L    Alk Phosphatase 51 50 - 136 U/L    Total Protein 5.5 (L) 6.3 - 8.2 g/dL    Albumin 1.8 (L) 3.2 - 4.6 g/dL    Globulin 3.7 2.8 - 4.5 g/dL    Albumin/Globulin Ratio 0.5 0.4 - 1.6     Magnesium    Collection Time: 12/29/22  5:54 PM   Result Value Ref Range    Magnesium 1.5 (L) 1.8 - 2.4 mg/dL   Basic Metabolic Panel    Collection Time: 12/30/22  8:15 AM   Result Value Ref Range    Sodium 139 133 - 143 mmol/L    Potassium 3.7 3.5 - 5.1 mmol/L    Chloride 104 101 - 110 mmol/L    CO2 26 21 - 32 mmol/L    Anion Gap 9 2 - 11 mmol/L    Glucose 102 (H) 65 - 100 mg/dL    BUN 38 (H) 8 - 23 MG/DL    Creatinine 12.10 (H) 0.8 - 1.5 MG/DL    Est, Glom Filt Rate 4 (L) >60 ml/min/1.73m2    Calcium 9.0 8.3 - 10.4 MG/DL         Other Studies:  CT ABDOMEN PELVIS WO CONTRAST Additional Contrast? Oral    Result Date: 12/25/2022  CT of the abdomen and pelvis without contrast. CLINICAL INDICATION: Yellowish solution after peritoneal dialysis, concern for peritonitis PROCEDURE: Serial thin-section axial images obtained from the upper abdomen through the proximal femurs without the administration of intravenous contrast. Oral contrast was given. Radiation dose reduction techniques were used for this study. Our CT scanners use one or all of the following: Automated exposure control, adjusted of the mA and/or kV according to patient size, iterative reconstruction COMPARISON: CT abdomen and pelvis dated 7/6/2022 FINDINGS:  Evaluation of the hollow and solid viscera is limited by the lack of intravenous contrast. Dependent atelectasis is present. CT ABDOMEN: The stomach is unremarkable. Innumerable cysts are noted throughout the bilateral kidneys unchanged from the prior exam in keeping with polycystic kidney disease. Multiple of the cysts are hyperdense and likely hemorrhagic. There is no hydronephrosis. No renal or ureteral stones evident. The adrenal glands are normal. An umbilical hernia is present in the lower mid abdomen at the insertion site of the peritoneal dialysis catheter. There is a fluid collection within the hernia sac concerning for possible seroma or abscess that measures 5.7 x 3.2 x 6.0 cm. There are herniated bowel loops as well without bowel obstruction. The abdominal wall defect measures roughly 7.5 cm. CT PELVIS: The bowel is normal in course, caliber, and wall thickness. No bowel obstruction evident. The appendix is normal. The peritoneal dialysis catheter coils in the central pelvis and a small amount of ascitic fluid. The bladder is decompressed. There is no inguinal hernia. The rectum is unremarkable. No aggressive bone lesions identified. 1. Moderate-sized umbilical hernia with herniated loops of bowel and fluid.  The fluid collection measures roughly 5.7 x 6.0 cm. Seroma or abscess could be considered. 2. Polycystic kidney disease. There is no hydronephrosis. XR CHEST PORTABLE    Result Date: 12/25/2022  KUB CLINICAL INDICATION: Sepsis FINDINGS: Single AP view the chest compared to a similar exam dated 6/24/2017 show the lungs are slightly underexpanded but otherwise clear. No pleural effusion or pneumothorax. The cardiac silhouette and mediastinum are unremarkable. The bones are normal.     Slightly under expanded lungs, otherwise no acute abnormality.       Current Meds:  Current Facility-Administered Medications   Medication Dose Route Frequency    pantoprazole (PROTONIX) 40 mg in sodium chloride (PF) 0.9 % 10 mL injection  40 mg IntraVENous Daily    morphine (PF) injection 2 mg  2 mg IntraVENous Q2H PRN    morphine injection 4 mg  4 mg IntraVENous Q2H PRN    sodium chloride flush 0.9 % injection 5-40 mL  5-40 mL IntraVENous 2 times per day    sodium chloride flush 0.9 % injection 5-40 mL  5-40 mL IntraVENous PRN    0.9 % sodium chloride infusion   IntraVENous PRN    [Held by provider] heparin (porcine) injection 5,000 Units  5,000 Units SubCUTAneous 3 times per day    ondansetron (ZOFRAN-ODT) disintegrating tablet 4 mg  4 mg Oral Q8H PRN    Or    ondansetron (ZOFRAN) injection 4 mg  4 mg IntraVENous Q6H PRN    acetaminophen (TYLENOL) tablet 650 mg  650 mg Oral Q6H PRN    Or    acetaminophen (TYLENOL) suppository 650 mg  650 mg Rectal Q6H PRN    cinacalcet (SENSIPAR) tablet 90 mg  90 mg Oral Nightly    [Held by provider] NIFEdipine (PROCARDIA XL) extended release tablet 90 mg  90 mg Oral Nightly    finasteride (PROSCAR) tablet 5 mg  5 mg Oral Nightly    traZODone (DESYREL) tablet 50 mg  50 mg Oral Nightly    piperacillin-tazobactam (ZOSYN) 3,375 mg in sodium chloride 0.9 % 50 mL IVPB (mini-bag)  3,375 mg IntraVENous Q12H    vancomycin (VANCOCIN) intermittent dosing (placeholder)   Other RX Placeholder    oxyCODONE (ROXICODONE) immediate release tablet 5 mg  5 mg Oral Q6H PRN    valsartan (DIOVAN) tablet 320 mg  320 mg Oral Daily    And    [Held by provider] hydroCHLOROthiazide (HYDRODIURIL) tablet 25 mg  25 mg Oral Daily    cloNIDine (CATAPRES) 0.3 MG/24HR 1 patch  1 patch TransDERmal Weekly       Signed:  LEROY Goyal - CNP    Part of this note may have been written by using a voice dictation software. The note has been proof read but may still contain some grammatical/other typographical errors.

## 2022-12-30 NOTE — PROGRESS NOTES
[address]   Admission Date: 12/25/2022         [address] Nephrology Progress Note: 12/30/2022    Follow-up for: ESRD    The patient's chart is reviewed and the patient is discussed with the staff. Subjective:   S/p HD yesterday. Patient resting in bed today without distress. Remains alert. On room air.     ROS:  Gen - no fever, no chills, appetite unchanged  Ext - no edema  No headache    Current Facility-Administered Medications   Medication Dose Route Frequency    pantoprazole (PROTONIX) 40 mg in sodium chloride (PF) 0.9 % 10 mL injection  40 mg IntraVENous Daily    morphine (PF) injection 2 mg  2 mg IntraVENous Q2H PRN    morphine injection 4 mg  4 mg IntraVENous Q2H PRN    sodium chloride flush 0.9 % injection 5-40 mL  5-40 mL IntraVENous 2 times per day    sodium chloride flush 0.9 % injection 5-40 mL  5-40 mL IntraVENous PRN    0.9 % sodium chloride infusion   IntraVENous PRN    [Held by provider] heparin (porcine) injection 5,000 Units  5,000 Units SubCUTAneous 3 times per day    ondansetron (ZOFRAN-ODT) disintegrating tablet 4 mg  4 mg Oral Q8H PRN    Or    ondansetron (ZOFRAN) injection 4 mg  4 mg IntraVENous Q6H PRN    acetaminophen (TYLENOL) tablet 650 mg  650 mg Oral Q6H PRN    Or    acetaminophen (TYLENOL) suppository 650 mg  650 mg Rectal Q6H PRN    cinacalcet (SENSIPAR) tablet 90 mg  90 mg Oral Nightly    [Held by provider] NIFEdipine (PROCARDIA XL) extended release tablet 90 mg  90 mg Oral Nightly    finasteride (PROSCAR) tablet 5 mg  5 mg Oral Nightly    traZODone (DESYREL) tablet 50 mg  50 mg Oral Nightly    piperacillin-tazobactam (ZOSYN) 3,375 mg in sodium chloride 0.9 % 50 mL IVPB (mini-bag)  3,375 mg IntraVENous Q12H    vancomycin (VANCOCIN) intermittent dosing (placeholder)   Other RX Placeholder    oxyCODONE (ROXICODONE) immediate release tablet 5 mg  5 mg Oral Q6H PRN    valsartan (DIOVAN) tablet 320 mg  320 mg Oral Daily    And    [Held by provider] hydroCHLOROthiazide (HYDRODIURIL) tablet 25 mg  25 mg Oral Daily    cloNIDine (CATAPRES) 0.3 MG/24HR 1 patch  1 patch TransDERmal Weekly         Objective:     Vitals:    12/30/22 0517 12/30/22 0717 12/30/22 0959 12/30/22 1110   BP:  (!) 133/109  (!) 131/98   Pulse:  (!) 103  99   Resp: 19 18  18   Temp:  98.6 °F (37 °C)  98.2 °F (36.8 °C)   TempSrc:  Oral  Oral   SpO2:  96%  96%   Weight:       Height:   6' 5\" (1.956 m)      Intake and Output:   12/28 1901 - 12/30 0700  In: 8599 [I.V.:665]  Out: 1500   No intake/output data recorded. Physical Exam:   GEN : alert and oriented, no distress  HEENT: anicteric sclerae, normocephalic  Neck - trachea midline, atraumatic  CV - regular rate, no audible murmur  Lung - equal chest rise, no audible wheezing  Abd - non distended, no obvious mass  Ext - no cyanosis, no edema  Neurologic - nonfocal, follows commands  Skin - no rashes, no purpura   Psychiatric: Normal mood and affect. Access: E AVF      LAB  Recent Labs     12/28/22  0758 12/29/22  0811   WBC 8.4 7.9   HGB 8.4* 9.5*   HCT 25.9* 28.5*    241     Recent Labs     12/28/22  0758 12/29/22  1754 12/30/22  0815    144* 139   K 4.2 3.2* 3.7    112* 104   CO2 32 26 26   BUN 46* 27* 38*   CREATININE 13.80* 8.00* 12.10*   MG  --  1.5*  --      No results for input(s): PH, PCO2, PO2, HCO3 in the last 72 hours. Assessment/Plan:  (Medical Decision Making)   ESRD was on PD, transitioning to HD. PD catheter was removed per gen surgery 12/26.   -suspected peritonitis on Abx, Cx pending   - he is very interested in HHD, but in the mean time pt will need outpt HD slot, consulted CW for slot at [facility]- clinic closest to his home  - HD tomorrow. Orders placed. 2. Umbilical hernia herniated loops of bowel, questionable seroma versus abscess  -s/p PD catheter d/c 12/26     3. Anemia - stable     4. MBD - On Sensipar     5. Hypertension- controlled.  Continue home meds    I have discontinued the IV fluids      Elle Vazquez MD  [address] [place], [place]

## 2022-12-31 ENCOUNTER — HOME HEALTH ADMISSION (OUTPATIENT)
Dept: HOME HEALTH SERVICES | Facility: HOME HEALTH | Age: 62
End: 2022-12-31
Payer: MEDICARE

## 2022-12-31 VITALS
HEART RATE: 98 BPM | RESPIRATION RATE: 20 BRPM | TEMPERATURE: 98.1 F | BODY MASS INDEX: 30.85 KG/M2 | DIASTOLIC BLOOD PRESSURE: 97 MMHG | WEIGHT: 261.25 LBS | SYSTOLIC BLOOD PRESSURE: 125 MMHG | OXYGEN SATURATION: 98 % | HEIGHT: 77 IN

## 2022-12-31 PROBLEM — A41.9 SEPSIS (HCC): Status: RESOLVED | Noted: 2022-12-25 | Resolved: 2022-12-31

## 2022-12-31 LAB
BASOPHILS # BLD: 0 K/UL (ref 0–0.2)
BASOPHILS NFR BLD: 0 % (ref 0–2)
DIFFERENTIAL METHOD BLD: ABNORMAL
EOSINOPHIL # BLD: 0.2 K/UL (ref 0–0.8)
EOSINOPHIL NFR BLD: 2 % (ref 0.5–7.8)
ERYTHROCYTE [DISTWIDTH] IN BLOOD BY AUTOMATED COUNT: 15.2 % (ref 11.9–14.6)
HCT VFR BLD AUTO: 27.8 % (ref 41.1–50.3)
HGB BLD-MCNC: 9.2 G/DL (ref 13.6–17.2)
IMM GRANULOCYTES # BLD AUTO: 0.1 K/UL (ref 0–0.5)
IMM GRANULOCYTES NFR BLD AUTO: 1 % (ref 0–5)
LYMPHOCYTES # BLD: 0.9 K/UL (ref 0.5–4.6)
LYMPHOCYTES NFR BLD: 9 % (ref 13–44)
MAGNESIUM SERPL-MCNC: 2.1 MG/DL (ref 1.8–2.4)
MCH RBC QN AUTO: 31.5 PG (ref 26.1–32.9)
MCHC RBC AUTO-ENTMCNC: 33.1 G/DL (ref 31.4–35)
MCV RBC AUTO: 95.2 FL (ref 82–102)
MONOCYTES # BLD: 0.7 K/UL (ref 0.1–1.3)
MONOCYTES NFR BLD: 7 % (ref 4–12)
NEUTS SEG # BLD: 8 K/UL (ref 1.7–8.2)
NEUTS SEG NFR BLD: 81 % (ref 43–78)
NRBC # BLD: 0 K/UL (ref 0–0.2)
PLATELET # BLD AUTO: 260 K/UL (ref 150–450)
PMV BLD AUTO: 9.9 FL (ref 9.4–12.3)
RBC # BLD AUTO: 2.92 M/UL (ref 4.23–5.6)
WBC # BLD AUTO: 10 K/UL (ref 4.3–11.1)

## 2022-12-31 PROCEDURE — 36415 COLL VENOUS BLD VENIPUNCTURE: CPT

## 2022-12-31 PROCEDURE — 6360000002 HC RX W HCPCS: Performed by: SURGERY

## 2022-12-31 PROCEDURE — 2580000003 HC RX 258: Performed by: INTERNAL MEDICINE

## 2022-12-31 PROCEDURE — C9113 INJ PANTOPRAZOLE SODIUM, VIA: HCPCS | Performed by: SURGERY

## 2022-12-31 PROCEDURE — 6370000000 HC RX 637 (ALT 250 FOR IP): Performed by: INTERNAL MEDICINE

## 2022-12-31 PROCEDURE — A4216 STERILE WATER/SALINE, 10 ML: HCPCS | Performed by: SURGERY

## 2022-12-31 PROCEDURE — 6370000000 HC RX 637 (ALT 250 FOR IP): Performed by: NURSE PRACTITIONER

## 2022-12-31 PROCEDURE — 83735 ASSAY OF MAGNESIUM: CPT

## 2022-12-31 PROCEDURE — 85025 COMPLETE CBC W/AUTO DIFF WBC: CPT

## 2022-12-31 PROCEDURE — 2580000003 HC RX 258: Performed by: SURGERY

## 2022-12-31 PROCEDURE — 8010000000 HC HEMODIALYSIS ACUTE INPT

## 2022-12-31 RX ORDER — PANTOPRAZOLE SODIUM 20 MG/1
20 TABLET, DELAYED RELEASE ORAL
Qty: 30 TABLET | Refills: 0 | Status: SHIPPED | OUTPATIENT
Start: 2022-12-31 | End: 2023-01-30

## 2022-12-31 RX ADMIN — SODIUM CHLORIDE 40 MG: 9 INJECTION, SOLUTION INTRAMUSCULAR; INTRAVENOUS; SUBCUTANEOUS at 11:54

## 2022-12-31 RX ADMIN — MORPHINE SULFATE 4 MG: 4 INJECTION INTRAVENOUS at 12:03

## 2022-12-31 RX ADMIN — AMOXICILLIN AND CLAVULANATE POTASSIUM 1 TABLET: 500; 125 TABLET, FILM COATED ORAL at 15:11

## 2022-12-31 RX ADMIN — SODIUM CHLORIDE, PRESERVATIVE FREE 5 ML: 5 INJECTION INTRAVENOUS at 11:55

## 2022-12-31 RX ADMIN — VALSARTAN 320 MG: 320 TABLET, FILM COATED ORAL at 11:54

## 2022-12-31 ASSESSMENT — PAIN SCALES - GENERAL: PAINLEVEL_OUTOF10: 6

## 2022-12-31 ASSESSMENT — PAIN DESCRIPTION - LOCATION: LOCATION: ABDOMEN

## 2022-12-31 ASSESSMENT — PAIN DESCRIPTION - DESCRIPTORS: DESCRIPTORS: ACHING

## 2022-12-31 NOTE — DISCHARGE SUMMARY
Hospitalist Discharge Summary   Admit Date:  2022 11:46 AM   DC Note date: 2022  Name:  Dirk Bill   Age:  65 y.o. Sex:  male  :  1960   MRN:  [id number]   Room:  [facility]  PCP:  Earley Blizzard, MD    Presenting Complaint: Other     Initial Admission Diagnosis: Peritonitis ([address] [K65.9]  Peritonitis, dialysis-associated, initial encounter ([address] [facility]     Problem List for this Hospitalization (present on admission):    Principal Problem (Resolved):    Sepsis ([address]  Active Problems:    Peritonitis ([address]    Peritoneal dialysis status ([address]    Anemia    Hypomagnesemia    DNR (do not resuscitate)    Localized peritonitis ([address]    Recurrent ventral hernia with incarceration    Infection due to peritoneal dialysis catheter ([address]    Periumbilical abdominal pain    Abdominal wall abscess    Necrotizing fasciitis ([address]    Abdominal wall ulcer, with necrosis of muscle ([address]    Essential hypertension    ESRD (end stage renal disease) [facility])      Hospital Course:  Serena Norman is a 52-year-old male with a PMH of ESRD on peritoneal dialysis, HTN, peritonitis, admitted on  with diffuse abdominal pain, secondary to possible peritonitis versus incarcerated umbilical hernia with abscess versus seroma as seen on CT abd. He did report having periumbilical hernia repair in the past. Patient met sepsis criteria, was started on Vanc and Zosyn via PD catheter. General Surgery consulted and pt underwent incarcerated ventral hernia repair  with Dr. Jefe Brooks and PD cath removed. Peritoneal fluid and BC NGTD however intra op cx +alpha strep. Nephrology consulted and started HD. CM secured outpatient HD chair. CLD and progress to full liquid diet. OK to DC from Gen Surg and Nephrology standpoint. Disposition: HH  Diet: ADULT DIET; Clear Liquid;  Low Potassium (Less than 3000 mg/day)  ADULT ORAL NUTRITION SUPPLEMENT; Breakfast, Lunch, Dinner; Clear Liquid Oral Supplement  ADULT ORAL NUTRITION SUPPLEMENT; Breakfast, Dinner; Wound Healing Oral Supplement  Code Status: DNR    Follow Ups:   Bao Mckeon MD Follow up on 1/16/2023. Specialty: General Surgery  Why: For wound re-check  Contact information:  [address] [place]             [address] [facility] Follow up. Specialty: Home Health Services  Why: Please call home health Monday when you have recieved your wound vac   delivery and when you are home after your dialysis so the home health   nurse can come to your home to connect your wound vac. Contact information:  [address] [facility] [address] [id number]  [address] [facility] Follow up. Specialty: Dialysis Clinic  Why: New HD Chair Time:   [facility]  site  Start Date 1/2/2023  Start Time: 11:45  Contact information:  [address] [place] [address] [address] Denise Kuldip Figueroa MD Follow up. Specialty: Internal Medicine  Contact information:  [address] [place] [address]  [phone]                       Time spent in patient discharge and coordination 30 minutes. Follow up labs/diagnostics (ultimately defer to outpatient provider):  Per Nephro and Gen Surg    Plan was discussed with pt. All questions answered. Patient was stable at time of discharge. Instructions given to call a physician or return if any concerns. Current Discharge Medication List        START taking these medications    Details   pantoprazole (PROTONIX) 20 MG tablet Take 1 tablet by mouth every morning (before breakfast)  Qty: 30 tablet, Refills: 0      HYDROcodone-acetaminophen (NORCO) 5-325 MG per tablet Take 1-2 tablets by mouth every 8 hours as needed for Pain for up to 7 days.  Max Daily Amount: 6 tablets  Qty: 28 tablet, Refills: 0    Comments: Reduce doses taken as pain becomes manageable  Associated Diagnoses: Abdominal wall ulcer, with necrosis of muscle ([facility] Necrotizing fasciitis ([facility] Abdominal wall abscess; Localized peritonitis ([facility]  Infection associated with peritoneal dialysis catheter, subsequent encounter ([facility]           CONTINUE these medications which have NOT CHANGED    Details   traZODone (DESYREL) 50 MG tablet Take 1 tablet by mouth nightly  Qty: 90 tablet, Refills: 1    Associated Diagnoses: Primary insomnia      B Complex-C-Folic Acid (DIALYVITE PO) Take 1 tablet by mouth daily      cinacalcet (SENSIPAR) 90 MG tablet Take 90 mg by mouth at bedtime      cloNIDine (CATAPRES) 0.3 MG/24HR PTWK Place 1 patch onto the skin every 3 days       finasteride (PROSCAR) 5 MG tablet Take 5 mg by mouth at bedtime      ondansetron (ZOFRAN) 4 MG tablet Take 4 mg by mouth every 8 hours as needed      potassium chloride (KLOR-CON) 10 MEQ extended release tablet Take 10-20 mEq by mouth daily       sevelamer hcl (RENAGEL) 800 MG tablet Take 2,400 mg by mouth 3 times daily (with meals)            STOP taking these medications       omeprazole (PRILOSEC) 40 MG delayed release capsule Comments:   Reason for Stopping:         valsartan-hydroCHLOROthiazide (DIOVAN-HCT) 320-25 MG per tablet Comments:   Reason for Stopping:         Hyoscyamine Sulfate SL (LEVSIN/SL) 0.125 MG SUBL Comments:   Reason for Stopping:         LACTULOSE PO Comments:   Reason for Stopping:         cloNIDine (CATAPRES) 0.2 MG tablet Comments:   Reason for Stopping:         escitalopram (LEXAPRO) 10 MG tablet Comments:   Reason for Stopping:         esomeprazole (NEXIUM) 40 MG delayed release capsule Comments:   Reason for Stopping:         hydrALAZINE (APRESOLINE) 50 MG tablet Comments:   Reason for Stopping:         NIFEdipine (PROCARDIA XL) 90 MG extended release tablet Comments:   Reason for Stopping:         rosuvastatin (CRESTOR) 10 MG tablet Comments:   Reason for Stopping:               Procedures done this admission:  Procedure(s):  ABDOMEN INCISION AND DRAINAGE, VENTRAL HERNIA  REPAIRE WITH MESH. REMOVAL PD CATH    Consults this admission:  IP CONSULT TO NEPHROLOGY  IP CONSULT TO GENERAL SURGERY  IP CONSULT TO CASE MANAGEMENT  IP CONSULT HOME HEALTH  IP CONSULT TO CASE MANAGEMENT    Echocardiogram results:  No results found for this or any previous visit. Diagnostic Imaging/Tests:   CT ABDOMEN PELVIS WO CONTRAST Additional Contrast? Oral    Result Date: 12/25/2022  1. Moderate-sized umbilical hernia with herniated loops of bowel and fluid. The fluid collection measures roughly 5.7 x 6.0 cm. Seroma or abscess could be considered. 2. Polycystic kidney disease. There is no hydronephrosis. XR CHEST PORTABLE    Result Date: 12/25/2022  Slightly under expanded lungs, otherwise no acute abnormality.        Labs: Results:       BMP, Mg, Phos Recent Labs     12/29/22  1754 12/30/22  0815   * 139   K 3.2* 3.7   * 104   CO2 26 26   ANIONGAP 6 9   BUN 27* 38*   CREATININE 8.00* 12.10*   LABGLOM 7* 4*   CALCIUM 7.1* 9.0   GLUCOSE 62* 102*   MG 1.5*  --       CBC Recent Labs     12/29/22  0811   WBC 7.9   RBC 3.01*   HGB 9.5*   HCT 28.5*   MCV 94.7   MCH 31.6   MCHC 33.3   RDW 15.9*      MPV 10.5   NRBC 0.00   SEGS 63   LYMPHOPCT 18   EOSRELPCT 5   MONOPCT 13*   BASOPCT 1   IMMGRAN 0   SEGSABS 5.0   LYMPHSABS 1.5   EOSABS 0.4   MONOSABS 1.0   BASOSABS 0.0   ABSIMMGRAN 0.0      LFT Recent Labs     12/29/22  1754   BILITOT 1.1   ALKPHOS 51   AST 10*   ALT 9*   PROT 5.5*   LABALBU 1.8*   GLOB 3.7      Cardiac  No results found for: NTPROBNP, TROPHS   Coags No results found for: PROTIME, INR, APTT   A1c No results found for: LABA1C, EAG   Lipids Lab Results   Component Value Date/Time    CHOL 132 04/21/2022 04:02 PM    LDLCALC 68 04/21/2022 04:02 PM    HDL 28 04/21/2022 04:02 PM    TRIG 217 04/21/2022 04:02 PM      Thyroid  No results found for: TSHELE, TRL1MSO     Most Recent UA No results found for: COLORU, APPEARANCE, [address], [facility], [address], GLUCOSEU, [address] [facility], [address] [facility], [facility] 89., [address] [facility], [facility], [address], RBCUA, EPITHUA, BACTERIA, LABCAST, MUCUS     Recent Labs     12/26/22  1413 12/26/22  1346 12/25/22  1317 12/25/22  1155   CULTURE NO GROWTH 2 DAYS NO ANAEROBES ISOLATED 4 DAYS  SCANT ALPHA STREPTOCOCCUS* NO GROWTH 2 DAYS NO GROWTH 5 DAYS       All Labs from Last 24 Hrs:  No results found for this or any previous visit (from the past 24 hour(s)). Allergies   Allergen Reactions    Iodine Other (See Comments)     Iodine Dye r/t kidney function     Immunization History   Administered Date(s) Administered    COVID-19, PFIZER PURPLE top, DILUTE for use, (age 15 y+), 30mcg/0.3mL 03/30/2021, 04/23/2021, 10/27/2021    Influenza Virus Vaccine 10/11/2021    PPD Test 12/27/2022       Recent Vital Data:  Patient Vitals for the past 24 hrs:   Temp Pulse Resp BP SpO2   12/31/22 1151 97.7 °F (36.5 °C) (!) 111 20 (!) 144/100 99 %   12/31/22 1052 -- 91 -- (!) 150/97 --   12/31/22 1032 -- 93 -- (!) 131/96 --   12/31/22 1012 -- 94 -- (!) 124/93 --   12/31/22 0930 -- 94 -- (!) 124/96 --   12/31/22 0903 -- 100 -- (!) 135/95 --   12/31/22 0832 -- 89 -- (!) 129/91 --   12/31/22 0800 -- 96 -- (!) 147/111 --   12/31/22 0730 -- 87 -- (!) 137/99 --   12/31/22 0710 -- 87 -- (!) 137/99 --   12/31/22 0357 98.1 °F (36.7 °C) 87 18 123/87 100 %   12/31/22 0000 -- -- -- (!) 126/96 --   12/30/22 2345 -- -- 17 -- --   12/30/22 2321 98.5 °F (36.9 °C) 99 18 (!) 140/106 96 %   12/30/22 2315 -- -- 19 -- --   12/30/22 1942 98.4 °F (36.9 °C) 98 17 110/75 98 %   12/30/22 1505 98.2 °F (36.8 °C) (!) 104 18 (!) 135/94 99 %       Oxygen Therapy  SpO2: 99 %  O2 Device: None (Room air)  O2 Flow Rate (L/min): 2 L/min    Estimated body mass index is 30.98 kg/m² as calculated from the following:    Height as of this encounter: 6' 5\" (1.956 m). Weight as of this encounter: 261 lb 3.9 oz (118.5 kg).     Intake/Output Summary (Last 24 hours) at 12/31/2022 1209  Last data filed at 12/31/2022 1052  Gross per 24 hour   Intake 980 ml   Output 2000 ml   Net -1020 ml         Physical Exam:  General:          Alert, awake, well built, on room air, NAD  Head:               Normocephalic, atraumatic  Eyes:               Sclerae appear normal. Pupils equally round. ENT:                Nares appear normal, no drainage. Moist oral mucosa  Neck:               No restricted ROM. Trachea midline. CV:                  RRR. No m/r/g. No jugular venous distension. Lungs:             CTAB. No wheezing, rhonchi, or rales. Respirations even, unlabored. Abdomen:        Surgical dressing intact. BS active  Extremities:     No cyanosis or clubbing. No edema. Skin:                No rashes and normal coloration. Warm and dry. Neuro:             GCS 15, cranial nerves intact, no motor or sensory deficit, cerebellar functions intact  Psych:             AOx3, mood and affect appropriate.       Signed:  LEROY Dale - CNP    Notes, labs, VS, diagnostic testing reviewed  Case discussed with pt, care team, Dr. Foster Ro

## 2022-12-31 NOTE — DIALYSIS
TRANSFER IN - DIALYSIS    Received patient in dialysis unit from [facility] (unit) for ordered procedure. Consent verified for renal replacement therapy. Procedure explained to patient, opportunity for Q&A provided. Call light given. Patient a/ox3 and vital signs stable. /99 , P87 ,  0L  O2  via RA. Hemodialysis initiated using LUE AVF and 15 g needles. Machine settings per MD order. Heparin 0 unit bolus and 0 units/hr. Will monitor during treatment.

## 2022-12-31 NOTE — PROGRESS NOTES
[address]   Admission Date: 12/25/2022         [address] Nephrology Progress Note: 12/31/2022    Follow-up for: ESRD    The patient's chart is reviewed and the patient is discussed with the staff. Subjective:   Patient seen on HD.  No distress    ROS:  Gen - no fever, no chills, appetite unchanged  Ext - no edema  No headache    Current Facility-Administered Medications   Medication Dose Route Frequency    amoxicillin-clavulanate (AUGMENTIN) 500-125 MG per tablet 1 tablet  1 tablet Oral Daily    pantoprazole (PROTONIX) 40 mg in sodium chloride (PF) 0.9 % 10 mL injection  40 mg IntraVENous Daily    morphine (PF) injection 2 mg  2 mg IntraVENous Q2H PRN    morphine injection 4 mg  4 mg IntraVENous Q2H PRN    sodium chloride flush 0.9 % injection 5-40 mL  5-40 mL IntraVENous 2 times per day    sodium chloride flush 0.9 % injection 5-40 mL  5-40 mL IntraVENous PRN    0.9 % sodium chloride infusion   IntraVENous PRN    [Held by provider] heparin (porcine) injection 5,000 Units  5,000 Units SubCUTAneous 3 times per day    ondansetron (ZOFRAN-ODT) disintegrating tablet 4 mg  4 mg Oral Q8H PRN    Or    ondansetron (ZOFRAN) injection 4 mg  4 mg IntraVENous Q6H PRN    acetaminophen (TYLENOL) tablet 650 mg  650 mg Oral Q6H PRN    Or    acetaminophen (TYLENOL) suppository 650 mg  650 mg Rectal Q6H PRN    cinacalcet (SENSIPAR) tablet 90 mg  90 mg Oral Nightly    [Held by provider] NIFEdipine (PROCARDIA XL) extended release tablet 90 mg  90 mg Oral Nightly    finasteride (PROSCAR) tablet 5 mg  5 mg Oral Nightly    traZODone (DESYREL) tablet 50 mg  50 mg Oral Nightly    oxyCODONE (ROXICODONE) immediate release tablet 5 mg  5 mg Oral Q6H PRN    valsartan (DIOVAN) tablet 320 mg  320 mg Oral Daily    And    [Held by provider] hydroCHLOROthiazide (HYDRODIURIL) tablet 25 mg  25 mg Oral Daily    cloNIDine (CATAPRES) 0.3 MG/24HR 1 patch  1 patch TransDERmal Weekly         Objective:     Vitals: 12/30/22 2345 12/31/22 0000 12/31/22 0357 12/31/22 0710   BP:  (!) 126/96 123/87 (!) 137/99   Pulse:   87 87   Resp: 17  18    Temp:   98.1 °F (36.7 °C)    TempSrc:   Oral    SpO2:   100%    Weight:       Height:         Intake and Output:   12/29 1901 - 12/31 0700  In: 480 [P.O.:480]  Out: -   No intake/output data recorded. Physical Exam:   GEN : alert and oriented, no distress  HEENT: anicteric sclerae, normocephalic  Neck - trachea midline, atraumatic  CV - regular rate, no audible murmur  Lung - equal chest rise, no audible wheezing  Abd - non distended, no obvious mass  Ext - no cyanosis, no edema  Neurologic - nonfocal, follows commands  Skin - no rashes, no purpura   Psychiatric: Normal mood and affect. Access: LUE AVF      LAB  Recent Labs     12/28/22  0758 12/29/22  0811   WBC 8.4 7.9   HGB 8.4* 9.5*   HCT 25.9* 28.5*    241       Recent Labs     12/28/22  0758 12/29/22  1754 12/30/22  0815    144* 139   K 4.2 3.2* 3.7    112* 104   CO2 32 26 26   BUN 46* 27* 38*   CREATININE 13.80* 8.00* 12.10*   MG  --  1.5*  --        No results for input(s): PH, PCO2, PO2, HCO3 in the last 72 hours. Assessment/Plan:  (Medical Decision Making)   ESRD was on PD, transitioning to HD. PD catheter was removed per gen surgery 12/26.   -suspected peritonitis on Abx, Cx pending   - he is very interested in HHD, but in the mean time pt will need outpt HD slot, consulted CW for slot at [facility]- clinic closest to his home  - Patient seen on dialysis. No distress. Qb: 200 - if this continues, may need fistulagram as outpatient  Qd: 800  Blood pressure: 124/96      2. Umbilical hernia herniated loops of bowel, questionable seroma versus abscess  -s/p PD catheter d/c 12/26     3. Anemia - stable     4. MBD - On Sensipar     5. Hypertension- controlled.  Continue home meds    Eriberto Osborne MD  [address] [place]

## 2022-12-31 NOTE — PROGRESS NOTES
Pt discharge is complete at this time. Discharge instructions and follow ups reviewed. Prescriptions reviewed. IV removed. Opportunity for questions given.

## 2022-12-31 NOTE — CARE COORDINATION
56 KELLY from Mak Kraft MD requesting a wound Vac to be placed after discharge by home health  Wound Vac order faxed to [facility] (processing)  [address] ordered and clarified with [address] Fernandez with [facility] that they would be able to apply the wound vac on 1/2/2023     Discharge plan:  New  Chair Time:   [facility]  Start Date 1/2/2023  Start Time: 11:45  Home Health: [facility] (SN) application of wound Vac  Wound Vac : [employer]    Will continue to follow for discharge planning needs  Please consult  if any new issues arise

## 2022-12-31 NOTE — DIALYSIS
TRANSFER OUT - DIALYSIS    Hemodialysis treatment completed, pt ran 3.5 hours without complications. Patient a/ox3 and /97 , P 91       1.5 Kg removed. Needles x2 removed from access and manual pressure held until hemostasis complete and pressure dressing applied. Meds given: 0.    RBCs given during dialysis: 0    Patient to 522 after dialysis.

## 2023-01-02 ENCOUNTER — HOME CARE VISIT (OUTPATIENT)
Dept: SCHEDULING | Facility: HOME HEALTH | Age: 63
End: 2023-01-02

## 2023-01-02 VITALS
TEMPERATURE: 97.9 F | RESPIRATION RATE: 18 BRPM | HEART RATE: 84 BPM | DIASTOLIC BLOOD PRESSURE: 80 MMHG | SYSTOLIC BLOOD PRESSURE: 116 MMHG | OXYGEN SATURATION: 100 % | WEIGHT: 250 LBS | BODY MASS INDEX: 29.65 KG/M2

## 2023-01-02 VITALS
DIASTOLIC BLOOD PRESSURE: 60 MMHG | SYSTOLIC BLOOD PRESSURE: 95 MMHG | RESPIRATION RATE: 18 BRPM | OXYGEN SATURATION: 97 % | HEART RATE: 96 BPM | TEMPERATURE: 97.9 F

## 2023-01-02 LAB
BACTERIA SPEC CULT: NORMAL
SERVICE CMNT-IMP: NORMAL

## 2023-01-02 PROCEDURE — G0299 HHS/HOSPICE OF RN EA 15 MIN: HCPCS

## 2023-01-02 PROCEDURE — 0221000100 HH NO PAY CLAIM PROCEDURE

## 2023-01-02 ASSESSMENT — ENCOUNTER SYMPTOMS
PAIN LOCATION - PAIN QUALITY: SHARP
DIARRHEA: 1
HEMOPTYSIS: 0

## 2023-01-03 ENCOUNTER — CARE COORDINATION (OUTPATIENT)
Dept: CARE COORDINATION | Facility: CLINIC | Age: 63
End: 2023-01-03

## 2023-01-03 ENCOUNTER — HOME CARE VISIT (OUTPATIENT)
Dept: SCHEDULING | Facility: HOME HEALTH | Age: 63
End: 2023-01-03

## 2023-01-03 VITALS
DIASTOLIC BLOOD PRESSURE: 70 MMHG | TEMPERATURE: 98.7 F | HEART RATE: 104 BPM | RESPIRATION RATE: 18 BRPM | SYSTOLIC BLOOD PRESSURE: 118 MMHG | OXYGEN SATURATION: 98 %

## 2023-01-03 PROCEDURE — G0299 HHS/HOSPICE OF RN EA 15 MIN: HCPCS

## 2023-01-03 NOTE — CARE COORDINATION
[facility] Care Transitions Initial Follow Up Call    Call within 2 business days of discharge: Yes    Care Transition Nurse contacted the patient by telephone to perform post hospital discharge assessment. Verified name and  with patient as identifiers. Provided introduction to self, and explanation of the Care Transition Nurse role. Patient: Shani Dey Patient : 1960   MRN: [id number]  Reason for Admission: sepsis  Discharge Date: 22 RARS: Readmission Risk Score: 17.6      Last Discharge  Street       Date Complaint Diagnosis Description Type Department Provider    22 Other Peritonitis, dialysis-associated, initial encounter ([facility] . .. ED to Hosp-Admission (Discharged) (ADMITTED) Makayla Nair MD; Janeen Gonzalez. .. Was this an external facility discharge? No Discharge Facility: [facility]    Challenges to be reviewed by the provider   Additional needs identified to be addressed with provider: No  Follow up appt               Method of communication with provider: phone. below    Care Transition Nurse reviewed discharge instructions, medical action plan, and red flags with patient who verbalized understanding. The patient was given an opportunity to ask questions and does not have any further questions or concerns at this time. Were discharge instructions available to patient? Yes. Reviewed appropriate site of care based on symptoms and resources available to patient including: PCP  Specialist  CTN . The patient agrees to contact the PCP office for questions related to their healthcare. Advance Care Planning:   Does patient have an Advance Directive: not on file. Medication reconciliation was performed with patient, who verbalizes understanding of administration of home medications.  Medications reviewed, 1111F entered: yes    Was patient discharged with a pulse oximeter? no    Non-face-to-face services provided:  Scheduled appointment with PCP-CTN to assist with PCP follow up   Scheduled appointment with Specialist-patient seen for New HD timeslot yesterday. Continue to follow OP HD, patient awaiting follow up with gen surgery  Obtained and reviewed discharge summary and/or continuity of care documents    Offered patient enrollment in the Remote Patient Monitoring (RPM) program for in-home monitoring: NA.    Care Transitions 24 Hour Call    Do you have a copy of your discharge instructions?: Yes  Do you have all of your prescriptions and are they filled?: Yes  Have you been contacted by a [address] Pharmacist?: No  Have you scheduled your follow up appointment?: No (Comment: awaiting surgery follow up, CTN to assist with PCP follow up)  Do you have support at home?: Alone  Do you feel like you have everything you need to keep you well at home?: No (Comment: Patient awaiting wound vac to be delivered.  H RN aware and redressed patient wound since d/c and plans to place wound vac when delivered.)  Are you an active caregiver in your home?: No  Care Transitions Interventions         Follow Up  Future Appointments   Date Time Provider Matias Antonio   1/5/2023 To Be Determined [facility], LES [place]   1/7/2023 To Be Determined [facility], LES [place]   1/9/2023  2:45 PM [id number] BLOOD DRAW [id number] GVL AMB   1/10/2023 To Be Determined [facility], LES [place]   1/12/2023 To Be Determined [facility], LES [facility]   1/14/2023 To Be Determined [facility], LES [facility]   1/16/2023  3:00 PM Pascual Cowan MD [id number] AMB   1/17/2023 To Be Determined [facility], LES [place]   1/19/2023 To Be Determined [facility], LES [facility]   1/21/2023 To Be Determined [facility], RN [facility]   1/24/2023 To Be Determined [facility], LES [facility]   1/26/2023 To Be Determined [facility], LES [facility]   1/28/2023 To Be Determined [facility], RN [facility] [facility]   1/31/2023 To Be Determined Nick Cope, RN [place]   2/2/2023 To Be Determined Nick Cope, RN [facility]   2/4/2023 To Be Determined Nick Achilles, RN [facility]   2/7/2023 To Be Determined Nick Achilles, RN [facility]   2/9/2023 To Be Determined Nick Achilles, RN [facility]   2/11/2023 To Be Determined Nick Achilles, RN [facility]   2/14/2023 To Be Determined Nick Achilles, RN [facility]   2/16/2023 To Be Determined Nick Achilles, RN [facility]   2/18/2023 To Be Determined Nick Achilles, RN [facility]   2/21/2023 To Be Determined Nick Achilles, RN [facility]   2/23/2023 To Be Determined Nick Achilles, RN [facility]   2/25/2023 To Be Determined Nick Achilles, RN [facility]   2/28/2023 To Be Determined Nick Achilles, RN [facility]   3/2/2023 To Be Determined Nick Cope, RN [address] Transition Nurse provided contact information. Plan for follow-up call in 5-7 days based on severity of symptoms and risk factors. Plan for next call: symptom management-assess for new or worsening s/s to report, assess for progres with OP HD and HH  follow-up appointment-assess for gen sugery appt, assist if not made.     Harleen Martell RN

## 2023-01-05 ENCOUNTER — HOME CARE VISIT (OUTPATIENT)
Dept: SCHEDULING | Facility: HOME HEALTH | Age: 63
End: 2023-01-05

## 2023-01-05 PROCEDURE — G0299 HHS/HOSPICE OF RN EA 15 MIN: HCPCS

## 2023-01-07 ENCOUNTER — HOME CARE VISIT (OUTPATIENT)
Dept: SCHEDULING | Facility: HOME HEALTH | Age: 63
End: 2023-01-07

## 2023-01-07 VITALS
BODY MASS INDEX: 29.46 KG/M2 | OXYGEN SATURATION: 99 % | DIASTOLIC BLOOD PRESSURE: 70 MMHG | WEIGHT: 248.4 LBS | TEMPERATURE: 99 F | HEART RATE: 100 BPM | RESPIRATION RATE: 19 BRPM | SYSTOLIC BLOOD PRESSURE: 114 MMHG

## 2023-01-07 PROCEDURE — G0299 HHS/HOSPICE OF RN EA 15 MIN: HCPCS

## 2023-01-07 ASSESSMENT — ENCOUNTER SYMPTOMS
PAIN LOCATION - PAIN QUALITY: ACHING, SORE
STOOL DESCRIPTION: FORMED

## 2023-01-09 ENCOUNTER — NURSE ONLY (OUTPATIENT)
Dept: UROLOGY | Age: 63
End: 2023-01-09

## 2023-01-09 VITALS
SYSTOLIC BLOOD PRESSURE: 130 MMHG | OXYGEN SATURATION: 100 % | HEART RATE: 100 BPM | RESPIRATION RATE: 18 BRPM | DIASTOLIC BLOOD PRESSURE: 70 MMHG | TEMPERATURE: 97.3 F

## 2023-01-09 DIAGNOSIS — N40.1 BENIGN PROSTATIC HYPERPLASIA WITH LOWER URINARY TRACT SYMPTOMS, SYMPTOM DETAILS UNSPECIFIED: ICD-10-CM

## 2023-01-09 DIAGNOSIS — N40.1 BENIGN PROSTATIC HYPERPLASIA WITH LOWER URINARY TRACT SYMPTOMS, SYMPTOM DETAILS UNSPECIFIED: Primary | ICD-10-CM

## 2023-01-09 LAB — PSA SERPL-MCNC: 1 NG/ML

## 2023-01-09 ASSESSMENT — ENCOUNTER SYMPTOMS: PAIN LOCATION - PAIN QUALITY: ACHE

## 2023-01-10 ENCOUNTER — HOME CARE VISIT (OUTPATIENT)
Dept: SCHEDULING | Facility: HOME HEALTH | Age: 63
End: 2023-01-10

## 2023-01-10 ENCOUNTER — CARE COORDINATION (OUTPATIENT)
Dept: CARE COORDINATION | Facility: CLINIC | Age: 63
End: 2023-01-10

## 2023-01-10 VITALS
RESPIRATION RATE: 18 BRPM | DIASTOLIC BLOOD PRESSURE: 86 MMHG | SYSTOLIC BLOOD PRESSURE: 124 MMHG | TEMPERATURE: 97.8 F | OXYGEN SATURATION: 100 % | HEART RATE: 88 BPM

## 2023-01-10 PROCEDURE — G0299 HHS/HOSPICE OF RN EA 15 MIN: HCPCS

## 2023-01-10 ASSESSMENT — ENCOUNTER SYMPTOMS: HEMOPTYSIS: 0

## 2023-01-10 NOTE — CARE COORDINATION
[facility] Care Transitions Follow Up Call    Care Transition Nurse contacted the family by telephone to follow up after admission on 1/10. Verified name and  with family as identifiers. Patient: Graeme Muniz  Patient : 1960   MRN: [id number]  Reason for Admission: sepsis, peritonitis  Discharge Date: 22 RARS: Readmission Risk Score: 17.6      Needs to be reviewed by the provider   Additional needs identified to be addressed with provider: No  none             Method of communication with provider: none. below    Addressed changes since last contact:   ensure wound vac delivered and placed, assessed for new/worsening s/s to report. Patient indicated [place] has placed wound vac and has been draining properly. Patient has PCP appt tomorrow and endorses and improvement in symptoms  Discussed follow-up appointments. If no appointment was previously scheduled, appointment scheduling offered: Yes. Is follow up appointment scheduled within 7 days of discharge? Yes.     Follow Up  Future Appointments   Date Time Provider Matias Antonio   2023  9:00 AM Arslan Long MD [facility] AM[facility]   2023  7:00 AM Kumar Joaquin RN [facility]   2023 To Be Determined Kumar Joaquin RN [facility]   2023  3:00 PM Davidson Valenzuela MD [id number] AMB   2023 To Be Determined [facility]    2023 To Be Determined Kumar Joaquin RN [facility]   2023 To Be Determined Kumar Joaquin RN [facility]   2023 To Be Determined Kumar Joaquin RN [facility]   2023 To Be Determined Kumar Joaquin RN [facility]   2023 To Be Determined Kumar Joaquin RN [facility]   2023 To Be Determined Kumar Joaquin RN [facility]   2023 To Be Determined Kumar Joaquin RN [facility]   2023 To Be Determined [facility]    2023 To Be Determined Dolph Dose, RN [facility]   2/9/2023 To Be Determined Dolph Dose, RN [facility]   2/11/2023 To Be Determined Dolph Dose, RN [facility]   2/14/2023 To Be Determined Dolph Dose, RN [facility]   2/16/2023 To Be Determined Dolph Dose, RN [facility]   2/18/2023 To Be Determined Dolph Dose, RN [facility]   2/21/2023 To Be Determined Dolph Dose, RN [facility]   2/23/2023 To Be Determined Dolph Dose, RN [facility]   2/25/2023 To Be Determined Dolph Dose, LES [facility]   2/28/2023 To Be Determined Dolph Dose, LES [facility]   3/2/2023 To Be Determined Dolph Dose, LES [facility]     [facility] follow up appointment(s): na    Care Transition Nurse reviewed discharge instructions, medical action plan, and red flags with patient and discussed any barriers to care and/or understanding of plan of care after discharge. Discussed appropriate site of care based on symptoms and resources available to patient including: PCP  Specialist  Home health  CTN . The family agrees to contact the PCP office for questions related to their healthcare. Advance Care Planning:   not on file.      Patients top risk factors for readmission: medical condition-sepsis, recent surgery, wound vac  Interventions to address risk factors: Scheduled appointment with PCP-1/10    Offered patient enrollment in the Remote Patient Monitoring (RPM) program for in-home monitoring: NA.     Care Transitions Subsequent and Final Call    Schedule Follow Up Appointment with PCP: Completed  Subsequent and Final Calls  Do you have any ongoing symptoms?: No  Have your medications changed?: No  Do you have any questions related to your medications?: No  Do you currently have any active services?: Yes  Are you currently active with any services?: Home Health  Do you have any needs or concerns that I can assist you with?: No  Identified Barriers: None  Care Transitions Interventions  Other Interventions:             Care Transition Nurse provided contact information for future needs. Plan for follow-up call in 10-14 days based on severity of symptoms and risk factors. Plan for next call: symptom management-assess for s/s of concern and progress with HH. Assist with follow up with surgery, if no wound check completed at that time.     Lluvia Pack RN

## 2023-01-12 ENCOUNTER — HOME CARE VISIT (OUTPATIENT)
Dept: SCHEDULING | Facility: HOME HEALTH | Age: 63
End: 2023-01-12

## 2023-01-12 PROCEDURE — G0299 HHS/HOSPICE OF RN EA 15 MIN: HCPCS

## 2023-01-14 ENCOUNTER — HOME CARE VISIT (OUTPATIENT)
Dept: SCHEDULING | Facility: HOME HEALTH | Age: 63
End: 2023-01-14

## 2023-01-14 VITALS
OXYGEN SATURATION: 98 % | HEART RATE: 88 BPM | SYSTOLIC BLOOD PRESSURE: 110 MMHG | TEMPERATURE: 97.7 F | RESPIRATION RATE: 18 BRPM | DIASTOLIC BLOOD PRESSURE: 80 MMHG

## 2023-01-14 VITALS
HEART RATE: 88 BPM | OXYGEN SATURATION: 96 % | TEMPERATURE: 98.8 F | SYSTOLIC BLOOD PRESSURE: 128 MMHG | DIASTOLIC BLOOD PRESSURE: 86 MMHG | RESPIRATION RATE: 18 BRPM

## 2023-01-14 PROCEDURE — G0299 HHS/HOSPICE OF RN EA 15 MIN: HCPCS

## 2023-01-17 ENCOUNTER — HOME CARE VISIT (OUTPATIENT)
Dept: SCHEDULING | Facility: HOME HEALTH | Age: 63
End: 2023-01-17
Payer: MEDICARE

## 2023-01-17 PROCEDURE — G0299 HHS/HOSPICE OF RN EA 15 MIN: HCPCS

## 2023-01-18 ENCOUNTER — HOME CARE VISIT (OUTPATIENT)
Dept: HOME HEALTH SERVICES | Facility: HOME HEALTH | Age: 63
End: 2023-01-18
Payer: MEDICARE

## 2023-01-18 VITALS
HEART RATE: 84 BPM | TEMPERATURE: 97.7 F | DIASTOLIC BLOOD PRESSURE: 84 MMHG | SYSTOLIC BLOOD PRESSURE: 130 MMHG | RESPIRATION RATE: 17 BRPM | OXYGEN SATURATION: 100 %

## 2023-01-18 ASSESSMENT — ENCOUNTER SYMPTOMS
PAIN LOCATION - PAIN QUALITY: ACHE
ABDOMINAL PAIN: 1

## 2023-01-19 ENCOUNTER — OFFICE VISIT (OUTPATIENT)
Dept: UROLOGY | Age: 63
End: 2023-01-19
Payer: MEDICARE

## 2023-01-19 ENCOUNTER — HOME CARE VISIT (OUTPATIENT)
Dept: SCHEDULING | Facility: HOME HEALTH | Age: 63
End: 2023-01-19
Payer: MEDICARE

## 2023-01-19 DIAGNOSIS — N20.0 CALCULUS OF KIDNEY: Primary | ICD-10-CM

## 2023-01-19 DIAGNOSIS — N40.1 BENIGN PROSTATIC HYPERPLASIA WITH LOWER URINARY TRACT SYMPTOMS, SYMPTOM DETAILS UNSPECIFIED: ICD-10-CM

## 2023-01-19 PROCEDURE — G0299 HHS/HOSPICE OF RN EA 15 MIN: HCPCS

## 2023-01-19 PROCEDURE — 99213 OFFICE O/P EST LOW 20 MIN: CPT | Performed by: UROLOGY

## 2023-01-19 RX ORDER — FINASTERIDE 5 MG/1
5 TABLET, FILM COATED ORAL NIGHTLY
Qty: 90 TABLET | Refills: 3 | Status: CANCELLED | OUTPATIENT
Start: 2023-01-19

## 2023-01-19 RX ORDER — FINASTERIDE 5 MG/1
5 TABLET, FILM COATED ORAL DAILY
Qty: 90 TABLET | Refills: 3 | Status: SHIPPED | OUTPATIENT
Start: 2023-01-19

## 2023-01-19 ASSESSMENT — ENCOUNTER SYMPTOMS: BACK PAIN: 0

## 2023-01-19 NOTE — PROGRESS NOTES
[facility]  [address] [facility] [address]    [place] [address], [address]   [address] [facility]  : 1960    Chief Complaint   Patient presents with    Follow-up          HPI     Angela Richardson is a 65 y.o. AA male with a PMH of gross hematuria s/p negative work up 2018 and 2020 (stones and BPh = sources), BPH, ESRD on dialysis as well as bilateral kidney stones who returns for annual follow up. Last seen 2022 for L URS/LL to remove L kidney stone causing L flank pain. At prior visit, finasteride was added for hematuria likely due to BPH at that time. Today, he denies any flank pain from his stones. No significant hematuria. Remains on finasteride. Denies UTI. Voids once per week. PSA screening normal.     Lab Results   Component Value Date    PSA 1.0 2023    PSA 0.5 2022    PSA 1.1 2020       Past Medical History:   Diagnosis Date    Anemia     Anxiety     Arthritis     CAD (coronary artery disease)     per pt- no MI- \"They said the back of my heart was something. ..like 40%\"    Chronic kidney disease     [facility]; stage 5; pt has AV fistula to left arm; PERITONEAL DIALYSIS daily since     Chronic pain     CKD (chronic kidney disease)     Former smoker     Fracture, tibial plateau     GERD (gastroesophageal reflux disease)     Heart disease     Hyperlipidemia     on med for control    Hypertension     controlled with med    Hypertensive nephrosclerosis     per nephrology note 14    Hypoactive thyroid     Kidney stone     Peritoneal dialysis catheter in place [facility])     Peritoneal dialysis-associated peritonitis ([address] 2017    during hs    PUD (peptic ulcer disease)     SBP (spontaneous bacterial peritonitis) ([address] 2016    Sepsis ([address] 2016    Tibial plateau fracture, left     Umbilical hernia     Unspecified sleep apnea     pt does not have a CPAP     Past Surgical History:   Procedure Laterality Date    ABDOMEN SURGERY Right 6/13/2022    RIGHT LOWER QUADRANT ABDOMEN LESION BIOPSY EXCISION performed by Herrrea Dickey MD at [address] N/A 12/26/2022    ABDOMEN INCISION AND DRAINAGE, VENTRAL HERNIA  REPAIRE WITH MESH. REMOVAL PD CATH performed by Shane Watson MD at [address] [facility] Right 1995    tip of 5th finger    COLONOSCOPY N/A 3/23/2018    COLONOSCOPY  BMI 34 performed by Jakob Moya MD at [address] [facility] PYELOGRAMS    CYSTOSCOPY Left 11/1/2022    CYSTOSCOPY, LEFT URETEROSCOPY, BASKET STONE EXTRACTION, LEFT URETERAL STENT INSERTION performed by Ruthanne Lennox, MD at [facility] [address] Left as a child    thumb    ORTHOPEDIC SURGERY Left 1989    knee    ORTHOPEDIC SURGERY Left 2017    LEFT LATERAL TIBIAL PLATEAU OPEN REDUCTION INTERNAL FIXATION    VT UNLISTED PROCEDURE ABDOMEN PERITONEUM & OMENTUM  6/11/14    PD catheter placement    SKIN BIOPSY Left 6/13/2022    LEFT BUTTOCK MASS EXCISION performed by Herrera Dickey MD at [address] [facility] Left 2012    AV fistula to arm     Current Outpatient Medications   Medication Sig Dispense Refill    finasteride (PROSCAR) 5 MG tablet Take 1 tablet by mouth daily 90 tablet 3    valsartan-hydroCHLOROthiazide (DIOVAN-HCT) 320-25 MG per tablet Take 1 tablet by mouth daily. diphenhydrAMINE-APAP, sleep, (TYLENOL PM EXTRA STRENGTH)  MG tablet Take 2 tablets by mouth at bedtime.       traZODone (DESYREL) 50 MG tablet Take 1 tablet by mouth nightly 90 tablet 1    cinacalcet (SENSIPAR) 90 MG tablet Take 60 mg by mouth daily take 2 tabs by mouth daily      cloNIDine (CATAPRES) 0.3 MG/24HR PTWK Place 1 patch onto the skin every 3 days       finasteride (PROSCAR) 5 MG tablet Take 5 mg by mouth at bedtime      sevelamer hcl (RENAGEL) 800 MG tablet Take 2,400 mg by mouth 3 times daily (with meals)       Cholecalciferol (D3 PO) Take 125 mcg by mouth daily. (Patient not taking: Reported on 2023)      pantoprazole (PROTONIX) 20 MG tablet Take 1 tablet by mouth every morning (before breakfast) (Patient not taking: Reported on 2023) 30 tablet 0    B Complex-C-Folic Acid (DIALYVITE PO) Take 1 tablet by mouth daily (Patient not taking: Reported on 2023)      ondansetron (ZOFRAN) 4 MG tablet Take 4 mg by mouth every 8 hours as needed (Patient not taking: Reported on 2023)      potassium chloride (KLOR-CON) 10 MEQ extended release tablet Take 10 mEq by mouth daily take one tablet by mouth twice daily (Patient not taking: Reported on 2023)       No current facility-administered medications for this visit.      Allergies   Allergen Reactions    Iodine Other (See Comments)     Iodine Dye r/t kidney function     Social History     Socioeconomic History    Marital status: Single     Spouse name: Not on file    Number of children: Not on file    Years of education: Not on file    Highest education level: Not on file   Occupational History    Not on file   Tobacco Use    Smoking status: Former     Packs/day: 0.50     Years: 15.00     Pack years: 7.50     Types: Cigarettes     Quit date: 3/1/2014     Years since quittin.8    Smokeless tobacco: Never    Tobacco comments:     Quit smoking: pt states only smoked cigarettes when drinking alcohol   Vaping Use    Vaping Use: Not on file   Substance and Sexual Activity    Alcohol use: Yes    Drug use: Yes     Types: Marijuana Inna Beckham)     Comment: \"when I can afford it\"     Sexual activity: Not on file   Other Topics Concern    Not on file   Social History Narrative    Not on file     Social Determinants of Health     Financial Resource Strain: Low Risk     Difficulty of Paying Living Expenses: Not hard at all   Food Insecurity: No Food Insecurity    Worried About [address] in the Last Year: Never true    [address] in the Last Year: Never true   Transportation Needs: Not on file   Physical Activity: Insufficiently Active    Days of Exercise per Week: 1 day    Minutes of Exercise per Session: 20 min   Stress: Not on file   Social Connections: Not on file   Intimate Partner Violence: Not on file   Housing Stability: Not on file     Family History   Problem Relation Age of Onset    COPD Father     Heart Disease Father     Hypertension Mother     Diabetes Mother     Hypertension Father        Review of Systems  Constitutional:   Negative for fever. Genitourinary: Positive for history of urolithiasis. Musculoskeletal:  Negative for back pain. There were no vitals taken for this visit. GENERAL: No acute distress, Awake, Alert, Oriented X 3, Gait normal  CARDIAC: regular rate and rhythm  CHEST AND LUNG: Easy work of breathing, clear to auscultation bilaterally, no cyanosis  ABDOMEN: soft, non tender, non-distended, positive bowel sounds, no organomegaly, no palpable masses, no guarding, no rebound tenderness  KATHY: 50 gram, symmetric, smooth without nodules. SKIN: No rash, no erythema, no lacerations or abrasions, no ecchymosis  NEUROLOGIC: cranial nerves 2-12 grossly intact         Assessment and Plan    ICD-10-CM    1. Calculus of kidney  N20.0 CANCELED: AMB POC URINALYSIS DIP STICK AUTO W/O MICRO      2. Benign prostatic hyperplasia with lower urinary tract symptoms, symptom details unspecified  N40.1 finasteride (PROSCAR) 5 MG tablet     PSA, Diagnostic     CANCELED: AMB POC URINALYSIS DIP STICK AUTO W/O MICRO        Bph/LUTS:   Continue finasteride. Working well. No further hematuria. PSA stable at 1.0 for CAP screening. KATHY with BPH. Kidney Stones:   No symptoms at this time. Continue to monitor. Follow up 1 year with PSA or sooner if needed. I have spent 20 minutes today reviewing previous notes, test results and face to face with the patient as well as documenting. Deejay Carlson M.D.     [facility]  [address], [address] [facility]  Phone: [phone]  Fax: [phone]

## 2023-01-21 ENCOUNTER — HOME CARE VISIT (OUTPATIENT)
Dept: SCHEDULING | Facility: HOME HEALTH | Age: 63
End: 2023-01-21
Payer: MEDICARE

## 2023-01-21 VITALS
SYSTOLIC BLOOD PRESSURE: 116 MMHG | HEART RATE: 94 BPM | TEMPERATURE: 97 F | RESPIRATION RATE: 18 BRPM | OXYGEN SATURATION: 99 % | DIASTOLIC BLOOD PRESSURE: 74 MMHG

## 2023-01-21 PROCEDURE — G0299 HHS/HOSPICE OF RN EA 15 MIN: HCPCS

## 2023-01-21 ASSESSMENT — ENCOUNTER SYMPTOMS: PAIN LOCATION - PAIN QUALITY: ACHE

## 2023-01-23 ENCOUNTER — OFFICE VISIT (OUTPATIENT)
Dept: SURGERY | Age: 63
End: 2023-01-23

## 2023-01-23 VITALS — HEIGHT: 77 IN | WEIGHT: 248 LBS | BODY MASS INDEX: 29.28 KG/M2

## 2023-01-23 VITALS
HEART RATE: 94 BPM | RESPIRATION RATE: 18 BRPM | OXYGEN SATURATION: 99 % | SYSTOLIC BLOOD PRESSURE: 104 MMHG | TEMPERATURE: 97.5 F | DIASTOLIC BLOOD PRESSURE: 60 MMHG

## 2023-01-23 DIAGNOSIS — M72.6 NECROTIZING FASCIITIS (HCC): Primary | ICD-10-CM

## 2023-01-23 ASSESSMENT — ENCOUNTER SYMPTOMS: PAIN LOCATION - PAIN QUALITY: DISCOMFORT

## 2023-01-23 NOTE — PROGRESS NOTES
H&P/Consult Note/Progress Note/Office Note:   Kristyn Saha  MRN: [id number]  :1960  Age:62 y.o.    HPI: Kristyn Saha is a 65 y.o. male who is s/p open drainage of localized peritonitis, debridement of necrotizing fasciitis of abdominal wall infection, removal of infected PD cath, and repair of recurrent incarcerated ventral hernia with Vicryl mesh on 22. Prior to surgery he was admitted by the hospitalist with sepsis on 22 after he presented to the ER with a 2-week history of progressive abdominal pain and bulging. He reported a prior recurrent ventral hernia for many years following open umbilical hernia repair with Prolene mesh by Dr. Corinne Bender on 2014. He had a PD cath placed at that time and is dialysis dependent. Recently however he developed progressive and severe abdominal pain associated with worsening bulging and tenderness. He also described an associated eschar of the umbilical skin recently  This was associated with a change in the color of the effluent from the PD catheter. He has had associated N/V, tachycardia, and leukocytosis on admission. He was placed on vancomycin and Zosyn. CT imaging was performed as shown below and general surgery consultation was obtained. He described a prior episode of peritonitis without abscess or hernia treated with Abx in approximately 2016. He described having an AV fistula for dialysis placed many years ago which has never been used. He wants to stop having peritoneal dialysis and convert to hemodialysis. 22 CT abd/pelvis withoral but no IV contrast  Hx: Yellowish solution after peritoneal dialysis, concern for peritonitis       FINDINGS: Evaluation of the hollow and solid viscera is limited by the lack of intravenous contrast.   Dependent atelectasis is present. CT ABDOMEN: The stomach is unremarkable.  Innumerable cysts are noted throughout the bilateral kidneys unchanged from the prior exam in keeping with polycystic kidney disease. Multiple of the cysts are hyperdense and likely hemorrhagic. There is no hydronephrosis. No renal or ureteral stones evident. The adrenal glands are normal. An umbilical hernia is present in the lower mid abdomen at the insertion site of the peritoneal dialysis catheter. There is a fluid collection within the hernia sac concerning for possible seroma or abscess that measures 5.7 x 3.2 x 6.0 cm. There are herniated bowel loops as well without bowel obstruction. The abdominal wall defect measures roughly 7.5 cm. CT PELVIS: The bowel is normal in course, caliber, and wall thickness. No bowel obstruction evident. The appendix is normal. The peritoneal dialysis catheter coils in the central pelvis and a small amount of ascitic fluid. The bladder is decompressed. There is no inguinal hernia. The rectum is unremarkable. No aggressive bone lesions identified. Impression:  1. Moderate-sized umbilical hernia with herniated loops of bowel and fluid. The fluid collection measures roughly 5.7 x 6.0 cm. Seroma or abscess could be considered. 2. Polycystic kidney disease. There is no hydronephrosis. Additional hx:  12/27/22 POD1: Patient alert and conversant. Abdominal pain is minimal.  Binder in place with surgical dressing CDI. LUE AVF pulsatile. WBC 13.2. Cr 13.3 K+4.4 AF/VSS. -Flatus/-BM  12/28/22 POD2 up in chair; comfortable; AF; Hg lower and subq Heparin on hold; await return of bowel function  12/29/22 POD3 feels better each day; abd dressing changes; 2 breanne left in place  12/30/22 POD4 Tolerating clears so far this am;  Abd breanne changed by Dr Duke Peter this am or tomorrow on PO Abx    1/23/23 POD28 office visit; feels well. No abdominal pain. No cellulitis. Wound VAC is being changed 3 times a week as outpatient.                 Past Medical History:   Diagnosis Date    Anemia     Anxiety     Arthritis     CAD (coronary artery disease)     per pt- no MI- \"They said the back of my heart was something. ..like 40%\"    Chronic kidney disease     followed by [facility]; stage 5; pt has AV fistula to left arm; PERITONEAL DIALYSIS daily since 2014    Chronic pain     CKD (chronic kidney disease)     Former smoker     Fracture, tibial plateau 75/98/8236    GERD (gastroesophageal reflux disease)     Heart disease     Hyperlipidemia     on med for control    Hypertension     controlled with med    Hypertensive nephrosclerosis     per nephrology note 4/14/14    Hypoactive thyroid     Kidney stone     Peritoneal dialysis catheter in place [facility])     Peritoneal dialysis-associated peritonitis ([address] 06/24/2017    during hs    PUD (peptic ulcer disease)     SBP (spontaneous bacterial peritonitis) ([facility] 09/24/2016    Sepsis ([facility] 09/24/2016    Tibial plateau fracture, left 63/82/0739    Umbilical hernia     Unspecified sleep apnea     pt does not have a CPAP     Past Surgical History:   Procedure Laterality Date    ABDOMEN SURGERY Right 6/13/2022    RIGHT LOWER QUADRANT ABDOMEN LESION BIOPSY EXCISION performed by Pascual Fletcher MD at [address] [place] [address] 280 N/A 12/26/2022    ABDOMEN INCISION AND DRAINAGE, VENTRAL HERNIA  REPAIRE WITH MESH.  REMOVAL PD CATH performed by Jeanne Paredes MD at [address] [place] [address] Right 1995    tip of 5th finger    COLONOSCOPY N/A 3/23/2018    COLONOSCOPY  BMI 34 performed by Juanis Roe MD at [address] [place] [address] PYELOGRAMS    CYSTOSCOPY Left 11/1/2022    CYSTOSCOPY, LEFT URETEROSCOPY, BASKET STONE EXTRACTION, LEFT URETERAL STENT INSERTION performed by Marylee Horner, MD at [place] [address] Left as a child    thumb    ORTHOPEDIC SURGERY Left 1989    knee    ORTHOPEDIC SURGERY Left 2017    LEFT LATERAL TIBIAL PLATEAU OPEN REDUCTION INTERNAL FIXATION    HI UNLISTED PROCEDURE ABDOMEN PERITONEUM & OMENTUM  6/11/14    PD catheter placement    SKIN BIOPSY Left 2022    LEFT BUTTOCK MASS EXCISION performed by Rajat Martínez MD at [facility] Left 2012    AV fistula to arm     Current Outpatient Medications   Medication Sig    finasteride (PROSCAR) 5 MG tablet Take 1 tablet by mouth daily    valsartan-hydroCHLOROthiazide (DIOVAN-HCT) 320-25 MG per tablet Take 1 tablet by mouth daily. Cholecalciferol (D3 PO) Take 125 mcg by mouth daily    diphenhydrAMINE-APAP, sleep, (TYLENOL PM EXTRA STRENGTH)  MG tablet Take 2 tablets by mouth at bedtime. pantoprazole (PROTONIX) 20 MG tablet Take 1 tablet by mouth every morning (before breakfast)    traZODone (DESYREL) 50 MG tablet Take 1 tablet by mouth nightly    B Complex-C-Folic Acid (DIALYVITE PO) Take 1 tablet by mouth daily    cinacalcet (SENSIPAR) 90 MG tablet Take 60 mg by mouth daily take 2 tabs by mouth daily    cloNIDine (CATAPRES) 0.3 MG/24HR PTWK Place 1 patch onto the skin every 3 days     finasteride (PROSCAR) 5 MG tablet Take 5 mg by mouth at bedtime    ondansetron (ZOFRAN) 4 MG tablet Take 4 mg by mouth every 8 hours as needed    potassium chloride (KLOR-CON) 10 MEQ extended release tablet Take 10 mEq by mouth daily take one tablet by mouth twice daily    sevelamer hcl (RENAGEL) 800 MG tablet Take 2,400 mg by mouth 3 times daily (with meals)      No current facility-administered medications for this visit. ALLERGIES:  Iodine    Social History     Socioeconomic History    Marital status: Single     Spouse name: None    Number of children: None    Years of education: None    Highest education level: None   Tobacco Use    Smoking status: Former     Packs/day: 0.50     Years: 15.00     Pack years: 7.50     Types: Cigarettes     Quit date: 3/1/2014     Years since quittin.9    Smokeless tobacco: Never    Tobacco comments:     Quit smoking: pt states only smoked cigarettes when drinking alcohol   Substance and Sexual Activity    Alcohol use: Yes    Drug use:  Yes Types: Marijuana (Weed)     Comment: \"when I can afford it\"      Social Determinants of Health     Financial Resource Strain: Low Risk     Difficulty of Paying Living Expenses: Not hard at all   Food Insecurity: No Food Insecurity    Worried About Running Out of Food in the Last Year: Never true    Ran Out of Food in the Last Year: Never true   Physical Activity: Insufficiently Active    Days of Exercise per Week: 1 day    Minutes of Exercise per Session: 20 min     Social History     Tobacco Use   Smoking Status Former    Packs/day: 0.50    Years: 15.00    Pack years: 7.50    Types: Cigarettes    Quit date: 3/1/2014    Years since quittin.9   Smokeless Tobacco Never   Tobacco Comments    Quit smoking: pt states only smoked cigarettes when drinking alcohol     Family History   Problem Relation Age of Onset    COPD Father     Heart Disease Father     Hypertension Mother     Diabetes Mother     Hypertension Father      ROS: The patient has no difficulty with chest pain or shortness of breath. No fever or chills. Comprehensive review of systems was otherwise unremarkable except as noted above. Physical Exam:   Ht 6' 5\" (1.956 m)   Wt 248 lb (112.5 kg)   BMI 29.41 kg/m²   Vitals:    23 0825   Weight: 248 lb (112.5 kg)   Height: 6' 5\" (1.956 m)     No intake/output data recorded.  1901 -  0700  In: 1165 [I.V.:665]  Out: 1500     Constitutional: Alert, oriented, cooperative patient in no acute distress; appears stated age    Eyes:Sclera are clear. EOMs intact  ENMT: no external lesions gross hearing normal; no obvious neck masses, no ear or lip lesions, nares normal  CV: RRR. Normal perfusion  Resp: No JVD. Breathing is  non-labored; no audible wheezing. GI:   Single midline wound packed with black foam which is about 3 cm deep by 6 mm x 5 mm at the orifice  Was packed with a single 2 x 2 on 2022. He will continue with black foam wound VAC 3 times a week.       Musculoskeletal: unremarkable with normal function. No embolic signs or cyanosis. LUE with AVF pulsitile  Neuro:  Oriented; moves all 4; no focal deficits  Psychiatric: normal affect and mood, no memory impairment    Recent vitals (if inpt):  Patient Vitals for the past 24 hrs:   BP Temp Temp src Pulse Resp SpO2 Height Weight   12/30/22 1110 (!) 131/98 98.2 °F (36.8 °C) Oral 99 18 96 % -- --   12/30/22 0959 -- -- -- -- -- -- 6' 5\" (1.956 m) --   12/30/22 0717 (!) 133/109 98.6 °F (37 °C) Oral (!) 103 18 96 % -- --   12/30/22 0517 -- -- -- -- 19 -- -- --   12/30/22 0447 (!) 133/97 99 °F (37.2 °C) Oral 97 18 94 % -- --   12/29/22 2313 115/87 99.1 °F (37.3 °C) Oral (!) 102 17 95 % -- --   12/29/22 1944 (!) 129/94 98.4 °F (36.9 °C) Oral (!) 109 20 97 % -- --   12/29/22 1931 -- -- -- -- -- -- -- 256 lb 5 oz (116.3 kg)   12/29/22 1542 106/82 100.3 °F (37.9 °C) Oral (!) 108 18 96 % -- --   12/29/22 1253 129/88 98.2 °F (36.8 °C) Oral (!) 106 18 99 % -- --       Amount and/or Complexity of Data Reviewed and Analyzed:  I reviewed and analyzed all of the unique labs and radiologic studies that are shown below as well as any that are in the HPI, and any that are in the expanded problem list below  *Each unique test, order, or document contributes to the combination of 2 or combination of 3 in Category 1 below. For this visit I also reviewed old records and prior notes. No results for input(s): WBC, HGB, PLT, NA, K, CL, CO2, BUN, CREA, GLU, INR, APTT, ALT, AML, AML, LCAD, PCO2, PO2, HCO3 in the last 72 hours.     Invalid input(s): PTP, TBIL, TBILI, CBIL, SGOT, GPT, AP, LPSE, NH4, TROPT, TROIQ,  PH    Review of most recent CBC  Lab Results   Component Value Date    WBC 10.0 12/31/2022    HGB 9.2 (L) 12/31/2022    HCT 27.8 (L) 12/31/2022    MCV 95.2 12/31/2022     12/31/2022       Review of most recent BMP  Lab Results   Component Value Date/Time     12/30/2022 08:15 AM    K 3.7 12/30/2022 08:15 AM     12/30/2022 08:15 AM CO2 26 12/30/2022 08:15 AM    BUN 38 12/30/2022 08:15 AM    CREATININE 12.10 12/30/2022 08:15 AM    GLUCOSE 102 12/30/2022 08:15 AM    CALCIUM 9.0 12/30/2022 08:15 AM        Review of most recent LFTs (and lipase if done)  Lab Results   Component Value Date    ALT 9 (L) 12/29/2022    AST 10 (L) 12/29/2022    ALKPHOS 51 12/29/2022    BILITOT 1.1 12/29/2022     Lab Results   Component Value Date    LIPASE 88 12/25/2022       No results found for: INR, APTT, LCAD    Review of most recent HgbA1c  No results found for: LABA1C    Nutritional assessment screen for wound healing issues:  Lab Results   Component Value Date    LABALBU 1.8 (L) 12/29/2022         Xray Result (most recent):  XR CHEST PORTABLE 12/25/2022    Narrative  KUB    CLINICAL INDICATION: Sepsis    FINDINGS: Single AP view the chest compared to a similar exam dated 6/24/2017  show the lungs are slightly underexpanded but otherwise clear. No pleural  effusion or pneumothorax. The cardiac silhouette and mediastinum are  unremarkable. The bones are normal.    Impression  Slightly under expanded lungs, otherwise no acute abnormality. CT Result (most recent):  CT ABDOMEN PELVIS WO IV CONTRAST 12/25/2022    Narrative  CT of the abdomen and pelvis without contrast.    CLINICAL INDICATION: Yellowish solution after peritoneal dialysis, concern for  peritonitis    PROCEDURE: Serial thin-section axial images obtained from the upper abdomen  through the proximal femurs without the administration of intravenous contrast.  Oral contrast was given. Radiation dose reduction techniques were used for this  study. Our CT scanners use one or all of the following: Automated exposure  control, adjusted of the mA and/or kV according to patient size, iterative  reconstruction    COMPARISON: CT abdomen and pelvis dated 7/6/2022    FINDINGS:  Evaluation of the hollow and solid viscera is limited by the lack of  intravenous contrast.    Dependent atelectasis is present.     CT ABDOMEN: The stomach is unremarkable. Innumerable cysts are noted throughout  the bilateral kidneys unchanged from the prior exam in keeping with polycystic  kidney disease. Multiple of the cysts are hyperdense and likely hemorrhagic.  There is no hydronephrosis. No renal or ureteral stones evident. The adrenal  glands are normal. An umbilical hernia is present in the lower mid abdomen at  the insertion site of the peritoneal dialysis catheter. There is a fluid  collection within the hernia sac concerning for possible seroma or abscess that  measures 5.7 x 3.2 x 6.0 cm. There are herniated bowel loops as well without  bowel obstruction. The abdominal wall defect measures roughly 7.5 cm.    CT PELVIS: The bowel is normal in course, caliber, and wall thickness. No bowel  obstruction evident. The appendix is normal. The peritoneal dialysis catheter  coils in the central pelvis and a small amount of ascitic fluid. The bladder is  decompressed. There is no inguinal hernia. The rectum is unremarkable.    No aggressive bone lesions identified.    Impression  1. Moderate-sized umbilical hernia with herniated loops of bowel and fluid. The  fluid collection measures roughly 5.7 x 6.0 cm. Seroma or abscess could be  considered.  2. Polycystic kidney disease. There is no hydronephrosis.    US Result (most recent):  US RETROPERITONEAL COMPLETE 01/10/2019    Narrative  Renal ultrasound, 1/10/2019    History: Kidney cyst.    Comparison: None.    Technique: Grayscale and doppler images of the kidneys and bladder were obtained  using a 3-5 MHz linear transducer.    Findings: The right kidney measures 11.3 cm, and the left kidney measures 10.7  cm in greatest longitudinal length.  No stones or evidence of hydronephrosis is  seen.  Renal parenchymal echogenicity is increased consistent with chronic  medical renal changes.  Numerous simple and complex renal cortical lesions are  seen in the bilateral kidneys. Many of these appear  cystic. The majority of  these are not well assessed given their very small size. No clearly worrisome  dominant solid lesion is seen. The urinary bladder is collapsed about a Rojas catheter and therefore not well  assessed. The visualized abdominal aorta is normal in caliber measuring up to  2.3 cm. The IVC is patent. Small to moderate ascites is seen in the right upper  quadrant, and bilateral lower quadrants. Impression  IMPRESSION:  1. Echogenic kidneys demonstrating numerous simple and complex likely cystic  lesions. Many lesions are not well characterized given their very small size  although no clearly worrisome dominant solid mass is seen. This appearance can  be seen with multiple etiologies. However, given the patient's history of renal  failure and dialysis, these are favored to represent acquired cystic renal  disease. 2. Small to moderate ascites which can suggest some degree of fluid overload. Admission date (for inpatients): (Not on file)   * No surgery found *  Procedure(s):  ABDOMEN INCISION AND DRAINAGE  HERNIA VENTRAL REPAIR  poss CATHETER REMOVAL PERITONEAL DIALYSIS        ASSESSMENT/PLAN:  [unfilled]  Active Problems:    * No active hospital problems. *  Resolved Problems:    * No resolved hospital problems.  *     Patient Active Problem List    Diagnosis Date Noted    Localized peritonitis ([address] 12/26/2022     Priority: Medium    Recurrent ventral hernia with incarceration 12/26/2022     Priority: Medium    Infection due to peritoneal dialysis catheter ([address] 12/26/2022     Priority: Medium    Periumbilical abdominal pain 12/26/2022     Priority: Medium    Abdominal wall abscess 12/26/2022     Priority: Medium    Necrotizing fasciitis ([address] 12/26/2022     Priority: Medium    Abdominal wall ulcer, with necrosis of muscle ([address] 12/26/2022     Priority: Medium    Peritonitis ([address] 12/25/2022     Priority: Medium    Peritoneal dialysis status ([address] 12/25/2022     Priority: Medium Anemia 12/25/2022     Priority: Medium    Hypomagnesemia 12/25/2022     Priority: Medium    DNR (do not resuscitate) 12/25/2022     Priority: Medium    Soft tissue mass      Priority: Medium    Kidney stone 10/14/2022     Added automatically from request for surgery [id number]      Other hyperlipidemia 04/21/2022    ESRD (end stage renal disease) ([facility] 05/04/2021    Non-recurrent unilateral inguinal hernia without obstruction or gangrene 12/22/2020    Hypertensive urgency 06/24/2017    Essential hypertension 32/04/2874    Umbilical hernia 60/46/0077          Number and Complexity of Problems addressed and   Risks of complications and/or morbidity of management        Recurrent, incarcerated ventral hernia  Localized peritonitis    12/26/22    1. Open drainage of localized peritonitis   2. Open repair of recurrent incarcerated ventral hernia with Vicryl mesh   3. Removal of tunneled intraperitoneal dialysis catheter  4. Debridement of skin, subcutaneous adipose, muscle, and fascia for abdominal wall necrotizing infection     Wound VAC with black foam 3 times a week  Follow-up with me in 2 weeks. Velcro abd binder at all times        Leukocytosis  WBC 7.9k  Cxs from OR 12/26/22 growing alpha Strep so far  On IV Vanc and Zosyn --> PO as outpt-->now off abx      Abdominal pain -->resolved post-op      Dialysis dependence  PD cath (originally placed in 2014 by Dr Saint Clair) was removed for infection on 12/26/22  Nephrology following for HD, pt has a patent LUE AVF                     Level of MDM (2/3 elements below)  Number and Complexity of Problems Addressed Amount and/or Complexity of Data to be Reviewed and Analyzed  *Each unique test, order, or document contributes to the combination of 2 or combination of 3 in Category 1 below.  Risk of Complications and/or Morbidity or Mortality of pt Management     [id number] [phone] SF Minimal  ?1self-limited or minor problem Minimal or none Minimal risk of morbidity from additional diagnostic testing or Rx   57814  06606 Low Low  ? 2or more self-limited or minor problems;    or  ? 1stable chronic illness;    or  ?1acute, uncomplicated illness or injury   Limited  (Must meet the requirements of at least 1 of the 2 categories)  Category 1: Tests and documents   ? Any combination of 2 from the following:  ?Review of prior external note(s) from each unique source*;  ?review of the result(s) of each unique test*;   ?ordering of each unique test*    or   Category 2: Assessment requiring an independent historian(s)  (For the categories of independent interpretation of tests and discussion of management or test interpretation, see moderate or high) Low risk of morbidity from additional diagnostic testing or treatment     66591  93591 Mod Moderate  ? 1or more chronic illnesses with exacerbation, progression, or side effects of treatment;    or  ?2or more stable chronic illnesses;    or  ?1undiagnosed new problem with uncertain prognosis;    or  ?1acute illness with systemic symptoms;    or  ?1acute complicated injury   Moderate  (Must meet the requirements of at least 1 out of 3 categories)  Category 1: Tests, documents, or independent historian(s)  ? Any combination of 3 from the following:   ?Review of prior external note(s) from each unique source*;  ?Review of the result(s) of each unique test*;  ?Ordering of each unique test*;  ?Assessment requiring an independent historian(s)    or  Category 2: Independent interpretation of tests   ? Independent interpretation of a test performed by another physician/other qualified health care professional (not separately reported);     or  Category 3: Discussion of management or test interpretation  ? Discussion of management or test interpretation with external physician/other qualified health care professional/appropriate source (not separately reported)   Moderate risk of morbidity from additional diagnostic testing or treatment  Examples only:  ?Prescription drug management   ? Decision regarding minor surgery with identified patient or procedure risk factors  ? Decision regarding elective major surgery without identified patient or procedure risk factors   ? Diagnosis or treatment significantly limited by social determinants of health       81584  87575 High High  ? 1or more chronic illnesses with severe exacerbation, progression, or side effects of treatment;    or  ?1 acute or chronic illness or injury that poses a threat to life or bodily function   Extensive  (Must meet the requirements of at least 2 out of 3 categories)  Category 1: Tests, documents, or independent historian(s)  ? Any combination of 3 from the following:   ?Review of prior external note(s) from each unique source*;  ?Review of the result(s) of each unique test*;   ?Ordering of each unique test*;   ?Assessment requiring an independent historian(s)    or   Category 2: Independent interpretation of tests   ? Independent interpretation of a test performed by another physician/other qualified health care professional (not separately reported);     or  Category 3: Discussion of management or test interpretation  ? Discussion of management or test interpretation with external physician/other qualified health care professional/appropriate source (not separately reported)   High risk of morbidity from additional diagnostic testing or treatment  Examples only:  ?Drug therapy requiring intensive monitoring for toxicity  ? Decision regarding elective major surgery with identified patient or procedure risk factors  ? Decision regarding emergency major surgery  ? Decision regarding hospitalization  ? Decision not to resuscitate or to de-escalate care because of poor prognosis             I have personally performed a face-to-face diagnostic evaluation and management  service on this patient. I have independently seen the patient. I have independently obtained the above history from the patient/family.     I have independently examined the patient with above findings. I have independently reviewed data/labs for this patient and developed the above plan of care (MDM).       Signed: Ching Boston MD  1/23/2023    8:47 AM

## 2023-01-24 ENCOUNTER — HOME CARE VISIT (OUTPATIENT)
Dept: SCHEDULING | Facility: HOME HEALTH | Age: 63
End: 2023-01-24
Payer: MEDICARE

## 2023-01-24 ENCOUNTER — CARE COORDINATION (OUTPATIENT)
Dept: CARE COORDINATION | Facility: CLINIC | Age: 63
End: 2023-01-24

## 2023-01-24 PROCEDURE — G0299 HHS/HOSPICE OF RN EA 15 MIN: HCPCS

## 2023-01-24 NOTE — CARE COORDINATION
[facility] Care Transitions Follow Up Call    Care Transition Nurse contacted the patient by telephone to follow up after admission on 1/3. Verified name and  with patient as identifiers. Patient: Bianka Barone  Patient : 1960   MRN: [id number]  Reason for Admission: necrotizing fascitits  Discharge Date: 22 RARS: Readmission Risk Score: 17.6      Needs to be reviewed by the provider   Additional needs identified to be addressed with provider: No  none             Method of communication with provider: none. Patient indicated he has been compliant with medications and follow up and HH has been doing wound vac dressing changes. Patient recent follow up with Gen Surgery, plans to go back in two weeks for wound check and possible decision on continuation of wound vac. Addressed changes since last contact:   assessed for changes after gen surg OV. Patient endorses improving slowly but happy with care he is getting and feels like he is improving. Discussed follow-up appointments. If no appointment was previously scheduled, appointment scheduling offered: Yes. Is follow up appointment scheduled within 7 days of discharge? Yes.     Follow Up  Future Appointments   Date Time Provider Matias Antonio   2023  2:00 PM Mitchell Deluna RN [facility]   2023 To Be Determined Mitchell Deluna RN [facility]   2023 To Be Determined Mitchell Deluna RN [facility]   2023 To Be Determined Mitchell Deluna RN [facility]   2023 To Be Determined Mitchell Deluna RN [facility]   2023  8:00 AM Matthew Angel MD [facility]   2023 To Be Determined [facility]   2023 To Be Determined Mitchell Deluna RN [facility]   2023 To Be Determined Mitchell Deluna RN [facility]   2023 To Be Determined Mitchell Deluna RN [facility]   2023 To Be Determined Mitchell Deluna RN [address]   2/18/2023 To Be Determined Altamese Delay, [facility] [address]   2/21/2023 To Be Determined Altamese Delay, RN [facility]   2/23/2023 To Be Determined Altamese Delay, RN [facility]   2/25/2023 To Be Determined Altamese Delay, RN [facility]   2/28/2023 To Be Determined Altamese Delay, [facility] [address]   3/2/2023 To Be Determined Altamese Delay, RN [facility]   1/15/2024  8:00 AM [id number] BLOOD DRAW [id number] [facility]   1/22/2024  9:15 AM Jose Maria Rubio MD [id number] [facility]     Non-BS follow up appointment(s): gordo    Care Transition Nurse reviewed discharge instructions, medical action plan, and red flags with patient and discussed any barriers to care and/or understanding of plan of care after discharge. Discussed appropriate site of care based on symptoms and resources available to patient including: PCP  Specialist  Home health  CTN . The patient agrees to contact the PCP office for questions related to their healthcare. Advance Care Planning:   not on file.      Patients top risk factors for readmission: medical condition-recent infection, necrotizing fasciitis   Interventions to address risk factors: Scheduled appointment with Specialist-two week follow up with general surgery  and Obtained and reviewed discharge summary and/or continuity of care documents    Offered patient enrollment in the Remote Patient Monitoring (RPM) program for in-home monitoring: NA.     Care Transitions Subsequent and Final Call    Schedule Follow Up Appointment with PCP: Completed  Subsequent and Final Calls  Do you have any ongoing symptoms?: Yes  Onset of Patient-reported symptoms: Other  Interventions for patient-reported symptoms: Notified PCP/Physician, Notified Home Care  Have your medications changed?: No  Do you have any questions related to your medications?: No  Do you currently have any active services?: Yes  Are you currently active with any services?: Home Health  Do you have any needs or concerns that I can assist you with?: No  Identified Barriers: None  Care Transitions Interventions  Disease Specific Clinic: Completed      Other Interventions:             Care Transition Nurse provided contact information for future needs. Plan for follow-up call in 10-14 days based on severity of symptoms and risk factors. Plan for next call: follow-up appointment-follow up after Gen Surgery OV in regards to wound vac.     Noemi Black RN

## 2023-01-26 ENCOUNTER — HOME CARE VISIT (OUTPATIENT)
Dept: SCHEDULING | Facility: HOME HEALTH | Age: 63
End: 2023-01-26
Payer: MEDICARE

## 2023-01-26 VITALS
TEMPERATURE: 97.7 F | SYSTOLIC BLOOD PRESSURE: 130 MMHG | OXYGEN SATURATION: 100 % | DIASTOLIC BLOOD PRESSURE: 70 MMHG | RESPIRATION RATE: 18 BRPM | HEART RATE: 99 BPM

## 2023-01-26 PROCEDURE — G0299 HHS/HOSPICE OF RN EA 15 MIN: HCPCS

## 2023-01-28 ENCOUNTER — HOME CARE VISIT (OUTPATIENT)
Dept: SCHEDULING | Facility: HOME HEALTH | Age: 63
End: 2023-01-28
Payer: MEDICARE

## 2023-01-28 VITALS
RESPIRATION RATE: 18 BRPM | SYSTOLIC BLOOD PRESSURE: 140 MMHG | TEMPERATURE: 97.5 F | HEART RATE: 76 BPM | DIASTOLIC BLOOD PRESSURE: 90 MMHG | OXYGEN SATURATION: 100 %

## 2023-01-28 PROCEDURE — G0299 HHS/HOSPICE OF RN EA 15 MIN: HCPCS

## 2023-01-28 ASSESSMENT — ENCOUNTER SYMPTOMS: HEMOPTYSIS: 0

## 2023-01-30 VITALS
RESPIRATION RATE: 18 BRPM | TEMPERATURE: 97.4 F | OXYGEN SATURATION: 96 % | DIASTOLIC BLOOD PRESSURE: 80 MMHG | HEART RATE: 74 BPM | SYSTOLIC BLOOD PRESSURE: 128 MMHG

## 2023-01-31 ENCOUNTER — HOME CARE VISIT (OUTPATIENT)
Dept: SCHEDULING | Facility: HOME HEALTH | Age: 63
End: 2023-01-31
Payer: MEDICARE

## 2023-01-31 VITALS
TEMPERATURE: 98.4 F | DIASTOLIC BLOOD PRESSURE: 80 MMHG | HEART RATE: 80 BPM | RESPIRATION RATE: 19 BRPM | OXYGEN SATURATION: 99 % | SYSTOLIC BLOOD PRESSURE: 128 MMHG

## 2023-01-31 PROCEDURE — G0299 HHS/HOSPICE OF RN EA 15 MIN: HCPCS

## 2023-01-31 ASSESSMENT — ENCOUNTER SYMPTOMS
STOOL DESCRIPTION: FORMED
PAIN LOCATION - PAIN QUALITY: SORE, ACHE

## 2023-02-02 ENCOUNTER — HOME CARE VISIT (OUTPATIENT)
Dept: SCHEDULING | Facility: HOME HEALTH | Age: 63
End: 2023-02-02
Payer: MEDICARE

## 2023-02-02 VITALS
SYSTOLIC BLOOD PRESSURE: 110 MMHG | DIASTOLIC BLOOD PRESSURE: 70 MMHG | RESPIRATION RATE: 18 BRPM | TEMPERATURE: 98.1 F | OXYGEN SATURATION: 100 % | HEART RATE: 90 BPM

## 2023-02-02 PROCEDURE — G0299 HHS/HOSPICE OF RN EA 15 MIN: HCPCS

## 2023-02-04 ENCOUNTER — HOME CARE VISIT (OUTPATIENT)
Dept: SCHEDULING | Facility: HOME HEALTH | Age: 63
End: 2023-02-04
Payer: MEDICARE

## 2023-02-04 VITALS
DIASTOLIC BLOOD PRESSURE: 70 MMHG | TEMPERATURE: 97.6 F | HEART RATE: 82 BPM | RESPIRATION RATE: 12 BRPM | SYSTOLIC BLOOD PRESSURE: 104 MMHG | OXYGEN SATURATION: 100 %

## 2023-02-04 PROCEDURE — G0299 HHS/HOSPICE OF RN EA 15 MIN: HCPCS

## 2023-02-04 ASSESSMENT — ENCOUNTER SYMPTOMS
STOOL DESCRIPTION: SOFT FORMED
SKIN LESIONS: 1
PAIN LOCATION - PAIN QUALITY: THROBBING

## 2023-02-06 ENCOUNTER — OFFICE VISIT (OUTPATIENT)
Dept: SURGERY | Age: 63
End: 2023-02-06
Payer: MEDICARE

## 2023-02-06 VITALS — WEIGHT: 248 LBS | BODY MASS INDEX: 29.28 KG/M2 | HEIGHT: 77 IN

## 2023-02-06 DIAGNOSIS — L98.493 ABDOMINAL WALL ULCER, WITH NECROSIS OF MUSCLE (HCC): Primary | ICD-10-CM

## 2023-02-06 DIAGNOSIS — T85.71XD INFECTION ASSOCIATED WITH PERITONEAL DIALYSIS CATHETER, SUBSEQUENT ENCOUNTER (HCC): ICD-10-CM

## 2023-02-06 DIAGNOSIS — L02.211 ABDOMINAL WALL ABSCESS: ICD-10-CM

## 2023-02-06 PROCEDURE — 11042 DBRDMT SUBQ TIS 1ST 20SQCM/<: CPT | Performed by: SURGERY

## 2023-02-07 ENCOUNTER — HOME CARE VISIT (OUTPATIENT)
Dept: SCHEDULING | Facility: HOME HEALTH | Age: 63
End: 2023-02-07
Payer: MEDICARE

## 2023-02-07 VITALS
HEART RATE: 92 BPM | DIASTOLIC BLOOD PRESSURE: 70 MMHG | OXYGEN SATURATION: 100 % | TEMPERATURE: 98.2 F | RESPIRATION RATE: 18 BRPM | SYSTOLIC BLOOD PRESSURE: 110 MMHG

## 2023-02-07 PROCEDURE — G0299 HHS/HOSPICE OF RN EA 15 MIN: HCPCS

## 2023-02-07 ASSESSMENT — ENCOUNTER SYMPTOMS: PAIN LOCATION - PAIN QUALITY: DISCOMFORT

## 2023-02-08 ENCOUNTER — HOME CARE VISIT (OUTPATIENT)
Dept: SCHEDULING | Facility: HOME HEALTH | Age: 63
End: 2023-02-08
Payer: MEDICARE

## 2023-02-08 ENCOUNTER — CARE COORDINATION (OUTPATIENT)
Dept: CARE COORDINATION | Facility: CLINIC | Age: 63
End: 2023-02-08

## 2023-02-08 VITALS
RESPIRATION RATE: 18 BRPM | SYSTOLIC BLOOD PRESSURE: 137 MMHG | TEMPERATURE: 98.1 F | HEART RATE: 88 BPM | DIASTOLIC BLOOD PRESSURE: 94 MMHG | OXYGEN SATURATION: 100 %

## 2023-02-08 PROCEDURE — G0299 HHS/HOSPICE OF RN EA 15 MIN: HCPCS

## 2023-02-08 ASSESSMENT — ENCOUNTER SYMPTOMS
STOOL DESCRIPTION: FORMED
PAIN LOCATION - PAIN QUALITY: ACHING

## 2023-02-08 NOTE — CARE COORDINATION
Patient has graduated from the Care Transitions program on sepsis. Patient/family has the ability to self-manage at this time. Patient has no further care management needs, no referral to the [facility] team for further management. Patient compliant with OV and HD. Patient does not have any new or worsening symptoms, indicated wound was healing and progressing. Patient denies any s/s of concern. Patient has Care Transition Nurse's contact information for any further questions, concerns, or needs.   Patients upcoming visits:    Future Appointments   Date Time Provider Department Center   2/9/2023 To Be Determined [facility]    2/10/2023  3:00 AM [facility]   2/11/2023 To Be Determined Jorge L Sarah RN [facility]   2/12/2023 To Be Determined Jorge L Sarah RN [facility]   2/13/2023 To Be Determined Roxana Rose RN [facility]   2/14/2023 To Be Determined Jorge L Sarah RN [facility]   2/15/2023 To Be Determined Jorge L Sarah RN [facility]   2/16/2023 To Be Determined Jorge L Sarah RN [facility]   2/17/2023 To Be Determined Jorge L Sarah RN [facility]   2/18/2023 To Be Determined Jorge L Sarah RN [facility]   2/19/2023 To Be Determined Jorge L Sarah RN [facility]   2/20/2023 To Be Determined Jorge L Sarah RN [facility]   2/21/2023 To Be Determined Jorge L Sarah RN [facility]   2/22/2023  8:00 AM Anni Yanez MD [facility]   1/15/2024  8:00 AM [id number] BLOOD DRAW [id number] [facility]   1/22/2024  9:15 AM Myles Medina MD [id number] [facility]

## 2023-02-09 ENCOUNTER — HOME CARE VISIT (OUTPATIENT)
Dept: SCHEDULING | Facility: HOME HEALTH | Age: 63
End: 2023-02-09
Payer: MEDICARE

## 2023-02-09 VITALS — HEART RATE: 70 BPM | DIASTOLIC BLOOD PRESSURE: 72 MMHG | TEMPERATURE: 97.2 F | SYSTOLIC BLOOD PRESSURE: 122 MMHG

## 2023-02-09 PROCEDURE — G0299 HHS/HOSPICE OF RN EA 15 MIN: HCPCS

## 2023-02-10 ENCOUNTER — HOME CARE VISIT (OUTPATIENT)
Dept: SCHEDULING | Facility: HOME HEALTH | Age: 63
End: 2023-02-10
Payer: MEDICARE

## 2023-02-10 PROCEDURE — G0299 HHS/HOSPICE OF RN EA 15 MIN: HCPCS

## 2023-02-11 ENCOUNTER — HOME CARE VISIT (OUTPATIENT)
Dept: SCHEDULING | Facility: HOME HEALTH | Age: 63
End: 2023-02-11
Payer: MEDICARE

## 2023-02-11 VITALS
TEMPERATURE: 98.3 F | DIASTOLIC BLOOD PRESSURE: 86 MMHG | OXYGEN SATURATION: 98 % | RESPIRATION RATE: 17 BRPM | SYSTOLIC BLOOD PRESSURE: 128 MMHG | HEART RATE: 90 BPM

## 2023-02-11 PROCEDURE — G0299 HHS/HOSPICE OF RN EA 15 MIN: HCPCS

## 2023-02-11 ASSESSMENT — ENCOUNTER SYMPTOMS: STOOL DESCRIPTION: FORMED

## 2023-02-12 ENCOUNTER — HOME CARE VISIT (OUTPATIENT)
Dept: SCHEDULING | Facility: HOME HEALTH | Age: 63
End: 2023-02-12
Payer: MEDICARE

## 2023-02-12 VITALS
TEMPERATURE: 98.1 F | RESPIRATION RATE: 18 BRPM | SYSTOLIC BLOOD PRESSURE: 138 MMHG | HEART RATE: 87 BPM | DIASTOLIC BLOOD PRESSURE: 70 MMHG | OXYGEN SATURATION: 100 %

## 2023-02-12 VITALS
OXYGEN SATURATION: 99 % | SYSTOLIC BLOOD PRESSURE: 122 MMHG | DIASTOLIC BLOOD PRESSURE: 74 MMHG | TEMPERATURE: 98.3 F | HEART RATE: 67 BPM | RESPIRATION RATE: 19 BRPM

## 2023-02-12 PROCEDURE — G0299 HHS/HOSPICE OF RN EA 15 MIN: HCPCS

## 2023-02-12 ASSESSMENT — ENCOUNTER SYMPTOMS
PAIN LOCATION - PAIN QUALITY: ACHE
STOOL DESCRIPTION: FORMED

## 2023-02-13 ENCOUNTER — HOME CARE VISIT (OUTPATIENT)
Dept: SCHEDULING | Facility: HOME HEALTH | Age: 63
End: 2023-02-13
Payer: MEDICARE

## 2023-02-13 VITALS
DIASTOLIC BLOOD PRESSURE: 82 MMHG | SYSTOLIC BLOOD PRESSURE: 122 MMHG | RESPIRATION RATE: 18 BRPM | TEMPERATURE: 98.2 F | HEART RATE: 70 BPM | OXYGEN SATURATION: 98 %

## 2023-02-13 PROCEDURE — G0299 HHS/HOSPICE OF RN EA 15 MIN: HCPCS

## 2023-02-13 ASSESSMENT — ENCOUNTER SYMPTOMS: HEMOPTYSIS: 0

## 2023-02-14 ENCOUNTER — HOME CARE VISIT (OUTPATIENT)
Dept: SCHEDULING | Facility: HOME HEALTH | Age: 63
End: 2023-02-14
Payer: MEDICARE

## 2023-02-14 PROCEDURE — G0299 HHS/HOSPICE OF RN EA 15 MIN: HCPCS

## 2023-02-15 ENCOUNTER — HOME CARE VISIT (OUTPATIENT)
Dept: SCHEDULING | Facility: HOME HEALTH | Age: 63
End: 2023-02-15
Payer: MEDICARE

## 2023-02-15 VITALS
TEMPERATURE: 98.2 F | OXYGEN SATURATION: 99 % | HEART RATE: 85 BPM | DIASTOLIC BLOOD PRESSURE: 80 MMHG | RESPIRATION RATE: 18 BRPM | SYSTOLIC BLOOD PRESSURE: 126 MMHG

## 2023-02-15 PROCEDURE — G0299 HHS/HOSPICE OF RN EA 15 MIN: HCPCS

## 2023-02-15 ASSESSMENT — ENCOUNTER SYMPTOMS: STOOL DESCRIPTION: FORMED

## 2023-02-16 ENCOUNTER — HOME CARE VISIT (OUTPATIENT)
Dept: SCHEDULING | Facility: HOME HEALTH | Age: 63
End: 2023-02-16
Payer: MEDICARE

## 2023-02-16 VITALS
TEMPERATURE: 98.2 F | RESPIRATION RATE: 18 BRPM | SYSTOLIC BLOOD PRESSURE: 128 MMHG | DIASTOLIC BLOOD PRESSURE: 70 MMHG | OXYGEN SATURATION: 100 % | HEART RATE: 90 BPM

## 2023-02-16 PROCEDURE — G0299 HHS/HOSPICE OF RN EA 15 MIN: HCPCS

## 2023-02-16 ASSESSMENT — ENCOUNTER SYMPTOMS
HEMOPTYSIS: 0
PAIN LOCATION - PAIN QUALITY: ACHY

## 2023-02-17 ENCOUNTER — HOME CARE VISIT (OUTPATIENT)
Dept: SCHEDULING | Facility: HOME HEALTH | Age: 63
End: 2023-02-17
Payer: MEDICARE

## 2023-02-17 VITALS
OXYGEN SATURATION: 98 % | DIASTOLIC BLOOD PRESSURE: 70 MMHG | HEART RATE: 81 BPM | TEMPERATURE: 97.3 F | RESPIRATION RATE: 18 BRPM | SYSTOLIC BLOOD PRESSURE: 122 MMHG

## 2023-02-17 PROCEDURE — G0299 HHS/HOSPICE OF RN EA 15 MIN: HCPCS

## 2023-02-17 ASSESSMENT — ENCOUNTER SYMPTOMS: PAIN LOCATION - PAIN QUALITY: ACHE

## 2023-02-18 ENCOUNTER — HOME CARE VISIT (OUTPATIENT)
Dept: SCHEDULING | Facility: HOME HEALTH | Age: 63
End: 2023-02-18
Payer: MEDICARE

## 2023-02-18 VITALS
OXYGEN SATURATION: 99 % | SYSTOLIC BLOOD PRESSURE: 130 MMHG | HEART RATE: 92 BPM | RESPIRATION RATE: 19 BRPM | TEMPERATURE: 98.1 F | DIASTOLIC BLOOD PRESSURE: 80 MMHG

## 2023-02-18 PROCEDURE — G0299 HHS/HOSPICE OF RN EA 15 MIN: HCPCS

## 2023-02-18 ASSESSMENT — ENCOUNTER SYMPTOMS: STOOL DESCRIPTION: FORMED

## 2023-02-19 ENCOUNTER — HOME CARE VISIT (OUTPATIENT)
Dept: SCHEDULING | Facility: HOME HEALTH | Age: 63
End: 2023-02-19
Payer: MEDICARE

## 2023-02-19 PROCEDURE — G0299 HHS/HOSPICE OF RN EA 15 MIN: HCPCS

## 2023-02-20 ENCOUNTER — HOME CARE VISIT (OUTPATIENT)
Dept: SCHEDULING | Facility: HOME HEALTH | Age: 63
End: 2023-02-20
Payer: MEDICARE

## 2023-02-20 VITALS
RESPIRATION RATE: 16 BRPM | HEART RATE: 92 BPM | OXYGEN SATURATION: 100 % | TEMPERATURE: 98 F | DIASTOLIC BLOOD PRESSURE: 70 MMHG | SYSTOLIC BLOOD PRESSURE: 138 MMHG

## 2023-02-20 VITALS
HEART RATE: 97 BPM | SYSTOLIC BLOOD PRESSURE: 122 MMHG | RESPIRATION RATE: 16 BRPM | TEMPERATURE: 98.3 F | OXYGEN SATURATION: 100 % | DIASTOLIC BLOOD PRESSURE: 82 MMHG

## 2023-02-20 VITALS
OXYGEN SATURATION: 100 % | DIASTOLIC BLOOD PRESSURE: 82 MMHG | SYSTOLIC BLOOD PRESSURE: 142 MMHG | HEART RATE: 95 BPM | RESPIRATION RATE: 16 BRPM | TEMPERATURE: 98.2 F

## 2023-02-20 PROCEDURE — G0299 HHS/HOSPICE OF RN EA 15 MIN: HCPCS

## 2023-02-20 ASSESSMENT — ENCOUNTER SYMPTOMS
STOOL DESCRIPTION: SOFT FORMED
PAIN LOCATION - PAIN QUALITY: ACHING

## 2023-02-21 ENCOUNTER — HOSPITAL ENCOUNTER (OUTPATIENT)
Dept: LAB | Age: 63
Discharge: HOME | End: 2023-02-24
Payer: MEDICARE

## 2023-02-21 ENCOUNTER — HOME CARE VISIT (OUTPATIENT)
Dept: SCHEDULING | Facility: HOME HEALTH | Age: 63
End: 2023-02-21
Payer: MEDICARE

## 2023-02-21 LAB — HISTORY CHECK: NORMAL

## 2023-02-21 PROCEDURE — 36415 COLL VENOUS BLD VENIPUNCTURE: CPT

## 2023-02-21 PROCEDURE — 86923 COMPATIBILITY TEST ELECTRIC: CPT

## 2023-02-21 PROCEDURE — G0299 HHS/HOSPICE OF RN EA 15 MIN: HCPCS

## 2023-02-21 PROCEDURE — 86900 BLOOD TYPING SEROLOGIC ABO: CPT

## 2023-02-22 ENCOUNTER — PREP FOR PROCEDURE (OUTPATIENT)
Dept: SURGERY | Age: 63
End: 2023-02-22

## 2023-02-22 ENCOUNTER — OFFICE VISIT (OUTPATIENT)
Dept: SURGERY | Age: 63
End: 2023-02-22

## 2023-02-22 VITALS — HEIGHT: 77 IN | WEIGHT: 248 LBS | BODY MASS INDEX: 29.28 KG/M2

## 2023-02-22 DIAGNOSIS — L98.493 ABDOMINAL WALL ULCER, WITH NECROSIS OF MUSCLE (HCC): ICD-10-CM

## 2023-02-22 DIAGNOSIS — L02.211 ABDOMINAL WALL ABSCESS: ICD-10-CM

## 2023-02-22 DIAGNOSIS — M72.6 NECROTIZING FASCIITIS (HCC): ICD-10-CM

## 2023-02-22 DIAGNOSIS — K43.0 RECURRENT VENTRAL HERNIA WITH INCARCERATION: Primary | ICD-10-CM

## 2023-02-22 PROCEDURE — 99024 POSTOP FOLLOW-UP VISIT: CPT | Performed by: SURGERY

## 2023-02-22 RX ORDER — ROSUVASTATIN CALCIUM 10 MG/1
TABLET, COATED ORAL
COMMUNITY
Start: 2023-01-30 | End: 2023-02-23

## 2023-02-22 RX ORDER — DOXYCYCLINE 100 MG/1
100 TABLET ORAL 2 TIMES DAILY
Qty: 28 TABLET | Refills: 0 | Status: SHIPPED | OUTPATIENT
Start: 2023-02-22 | End: 2023-03-08

## 2023-02-22 RX ORDER — HYDRALAZINE HYDROCHLORIDE 25 MG/1
TABLET, FILM COATED ORAL
COMMUNITY
Start: 2022-12-19 | End: 2023-02-23

## 2023-02-22 NOTE — PROGRESS NOTES
H&P/Consult Note/Progress Note/Office Note:   Leo Rowe  MRN: [id number]  :1960  Age:62 y.o.    HPI: Leo Rowe is a 65 y.o. male who is s/p open drainage of localized peritonitis, debridement of necrotizing fasciitis of abdominal wall infection, removal of infected PD cath, and repair of recurrent incarcerated ventral hernia with Vicryl mesh on 22. Prior to surgery he was admitted by the hospitalist with sepsis on 22 after he presented to the ER with a 2-week history of progressive abdominal pain and bulging. He reported a prior recurrent ventral hernia for many years following open umbilical hernia repair with Prolene mesh by Dr. Jesusita Johns on 2014. He had a PD cath placed at that time and is dialysis dependent. Recently however he developed progressive and severe abdominal pain associated with worsening bulging and tenderness. He also described an associated eschar of the umbilical skin recently  This was associated with a change in the color of the effluent from the PD catheter. He has had associated N/V, tachycardia, and leukocytosis on admission. He was placed on vancomycin and Zosyn. CT imaging was performed as shown below and general surgery consultation was obtained. He described a prior episode of peritonitis without abscess or hernia treated with Abx in approximately 2016. He described having an AV fistula for dialysis placed many years ago which has never been used. He wants to stop having peritoneal dialysis and convert to hemodialysis. 22 CT abd/pelvis withoral but no IV contrast  Hx: Yellowish solution after peritoneal dialysis, concern for peritonitis       FINDINGS: Evaluation of the hollow and solid viscera is limited by the lack of intravenous contrast.   Dependent atelectasis is present. CT ABDOMEN: The stomach is unremarkable.  Innumerable cysts are noted throughout the bilateral kidneys unchanged from the prior exam in keeping with polycystic kidney disease. Multiple of the cysts are hyperdense and likely hemorrhagic. There is no hydronephrosis. No renal or ureteral stones evident. The adrenal glands are normal. An umbilical hernia is present in the lower mid abdomen at the insertion site of the peritoneal dialysis catheter. There is a fluid collection within the hernia sac concerning for possible seroma or abscess that measures 5.7 x 3.2 x 6.0 cm. There are herniated bowel loops as well without bowel obstruction. The abdominal wall defect measures roughly 7.5 cm. CT PELVIS: The bowel is normal in course, caliber, and wall thickness. No bowel obstruction evident. The appendix is normal. The peritoneal dialysis catheter coils in the central pelvis and a small amount of ascitic fluid. The bladder is decompressed. There is no inguinal hernia. The rectum is unremarkable. No aggressive bone lesions identified. Impression:  1. Moderate-sized umbilical hernia with herniated loops of bowel and fluid. The fluid collection measures roughly 5.7 x 6.0 cm. Seroma or abscess could be considered. 2. Polycystic kidney disease. There is no hydronephrosis. Additional hx:  12/27/22 POD1: Patient alert and conversant. Abdominal pain is minimal.  Binder in place with surgical dressing CDI. LUE AVF pulsatile. WBC 13.2. Cr 13.3 K+4.4 AF/VSS. -Flatus/-BM  12/28/22 POD2 up in chair; comfortable; AF; Hg lower and subq Heparin on hold; await return of bowel function  12/29/22 POD3 feels better each day; abd dressing changes; 2 breanne left in place  12/30/22 POD4 Tolerating clears so far this am;  Abd breanne changed by Dr [place] this am or tomorrow on PO Abx    1/23/23 POD28 office visit; feels well. No abdominal pain. No cellulitis. Wound VAC is being changed 3 times a week as outpatient. 2/6/23 no nausea vomiting or abdominal pain.   The umbilical skin is swollen under the wound VAC and the black foam is not being packed into the wound orifice as the wound entry site is too small. Converted to 1/4\" Iodoform. Past Medical History:   Diagnosis Date    Anemia     Anxiety     Arthritis     CAD (coronary artery disease)     per pt- no MI- \"They said the back of my heart was something. ..like 40%\"    Chronic kidney disease     followed by [facility]; stage 5; pt has AV fistula to left arm; PERITONEAL DIALYSIS daily since 2014    Chronic pain     CKD (chronic kidney disease)     Former smoker     Fracture, tibial plateau 86/76/8943    GERD (gastroesophageal reflux disease)     Heart disease     Hyperlipidemia     on med for control    Hypertension     controlled with med    Hypertensive nephrosclerosis     per nephrology note 4/14/14    Hypoactive thyroid     Kidney stone     Peritoneal dialysis catheter in place [facility])     Peritoneal dialysis-associated peritonitis ([facility] 06/24/2017    during hs    PUD (peptic ulcer disease)     SBP (spontaneous bacterial peritonitis) ([facility] 09/24/2016    Sepsis ([facility] 09/24/2016    Tibial plateau fracture, left 42/86/3859    Umbilical hernia     Unspecified sleep apnea     pt does not have a CPAP     Past Surgical History:   Procedure Laterality Date    ABDOMEN SURGERY Right 6/13/2022    RIGHT LOWER QUADRANT ABDOMEN LESION BIOPSY EXCISION performed by Deandra Lino MD at [address] [place] [address] 280 N/A 12/26/2022    ABDOMEN INCISION AND DRAINAGE, VENTRAL HERNIA  REPAIRE WITH MESH.  REMOVAL PD CATH performed by Agustina Moran MD at [address] [place] [address] Right 1995    tip of 5th finger    COLONOSCOPY N/A 3/23/2018    COLONOSCOPY  BMI 34 performed by Tacos Mooney MD at [address] [place] [address] PYELOGRAMS    CYSTOSCOPY Left 11/1/2022    CYSTOSCOPY, LEFT URETEROSCOPY, BASKET STONE EXTRACTION, LEFT URETERAL STENT INSERTION performed by Bravo Chung MD at [place] [address] Left as a child    thumb ORTHOPEDIC SURGERY Left     knee    ORTHOPEDIC SURGERY Left 2017    LEFT LATERAL TIBIAL PLATEAU OPEN REDUCTION INTERNAL FIXATION    SD UNLISTED PROCEDURE ABDOMEN PERITONEUM & OMENTUM  14    PD catheter placement    SKIN BIOPSY Left 2022    LEFT BUTTOCK MASS EXCISION performed by Constanza Montero MD at [address] [place] [address] Left     AV fistula to arm     Current Outpatient Medications   Medication Sig    finasteride (PROSCAR) 5 MG tablet Take 1 tablet by mouth daily    valsartan-hydroCHLOROthiazide (DIOVAN-HCT) 320-25 MG per tablet Take 1 tablet by mouth daily. Cholecalciferol (D3 PO) Take 125 mcg by mouth daily    diphenhydrAMINE-APAP, sleep, (TYLENOL PM EXTRA STRENGTH)  MG tablet Take 2 tablets by mouth at bedtime. pantoprazole (PROTONIX) 20 MG tablet Take 1 tablet by mouth every morning (before breakfast)    traZODone (DESYREL) 50 MG tablet Take 1 tablet by mouth nightly    B Complex-C-Folic Acid (DIALYVITE PO) Take 1 tablet by mouth daily    cinacalcet (SENSIPAR) 90 MG tablet Take 60 mg by mouth daily take 2 tabs by mouth daily    cloNIDine (CATAPRES) 0.3 MG/24HR PTWK Place 1 patch onto the skin every 3 days     finasteride (PROSCAR) 5 MG tablet Take 5 mg by mouth at bedtime    ondansetron (ZOFRAN) 4 MG tablet Take 4 mg by mouth every 8 hours as needed    potassium chloride (KLOR-CON) 10 MEQ extended release tablet Take 10 mEq by mouth daily take one tablet by mouth twice daily    sevelamer hcl (RENAGEL) 800 MG tablet Take 2,400 mg by mouth 3 times daily (with meals)      No current facility-administered medications for this visit.      ALLERGIES:  Iodine    Social History     Socioeconomic History    Marital status: Single   Tobacco Use    Smoking status: Former     Packs/day: 0.50     Years: 15.00     Pack years: 7.50     Types: Cigarettes     Quit date: 3/1/2014     Years since quittin.9    Smokeless tobacco: Never    Tobacco comments:     Quit smoking: pt states only smoked cigarettes when drinking alcohol   Substance and Sexual Activity    Alcohol use: Yes    Drug use: Yes     Types: Marijuana Irma Hirsch)     Comment: \"when I can afford it\"      Social Determinants of Health     Financial Resource Strain: Low Risk     Difficulty of Paying Living Expenses: Not hard at all   Food Insecurity: No Food Insecurity    Worried About Running Out of Food in the Last Year: Never true    Ran Out of Food in the Last Year: Never true   Physical Activity: Insufficiently Active    Days of Exercise per Week: 1 day    Minutes of Exercise per Session: 20 min     Social History     Tobacco Use   Smoking Status Former    Packs/day: 0.50    Years: 15.00    Pack years: 7.50    Types: Cigarettes    Quit date: 3/1/2014    Years since quittin.9   Smokeless Tobacco Never   Tobacco Comments    Quit smoking: pt states only smoked cigarettes when drinking alcohol     Family History   Problem Relation Age of Onset    COPD Father     Heart Disease Father     Hypertension Mother     Diabetes Mother     Hypertension Father      ROS: The patient has no difficulty with chest pain or shortness of breath. No fever or chills. Comprehensive review of systems was otherwise unremarkable except as noted above. Physical Exam:   There were no vitals taken for this visit. There were no vitals filed for this visit. No intake/output data recorded. 1 -  0700  In: 1165 [I.V.:665]  Out: 1500     Constitutional: Alert, oriented, cooperative patient in no acute distress; appears stated age    Eyes:Sclera are clear. EOMs intact  ENMT: no external lesions gross hearing normal; no obvious neck masses, no ear or lip lesions, nares normal  CV: RRR. Normal perfusion  Resp: No JVD. Breathing is  non-labored; no audible wheezing. GI:   The wound orifice in the central aspect of the umbilicus is quite small. It had irreversibly ischemic tissue around it. It was debrided on 2023.     Wound tunnels about 2 cm. The wound orifice is about 3 mm x 2 mm. There is a second wound around the periphery of the umbilicus which was also debrided sharply on 2/6/2023. The umbilical wound is too small to allow black foam from the wound VAC. There is a lot of edema around the umbilical skin. No fluctuance or cellulitis. Musculoskeletal: unremarkable with normal function. No embolic signs or cyanosis. LUE with AVF pulsitile  Neuro:  Oriented; moves all 4; no focal deficits  Psychiatric: normal affect and mood, no memory impairment    Recent vitals (if inpt):  Patient Vitals for the past 24 hrs:   BP Temp Temp src Pulse Resp SpO2 Height Weight   12/30/22 1110 (!) 131/98 98.2 °F (36.8 °C) Oral 99 18 96 % -- --   12/30/22 0959 -- -- -- -- -- -- 6' 5\" (1.956 m) --   12/30/22 0717 (!) 133/109 98.6 °F (37 °C) Oral (!) 103 18 96 % -- --   12/30/22 0517 -- -- -- -- 19 -- -- --   12/30/22 0447 (!) 133/97 99 °F (37.2 °C) Oral 97 18 94 % -- --   12/29/22 2313 115/87 99.1 °F (37.3 °C) Oral (!) 102 17 95 % -- --   12/29/22 1944 (!) 129/94 98.4 °F (36.9 °C) Oral (!) 109 20 97 % -- --   12/29/22 1931 -- -- -- -- -- -- -- 256 lb 5 oz (116.3 kg)   12/29/22 1542 106/82 100.3 °F (37.9 °C) Oral (!) 108 18 96 % -- --   12/29/22 1253 129/88 98.2 °F (36.8 °C) Oral (!) 106 18 99 % -- --       Amount and/or Complexity of Data Reviewed and Analyzed:  I reviewed and analyzed all of the unique labs and radiologic studies that are shown below as well as any that are in the HPI, and any that are in the expanded problem list below  *Each unique test, order, or document contributes to the combination of 2 or combination of 3 in Category 1 below. For this visit I also reviewed old records and prior notes. No results for input(s): WBC, HGB, PLT, NA, K, CL, CO2, BUN, CREA, GLU, INR, APTT, ALT, AML, AML, LCAD, PCO2, PO2, HCO3 in the last 72 hours.     Invalid input(s): PTP, TBIL, TBILI, CBIL, SGOT, GPT, AP, LPSE, NH4, TROPT, TROIQ,  PH    Review of most recent CBC  Lab Results   Component Value Date    WBC 10.0 12/31/2022    HGB 9.2 (L) 12/31/2022    HCT 27.8 (L) 12/31/2022    MCV 95.2 12/31/2022     12/31/2022       Review of most recent BMP  Lab Results   Component Value Date/Time     12/30/2022 08:15 AM    K 3.7 12/30/2022 08:15 AM     12/30/2022 08:15 AM    CO2 26 12/30/2022 08:15 AM    BUN 38 12/30/2022 08:15 AM    CREATININE 12.10 12/30/2022 08:15 AM    GLUCOSE 102 12/30/2022 08:15 AM    CALCIUM 9.0 12/30/2022 08:15 AM        Review of most recent LFTs (and lipase if done)  Lab Results   Component Value Date    ALT 9 (L) 12/29/2022    AST 10 (L) 12/29/2022    ALKPHOS 51 12/29/2022    BILITOT 1.1 12/29/2022     Lab Results   Component Value Date    LIPASE 88 12/25/2022       No results found for: INR, APTT, LCAD    Review of most recent HgbA1c  No results found for: LABA1C    Nutritional assessment screen for wound healing issues:  Lab Results   Component Value Date    LABALBU 1.8 (L) 12/29/2022         Xray Result (most recent):  XR CHEST PORTABLE 12/25/2022    Narrative  KUB    CLINICAL INDICATION: Sepsis    FINDINGS: Single AP view the chest compared to a similar exam dated 6/24/2017  show the lungs are slightly underexpanded but otherwise clear. No pleural  effusion or pneumothorax. The cardiac silhouette and mediastinum are  unremarkable. The bones are normal.    Impression  Slightly under expanded lungs, otherwise no acute abnormality. CT Result (most recent):  CT ABDOMEN PELVIS WO IV CONTRAST 12/25/2022    Narrative  CT of the abdomen and pelvis without contrast.    CLINICAL INDICATION: Yellowish solution after peritoneal dialysis, concern for  peritonitis    PROCEDURE: Serial thin-section axial images obtained from the upper abdomen  through the proximal femurs without the administration of intravenous contrast.  Oral contrast was given. Radiation dose reduction techniques were used for this  study.  Our CT scanners use one or all of the following: Automated exposure  control, adjusted of the mA and/or kV according to patient size, iterative  reconstruction    COMPARISON: CT abdomen and pelvis dated 7/6/2022    FINDINGS:  Evaluation of the hollow and solid viscera is limited by the lack of  intravenous contrast.    Dependent atelectasis is present. CT ABDOMEN: The stomach is unremarkable. Innumerable cysts are noted throughout  the bilateral kidneys unchanged from the prior exam in keeping with polycystic  kidney disease. Multiple of the cysts are hyperdense and likely hemorrhagic. There is no hydronephrosis. No renal or ureteral stones evident. The adrenal  glands are normal. An umbilical hernia is present in the lower mid abdomen at  the insertion site of the peritoneal dialysis catheter. There is a fluid  collection within the hernia sac concerning for possible seroma or abscess that  measures 5.7 x 3.2 x 6.0 cm. There are herniated bowel loops as well without  bowel obstruction. The abdominal wall defect measures roughly 7.5 cm. CT PELVIS: The bowel is normal in course, caliber, and wall thickness. No bowel  obstruction evident. The appendix is normal. The peritoneal dialysis catheter  coils in the central pelvis and a small amount of ascitic fluid. The bladder is  decompressed. There is no inguinal hernia. The rectum is unremarkable. No aggressive bone lesions identified. Impression  1. Moderate-sized umbilical hernia with herniated loops of bowel and fluid. The  fluid collection measures roughly 5.7 x 6.0 cm. Seroma or abscess could be  considered. 2. Polycystic kidney disease. There is no hydronephrosis. US Result (most recent):  US RETROPERITONEAL COMPLETE 01/10/2019    Narrative  Renal ultrasound, 1/10/2019    History: Kidney cyst.    Comparison: None. Technique: Grayscale and doppler images of the kidneys and bladder were obtained  using a 3-5 MHz linear transducer. Findings:  The right kidney measures 11.3 cm, and the left kidney measures 10.7  cm in greatest longitudinal length. No stones or evidence of hydronephrosis is  seen. Renal parenchymal echogenicity is increased consistent with chronic  medical renal changes. Numerous simple and complex renal cortical lesions are  seen in the bilateral kidneys. Many of these appear cystic. The majority of  these are not well assessed given their very small size. No clearly worrisome  dominant solid lesion is seen. The urinary bladder is collapsed about a Rojas catheter and therefore not well  assessed. The visualized abdominal aorta is normal in caliber measuring up to  2.3 cm. The IVC is patent. Small to moderate ascites is seen in the right upper  quadrant, and bilateral lower quadrants. Impression  IMPRESSION:  1. Echogenic kidneys demonstrating numerous simple and complex likely cystic  lesions. Many lesions are not well characterized given their very small size  although no clearly worrisome dominant solid mass is seen. This appearance can  be seen with multiple etiologies. However, given the patient's history of renal  failure and dialysis, these are favored to represent acquired cystic renal  disease. 2. Small to moderate ascites which can suggest some degree of fluid overload. Admission date (for inpatients): (Not on file)   * No surgery found *  Procedure(s):  ABDOMEN INCISION AND DRAINAGE  HERNIA VENTRAL REPAIR  poss CATHETER REMOVAL PERITONEAL DIALYSIS        ASSESSMENT/PLAN:  [unfilled]  Active Problems:    * No active hospital problems. *  Resolved Problems:    * No resolved hospital problems.  *     Patient Active Problem List    Diagnosis Date Noted    Localized peritonitis ([address] 12/26/2022     Priority: Medium    Recurrent ventral hernia with incarceration 12/26/2022     Priority: Medium    Infection due to peritoneal dialysis catheter ([address] 12/26/2022     Priority: Medium    Periumbilical abdominal pain 12/26/2022     Priority: Medium Abdominal wall abscess 12/26/2022     Priority: Medium    Necrotizing fasciitis ([address] 12/26/2022     Priority: Medium    Abdominal wall ulcer, with necrosis of muscle ([address] 12/26/2022     Priority: Medium    Peritonitis ([address] 12/25/2022     Priority: Medium    Peritoneal dialysis status ([address] 12/25/2022     Priority: Medium    Anemia 12/25/2022     Priority: Medium    Hypomagnesemia 12/25/2022     Priority: Medium    DNR (do not resuscitate) 12/25/2022     Priority: Medium    Soft tissue mass      Priority: Medium    Kidney stone 10/14/2022     Added automatically from request for surgery [id number]      Other hyperlipidemia 04/21/2022    ESRD (end stage renal disease) ([address] 05/04/2021    Non-recurrent unilateral inguinal hernia without obstruction or gangrene 12/22/2020    Hypertensive urgency 06/24/2017    Essential hypertension 57/52/1709    Umbilical hernia 64/71/3520          Number and Complexity of Problems addressed and   Risks of complications and/or morbidity of management        Recurrent, incarcerated ventral hernia  Localized peritonitis    12/26/22    1. Open drainage of localized peritonitis   2. Open repair of recurrent incarcerated ventral hernia with Vicryl mesh   3. Removal of tunneled intraperitoneal dialysis catheter  4. Debridement of skin, subcutaneous adipose, muscle, and fascia for abdominal wall necrotizing infection       PROCEDURE  On 2/6/23 I sharply debrided devitalized and irreversibly ischemic skin and subcu tissues from the wound. Surgical scissors were used. Sharp excisional technique was used. 1 cm² of 1 surface area was debrided. Wound margins were extended. Wound VAC with black foam 3 times a week continued on 2/6/2023. He was converted to quarter-inch to form packing daily. The wound orifice is too small for the black foam.    Follow-up with me in 2 weeks.   Velcro abd binder at all times      Leukocytosis-->resolved  WBC 7.9k  Cxs from OR 12/26/22 grew alpha Strep  On IV Vanc and Zosyn --> PO as outpt-->now off abx    Abdominal pain -->resolved    Dialysis dependence  PD cath (originally placed in 2014 by Dr [place]) was removed for infection on 12/26/22  Nephrology following for HD, pt has a patent LUE AVF                     Level of MDM (2/3 elements below)  Number and Complexity of Problems Addressed Amount and/or Complexity of Data to be Reviewed and Analyzed  *Each unique test, order, or document contributes to the combination of 2 or combination of 3 in Category 1 below. Risk of Complications and/or Morbidity or Mortality of pt Management     17127  57417 SF Minimal  ?1self-limited or minor problem Minimal or none Minimal risk of morbidity from additional diagnostic testing or Rx   20268  31991 Low Low  ? 2or more self-limited or minor problems;    or  ? 1stable chronic illness;    or  ?1acute, uncomplicated illness or injury   Limited  (Must meet the requirements of at least 1 of the 2 categories)  Category 1: Tests and documents   ? Any combination of 2 from the following:  ?Review of prior external note(s) from each unique source*;  ?review of the result(s) of each unique test*;   ?ordering of each unique test*    or   Category 2: Assessment requiring an independent historian(s)  (For the categories of independent interpretation of tests and discussion of management or test interpretation, see moderate or high) Low risk of morbidity from additional diagnostic testing or treatment     52326  40674 Mod Moderate  ? 1or more chronic illnesses with exacerbation, progression, or side effects of treatment;    or  ?2or more stable chronic illnesses;    or  ?1undiagnosed new problem with uncertain prognosis;    or  ?1acute illness with systemic symptoms;    or  ?1acute complicated injury   Moderate  (Must meet the requirements of at least 1 out of 3 categories)  Category 1: Tests, documents, or independent historian(s)  ? Any combination of 3 from the following:   ?Review of prior external note(s) from each unique source*;  ?Review of the result(s) of each unique test*;  ?Ordering of each unique test*;  ?Assessment requiring an independent historian(s)    or  Category 2: Independent interpretation of tests   ? Independent interpretation of a test performed by another physician/other qualified health care professional (not separately reported);     or  Category 3: Discussion of management or test interpretation  ? Discussion of management or test interpretation with external physician/other qualified health care professional/appropriate source (not separately reported)   Moderate risk of morbidity from additional diagnostic testing or treatment  Examples only:  ?Prescription drug management   ? Decision regarding minor surgery with identified patient or procedure risk factors  ? Decision regarding elective major surgery without identified patient or procedure risk factors   ? Diagnosis or treatment significantly limited by social determinants of health       01343  63319 High High  ? 1or more chronic illnesses with severe exacerbation, progression, or side effects of treatment;    or  ?1 acute or chronic illness or injury that poses a threat to life or bodily function   Extensive  (Must meet the requirements of at least 2 out of 3 categories)  Category 1: Tests, documents, or independent historian(s)  ? Any combination of 3 from the following:   ?Review of prior external note(s) from each unique source*;  ?Review of the result(s) of each unique test*;   ?Ordering of each unique test*;   ?Assessment requiring an independent historian(s)    or   Category 2: Independent interpretation of tests   ? Independent interpretation of a test performed by another physician/other qualified health care professional (not separately reported);     or  Category 3: Discussion of management or test interpretation  ? Discussion of management or test interpretation with external physician/other qualified health care professional/appropriate source (not separately reported)   High risk of morbidity from additional diagnostic testing or treatment  Examples only:  ?Drug therapy requiring intensive monitoring for toxicity  ? Decision regarding elective major surgery with identified patient or procedure risk factors  ? Decision regarding emergency major surgery  ? Decision regarding hospitalization  ? Decision not to resuscitate or to de-escalate care because of poor prognosis             I have personally performed a face-to-face diagnostic evaluation and management  service on this patient. I have independently seen the patient. I have independently obtained the above history from the patient/family. I have independently examined the patient with above findings. I have independently reviewed data/labs for this patient and developed the above plan of care (MDM).       Signed: Carter Middleton MD  2/22/2023    8:01 AM

## 2023-02-22 NOTE — PROGRESS NOTES
H&P/Consult Note/Progress Note/Office Note:   Clarisa Barba  MRN: [id number]  :1960  Age:62 y.o.    HPI: Clarisa Barba is a 65 y.o. male who is s/p open drainage of localized peritonitis, debridement of necrotizing fasciitis of abdominal wall infection, removal of infected PD cath, and repair of recurrent incarcerated ventral hernia with Vicryl mesh on 22. Prior to surgery he was admitted by the hospitalist with sepsis on 22 after he presented to the ER with a 2-week history of progressive abdominal pain and bulging. He reported a prior recurrent ventral hernia for many years following open umbilical hernia repair with Prolene mesh by Dr. Saint Clair on 2014. He had a PD cath placed at that time and is dialysis dependent. Recently however he developed progressive and severe abdominal pain associated with worsening bulging and tenderness. He also described an associated eschar of the umbilical skin recently  This was associated with a change in the color of the effluent from the PD catheter. He has had associated N/V, tachycardia, and leukocytosis on admission. He was placed on vancomycin and Zosyn. CT imaging was performed as shown below and general surgery consultation was obtained. He described a prior episode of peritonitis without abscess or hernia treated with Abx in approximately 2016. He described having an AV fistula for dialysis placed many years ago which has never been used. He wants to stop having peritoneal dialysis and convert to hemodialysis. 22 CT abd/pelvis withoral but no IV contrast  Hx: Yellowish solution after peritoneal dialysis, concern for peritonitis       FINDINGS: Evaluation of the hollow and solid viscera is limited by the lack of intravenous contrast.   Dependent atelectasis is present. CT ABDOMEN: The stomach is unremarkable.  Innumerable cysts are noted throughout the bilateral kidneys unchanged from the prior exam in keeping with polycystic kidney disease. Multiple of the cysts are hyperdense and likely hemorrhagic. There is no hydronephrosis. No renal or ureteral stones evident. The adrenal glands are normal. An umbilical hernia is present in the lower mid abdomen at the insertion site of the peritoneal dialysis catheter. There is a fluid collection within the hernia sac concerning for possible seroma or abscess that measures 5.7 x 3.2 x 6.0 cm. There are herniated bowel loops as well without bowel obstruction. The abdominal wall defect measures roughly 7.5 cm. CT PELVIS: The bowel is normal in course, caliber, and wall thickness. No bowel obstruction evident. The appendix is normal. The peritoneal dialysis catheter coils in the central pelvis and a small amount of ascitic fluid. The bladder is decompressed. There is no inguinal hernia. The rectum is unremarkable. No aggressive bone lesions identified. Impression:  1. Moderate-sized umbilical hernia with herniated loops of bowel and fluid. The fluid collection measures roughly 5.7 x 6.0 cm. Seroma or abscess could be considered. 2. Polycystic kidney disease. There is no hydronephrosis. Additional hx:  12/27/22 POD1: Patient alert and conversant. Abdominal pain is minimal.  Binder in place with surgical dressing CDI. LUE AVF pulsatile. WBC 13.2. Cr 13.3 K+4.4 AF/VSS. -Flatus/-BM  12/28/22 POD2 up in chair; comfortable; AF; Hg lower and subq Heparin on hold; await return of bowel function  12/29/22 POD3 feels better each day; abd dressing changes; 2 breanne left in place  12/30/22 POD4 Tolerating clears so far this am;  Abd breanne changed by Dr Liya Ann this am or tomorrow on PO Abx    1/23/23 POD28 office visit; feels well. No abdominal pain. No cellulitis. Wound VAC is being changed 3 times a week as outpatient. 2/6/23 no nausea vomiting or abdominal pain.   The umbilical skin is swollen under the wound VAC and the black foam is not being packed into the wound orifice as the wound entry site is too small. Converted to 1/4\" Iodoform. 2/22/23 office visit; purulent drainage from small opening in umb region            Past Medical History:   Diagnosis Date    Anemia     Anxiety     Arthritis     CAD (coronary artery disease)     per pt- no MI- \"They said the back of my heart was something. ..like 40%\"    Chronic kidney disease     followed by [facility] in [facility]; stage 5; pt has AV fistula to left arm; PERITONEAL DIALYSIS daily since 2014    Chronic pain     CKD (chronic kidney disease)     Former smoker     Fracture, tibial plateau 21/57/4966    GERD (gastroesophageal reflux disease)     Heart disease     Hyperlipidemia     on med for control    Hypertension     controlled with med    Hypertensive nephrosclerosis     per nephrology note 4/14/14    Hypoactive thyroid     Kidney stone     Peritoneal dialysis catheter in place [facility])     Peritoneal dialysis-associated peritonitis ([address] 06/24/2017    during hs    PUD (peptic ulcer disease)     SBP (spontaneous bacterial peritonitis) ([address] 09/24/2016    Sepsis ([address] 09/24/2016    Tibial plateau fracture, left 55/84/0745    Umbilical hernia     Unspecified sleep apnea     pt does not have a CPAP     Past Surgical History:   Procedure Laterality Date    ABDOMEN SURGERY Right 6/13/2022    RIGHT LOWER QUADRANT ABDOMEN LESION BIOPSY EXCISION performed by Lisha Collins MD at [address] [place] [address] 280 N/A 12/26/2022    ABDOMEN INCISION AND DRAINAGE, VENTRAL HERNIA  REPAIRE WITH MESH.  REMOVAL PD CATH performed by Sunny Peraza MD at [address] [place] [address] Right 1995    tip of 5th finger    COLONOSCOPY N/A 3/23/2018    COLONOSCOPY  BMI 34 performed by Alena Burnett MD at [address] [place] [address] PYELOGRAMS    CYSTOSCOPY Left 11/1/2022    CYSTOSCOPY, LEFT URETEROSCOPY, BASKET STONE EXTRACTION, LEFT URETERAL STENT INSERTION performed by Bravo Nunez MD at [facility] MAIN OR    HERNIA REPAIR      ORTHOPEDIC SURGERY Left as a child    thumb    ORTHOPEDIC SURGERY Left 1989    knee    ORTHOPEDIC SURGERY Left 2017    LEFT LATERAL TIBIAL PLATEAU OPEN REDUCTION INTERNAL FIXATION    KS UNLISTED PROCEDURE ABDOMEN PERITONEUM & OMENTUM  6/11/14    PD catheter placement    SKIN BIOPSY Left 6/13/2022    LEFT BUTTOCK MASS EXCISION performed by Christo Desir MD at [facility] Left 2012    AV fistula to arm     Current Outpatient Medications   Medication Sig    doxycycline monohydrate (ADOXA) 100 MG tablet Take 1 tablet by mouth 2 times daily for 14 days    finasteride (PROSCAR) 5 MG tablet Take 1 tablet by mouth daily    valsartan-hydroCHLOROthiazide (DIOVAN-HCT) 320-25 MG per tablet Take 1 tablet by mouth daily. Cholecalciferol (D3 PO) Take 125 mcg by mouth daily    diphenhydrAMINE-APAP, sleep, (TYLENOL PM EXTRA STRENGTH)  MG tablet Take 2 tablets by mouth at bedtime. traZODone (DESYREL) 50 MG tablet Take 1 tablet by mouth nightly    B Complex-C-Folic Acid (DIALYVITE PO) Take 1 tablet by mouth daily    cinacalcet (SENSIPAR) 90 MG tablet Take 60 mg by mouth daily take 2 tabs by mouth daily    cloNIDine (CATAPRES) 0.3 MG/24HR PTWK Place 1 patch onto the skin every 3 days     finasteride (PROSCAR) 5 MG tablet Take 5 mg by mouth at bedtime    ondansetron (ZOFRAN) 4 MG tablet Take 4 mg by mouth every 8 hours as needed    potassium chloride (KLOR-CON) 10 MEQ extended release tablet Take 10 mEq by mouth daily take one tablet by mouth twice daily    sevelamer hcl (RENAGEL) 800 MG tablet Take 2,400 mg by mouth 3 times daily (with meals)     pantoprazole (PROTONIX) 20 MG tablet Take 1 tablet by mouth every morning (before breakfast)     No current facility-administered medications for this visit.      ALLERGIES:  Iodine    Social History     Socioeconomic History    Marital status: Single     Spouse name: None    Number of children: None    Years of education: None Highest education level: None   Tobacco Use    Smoking status: Former     Packs/day: 0.50     Years: 15.00     Pack years: 7.50     Types: Cigarettes     Quit date: 3/1/2014     Years since quittin.9    Smokeless tobacco: Never    Tobacco comments:     Quit smoking: pt states only smoked cigarettes when drinking alcohol   Substance and Sexual Activity    Alcohol use: Yes    Drug use: Yes     Types: Marijuana (Weed)     Comment: \"when I can afford it\"      Social Determinants of Health     Financial Resource Strain: Low Risk     Difficulty of Paying Living Expenses: Not hard at all   Food Insecurity: No Food Insecurity    Worried About Running Out of Food in the Last Year: Never true    Ran Out of Food in the Last Year: Never true   Physical Activity: Insufficiently Active    Days of Exercise per Week: 1 day    Minutes of Exercise per Session: 20 min     Social History     Tobacco Use   Smoking Status Former    Packs/day: 0.50    Years: 15.00    Pack years: 7.50    Types: Cigarettes    Quit date: 3/1/2014    Years since quittin.9   Smokeless Tobacco Never   Tobacco Comments    Quit smoking: pt states only smoked cigarettes when drinking alcohol     Family History   Problem Relation Age of Onset    COPD Father     Heart Disease Father     Hypertension Mother     Diabetes Mother     Hypertension Father      ROS: The patient has no difficulty with chest pain or shortness of breath. No fever or chills. Comprehensive review of systems was otherwise unremarkable except as noted above. Physical Exam:   Ht 6' 5\" (1.956 m)   Wt 248 lb (112.5 kg)   BMI 29.41 kg/m²   Vitals:    23 0802   Weight: 248 lb (112.5 kg)   Height: 6' 5\" (1.956 m)       No intake/output data recorded.  1901 -  0700  In: 1165 [I.V.:665]  Out: 1500     Constitutional: Alert, oriented, cooperative patient in no acute distress; appears stated age    Eyes:Sclera are clear.  EOMs intact  ENMT: no external lesions gross hearing normal; no obvious neck masses, no ear or lip lesions, nares normal  CV: RRR. Normal perfusion  Resp: No JVD. Breathing is  non-labored; no audible wheezing. GI:   Purulent drainage from abd wound   The wound orifice in the central aspect of the umbilicus is quite small. Wound tunnels about 2 cm. The wound orifice is about 2 mm. There is a second wound around the periphery of the umbilicus which was also debrided sharply on 2/6/2023. The umbilical wound is too small to allow black foam from the wound VAC. There is a lot of edema around the umbilical skin. Musculoskeletal: unremarkable with normal function. No embolic signs or cyanosis. LUE with AVF pulsitile  Neuro:  Oriented; moves all 4; no focal deficits  Psychiatric: normal affect and mood, no memory impairment    Recent vitals (if inpt):  Patient Vitals for the past 24 hrs:   BP Temp Temp src Pulse Resp SpO2 Height Weight   12/30/22 1110 (!) 131/98 98.2 °F (36.8 °C) Oral 99 18 96 % -- --   12/30/22 0959 -- -- -- -- -- -- 6' 5\" (1.956 m) --   12/30/22 0717 (!) 133/109 98.6 °F (37 °C) Oral (!) 103 18 96 % -- --   12/30/22 0517 -- -- -- -- 19 -- -- --   12/30/22 0447 (!) 133/97 99 °F (37.2 °C) Oral 97 18 94 % -- --   12/29/22 2313 115/87 99.1 °F (37.3 °C) Oral (!) 102 17 95 % -- --   12/29/22 1944 (!) 129/94 98.4 °F (36.9 °C) Oral (!) 109 20 97 % -- --   12/29/22 1931 -- -- -- -- -- -- -- 256 lb 5 oz (116.3 kg)   12/29/22 1542 106/82 100.3 °F (37.9 °C) Oral (!) 108 18 96 % -- --   12/29/22 1253 129/88 98.2 °F (36.8 °C) Oral (!) 106 18 99 % -- --       Amount and/or Complexity of Data Reviewed and Analyzed:  I reviewed and analyzed all of the unique labs and radiologic studies that are shown below as well as any that are in the HPI, and any that are in the expanded problem list below  *Each unique test, order, or document contributes to the combination of 2 or combination of 3 in Category 1 below.   For this visit I also reviewed old records and prior notes. No results for input(s): WBC, HGB, PLT, NA, K, CL, CO2, BUN, CREA, GLU, INR, APTT, ALT, AML, AML, LCAD, PCO2, PO2, HCO3 in the last 72 hours. Invalid input(s): PTP, TBIL, TBILI, CBIL, SGOT, GPT, AP, LPSE, NH4, TROPT, TROIQ,  PH    Review of most recent CBC  Lab Results   Component Value Date    WBC 10.0 12/31/2022    HGB 9.2 (L) 12/31/2022    HCT 27.8 (L) 12/31/2022    MCV 95.2 12/31/2022     12/31/2022       Review of most recent BMP  Lab Results   Component Value Date/Time     12/30/2022 08:15 AM    K 3.7 12/30/2022 08:15 AM     12/30/2022 08:15 AM    CO2 26 12/30/2022 08:15 AM    BUN 38 12/30/2022 08:15 AM    CREATININE 12.10 12/30/2022 08:15 AM    GLUCOSE 102 12/30/2022 08:15 AM    CALCIUM 9.0 12/30/2022 08:15 AM        Review of most recent LFTs (and lipase if done)  Lab Results   Component Value Date    ALT 9 (L) 12/29/2022    AST 10 (L) 12/29/2022    ALKPHOS 51 12/29/2022    BILITOT 1.1 12/29/2022     Lab Results   Component Value Date    LIPASE 88 12/25/2022       No results found for: INR, APTT, LCAD    Review of most recent HgbA1c  No results found for: LABA1C    Nutritional assessment screen for wound healing issues:  Lab Results   Component Value Date    LABALBU 1.8 (L) 12/29/2022         Xray Result (most recent):  XR CHEST PORTABLE 12/25/2022    Narrative  KUB    CLINICAL INDICATION: Sepsis    FINDINGS: Single AP view the chest compared to a similar exam dated 6/24/2017  show the lungs are slightly underexpanded but otherwise clear. No pleural  effusion or pneumothorax. The cardiac silhouette and mediastinum are  unremarkable. The bones are normal.    Impression  Slightly under expanded lungs, otherwise no acute abnormality.     CT Result (most recent):  CT ABDOMEN PELVIS WO IV CONTRAST 12/25/2022    Narrative  CT of the abdomen and pelvis without contrast.    CLINICAL INDICATION: Yellowish solution after peritoneal dialysis, concern for  peritonitis    PROCEDURE: Serial thin-section axial images obtained from the upper abdomen  through the proximal femurs without the administration of intravenous contrast.  Oral contrast was given. Radiation dose reduction techniques were used for this  study. Our CT scanners use one or all of the following: Automated exposure  control, adjusted of the mA and/or kV according to patient size, iterative  reconstruction    COMPARISON: CT abdomen and pelvis dated 7/6/2022    FINDINGS:  Evaluation of the hollow and solid viscera is limited by the lack of  intravenous contrast.    Dependent atelectasis is present. CT ABDOMEN: The stomach is unremarkable. Innumerable cysts are noted throughout  the bilateral kidneys unchanged from the prior exam in keeping with polycystic  kidney disease. Multiple of the cysts are hyperdense and likely hemorrhagic. There is no hydronephrosis. No renal or ureteral stones evident. The adrenal  glands are normal. An umbilical hernia is present in the lower mid abdomen at  the insertion site of the peritoneal dialysis catheter. There is a fluid  collection within the hernia sac concerning for possible seroma or abscess that  measures 5.7 x 3.2 x 6.0 cm. There are herniated bowel loops as well without  bowel obstruction. The abdominal wall defect measures roughly 7.5 cm. CT PELVIS: The bowel is normal in course, caliber, and wall thickness. No bowel  obstruction evident. The appendix is normal. The peritoneal dialysis catheter  coils in the central pelvis and a small amount of ascitic fluid. The bladder is  decompressed. There is no inguinal hernia. The rectum is unremarkable. No aggressive bone lesions identified. Impression  1. Moderate-sized umbilical hernia with herniated loops of bowel and fluid. The  fluid collection measures roughly 5.7 x 6.0 cm. Seroma or abscess could be  considered. 2. Polycystic kidney disease. There is no hydronephrosis.     US Result (most recent):  US RETROPERITONEAL COMPLETE 01/10/2019    Narrative  Renal ultrasound, 1/10/2019    History: Kidney cyst.    Comparison: None. Technique: Grayscale and doppler images of the kidneys and bladder were obtained  using a 3-5 MHz linear transducer. Findings: The right kidney measures 11.3 cm, and the left kidney measures 10.7  cm in greatest longitudinal length. No stones or evidence of hydronephrosis is  seen. Renal parenchymal echogenicity is increased consistent with chronic  medical renal changes. Numerous simple and complex renal cortical lesions are  seen in the bilateral kidneys. Many of these appear cystic. The majority of  these are not well assessed given their very small size. No clearly worrisome  dominant solid lesion is seen. The urinary bladder is collapsed about a Rojas catheter and therefore not well  assessed. The visualized abdominal aorta is normal in caliber measuring up to  2.3 cm. The IVC is patent. Small to moderate ascites is seen in the right upper  quadrant, and bilateral lower quadrants. Impression  IMPRESSION:  1. Echogenic kidneys demonstrating numerous simple and complex likely cystic  lesions. Many lesions are not well characterized given their very small size  although no clearly worrisome dominant solid mass is seen. This appearance can  be seen with multiple etiologies. However, given the patient's history of renal  failure and dialysis, these are favored to represent acquired cystic renal  disease. 2. Small to moderate ascites which can suggest some degree of fluid overload. Admission date (for inpatients): (Not on file)   * No surgery found *  Procedure(s):  ABDOMEN INCISION AND DRAINAGE  HERNIA VENTRAL REPAIR  poss CATHETER REMOVAL PERITONEAL DIALYSIS        ASSESSMENT/PLAN:  [unfilled]  Active Problems:    * No active hospital problems. *  Resolved Problems:    * No resolved hospital problems.  *     Patient Active Problem List    Diagnosis Date Noted Localized peritonitis ([address] 12/26/2022     Priority: Medium    Recurrent ventral hernia with incarceration 12/26/2022     Priority: Medium    Infection due to peritoneal dialysis catheter ([address] 12/26/2022     Priority: Medium    Periumbilical abdominal pain 12/26/2022     Priority: Medium    Abdominal wall abscess 12/26/2022     Priority: Medium    Necrotizing fasciitis ([address] 12/26/2022     Priority: Medium    Abdominal wall ulcer, with necrosis of muscle ([address] 12/26/2022     Priority: Medium    Peritonitis ([address] 12/25/2022     Priority: Medium    Peritoneal dialysis status ([address] 12/25/2022     Priority: Medium    Anemia 12/25/2022     Priority: Medium    Hypomagnesemia 12/25/2022     Priority: Medium    DNR (do not resuscitate) 12/25/2022     Priority: Medium    Soft tissue mass      Priority: Medium    Kidney stone 10/14/2022     Added automatically from request for surgery [id number]      Other hyperlipidemia 04/21/2022    ESRD (end stage renal disease) ([address] 05/04/2021    Non-recurrent unilateral inguinal hernia without obstruction or gangrene 12/22/2020    Hypertensive urgency 06/24/2017    Essential hypertension 86/29/9712    Umbilical hernia 59/54/3407          Number and Complexity of Problems addressed and   Risks of complications and/or morbidity of management        Recurrent, incarcerated ventral hernia  Localized peritonitis    12/26/22    1. Open drainage of localized peritonitis   2. Open repair of recurrent incarcerated ventral hernia with Vicryl mesh   3. Removal of tunneled intraperitoneal dialysis catheter  4. Debridement of skin, subcutaneous adipose, muscle, and fascia for abdominal wall necrotizing infection       Wound VAC with black foam 3 times a week continued on 2/6/2023. He was converted to quarter-inch to form packing daily. He has purulent drainage from the wound orifice which is too small to pack adequately.     Informed consent was obtained for operative debridement and irrigation of the area. We will have CT imaging performed preop and I placed him on doxycycline between now and the time of surgery. He will continue with daily packing until OR. Leukocytosis-->resolved  WBC 7.9k  Cxs from OR 12/26/22 grew alpha Strep  On IV Vanc and Zosyn --> PO as outpt-->off abx-->restart (doxy) on 2/22/23    Abdominal pain -->resolved    Dialysis dependence  PD cath (originally placed in 2014 by Dr Christiano Silva) was removed for infection on 12/26/22  Nephrology following for HD, pt has a patent LUE AVF                     Level of MDM (2/3 elements below)  Number and Complexity of Problems Addressed Amount and/or Complexity of Data to be Reviewed and Analyzed  *Each unique test, order, or document contributes to the combination of 2 or combination of 3 in Category 1 below. Risk of Complications and/or Morbidity or Mortality of pt Management     10583  75062 SF Minimal  ?1self-limited or minor problem Minimal or none Minimal risk of morbidity from additional diagnostic testing or Rx   55571  03221 Low Low  ? 2or more self-limited or minor problems;    or  ? 1stable chronic illness;    or  ?1acute, uncomplicated illness or injury   Limited  (Must meet the requirements of at least 1 of the 2 categories)  Category 1: Tests and documents   ? Any combination of 2 from the following:  ?Review of prior external note(s) from each unique source*;  ?review of the result(s) of each unique test*;   ?ordering of each unique test*    or   Category 2: Assessment requiring an independent historian(s)  (For the categories of independent interpretation of tests and discussion of management or test interpretation, see moderate or high) Low risk of morbidity from additional diagnostic testing or treatment     69963  49142 Mod Moderate  ? 1or more chronic illnesses with exacerbation, progression, or side effects of treatment;    or  ?2or more stable chronic illnesses;    or  ?1undiagnosed new problem with uncertain prognosis;    or  ?1acute illness with systemic symptoms;    or  ?1acute complicated injury   Moderate  (Must meet the requirements of at least 1 out of 3 categories)  Category 1: Tests, documents, or independent historian(s)  ? Any combination of 3 from the following:   ?Review of prior external note(s) from each unique source*;  ?Review of the result(s) of each unique test*;  ?Ordering of each unique test*;  ?Assessment requiring an independent historian(s)    or  Category 2: Independent interpretation of tests   ? Independent interpretation of a test performed by another physician/other qualified health care professional (not separately reported);     or  Category 3: Discussion of management or test interpretation  ? Discussion of management or test interpretation with external physician/other qualified health care professional/appropriate source (not separately reported)   Moderate risk of morbidity from additional diagnostic testing or treatment  Examples only:  ?Prescription drug management   ? Decision regarding minor surgery with identified patient or procedure risk factors  ? Decision regarding elective major surgery without identified patient or procedure risk factors   ? Diagnosis or treatment significantly limited by social determinants of health       24551  70747 High High  ? 1or more chronic illnesses with severe exacerbation, progression, or side effects of treatment;    or  ?1 acute or chronic illness or injury that poses a threat to life or bodily function   Extensive  (Must meet the requirements of at least 2 out of 3 categories)  Category 1: Tests, documents, or independent historian(s)  ? Any combination of 3 from the following:   ?Review of prior external note(s) from each unique source*;  ?Review of the result(s) of each unique test*;   ?Ordering of each unique test*;   ?Assessment requiring an independent historian(s)    or   Category 2: Independent interpretation of tests   ? Independent interpretation of a test performed by another physician/other qualified health care professional (not separately reported);     or  Category 3: Discussion of management or test interpretation  ? Discussion of management or test interpretation with external physician/other qualified health care professional/appropriate source (not separately reported)   High risk of morbidity from additional diagnostic testing or treatment  Examples only:  ?Drug therapy requiring intensive monitoring for toxicity  ? Decision regarding elective major surgery with identified patient or procedure risk factors  ? Decision regarding emergency major surgery  ? Decision regarding hospitalization  ? Decision not to resuscitate or to de-escalate care because of poor prognosis             I have personally performed a face-to-face diagnostic evaluation and management  service on this patient. I have independently seen the patient. I have independently obtained the above history from the patient/family. I have independently examined the patient with above findings. I have independently reviewed data/labs for this patient and developed the above plan of care (MDM).       Signed: Rios Gaines MD  2/22/2023    8:20 AM

## 2023-02-23 ENCOUNTER — HOSPITAL ENCOUNTER (OUTPATIENT)
Dept: INFUSION THERAPY | Age: 63
Discharge: HOME | End: 2023-02-23
Payer: MEDICARE

## 2023-02-23 ENCOUNTER — HOME CARE VISIT (OUTPATIENT)
Dept: SCHEDULING | Facility: HOME HEALTH | Age: 63
End: 2023-02-23
Payer: MEDICARE

## 2023-02-23 VITALS
RESPIRATION RATE: 16 BRPM | DIASTOLIC BLOOD PRESSURE: 84 MMHG | SYSTOLIC BLOOD PRESSURE: 118 MMHG | OXYGEN SATURATION: 100 % | TEMPERATURE: 98 F | HEART RATE: 82 BPM

## 2023-02-23 VITALS
HEART RATE: 92 BPM | TEMPERATURE: 98.1 F | TEMPERATURE: 98.5 F | HEART RATE: 74 BPM | OXYGEN SATURATION: 100 % | RESPIRATION RATE: 16 BRPM | SYSTOLIC BLOOD PRESSURE: 110 MMHG | DIASTOLIC BLOOD PRESSURE: 80 MMHG | SYSTOLIC BLOOD PRESSURE: 140 MMHG | DIASTOLIC BLOOD PRESSURE: 80 MMHG | OXYGEN SATURATION: 99 % | RESPIRATION RATE: 18 BRPM

## 2023-02-23 PROCEDURE — G0299 HHS/HOSPICE OF RN EA 15 MIN: HCPCS

## 2023-02-23 PROCEDURE — 2580000003 HC RX 258: Performed by: NURSE PRACTITIONER

## 2023-02-23 PROCEDURE — 36430 TRANSFUSION BLD/BLD COMPNT: CPT

## 2023-02-23 PROCEDURE — P9016 RBC LEUKOCYTES REDUCED: HCPCS

## 2023-02-23 PROCEDURE — 6370000000 HC RX 637 (ALT 250 FOR IP): Performed by: NURSE PRACTITIONER

## 2023-02-23 RX ORDER — DIPHENHYDRAMINE HCL 25 MG
25 CAPSULE ORAL ONCE
Status: COMPLETED | OUTPATIENT
Start: 2023-02-23 | End: 2023-02-23

## 2023-02-23 RX ORDER — SODIUM CHLORIDE 9 MG/ML
INJECTION, SOLUTION INTRAVENOUS CONTINUOUS
Status: DISCONTINUED | OUTPATIENT
Start: 2023-02-23 | End: 2023-02-24 | Stop reason: HOSPADM

## 2023-02-23 RX ORDER — ACETAMINOPHEN 325 MG/1
650 TABLET ORAL ONCE
Status: COMPLETED | OUTPATIENT
Start: 2023-02-23 | End: 2023-02-23

## 2023-02-23 RX ORDER — SODIUM CHLORIDE 0.9 % (FLUSH) 0.9 %
5-40 SYRINGE (ML) INJECTION PRN
Status: DISCONTINUED | OUTPATIENT
Start: 2023-02-23 | End: 2023-02-24 | Stop reason: HOSPADM

## 2023-02-23 RX ADMIN — DIPHENHYDRAMINE HYDROCHLORIDE 25 MG: 25 CAPSULE ORAL at 13:43

## 2023-02-23 RX ADMIN — ACETAMINOPHEN 650 MG: 325 TABLET ORAL at 13:43

## 2023-02-23 RX ADMIN — SODIUM CHLORIDE, PRESERVATIVE FREE 10 ML: 5 INJECTION INTRAVENOUS at 13:45

## 2023-02-23 RX ADMIN — SODIUM CHLORIDE: 9 INJECTION, SOLUTION INTRAVENOUS at 13:45

## 2023-02-23 NOTE — PROGRESS NOTES
Arrived to the [facility]. 1 unit PRBCs completed. Patient tolerated well. Any issues or concerns during appointment: none. Patient instructed to call provider with temperature of 100.4 or greater or nausea/vomiting/ diarrhea or pain not controlled by medications  Discharged ambulatory.

## 2023-02-23 NOTE — PROGRESS NOTES
Patient verified name and . Order for consent not found in EHR and matches case posting; patient verifies procedure. Type 1B surgery, phone  assessment complete. Orders not received. Labs per surgeon: none  Labs per anesthesia protocol: none    Patient answered medical/surgical history questions at their best of ability. All prior to admission medications documented in [facility]. Patient instructed to take the following medications the day of surgery according to anesthesia guidelines with a small sip of water: Sensipar, Doxycycline,  On the day before surgery please take Acetaminophen 1000mg in the morning and then again before bed. You may substitute for Tylenol 650 mg. Hold all vitamins 7 days prior to surgery and NSAIDS 5 days prior to surgery. Prescription meds to hold:none  Patient instructed on the following:    > Arrive at [facility], time of arrival to be called the day before by 1700  > NPO after midnight, unless otherwise indicated, including gum, mints, and ice chips  > Responsible adult must drive patient to the hospital, stay during surgery, and patient will need supervision 24 hours after anesthesia  > Use Dial in shower the night before surgery and on the morning of surgery  > All piercings must be removed prior to arrival.    > Leave all valuables (money and jewelry) at home but bring insurance card and ID on DOS.   > You may be required to pay a deductible or co-pay on the day of your procedure. You can pre-pay by calling [phone] if your surgery is at the [facility] or [phone] if your surgery is at the [facility]. > Do not wear make-up, nail polish, lotions, cologne, perfumes, powders, or oil on skin. Artificial nails are not permitted.

## 2023-02-24 ENCOUNTER — HOME CARE VISIT (OUTPATIENT)
Dept: SCHEDULING | Facility: HOME HEALTH | Age: 63
End: 2023-02-24
Payer: MEDICARE

## 2023-02-24 VITALS
HEART RATE: 95 BPM | RESPIRATION RATE: 18 BRPM | TEMPERATURE: 98.2 F | OXYGEN SATURATION: 100 % | SYSTOLIC BLOOD PRESSURE: 140 MMHG | DIASTOLIC BLOOD PRESSURE: 80 MMHG

## 2023-02-24 LAB
ABO + RH BLD: NORMAL
BLD PROD TYP BPU: NORMAL
BLOOD BANK DISPENSE STATUS: NORMAL
BLOOD GROUP ANTIBODIES SERPL: NORMAL
BPU ID: NORMAL
CROSSMATCH RESULT: NORMAL
SPECIMEN EXP DATE BLD: NORMAL
UNIT DIVISION: 0

## 2023-02-24 PROCEDURE — G0299 HHS/HOSPICE OF RN EA 15 MIN: HCPCS

## 2023-02-24 RX ORDER — SODIUM CHLORIDE 0.9 % (FLUSH) 0.9 %
5-40 SYRINGE (ML) INJECTION EVERY 12 HOURS SCHEDULED
Status: CANCELLED | OUTPATIENT
Start: 2023-02-24

## 2023-02-24 RX ORDER — SODIUM CHLORIDE 9 MG/ML
INJECTION, SOLUTION INTRAVENOUS PRN
Status: CANCELLED | OUTPATIENT
Start: 2023-02-24

## 2023-02-24 RX ORDER — SODIUM CHLORIDE 0.9 % (FLUSH) 0.9 %
5-40 SYRINGE (ML) INJECTION PRN
Status: CANCELLED | OUTPATIENT
Start: 2023-02-24

## 2023-02-25 ENCOUNTER — HOME CARE VISIT (OUTPATIENT)
Dept: SCHEDULING | Facility: HOME HEALTH | Age: 63
End: 2023-02-25
Payer: MEDICARE

## 2023-02-25 PROCEDURE — G0299 HHS/HOSPICE OF RN EA 15 MIN: HCPCS

## 2023-02-26 ENCOUNTER — HOME CARE VISIT (OUTPATIENT)
Dept: SCHEDULING | Facility: HOME HEALTH | Age: 63
End: 2023-02-26
Payer: MEDICARE

## 2023-02-26 VITALS
TEMPERATURE: 97.8 F | HEART RATE: 80 BPM | OXYGEN SATURATION: 96 % | DIASTOLIC BLOOD PRESSURE: 76 MMHG | SYSTOLIC BLOOD PRESSURE: 138 MMHG | RESPIRATION RATE: 18 BRPM

## 2023-02-26 PROCEDURE — G0299 HHS/HOSPICE OF RN EA 15 MIN: HCPCS

## 2023-02-27 ENCOUNTER — HOSPITAL ENCOUNTER (OUTPATIENT)
Dept: CT IMAGING | Age: 63
Discharge: HOME | End: 2023-03-02
Payer: MEDICARE

## 2023-02-27 ENCOUNTER — HOME CARE VISIT (OUTPATIENT)
Dept: SCHEDULING | Facility: HOME HEALTH | Age: 63
End: 2023-02-27
Payer: MEDICARE

## 2023-02-27 DIAGNOSIS — L02.211 ABDOMINAL WALL ABSCESS: ICD-10-CM

## 2023-02-27 DIAGNOSIS — K43.0 RECURRENT VENTRAL HERNIA WITH INCARCERATION: ICD-10-CM

## 2023-02-27 DIAGNOSIS — L98.493 ABDOMINAL WALL ULCER, WITH NECROSIS OF MUSCLE (HCC): ICD-10-CM

## 2023-02-27 PROCEDURE — 74176 CT ABD & PELVIS W/O CONTRAST: CPT

## 2023-02-27 PROCEDURE — G0299 HHS/HOSPICE OF RN EA 15 MIN: HCPCS

## 2023-02-27 NOTE — H&P
H&P/Consult Note/Progress Note/Office Note:   Victoriano Ortega  MRN: [id number]  :1960  Age:62 y.o.    HPI: Victoriano Ortega is a 65 y.o. male who is here for abd wall wound debridement and possible drainage of anterior peritonitis on 23. He is s/p open drainage of localized peritonitis, debridement of necrotizing fasciitis of abdominal wall infection, removal of infected PD cath,   and repair of recurrent incarcerated ventral hernia with Vicryl mesh on 22. Prior to surgery he was admitted by the hospitalist with sepsis on 22 after he presented to the ER with a 2-week history of progressive abdominal pain and bulging. He reported a prior recurrent ventral hernia for many years following open umbilical hernia repair with Prolene mesh by Dr. Saint Clair on 2014. He had a PD cath placed at that time and is dialysis dependent. Recently however he developed progressive and severe abdominal pain associated with worsening bulging and tenderness. He also described an associated eschar of the umbilical skin recently  This was associated with a change in the color of the effluent from the PD catheter. He has had associated N/V, tachycardia, and leukocytosis on admission. He was placed on vancomycin and Zosyn. CT imaging was performed as shown below and general surgery consultation was obtained. He described a prior episode of peritonitis without abscess or hernia treated with Abx in approximately . He described having an AV fistula for dialysis placed many years ago which has never been used. He wants to stop having peritoneal dialysis and convert to hemodialysis. 22 CT abd/pelvis withoral but no IV contrast  Hx: Yellowish solution after peritoneal dialysis, concern for peritonitis       FINDINGS: Evaluation of the hollow and solid viscera is limited by the lack of intravenous contrast.   Dependent atelectasis is present.    CT ABDOMEN: The stomach is unremarkable. Innumerable cysts are noted throughout the bilateral kidneys unchanged from the prior exam in keeping with polycystic kidney disease. Multiple of the cysts are hyperdense and likely hemorrhagic. There is no hydronephrosis. No renal or ureteral stones evident. The adrenal glands are normal. An umbilical hernia is present in the lower mid abdomen at the insertion site of the peritoneal dialysis catheter. There is a fluid collection within the hernia sac concerning for possible seroma or abscess that measures 5.7 x 3.2 x 6.0 cm. There are herniated bowel loops as well without bowel obstruction. The abdominal wall defect measures roughly 7.5 cm. CT PELVIS: The bowel is normal in course, caliber, and wall thickness. No bowel obstruction evident. The appendix is normal. The peritoneal dialysis catheter coils in the central pelvis and a small amount of ascitic fluid. The bladder is decompressed. There is no inguinal hernia. The rectum is unremarkable. No aggressive bone lesions identified. Impression:  1. Moderate-sized umbilical hernia with herniated loops of bowel and fluid. The fluid collection measures roughly 5.7 x 6.0 cm. Seroma or abscess could be considered. 2. Polycystic kidney disease. There is no hydronephrosis. 2/27/23 CT abd/pelvis without IV contrast      Hx: Incisional hernia with obstruction, without gangrene; Cutaneous abscess of abdominal wall; Non-pressure chronic ulcer of skin of other sites with necrosis of muscle       FINDINGS: LUNG BASES: No focal consolidation in the visualized lung bases. Calcified coronary atherosclerosis. LIVER: Stable small hypodensities in the right hepatic lobe. No new liver lesion. BILIARY TREE: The gallbladder is within normal limits. No biliary dilation. SPLEEN: Normal.     PANCREAS: No pancreatic mass or ductal dilation. ADRENALS: Normal.     KIDNEYS/BLADDER: The kidneys are symmetric in size.  Innumerable bilateral renal cysts, grossly similar to prior exam. Again, several cysts are somewhat hyperdense and likely hemorrhagic. No hydronephrosis. No ureteral calculus. The urinary bladder is nondistended, limiting its evaluation. BOWEL: Mild colonic diverticulosis without evidence of diverticulitis. No focal colonic wall thickening. The small bowel is normal in caliber. No bowel wall thickening. APPENDIX: The appendix is unremarkable. PERITONEUM/RETROPERITONEUM: The previously seen peritoneal dialysis catheter is no longer visualized and is likely been removed. Small volume abdominal and pelvic ascites. Present edema/stranding throughout the anterior mesentery/peritoneum. No discrete drainable intra-abdominal fluid collection. No enlarged abdominal or pelvic lymph nodes. VESSELS: Abdominal aorta is normal in caliber with atherosclerotic calcification. ABDOMINAL WALL: Anterior abdominal wall postsurgical changes. There is an 8.6 x 4.5 cm gas and fluid collection in the anterior abdominal wall subq tissues with possible extension into the anterior most aspect of the abdomen. Fat and fluid containing left inguinal hernia. REPRODUCTIVE: Unremarkable. BONES: No acute osseous abnormality. Multilevel degenerative changes of the lumbar spine, similar prior. Multifocal endplate lucencies, unchanged from prior and possibly reflecting Schmorl nodes     Impression  -Anterior abdominal wall postsurgical changes. 8.6 cm gas and fluid collection in the anterior abdominal wall at the surgical site   with slight extension into the anterior abdominal cavity, concerning for abscess.     -Interval increased mesenteric stranding in the anterior abdomen. This may be reactive, though superimposed peritonitis is also a consideration.     -Small-volume abdominal and pelvic ascites. Interval removal of previously seen peritoneal dialysis catheter.     -Polycystic kidney disease, similar to prior. -Additional chronic/incidental findings, as a result above         Additional hx:  12/27/22 POD1: Patient alert and conversant. Abdominal pain is minimal.  Binder in place with surgical dressing CDI. LUE AVF pulsatile. WBC 13.2. Cr 13.3 K+4.4 AF/VSS. -Flatus/-BM  12/28/22 POD2 up in chair; comfortable; AF; Hg lower and subq Heparin on hold; await return of bowel function  12/29/22 POD3 feels better each day; abd dressing changes; 2 breanne left in place  12/30/22 POD4 Tolerating clears so far this am;  Abd breanne changed by Dr Cindy Schuler this am or tomorrow on PO Abx    1/23/23 POD28 office visit; feels well. No abdominal pain. No cellulitis. Wound VAC is being changed 3 times a week as outpatient. 2/6/23 no nausea vomiting or abdominal pain. The umbilical skin is swollen under the wound VAC and the black foam is not being packed into the wound orifice as the wound entry site is too small. Converted to 1/4\" Iodoform. 2/22/23 office visit; purulent drainage from small opening in umb region    2/28/23 OR wound debridement and drainage of anterior abd peritonitis            Past Medical History:   Diagnosis Date    Anemia     Anxiety     Arthritis     CAD (coronary artery disease)     per pt- no MI- \"They said the back of my heart was something. ..like 40%\"    Chronic kidney disease     followed [facility]; stage 5; pt has AV fistula to left arm; PERITONEAL DIALYSIS daily since 2014    Chronic pain     CKD (chronic kidney disease)     Former smoker     Fracture, tibial plateau 29/72/2275    GERD (gastroesophageal reflux disease)     Heart disease     Hyperlipidemia     on med for control    Hypertension     controlled with med    Hypertensive nephrosclerosis     per nephrology note 4/14/14    Hypoactive thyroid     Kidney stone     Peritoneal dialysis catheter in place [facility])     Peritoneal dialysis-associated peritonitis ([address] 06/24/2017    during hs    PUD (peptic ulcer disease)     SBP (spontaneous bacterial peritonitis) [address] [address] 09/24/2016    Sepsis ([facility] [address] 09/24/2016    Tibial plateau fracture, left 92/22/9455    Umbilical hernia     Unspecified sleep apnea     pt does not have a CPAP     Past Surgical History:   Procedure Laterality Date    ABDOMEN SURGERY Right 6/13/2022    RIGHT LOWER QUADRANT ABDOMEN LESION BIOPSY EXCISION performed by Rani Rizvi MD at [address] N/A 12/26/2022    ABDOMEN INCISION AND DRAINAGE, VENTRAL HERNIA  REPAIRE WITH MESH.  REMOVAL PD CATH performed by Ligia Packer MD at [address] [facility] Right 1995    tip of 5th finger    COLONOSCOPY N/A 3/23/2018    COLONOSCOPY  BMI 34 performed by Marivel Negron MD at [address] [facility]    CYSTOSCOPY Left 11/1/2022    CYSTOSCOPY, LEFT URETEROSCOPY, BASKET STONE EXTRACTION, LEFT URETERAL STENT INSERTION performed by Kinsey Miller MD at [place] [address] Left as a child    thumb    ORTHOPEDIC SURGERY Left 1989    knee    ORTHOPEDIC SURGERY Left 2017    LEFT LATERAL TIBIAL PLATEAU OPEN REDUCTION INTERNAL FIXATION    [facility] UNLISTED PROCEDURE ABDOMEN PERITONEUM & OMENTUM  6/11/14    PD catheter placement    SKIN BIOPSY Left 6/13/2022    LEFT BUTTOCK MASS EXCISION performed by Rani Rizvi MD at [address] [place] [address] Left 2012    AV fistula to arm     Current Facility-Administered Medications   Medication Dose Route Frequency    lidocaine 1 % injection 1 mL  1 mL IntraDERmal Once PRN    fentaNYL (SUBLIMAZE) injection 100 mcg  100 mcg IntraVENous Once PRN    lactated ringers IV soln infusion   IntraVENous Continuous    sodium chloride flush 0.9 % injection 5-40 mL  5-40 mL IntraVENous 2 times per day    sodium chloride flush 0.9 % injection 5-40 mL  5-40 mL IntraVENous PRN    0.9 % sodium chloride infusion   IntraVENous PRN    sodium chloride flush 0.9 % injection 5-40 mL  5-40 mL IntraVENous 2 times per day    sodium chloride flush 0.9 % injection 5-40 mL  5-40 mL IntraVENous PRN    0.9 % sodium chloride infusion   IntraVENous PRN    ceFAZolin (ANCEF) 2000 mg in sterile water 20 mL IV syringe  2,000 mg IntraVENous Once    0.9 % sodium chloride infusion   IntraVENous Continuous     ALLERGIES:  Iodine    Social History     Socioeconomic History    Marital status: Single     Spouse name: None    Number of children: None    Years of education: None    Highest education level: None   Tobacco Use    Smoking status: Former     Packs/day: 0.50     Years: 15.00     Pack years: 7.50     Types: Cigarettes     Quit date: 3/1/2014     Years since quittin.0    Smokeless tobacco: Never    Tobacco comments:     Quit smoking: pt states only smoked cigarettes when drinking alcohol   Substance and Sexual Activity    Alcohol use: Yes    Drug use: Yes     Types: Marijuana (Weed)     Comment: \"when I can afford it\"      Social Determinants of Health     Financial Resource Strain: Low Risk     Difficulty of Paying Living Expenses: Not hard at all   Food Insecurity: No Food Insecurity    Worried About Running Out of Food in the Last Year: Never true    Ran Out of Food in the Last Year: Never true   Physical Activity: Insufficiently Active    Days of Exercise per Week: 1 day    Minutes of Exercise per Session: 20 min     Social History     Tobacco Use   Smoking Status Former    Packs/day: 0.50    Years: 15.00    Pack years: 7.50    Types: Cigarettes    Quit date: 3/1/2014    Years since quittin.0   Smokeless Tobacco Never   Tobacco Comments    Quit smoking: pt states only smoked cigarettes when drinking alcohol     Family History   Problem Relation Age of Onset    COPD Father     Heart Disease Father     Hypertension Mother     Diabetes Mother     Hypertension Father      ROS: The patient has no difficulty with chest pain or shortness of breath. No fever or chills.   Comprehensive review of systems was otherwise unremarkable except as noted above. Physical Exam:   BP (!) 171/96   Pulse 83   Temp 98.4 °F (36.9 °C) (Oral)   Resp 18   Ht 6' 5\" (1.956 m)   Wt 240 lb (108.9 kg)   SpO2 98%   BMI 28.46 kg/m²   Vitals:    02/23/23 0950 02/28/23 0547   BP:  (!) 171/96   Pulse:  83   Resp:  18   Temp:  98.4 °F (36.9 °C)   TempSrc:  Oral   SpO2:  98%   Weight: 240 lb (108.9 kg) 240 lb (108.9 kg)   Height: 6' 5\" (1.956 m) 6' 5\" (1.956 m)       No intake/output data recorded. 12/28 1901 - 12/30 0700  In: 1165 [I.V.:665]  Out: 1500     Constitutional: Alert, oriented, cooperative patient in no acute distress; appears stated age    Eyes:Sclera are clear. EOMs intact  ENMT: no external lesions gross hearing normal; no obvious neck masses, no ear or lip lesions, nares normal  CV: RRR. Normal perfusion  Resp: No JVD. Breathing is  non-labored; no audible wheezing. GI:   Purulent drainage from abd wound   The wound orifice in the central aspect of the umbilicus is quite small. Wound tunnels about 2 cm. The wound orifice is about 2 mm. There is a second wound around the periphery of the umbilicus which was also debrided sharply on 2/6/2023. The umbilical wound is too small to allow black foam from the wound VAC. There is a lot of edema around the umbilical skin. Musculoskeletal: unremarkable with normal function. No embolic signs or cyanosis.  LUE with AVF pulsitile  Neuro:  Oriented; moves all 4; no focal deficits  Psychiatric: normal affect and mood, no memory impairment    Recent vitals (if inpt):  Patient Vitals for the past 24 hrs:   BP Temp Temp src Pulse Resp SpO2 Height Weight   12/30/22 1110 (!) 131/98 98.2 °F (36.8 °C) Oral 99 18 96 % -- --   12/30/22 0959 -- -- -- -- -- -- 6' 5\" (1.956 m) --   12/30/22 0717 (!) 133/109 98.6 °F (37 °C) Oral (!) 103 18 96 % -- --   12/30/22 0517 -- -- -- -- 19 -- -- --   12/30/22 0447 (!) 133/97 99 °F (37.2 °C) Oral 97 18 94 % -- --   12/29/22 2313 115/87 99.1 °F (37.3 °C) Oral (!) 102 17 95 % -- --   12/29/22 1944 (!) 129/94 98.4 °F (36.9 °C) Oral (!) 109 20 97 % -- --   12/29/22 1931 -- -- -- -- -- -- -- 256 lb 5 oz (116.3 kg)   12/29/22 1542 106/82 100.3 °F (37.9 °C) Oral (!) 108 18 96 % -- --   12/29/22 1253 129/88 98.2 °F (36.8 °C) Oral (!) 106 18 99 % -- --       Amount and/or Complexity of Data Reviewed and Analyzed:  I reviewed and analyzed all of the unique labs and radiologic studies that are shown below as well as any that are in the HPI, and any that are in the expanded problem list below  *Each unique test, order, or document contributes to the combination of 2 or combination of 3 in Category 1 below. For this visit I also reviewed old records and prior notes.       Recent Labs     02/28/23  0607      K 4.4      CO2 27   BUN 28*       Review of most recent CBC  Lab Results   Component Value Date    WBC 10.0 12/31/2022    HGB 9.2 (L) 12/31/2022    HCT 27.8 (L) 12/31/2022    MCV 95.2 12/31/2022     12/31/2022       Review of most recent BMP  Lab Results   Component Value Date/Time     02/28/2023 06:07 AM    K 4.4 02/28/2023 06:07 AM     02/28/2023 06:07 AM    CO2 27 02/28/2023 06:07 AM    BUN 28 02/28/2023 06:07 AM    CREATININE 8.90 02/28/2023 06:07 AM    GLUCOSE 82 02/28/2023 06:07 AM    CALCIUM 10.1 02/28/2023 06:07 AM        Review of most recent LFTs (and lipase if done)  Lab Results   Component Value Date    ALT 9 (L) 12/29/2022    AST 10 (L) 12/29/2022    ALKPHOS 51 12/29/2022    BILITOT 1.1 12/29/2022     Lab Results   Component Value Date    LIPASE 88 12/25/2022       No results found for: INR, APTT, LCAD    Review of most recent HgbA1c  No results found for: LABA1C    Nutritional assessment screen for wound healing issues:  Lab Results   Component Value Date    LABALBU 1.8 (L) 12/29/2022         Xray Result (most recent):  XR CHEST PORTABLE 12/25/2022    Narrative  KUB    CLINICAL INDICATION: Sepsis    FINDINGS: Single AP view the chest compared to a similar exam dated 6/24/2017  show the lungs are slightly underexpanded but otherwise clear. No pleural  effusion or pneumothorax. The cardiac silhouette and mediastinum are  unremarkable. The bones are normal.    Impression  Slightly under expanded lungs, otherwise no acute abnormality. CT Result (most recent):  CT ABDOMEN PELVIS WO IV CONTRAST 02/27/2023    Narrative  EXAMINATION: CT ABDOMEN PELVIS WO CONTRAST 2/27/2023 4:55 PM    ACCESSION NUMBER: [id number]    COMPARISON: CT abdomen/pelvis 12/25/2022 and earlier. INDICATION: Incisional hernia with obstruction, without gangrene; Cutaneous  abscess of abdominal wall; Non-pressure chronic ulcer of skin of other sites  with necrosis of muscle    TECHNIQUE: Contiguous axial computed tomographic images were obtained from the  domes of the diaphragm to the symphysis pubis without intravenous contrast.  Coronal and sagittal reformats are provided. Please note that the detection of solid organ and vascular abnormalities is  limited in the absence of intravenous contrast.    Radiation dose reduction techniques were used for this study. Our CT scanners  use one or all of the following: Automated exposure control, adjustment of the  mA and/or kV according to patient size, iterative reconstruction. FINDINGS:  LUNG BASES: No focal consolidation in the visualized lung bases. Calcified  coronary atherosclerosis. LIVER: Stable small hypodensities in the right hepatic lobe. No new liver  lesion. BILIARY TREE: The gallbladder is within normal limits. No biliary dilation. SPLEEN: Normal.    PANCREAS: No pancreatic mass or ductal dilation. ADRENALS: Normal.    KIDNEYS/BLADDER: The kidneys are symmetric in size. Innumerable bilateral renal  cysts, grossly similar to prior exam. Again, several cysts are somewhat  hyperdense and likely hemorrhagic. No hydronephrosis. No ureteral calculus.  The  urinary bladder is nondistended, limiting its evaluation. BOWEL: Mild colonic diverticulosis without evidence of diverticulitis. No focal  colonic wall thickening. The small bowel is normal in caliber. No bowel wall  thickening. APPENDIX: The appendix is unremarkable. PERITONEUM/RETROPERITONEUM: The previously seen peritoneal dialysis catheter is  no longer visualized and is likely been removed. Small volume abdominal and  pelvic ascites. Present edema/stranding throughout the anterior  mesentery/peritoneum. No discrete drainable intra-abdominal fluid collection. No enlarged abdominal or pelvic lymph nodes. VESSELS: Abdominal aorta is normal in caliber with atherosclerotic  calcification. .    ABDOMINAL WALL: Anterior abdominal wall postsurgical changes. There is an 8.6 x  4.5 cm gas and fluid collection in the anterior abdominal wall subcutaneous  tissues with possible extension into the anterior most aspect of the abdomen. Fat and fluid containing left inguinal hernia. REPRODUCTIVE: Unremarkable. BONES: No acute osseous abnormality. Multilevel degenerative changes of the  lumbar spine, similar prior. Multifocal endplate lucencies, unchanged from prior  and possibly reflecting Schmorl nodes. Impression  -Anterior abdominal wall postsurgical changes. 8.6 cm gas and fluid collection  in the anterior abdominal wall at the surgical site with slight extension into  the anterior abdominal cavity, concerning for abscess.    -Interval increased mesenteric stranding in the anterior abdomen. This may be  reactive, though superimposed peritonitis is also a consideration.    -Small-volume abdominal and pelvic ascites. Interval removal of previously seen  peritoneal dialysis catheter.    -Polycystic kidney disease, similar to prior.    -Additional chronic/incidental findings, as a result above.       DC4  The significant findings in this report have been referred to the Imaging  Navigator in order to communicate to the referring provider or his/her designee  as outlined in Section II.C.2.a.ii-iii of the ACR Practice Guideline for  Communication of Diagnostic Imaging Findings. US Result (most recent):  US RETROPERITONEAL COMPLETE 01/10/2019    Narrative  Renal ultrasound, 1/10/2019    History: Kidney cyst.    Comparison: None. Technique: Grayscale and doppler images of the kidneys and bladder were obtained  using a 3-5 MHz linear transducer. Findings: The right kidney measures 11.3 cm, and the left kidney measures 10.7  cm in greatest longitudinal length. No stones or evidence of hydronephrosis is  seen. Renal parenchymal echogenicity is increased consistent with chronic  medical renal changes. Numerous simple and complex renal cortical lesions are  seen in the bilateral kidneys. Many of these appear cystic. The majority of  these are not well assessed given their very small size. No clearly worrisome  dominant solid lesion is seen. The urinary bladder is collapsed about a Rojas catheter and therefore not well  assessed. The visualized abdominal aorta is normal in caliber measuring up to  2.3 cm. The IVC is patent. Small to moderate ascites is seen in the right upper  quadrant, and bilateral lower quadrants. Impression  IMPRESSION:  1. Echogenic kidneys demonstrating numerous simple and complex likely cystic  lesions. Many lesions are not well characterized given their very small size  although no clearly worrisome dominant solid mass is seen. This appearance can  be seen with multiple etiologies. However, given the patient's history of renal  failure and dialysis, these are favored to represent acquired cystic renal  disease. 2. Small to moderate ascites which can suggest some degree of fluid overload.       Admission date (for inpatients): 2/28/2023   Day of Surgery  Procedure(s):  ABDOMEN INCISION AND DRAINAGE  HERNIA VENTRAL REPAIR  poss CATHETER REMOVAL PERITONEAL DIALYSIS        ASSESSMENT/PLAN:  @[facility]@  Principal Problem: Abdominal wall abscess  Resolved Problems:    * No resolved hospital problems. *     Patient Active Problem List    Diagnosis Date Noted    Localized peritonitis ([address] 12/26/2022     Priority: Medium    Recurrent ventral hernia with incarceration 12/26/2022     Priority: Medium    Infection due to peritoneal dialysis catheter ([address] 12/26/2022     Priority: Medium    Periumbilical abdominal pain 12/26/2022     Priority: Medium    Abdominal wall abscess 12/26/2022     Priority: Medium    Necrotizing fasciitis ([address] 12/26/2022     Priority: Medium    Abdominal wall ulcer, with necrosis of muscle ([address] 12/26/2022     Priority: Medium    Peritonitis ([address] 12/25/2022     Priority: Medium    Peritoneal dialysis status ([address] 12/25/2022     Priority: Medium    Anemia 12/25/2022     Priority: Medium    Hypomagnesemia 12/25/2022     Priority: Medium    DNR (do not resuscitate) 12/25/2022     Priority: Medium    Soft tissue mass      Priority: Medium    Kidney stone 10/14/2022     Added automatically from request for surgery [id number]      Other hyperlipidemia 04/21/2022    ESRD (end stage renal disease) ([address] 05/04/2021    Non-recurrent unilateral inguinal hernia without obstruction or gangrene 12/22/2020    Hypertensive urgency 06/24/2017    Essential hypertension 89/34/1790    Umbilical hernia 29/90/0969          Number and Complexity of Problems addressed and   Risks of complications and/or morbidity of management        Recurrent, incarcerated ventral hernia  Localized peritonitis    12/26/22    1. Open drainage of localized peritonitis   2. Open repair of recurrent incarcerated ventral hernia with Vicryl mesh   3. Removal of tunneled intraperitoneal dialysis catheter  4. Debridement of skin, subcutaneous adipose, muscle, and fascia for abdominal wall necrotizing infection       Wound VAC with black foam 3 times a week initially   He was then converted to 1/4\" iodoform packing daily.     He had purulent drainage from the wound orifice which is too small to pack adequately. He is here for debridement of abd wall wound and possible drainage of anterior peritonitis on  2/28/23 (see CT 2/27/23)      Informed consent was obtained for operative debridement and irrigation of the area with possible drainage of anterior peritonitis. I ordered pre-op CT imaging but it did not get done until late on 2/27/23 after I called Radiology scheduling. I placed him on doxycycline pre-op        Leukocytosis-->resolved  WBC 7.9k  Cxs from OR 12/26/22 grew alpha Strep  On IV Vanc and Zosyn --> PO as outpt-->off abx-->restart (doxy) on 2/22/23    Abdominal pain -->resolved    Dialysis dependence  PD cath (originally placed in 2014 by Dr Logan Peter) was removed for infection on 12/26/22  Nephrology following for HD, pt has a patent LUE AVF                     Level of MDM (2/3 elements below)  Number and Complexity of Problems Addressed Amount and/or Complexity of Data to be Reviewed and Analyzed  *Each unique test, order, or document contributes to the combination of 2 or combination of 3 in Category 1 below. Risk of Complications and/or Morbidity or Mortality of pt Management     91363  08969 SF Minimal  ?1self-limited or minor problem Minimal or none Minimal risk of morbidity from additional diagnostic testing or Rx   77119  48648 Low Low  ? 2or more self-limited or minor problems;    or  ? 1stable chronic illness;    or  ?1acute, uncomplicated illness or injury   Limited  (Must meet the requirements of at least 1 of the 2 categories)  Category 1: Tests and documents   ? Any combination of 2 from the following:  ?Review of prior external note(s) from each unique source*;  ?review of the result(s) of each unique test*;   ?ordering of each unique test*    or   Category 2: Assessment requiring an independent historian(s)  (For the categories of independent interpretation of tests and discussion of management or test interpretation, see moderate or high) Low risk of morbidity from additional diagnostic testing or treatment     58778  63827 Mod Moderate  ? 1or more chronic illnesses with exacerbation, progression, or side effects of treatment;    or  ?2or more stable chronic illnesses;    or  ?1undiagnosed new problem with uncertain prognosis;    or  ?1acute illness with systemic symptoms;    or  ?1acute complicated injury   Moderate  (Must meet the requirements of at least 1 out of 3 categories)  Category 1: Tests, documents, or independent historian(s)  ? Any combination of 3 from the following:   ?Review of prior external note(s) from each unique source*;  ?Review of the result(s) of each unique test*;  ?Ordering of each unique test*;  ?Assessment requiring an independent historian(s)    or  Category 2: Independent interpretation of tests   ? Independent interpretation of a test performed by another physician/other qualified health care professional (not separately reported);     or  Category 3: Discussion of management or test interpretation  ? Discussion of management or test interpretation with external physician/other qualified health care professional/appropriate source (not separately reported)   Moderate risk of morbidity from additional diagnostic testing or treatment  Examples only:  ?Prescription drug management   ? Decision regarding minor surgery with identified patient or procedure risk factors  ? Decision regarding elective major surgery without identified patient or procedure risk factors   ? Diagnosis or treatment significantly limited by social determinants of health       [id number] High High  ? 1or more chronic illnesses with severe exacerbation, progression, or side effects of treatment;    or  ?1 acute or chronic illness or injury that poses a threat to life or bodily function   Extensive  (Must meet the requirements of at least 2 out of 3 categories)  Category 1: Tests, documents, or independent historian(s)  ? Any combination of 3 from the following:   ?Review of prior external note(s) from each unique source*;  ?Review of the result(s) of each unique test*;   ?Ordering of each unique test*;   ?Assessment requiring an independent historian(s)    or   Category 2: Independent interpretation of tests   ? Independent interpretation of a test performed by another physician/other qualified health care professional (not separately reported);     or  Category 3: Discussion of management or test interpretation  ? Discussion of management or test interpretation with external physician/other qualified health care professional/appropriate source (not separately reported)   High risk of morbidity from additional diagnostic testing or treatment  Examples only:  ?Drug therapy requiring intensive monitoring for toxicity  ? Decision regarding elective major surgery with identified patient or procedure risk factors  ? Decision regarding emergency major surgery  ? Decision regarding hospitalization  ? Decision not to resuscitate or to de-escalate care because of poor prognosis             I have personally performed a face-to-face diagnostic evaluation and management  service on this patient. I have independently seen the patient. I have independently obtained the above history from the patient/family. I have independently examined the patient with above findings. I have independently reviewed data/labs for this patient and developed the above plan of care (MDM).       Signed: Jagdish Rodrigues MD  2/28/2023    7:09 AM

## 2023-02-28 ENCOUNTER — ANESTHESIA EVENT (OUTPATIENT)
Dept: SURGERY | Age: 63
End: 2023-02-28
Payer: MEDICARE

## 2023-02-28 ENCOUNTER — HOSPITAL ENCOUNTER (OUTPATIENT)
Age: 63
Setting detail: OUTPATIENT SURGERY
Discharge: HOME | End: 2023-02-28
Attending: SURGERY | Admitting: SURGERY
Payer: MEDICARE

## 2023-02-28 ENCOUNTER — ANESTHESIA (OUTPATIENT)
Dept: SURGERY | Age: 63
End: 2023-02-28
Payer: MEDICARE

## 2023-02-28 VITALS
TEMPERATURE: 97.2 F | RESPIRATION RATE: 17 BRPM | HEIGHT: 77 IN | OXYGEN SATURATION: 98 % | WEIGHT: 240 LBS | SYSTOLIC BLOOD PRESSURE: 179 MMHG | DIASTOLIC BLOOD PRESSURE: 107 MMHG | BODY MASS INDEX: 28.34 KG/M2 | HEART RATE: 72 BPM

## 2023-02-28 VITALS
OXYGEN SATURATION: 100 % | HEART RATE: 85 BPM | RESPIRATION RATE: 18 BRPM | SYSTOLIC BLOOD PRESSURE: 110 MMHG | TEMPERATURE: 98.7 F | DIASTOLIC BLOOD PRESSURE: 80 MMHG

## 2023-02-28 DIAGNOSIS — L02.211 ABSCESS OF ABDOMINAL WALL: ICD-10-CM

## 2023-02-28 DIAGNOSIS — L98.493 ABDOMINAL WALL ULCER, WITH NECROSIS OF MUSCLE (HCC): ICD-10-CM

## 2023-02-28 DIAGNOSIS — M72.6 NECROTIZING FASCIITIS (HCC): Primary | ICD-10-CM

## 2023-02-28 LAB
ANION GAP SERPL CALC-SCNC: 5 MMOL/L (ref 2–11)
BUN SERPL-MCNC: 28 MG/DL (ref 8–23)
CALCIUM SERPL-MCNC: 10.1 MG/DL (ref 8.3–10.4)
CHLORIDE SERPL-SCNC: 107 MMOL/L (ref 101–110)
CO2 SERPL-SCNC: 27 MMOL/L (ref 21–32)
CREAT SERPL-MCNC: 8.9 MG/DL (ref 0.8–1.5)
GLUCOSE SERPL-MCNC: 82 MG/DL (ref 65–100)
POTASSIUM SERPL-SCNC: 4.4 MMOL/L (ref 3.5–5.1)
SODIUM SERPL-SCNC: 139 MMOL/L (ref 133–143)

## 2023-02-28 PROCEDURE — 87075 CULTR BACTERIA EXCEPT BLOOD: CPT

## 2023-02-28 PROCEDURE — 7100000000 HC PACU RECOVERY - FIRST 15 MIN: Performed by: SURGERY

## 2023-02-28 PROCEDURE — 3600000002 HC SURGERY LEVEL 2 BASE: Performed by: SURGERY

## 2023-02-28 PROCEDURE — 7100000010 HC PHASE II RECOVERY - FIRST 15 MIN: Performed by: SURGERY

## 2023-02-28 PROCEDURE — 6370000000 HC RX 637 (ALT 250 FOR IP): Performed by: ANESTHESIOLOGY

## 2023-02-28 PROCEDURE — 49020 DRAINAGE ABDOM ABSCESS OPEN: CPT | Performed by: SURGERY

## 2023-02-28 PROCEDURE — 7100000001 HC PACU RECOVERY - ADDTL 15 MIN: Performed by: SURGERY

## 2023-02-28 PROCEDURE — 2580000003 HC RX 258: Performed by: SURGERY

## 2023-02-28 PROCEDURE — 80048 BASIC METABOLIC PNL TOTAL CA: CPT

## 2023-02-28 PROCEDURE — 3700000000 HC ANESTHESIA ATTENDED CARE: Performed by: SURGERY

## 2023-02-28 PROCEDURE — 2500000003 HC RX 250 WO HCPCS: Performed by: NURSE ANESTHETIST, CERTIFIED REGISTERED

## 2023-02-28 PROCEDURE — 3600000012 HC SURGERY LEVEL 2 ADDTL 15MIN: Performed by: SURGERY

## 2023-02-28 PROCEDURE — 6360000002 HC RX W HCPCS: Performed by: NURSE ANESTHETIST, CERTIFIED REGISTERED

## 2023-02-28 PROCEDURE — 6360000002 HC RX W HCPCS: Performed by: ANESTHESIOLOGY

## 2023-02-28 PROCEDURE — 2580000003 HC RX 258: Performed by: ANESTHESIOLOGY

## 2023-02-28 PROCEDURE — A4217 STERILE WATER/SALINE, 500 ML: HCPCS | Performed by: SURGERY

## 2023-02-28 PROCEDURE — 6360000002 HC RX W HCPCS: Performed by: SURGERY

## 2023-02-28 PROCEDURE — 99024 POSTOP FOLLOW-UP VISIT: CPT | Performed by: SURGERY

## 2023-02-28 PROCEDURE — 3700000001 HC ADD 15 MINUTES (ANESTHESIA): Performed by: SURGERY

## 2023-02-28 PROCEDURE — 11043 DBRDMT MUSC&/FSCA 1ST 20/<: CPT | Performed by: SURGERY

## 2023-02-28 PROCEDURE — 11046 DBRDMT MUSC&/FSCA EA ADDL: CPT | Performed by: SURGERY

## 2023-02-28 PROCEDURE — 87070 CULTURE OTHR SPECIMN AEROBIC: CPT

## 2023-02-28 PROCEDURE — 2709999900 HC NON-CHARGEABLE SUPPLY: Performed by: SURGERY

## 2023-02-28 RX ORDER — SODIUM CHLORIDE 9 MG/ML
INJECTION, SOLUTION INTRAVENOUS PRN
Status: DISCONTINUED | OUTPATIENT
Start: 2023-02-28 | End: 2023-02-28 | Stop reason: HOSPADM

## 2023-02-28 RX ORDER — OXYCODONE HYDROCHLORIDE 5 MG/1
5 TABLET ORAL ONCE
Status: COMPLETED | OUTPATIENT
Start: 2023-02-28 | End: 2023-02-28

## 2023-02-28 RX ORDER — SODIUM CHLORIDE, SODIUM LACTATE, POTASSIUM CHLORIDE, CALCIUM CHLORIDE 600; 310; 30; 20 MG/100ML; MG/100ML; MG/100ML; MG/100ML
INJECTION, SOLUTION INTRAVENOUS CONTINUOUS
Status: DISCONTINUED | OUTPATIENT
Start: 2023-02-28 | End: 2023-02-28 | Stop reason: HOSPADM

## 2023-02-28 RX ORDER — SODIUM CHLORIDE 9 MG/ML
INJECTION, SOLUTION INTRAVENOUS CONTINUOUS
Status: DISCONTINUED | OUTPATIENT
Start: 2023-02-28 | End: 2023-02-28 | Stop reason: HOSPADM

## 2023-02-28 RX ORDER — PROCHLORPERAZINE EDISYLATE 5 MG/ML
5 INJECTION INTRAMUSCULAR; INTRAVENOUS
Status: DISCONTINUED | OUTPATIENT
Start: 2023-02-28 | End: 2023-02-28 | Stop reason: HOSPADM

## 2023-02-28 RX ORDER — SODIUM CHLORIDE 0.9 % (FLUSH) 0.9 %
5-40 SYRINGE (ML) INJECTION EVERY 12 HOURS SCHEDULED
Status: DISCONTINUED | OUTPATIENT
Start: 2023-02-28 | End: 2023-02-28 | Stop reason: HOSPADM

## 2023-02-28 RX ORDER — LIDOCAINE HYDROCHLORIDE 10 MG/ML
1 INJECTION, SOLUTION INFILTRATION; PERINEURAL
Status: DISCONTINUED | OUTPATIENT
Start: 2023-02-28 | End: 2023-02-28 | Stop reason: HOSPADM

## 2023-02-28 RX ORDER — NEOSTIGMINE METHYLSULFATE 1 MG/ML
INJECTION, SOLUTION INTRAVENOUS PRN
Status: DISCONTINUED | OUTPATIENT
Start: 2023-02-28 | End: 2023-02-28 | Stop reason: SDUPTHER

## 2023-02-28 RX ORDER — FAMOTIDINE 20 MG/1
20 TABLET, FILM COATED ORAL ONCE
Status: COMPLETED | OUTPATIENT
Start: 2023-02-28 | End: 2023-02-28

## 2023-02-28 RX ORDER — PROPOFOL 10 MG/ML
INJECTION, EMULSION INTRAVENOUS PRN
Status: DISCONTINUED | OUTPATIENT
Start: 2023-02-28 | End: 2023-02-28 | Stop reason: SDUPTHER

## 2023-02-28 RX ORDER — FENTANYL CITRATE 50 UG/ML
INJECTION, SOLUTION INTRAMUSCULAR; INTRAVENOUS PRN
Status: DISCONTINUED | OUTPATIENT
Start: 2023-02-28 | End: 2023-02-28 | Stop reason: SDUPTHER

## 2023-02-28 RX ORDER — ROCURONIUM BROMIDE 10 MG/ML
INJECTION, SOLUTION INTRAVENOUS PRN
Status: DISCONTINUED | OUTPATIENT
Start: 2023-02-28 | End: 2023-02-28 | Stop reason: SDUPTHER

## 2023-02-28 RX ORDER — GLYCOPYRROLATE 0.2 MG/ML
INJECTION INTRAMUSCULAR; INTRAVENOUS PRN
Status: DISCONTINUED | OUTPATIENT
Start: 2023-02-28 | End: 2023-02-28 | Stop reason: SDUPTHER

## 2023-02-28 RX ORDER — ONDANSETRON 2 MG/ML
INJECTION INTRAMUSCULAR; INTRAVENOUS PRN
Status: DISCONTINUED | OUTPATIENT
Start: 2023-02-28 | End: 2023-02-28 | Stop reason: SDUPTHER

## 2023-02-28 RX ORDER — SODIUM CHLORIDE 0.9 % (FLUSH) 0.9 %
5-40 SYRINGE (ML) INJECTION PRN
Status: DISCONTINUED | OUTPATIENT
Start: 2023-02-28 | End: 2023-02-28 | Stop reason: HOSPADM

## 2023-02-28 RX ORDER — MIDAZOLAM HYDROCHLORIDE 2 MG/2ML
2 INJECTION, SOLUTION INTRAMUSCULAR; INTRAVENOUS
Status: COMPLETED | OUTPATIENT
Start: 2023-02-28 | End: 2023-02-28

## 2023-02-28 RX ORDER — FENTANYL CITRATE 50 UG/ML
100 INJECTION, SOLUTION INTRAMUSCULAR; INTRAVENOUS
Status: DISCONTINUED | OUTPATIENT
Start: 2023-02-28 | End: 2023-02-28 | Stop reason: HOSPADM

## 2023-02-28 RX ORDER — LIDOCAINE HYDROCHLORIDE 20 MG/ML
INJECTION, SOLUTION EPIDURAL; INFILTRATION; INTRACAUDAL; PERINEURAL PRN
Status: DISCONTINUED | OUTPATIENT
Start: 2023-02-28 | End: 2023-02-28 | Stop reason: SDUPTHER

## 2023-02-28 RX ORDER — OXYCODONE HYDROCHLORIDE 5 MG/1
10 TABLET ORAL ONCE
Status: COMPLETED | OUTPATIENT
Start: 2023-02-28 | End: 2023-02-28

## 2023-02-28 RX ORDER — HYDROCODONE BITARTRATE AND ACETAMINOPHEN 5; 325 MG/1; MG/1
1-2 TABLET ORAL EVERY 8 HOURS PRN
Qty: 28 TABLET | Refills: 0 | Status: SHIPPED | OUTPATIENT
Start: 2023-02-28 | End: 2023-03-07

## 2023-02-28 RX ORDER — DOXYCYCLINE 100 MG/1
100 TABLET ORAL 2 TIMES DAILY
Qty: 28 TABLET | Refills: 2 | Status: SHIPPED | OUTPATIENT
Start: 2023-02-28 | End: 2023-03-14

## 2023-02-28 RX ORDER — HYDROMORPHONE HCL 110MG/55ML
0.5 PATIENT CONTROLLED ANALGESIA SYRINGE INTRAVENOUS EVERY 5 MIN PRN
Status: DISCONTINUED | OUTPATIENT
Start: 2023-02-28 | End: 2023-02-28 | Stop reason: HOSPADM

## 2023-02-28 RX ORDER — HYDROMORPHONE HCL 110MG/55ML
0.5 PATIENT CONTROLLED ANALGESIA SYRINGE INTRAVENOUS EVERY 5 MIN PRN
Status: COMPLETED | OUTPATIENT
Start: 2023-02-28 | End: 2023-02-28

## 2023-02-28 RX ORDER — DIPHENHYDRAMINE HYDROCHLORIDE 50 MG/ML
12.5 INJECTION INTRAMUSCULAR; INTRAVENOUS
Status: DISCONTINUED | OUTPATIENT
Start: 2023-02-28 | End: 2023-02-28 | Stop reason: HOSPADM

## 2023-02-28 RX ADMIN — FAMOTIDINE 20 MG: 20 TABLET, FILM COATED ORAL at 05:54

## 2023-02-28 RX ADMIN — PROPOFOL 150 MG: 10 INJECTION, EMULSION INTRAVENOUS at 07:13

## 2023-02-28 RX ADMIN — ROCURONIUM BROMIDE 50 MG: 50 INJECTION, SOLUTION INTRAVENOUS at 07:13

## 2023-02-28 RX ADMIN — LIDOCAINE HYDROCHLORIDE 100 MG: 20 INJECTION, SOLUTION EPIDURAL; INFILTRATION; INTRACAUDAL; PERINEURAL at 07:13

## 2023-02-28 RX ADMIN — OXYCODONE 10 MG: 5 TABLET ORAL at 08:48

## 2023-02-28 RX ADMIN — Medication 5 MG: at 08:13

## 2023-02-28 RX ADMIN — GLYCOPYRROLATE 0.6 MG: 0.2 INJECTION INTRAMUSCULAR; INTRAVENOUS at 08:13

## 2023-02-28 RX ADMIN — HYDROMORPHONE HYDROCHLORIDE 0.5 MG: 2 INJECTION, SOLUTION INTRAMUSCULAR; INTRAVENOUS; SUBCUTANEOUS at 08:44

## 2023-02-28 RX ADMIN — Medication 2000 MG: at 07:26

## 2023-02-28 RX ADMIN — SODIUM CHLORIDE: 9 INJECTION, SOLUTION INTRAVENOUS at 06:20

## 2023-02-28 RX ADMIN — HYDROMORPHONE HYDROCHLORIDE 0.5 MG: 2 INJECTION, SOLUTION INTRAMUSCULAR; INTRAVENOUS; SUBCUTANEOUS at 08:39

## 2023-02-28 RX ADMIN — MIDAZOLAM 2 MG: 1 INJECTION INTRAMUSCULAR; INTRAVENOUS at 06:52

## 2023-02-28 RX ADMIN — FENTANYL CITRATE 50 MCG: 50 INJECTION, SOLUTION INTRAMUSCULAR; INTRAVENOUS at 07:13

## 2023-02-28 RX ADMIN — SUGAMMADEX 100 MG: 100 INJECTION, SOLUTION INTRAVENOUS at 08:19

## 2023-02-28 RX ADMIN — HYDROMORPHONE HYDROCHLORIDE 0.5 MG: 2 INJECTION, SOLUTION INTRAMUSCULAR; INTRAVENOUS; SUBCUTANEOUS at 08:34

## 2023-02-28 RX ADMIN — HYDROMORPHONE HYDROCHLORIDE 0.5 MG: 2 INJECTION, SOLUTION INTRAMUSCULAR; INTRAVENOUS; SUBCUTANEOUS at 08:29

## 2023-02-28 RX ADMIN — SUGAMMADEX 100 MG: 100 INJECTION, SOLUTION INTRAVENOUS at 08:20

## 2023-02-28 RX ADMIN — ONDANSETRON 4 MG: 2 INJECTION INTRAMUSCULAR; INTRAVENOUS at 07:48

## 2023-02-28 ASSESSMENT — PAIN SCALES - GENERAL
PAINLEVEL_OUTOF10: 9
PAINLEVEL_OUTOF10: 5
PAINLEVEL_OUTOF10: 9
PAINLEVEL_OUTOF10: 9
PAINLEVEL_OUTOF10: 7

## 2023-02-28 ASSESSMENT — PAIN DESCRIPTION - LOCATION
LOCATION: ABDOMEN

## 2023-02-28 ASSESSMENT — PAIN DESCRIPTION - DESCRIPTORS: DESCRIPTORS: SORE

## 2023-02-28 ASSESSMENT — PAIN - FUNCTIONAL ASSESSMENT: PAIN_FUNCTIONAL_ASSESSMENT: 0-10

## 2023-02-28 NOTE — OP NOTE
[facility]  OPERATIVE REPORT    Name:  Judah Garcia  MR#:  [id number]  :  1960  ACCOUNT #:  [de-identified]  DATE OF SERVICE:  2023    PREOPERATIVE DIAGNOSES:  1. Open abdominal wall wound with cellulitis and debris. 2.  Recent emergent surgery for necrotizing fasciitis caused by infected PD catheter infecting old Prolene mesh in umbilical region. 3.  Preoperative CT imaging on 2023 identifying 8.6 x 4.5 cm gas and fluid collection in the anterior abdominal wall with possible extension into the anterior most aspect of the abdomen. POSTOPERATIVE DIAGNOSES:  1. Open abdominal wall wound with cellulitis and debris. 2.  Recent emergent surgery for necrotizing fasciitis caused by infected PD catheter infecting old Prolene mesh in umbilical region. 3.  Preoperative CT imaging on 2023 identifying 8.6 x 4.5 cm gas and fluid collection in the anterior abdominal wall with possible extension into the anterior most aspect of the abdomen. 4.  Localized anterior abdominal peritonitis. PROCEDURE PERFORMED:  1. Open drainage of localized peritonitis (CPT 65408). 2.  Sharp excisional debridement of abdominal wall wound for devitalized skin, subcutaneous adipose, and fascia, 116 cm2 (CPT 49470 - 51, 25769, + listed five times). SURGEON:  Dayanna Jack MD    ASSISTANT:  None. ANESTHESIA:  General.    COMPLICATIONS:  None. SPECIMENS REMOVED:  Cultures sent to Microbiology. IMPLANTS:  Kerlix roll packing. ESTIMATED BLOOD LOSS:  Less than 40 mL. PROCEDURE:  The patient was taken to the operating room, placed in supine position. After adequate anesthesia was given, his abdomen was prepped and draped in sterile fashion with multiple layers of chlorhexidine. He had an iodine allergy. Ancef was given preoperatively. His umbilical skin had two draining sinuses. These were probed. Purulent drainage was noted. A culture was sent to Microbiology.   We used a #15 scalpel and cautery as well as curved Gold scissors to sharply excise and debride devitalized skin, subcutaneous adipose, omentum, fascia, old mesh fragments, and PDS suture material from the abdominal wall wound. This was a large and deep infected space which had limited access from the skin surface for home health nursing, as he only had two small sinuses which were just a few millimeters in size and the connection from the surface of the skin down to the deeper collection and debris was not a straight tract. We excised all the tissue anteriorly to allow wound exposure. We debrided the walls. There were fibrinopurulent exudative changes all over the wound. The wound was extended medially and laterally and we took skin from this area as well to help with exposure for dressing changes postoperatively. We irrigated the wound with vancomycin irrigation. Our dissection and debridement extended below fascia, as the Vicryl mesh was disrupted and there was a lot of purulence and debris deep to the fascia. Localized peritonitis was drained from the anterior abdomen as much as possible. We were careful not to dissect into bowel, although with the anatomy deep in the incision so inflamed and ischemic, it was difficult to tell bowel wall from omentum. No bile staining or feculent drainage was noted intraoperatively to suggest a bowel fistula. The patient had an open abdominal wound and open fascia. This could not be closed. We irrigated with vancomycin irrigation and packed the wound with Kerlix. The wound cavity debrided had a 4 x 7 cm base and was more than 4 cm deep. Once the wound was packed with Kerlix, it was covered with 4x4s and tape. The patient tolerated the procedure well. There were no immediate complications.       Tara Bishop MD [id number]  D:  02/28/2023 8:25  T:  02/28/2023 11:46  JOB #:  [id number]

## 2023-02-28 NOTE — ANESTHESIA PROCEDURE NOTES
Airway  Date/Time: 2/28/2023 7:17 AM  Urgency: elective    Airway not difficult    General Information and Staff    Patient location during procedure: OR  Resident/CRNA: LEROY Nieto - CRNA    Indications and Patient Condition  Indications for airway management: anesthesia and airway protection  Spontaneous Ventilation: absent  Sedation level: deep  Preoxygenated: yes  Patient position: sniffing  MILS maintained throughout  Mask difficulty assessment: vent by bag mask + OA or adjuvant +/- NMBA    Final Airway Details  Final airway type: endotracheal airway      Successful airway: ETT  Cuffed: yes   Successful intubation technique: direct laryngoscopy  Endotracheal tube insertion site: oral  Blade: Carpenter  Blade size: #3  ETT size (mm): 8.0  Cormack-Lehane Classification: grade I - full view of glottis  Placement verified by: chest auscultation and capnometry   Measured from: lips  ETT to lips (cm): 24  Number of attempts at approach: 1  Ventilation between attempts: bag mask  Number of other approaches attempted: 1    no

## 2023-02-28 NOTE — ANESTHESIA PRE PROCEDURE
Department of Anesthesiology  Preprocedure Note       Name:  Yadira Crowell   Age:  65 y.o.  :  1960                                          MRN:  [id number]         Date:  2023      Surgeon: Kendy Reeves):  Jesi Reza MD    Procedure: Procedure(s):  DEBRIDEMENT ABDOMINAL WOUND    Medications prior to admission:   Prior to Admission medications    Medication Sig Start Date End Date Taking? Authorizing Provider   doxycycline monohydrate (ADOXA) 100 MG tablet Take 1 tablet by mouth 2 times daily for 14 days 2/22/23 3/8/23  Jesi Reza MD   hyoscyamine (LEVSIN/SL) 125 MCG sublingual tablet Place 0.125 mg (1 tablet) under the tongue every 4 hours as needed (bladder spasms)  Patient not taking: Reported on 2023   Historical Provider, MD   finasteride (PROSCAR) 5 MG tablet Take 1 tablet by mouth daily 23   Dorothy Rashid MD   Cholecalciferol (D3 PO) Take 125 mcg by mouth daily    Refugio Mendez MD   diphenhydrAMINE-APAP, sleep, (TYLENOL PM EXTRA STRENGTH)  MG tablet Take 2 tablets by mouth at bedtime. Refugio Mendez MD   Multiple Vitamin (MULTIVITAMIN PO) Take 1 tablet by mouth daily.  Dialyvite multivitamin for dialysis patients  23  Solomon Mustafa MD   traZODone (DESYREL) 50 MG tablet Take 1 tablet by mouth nightly 22   Refugio Mendez MD   B Complex-C-Folic Acid (DIALYVITE PO) Take 1 tablet by mouth daily 3/2/21   Historical Provider, MD   cinacalcet (SENSIPAR) 90 MG tablet Take 60 mg by mouth daily take 2 tabs by mouth daily    Ar Automatic Reconciliation   cloNIDine (CATAPRES) 0.3 MG/24HR PTWK Place 1 patch onto the skin every 3 days     Ar Automatic Reconciliation   finasteride (PROSCAR) 5 MG tablet Take 5 mg by mouth at bedtime 22   Ar Automatic Reconciliation   ondansetron (ZOFRAN) 4 MG tablet Take 4 mg by mouth every 8 hours as needed    Ar Automatic Reconciliation       Current medications:    No current facility-administered medications for this visit. No current outpatient medications on file. Facility-Administered Medications Ordered in Other Visits   Medication Dose Route Frequency Provider Last Rate Last Admin    lidocaine 1 % injection 1 mL  1 mL IntraDERmal Once PRN NEERAJ Duran MD        fentaNYL (SUBLIMAZE) injection 100 mcg  100 mcg IntraVENous Once PRN Pamella Bueno MD        lactated ringers IV soln infusion   IntraVENous Continuous Pamella Bueno MD        sodium chloride flush 0.9 % injection 5-40 mL  5-40 mL IntraVENous 2 times per day Pamella Bueno MD        sodium chloride flush 0.9 % injection 5-40 mL  5-40 mL IntraVENous PRN Pamella Bueno MD        0.9 % sodium chloride infusion   IntraVENous PRN Pamella Bueno MD        sodium chloride flush 0.9 % injection 5-40 mL  5-40 mL IntraVENous 2 times per day Dayanna MD Marcie        sodium chloride flush 0.9 % injection 5-40 mL  5-40 mL IntraVENous PRN Dayanna MD Marcie        0.9 % sodium chloride infusion   IntraVENous PRN Dayanna Jack MD        ceFAZolin (ANCEF) 2000 mg in sterile water 20 mL IV syringe  2,000 mg IntraVENous Once Dayanna Jack MD        0.9 % sodium chloride infusion   IntraVENous Continuous Pamella Bueno  mL/hr at 02/28/23 0620 New Bag at 02/28/23 0620       Allergies:     Allergies   Allergen Reactions    Iodine Other (See Comments)     Iodine Dye r/t kidney function       Problem List:    Patient Active Problem List   Diagnosis Code    Umbilical hernia E69.5    Hypertensive urgency I16.0    Essential hypertension I10    ESRD (end stage renal disease) ([address] N18.6    Non-recurrent unilateral inguinal hernia without obstruction or gangrene K40.90    Other hyperlipidemia E78.49    Soft tissue mass M79.89    Kidney stone N20.0    Peritonitis ([address] K65.9    Peritoneal dialysis status ([address] Z99.2    Anemia D64.9    Hypomagnesemia E83.42    DNR (do not resuscitate) Z66    Localized peritonitis ([address] K65.9    Recurrent ventral hernia with incarceration K43.0    Infection due to peritoneal dialysis catheter ([address] X79.60FB    Periumbilical abdominal pain R10.33    Abdominal wall abscess L02. 80    Necrotizing fasciitis ([address] M72.6    Abdominal wall ulcer, with necrosis of muscle ([address] L98.493       Past Medical History:        Diagnosis Date    Anemia     Anxiety     Arthritis     CAD (coronary artery disease)     per pt- no MI- \"They said the back of my heart was something. ..like 40%\"    Chronic kidney disease     followed by [facility]; stage 5; pt has AV fistula to left arm; PERITONEAL DIALYSIS daily since 2014    Chronic pain     CKD (chronic kidney disease)     Former smoker     Fracture, tibial plateau 05/41/6840    GERD (gastroesophageal reflux disease)     Heart disease     Hyperlipidemia     on med for control    Hypertension     controlled with med    Hypertensive nephrosclerosis     per nephrology note 4/14/14    Hypoactive thyroid     Kidney stone     Peritoneal dialysis catheter in place [facility])     Peritoneal dialysis-associated peritonitis ([address] 06/24/2017    during hs    PUD (peptic ulcer disease)     SBP (spontaneous bacterial peritonitis) ([address] 09/24/2016    Sepsis ([address] 09/24/2016    Tibial plateau fracture, left 77/95/1456    Umbilical hernia     Unspecified sleep apnea     pt does not have a CPAP       Past Surgical History:        Procedure Laterality Date    ABDOMEN SURGERY Right 6/13/2022    RIGHT LOWER QUADRANT ABDOMEN LESION BIOPSY EXCISION performed by Yunier Cervantes MD at [address] [facility] 18 N/A 12/26/2022    ABDOMEN INCISION AND DRAINAGE, VENTRAL HERNIA  REPAIRE WITH MESH.  REMOVAL PD CATH performed by Emily Echevarria MD at [facility] Right 1995    tip of 5th finger    COLONOSCOPY N/A 3/23/2018    COLONOSCOPY  BMI 34 performed by Jacob Murillo MD at [facility] WITH BILATERAL RETROGRADE PYELOGRAMS    CYSTOSCOPY Left 2022    CYSTOSCOPY, LEFT URETEROSCOPY, BASKET STONE EXTRACTION, LEFT URETERAL STENT INSERTION performed by Charu Avendano MD at [address] [place] [address] Drive Left as a child    thumb    ORTHOPEDIC SURGERY Left     knee    ORTHOPEDIC SURGERY Left 2017    LEFT LATERAL TIBIAL PLATEAU OPEN REDUCTION INTERNAL FIXATION    MN UNLISTED PROCEDURE ABDOMEN PERITONEUM & OMENTUM  14    PD catheter placement    SKIN BIOPSY Left 2022    LEFT BUTTOCK MASS EXCISION performed by Dash Leal MD at [address] [place] [address] VASCULAR SURGERY Left     AV fistula to arm       Social History:    Social History     Tobacco Use    Smoking status: Former     Packs/day: 0.50     Years: 15.00     Pack years: 7.50     Types: Cigarettes     Quit date: 3/1/2014     Years since quittin.0    Smokeless tobacco: Never    Tobacco comments:     Quit smoking: pt states only smoked cigarettes when drinking alcohol   Substance Use Topics    Alcohol use: Yes                                Counseling given: Not Answered  Tobacco comments: Quit smoking: pt states only smoked cigarettes when drinking alcohol      Vital Signs (Current): There were no vitals filed for this visit.                                            BP Readings from Last 3 Encounters:   23 (!) 171/96   23 130/88   23 138/76       NPO Status:                                                                                 BMI:   Wt Readings from Last 3 Encounters:   23 240 lb (108.9 kg)   23 248 lb (112.5 kg)   23 248 lb (112.5 kg)     There is no height or weight on file to calculate BMI.    CBC:   Lab Results   Component Value Date/Time    WBC 10.0 2022 01:09 PM    RBC 2.92 2022 01:09 PM    HGB 9.2 2022 01:09 PM    HCT 27.8 2022 01:09 PM    MCV 95.2 2022 01:09 PM    RDW 15.2 2022 01:09 PM     12/31/2022 01:09 PM       CMP:   Lab Results   Component Value Date/Time     02/28/2023 06:07 AM    K 4.4 02/28/2023 06:07 AM     02/28/2023 06:07 AM    CO2 27 02/28/2023 06:07 AM    BUN 28 02/28/2023 06:07 AM    CREATININE 8.90 02/28/2023 06:07 AM    GFRAA 4 07/06/2022 02:01 AM    LABGLOM 6 02/28/2023 06:07 AM    LABGLOM 4 04/21/2022 04:02 PM    GLUCOSE 82 02/28/2023 06:07 AM    PROT 5.5 12/29/2022 05:54 PM    CALCIUM 10.1 02/28/2023 06:07 AM    BILITOT 1.1 12/29/2022 05:54 PM    ALKPHOS 51 12/29/2022 05:54 PM    ALKPHOS 71 04/21/2022 04:02 PM    AST 10 12/29/2022 05:54 PM    ALT 9 12/29/2022 05:54 PM       POC Tests:   No results for input(s): POCGLU, POCNA, POCK, POCCL, POCBUN, POCHEMO, POCHCT in the last 72 hours.     Coags: No results found for: PROTIME, INR, APTT    HCG (If Applicable): No results found for: PREGTESTUR, PREGSERUM, HCG, HCGQUANT     ABGs: No results found for: PHART, PO2ART, MGJ2PVZ, YOA8XPU, BEART, Q2ZFUBEU     Type & Screen (If Applicable):  No results found for: LABABO, LABRH    Drug/Infectious Status (If Applicable):  Lab Results   Component Value Date/Time    HEPCAB NONREACTIVE 12/27/2022 08:48 AM       COVID-19 Screening (If Applicable): No results found for: COVID19        Anesthesia Evaluation  Patient summary reviewed and Nursing notes reviewed  Airway: Mallampati: II  TM distance: >3 FB   Neck ROM: full  Mouth opening: > = 3 FB   Dental: normal exam   (+) poor dentition      Pulmonary:normal exam  breath sounds clear to auscultation  (+) sleep apnea: on noncompliant,                             Cardiovascular:  Exercise tolerance: good (>4 METS),   (+) hypertension:, CAD: no interval change,         Rhythm: regular  Rate: normal                    Neuro/Psych:   Negative Neuro/Psych ROS              GI/Hepatic/Renal:   (+) GERD: well controlled, PUD, renal disease: ESRD and dialysis,           Endo/Other:    (+) hypothyroidism::., .                 Abdominal: Vascular: Other Findings: Pt presenting with suspected peritonitis being treated with IV abx. Noted to have sig fluid collection and hernia around umbilicus. , now for hernia repair             Anesthesia Plan      general     ASA 3     (ETT)  Induction: intravenous. MIPS: Postoperative opioids intended and Prophylactic antiemetics administered. Anesthetic plan and risks discussed with patient.                         Andrea Rosario MD   2/28/2023

## 2023-02-28 NOTE — PROGRESS NOTES
Discharge instructions given and reviewed with patient and caregiver.   Time allowed for questions and answers

## 2023-02-28 NOTE — ANESTHESIA POSTPROCEDURE EVALUATION
Department of Anesthesiology  Postprocedure Note    Patient: Jame Mejía  MRN: [id number]  YOB: 1960  Date of evaluation: 2/28/2023      Procedure Summary     Date: 02/28/23 Room / Location: [facility] OP OR 05 / [facility]    Anesthesia Start: 0701 Anesthesia Stop: 0715    Procedure: [address], DRAINAGE OF PERITINITIS.  (Abdomen) Diagnosis:       Abscess of abdominal wall      (Abscess of abdominal wall [L02.211])    Surgeons: Arnoldo Nazario MD Responsible Provider: Ana Mitchell MD    Anesthesia Type: General ASA Status: 3          Anesthesia Type: General    Chico Phase I: [place]ico Score: 10    [place] Phase II: [place] Score: 10      Anesthesia Post Evaluation    Patient location during evaluation: PACU  Patient participation: complete - patient participated  Level of consciousness: awake and alert  Airway patency: patent  Nausea & Vomiting: no nausea and no vomiting  Complications: no  Cardiovascular status: hemodynamically stable  Respiratory status: acceptable, nonlabored ventilation and spontaneous ventilation  Hydration status: euvolemic  Comments: BP (!) 179/107   Pulse 72   Temp 97.2 °F (36.2 °C) (Temporal)   Resp 17   Ht 6' 5\" (1.956 m)   Wt 240 lb (108.9 kg)   SpO2 98%   BMI 28.46 kg/m²     Multimodal analgesia pain management approach

## 2023-03-01 ENCOUNTER — HOME CARE VISIT (OUTPATIENT)
Dept: SCHEDULING | Facility: HOME HEALTH | Age: 63
End: 2023-03-01
Payer: MEDICARE

## 2023-03-01 PROCEDURE — G0299 HHS/HOSPICE OF RN EA 15 MIN: HCPCS

## 2023-03-02 ENCOUNTER — HOME CARE VISIT (OUTPATIENT)
Dept: SCHEDULING | Facility: HOME HEALTH | Age: 63
End: 2023-03-02
Payer: MEDICARE

## 2023-03-02 VITALS
RESPIRATION RATE: 18 BRPM | TEMPERATURE: 98.3 F | HEART RATE: 98 BPM | OXYGEN SATURATION: 96 % | SYSTOLIC BLOOD PRESSURE: 148 MMHG | DIASTOLIC BLOOD PRESSURE: 90 MMHG

## 2023-03-02 VITALS
TEMPERATURE: 97.3 F | RESPIRATION RATE: 18 BRPM | SYSTOLIC BLOOD PRESSURE: 130 MMHG | DIASTOLIC BLOOD PRESSURE: 88 MMHG | HEART RATE: 80 BPM | OXYGEN SATURATION: 100 %

## 2023-03-02 LAB
BACTERIA SPEC CULT: ABNORMAL
BACTERIA SPEC CULT: ABNORMAL
GRAM STN SPEC: ABNORMAL
GRAM STN SPEC: ABNORMAL
SERVICE CMNT-IMP: ABNORMAL

## 2023-03-02 PROCEDURE — G0299 HHS/HOSPICE OF RN EA 15 MIN: HCPCS

## 2023-03-02 ASSESSMENT — ENCOUNTER SYMPTOMS: PAIN LOCATION - PAIN QUALITY: ACHE

## 2023-03-03 ENCOUNTER — HOME CARE VISIT (OUTPATIENT)
Dept: SCHEDULING | Facility: HOME HEALTH | Age: 63
End: 2023-03-03
Payer: MEDICARE

## 2023-03-03 LAB
BACTERIA SPEC CULT: NORMAL
SERVICE CMNT-IMP: NORMAL

## 2023-03-03 PROCEDURE — 1090000001 HH PPS REVENUE CREDIT

## 2023-03-03 PROCEDURE — 400014 HH F/U

## 2023-03-03 PROCEDURE — G0299 HHS/HOSPICE OF RN EA 15 MIN: HCPCS

## 2023-03-03 PROCEDURE — 1090000002 HH PPS REVENUE DEBIT

## 2023-03-04 ENCOUNTER — HOME CARE VISIT (OUTPATIENT)
Dept: SCHEDULING | Facility: HOME HEALTH | Age: 63
End: 2023-03-04

## 2023-03-04 PROCEDURE — 1090000001 HH PPS REVENUE CREDIT

## 2023-03-04 PROCEDURE — 1090000002 HH PPS REVENUE DEBIT

## 2023-03-04 PROCEDURE — G0299 HHS/HOSPICE OF RN EA 15 MIN: HCPCS

## 2023-03-05 ENCOUNTER — HOME CARE VISIT (OUTPATIENT)
Dept: SCHEDULING | Facility: HOME HEALTH | Age: 63
End: 2023-03-05
Payer: MEDICARE

## 2023-03-05 VITALS
RESPIRATION RATE: 18 BRPM | SYSTOLIC BLOOD PRESSURE: 138 MMHG | TEMPERATURE: 97.8 F | DIASTOLIC BLOOD PRESSURE: 78 MMHG | OXYGEN SATURATION: 100 % | HEART RATE: 89 BPM

## 2023-03-05 PROCEDURE — 1090000001 HH PPS REVENUE CREDIT

## 2023-03-05 PROCEDURE — G0299 HHS/HOSPICE OF RN EA 15 MIN: HCPCS

## 2023-03-05 PROCEDURE — 1090000002 HH PPS REVENUE DEBIT

## 2023-03-05 ASSESSMENT — ENCOUNTER SYMPTOMS: PAIN LOCATION - PAIN QUALITY: SORE

## 2023-03-06 ENCOUNTER — HOME CARE VISIT (OUTPATIENT)
Dept: SCHEDULING | Facility: HOME HEALTH | Age: 63
End: 2023-03-06
Payer: MEDICARE

## 2023-03-06 VITALS
RESPIRATION RATE: 18 BRPM | SYSTOLIC BLOOD PRESSURE: 130 MMHG | OXYGEN SATURATION: 99 % | HEART RATE: 76 BPM | DIASTOLIC BLOOD PRESSURE: 76 MMHG | TEMPERATURE: 98.6 F

## 2023-03-06 PROCEDURE — G0299 HHS/HOSPICE OF RN EA 15 MIN: HCPCS

## 2023-03-06 PROCEDURE — 1090000001 HH PPS REVENUE CREDIT

## 2023-03-06 PROCEDURE — 1090000002 HH PPS REVENUE DEBIT

## 2023-03-06 ASSESSMENT — ENCOUNTER SYMPTOMS: PAIN LOCATION - PAIN QUALITY: ACHING

## 2023-03-07 ENCOUNTER — HOME CARE VISIT (OUTPATIENT)
Dept: SCHEDULING | Facility: HOME HEALTH | Age: 63
End: 2023-03-07
Payer: MEDICARE

## 2023-03-07 VITALS
RESPIRATION RATE: 18 BRPM | OXYGEN SATURATION: 100 % | HEART RATE: 92 BPM | TEMPERATURE: 98.6 F | SYSTOLIC BLOOD PRESSURE: 122 MMHG | DIASTOLIC BLOOD PRESSURE: 60 MMHG

## 2023-03-07 VITALS
SYSTOLIC BLOOD PRESSURE: 140 MMHG | TEMPERATURE: 97.9 F | DIASTOLIC BLOOD PRESSURE: 80 MMHG | HEART RATE: 94 BPM | OXYGEN SATURATION: 100 % | RESPIRATION RATE: 18 BRPM

## 2023-03-07 VITALS
SYSTOLIC BLOOD PRESSURE: 120 MMHG | DIASTOLIC BLOOD PRESSURE: 80 MMHG | TEMPERATURE: 98.1 F | OXYGEN SATURATION: 98 % | RESPIRATION RATE: 19 BRPM | HEART RATE: 80 BPM

## 2023-03-07 PROCEDURE — 1090000001 HH PPS REVENUE CREDIT

## 2023-03-07 PROCEDURE — 1090000002 HH PPS REVENUE DEBIT

## 2023-03-07 PROCEDURE — G0299 HHS/HOSPICE OF RN EA 15 MIN: HCPCS

## 2023-03-07 ASSESSMENT — ENCOUNTER SYMPTOMS
PAIN LOCATION - PAIN QUALITY: ACHE
PAIN LOCATION - PAIN QUALITY: ACHE
STOOL DESCRIPTION: FORMED

## 2023-03-08 ENCOUNTER — HOME CARE VISIT (OUTPATIENT)
Dept: SCHEDULING | Facility: HOME HEALTH | Age: 63
End: 2023-03-08
Payer: MEDICARE

## 2023-03-08 VITALS
HEART RATE: 87 BPM | SYSTOLIC BLOOD PRESSURE: 140 MMHG | RESPIRATION RATE: 18 BRPM | DIASTOLIC BLOOD PRESSURE: 80 MMHG | TEMPERATURE: 97.5 F | OXYGEN SATURATION: 100 %

## 2023-03-08 LAB
BACTERIA SPEC CULT: NORMAL
SERVICE CMNT-IMP: NORMAL

## 2023-03-08 PROCEDURE — 1090000001 HH PPS REVENUE CREDIT

## 2023-03-08 PROCEDURE — 1090000002 HH PPS REVENUE DEBIT

## 2023-03-08 PROCEDURE — G0299 HHS/HOSPICE OF RN EA 15 MIN: HCPCS

## 2023-03-09 ENCOUNTER — HOME CARE VISIT (OUTPATIENT)
Dept: SCHEDULING | Facility: HOME HEALTH | Age: 63
End: 2023-03-09
Payer: MEDICARE

## 2023-03-09 VITALS
OXYGEN SATURATION: 98 % | TEMPERATURE: 97.4 F | SYSTOLIC BLOOD PRESSURE: 128 MMHG | HEART RATE: 78 BPM | RESPIRATION RATE: 18 BRPM | DIASTOLIC BLOOD PRESSURE: 80 MMHG

## 2023-03-09 PROCEDURE — 1090000002 HH PPS REVENUE DEBIT

## 2023-03-09 PROCEDURE — G0299 HHS/HOSPICE OF RN EA 15 MIN: HCPCS

## 2023-03-09 PROCEDURE — 1090000001 HH PPS REVENUE CREDIT

## 2023-03-10 ENCOUNTER — HOME CARE VISIT (OUTPATIENT)
Dept: SCHEDULING | Facility: HOME HEALTH | Age: 63
End: 2023-03-10
Payer: MEDICARE

## 2023-03-10 VITALS
SYSTOLIC BLOOD PRESSURE: 140 MMHG | HEART RATE: 90 BPM | DIASTOLIC BLOOD PRESSURE: 80 MMHG | RESPIRATION RATE: 18 BRPM | RESPIRATION RATE: 18 BRPM | TEMPERATURE: 98.5 F | OXYGEN SATURATION: 99 % | SYSTOLIC BLOOD PRESSURE: 136 MMHG | HEART RATE: 82 BPM | TEMPERATURE: 97.2 F | DIASTOLIC BLOOD PRESSURE: 68 MMHG | OXYGEN SATURATION: 100 %

## 2023-03-10 PROCEDURE — G0299 HHS/HOSPICE OF RN EA 15 MIN: HCPCS

## 2023-03-10 PROCEDURE — 1090000001 HH PPS REVENUE CREDIT

## 2023-03-10 PROCEDURE — 1090000002 HH PPS REVENUE DEBIT

## 2023-03-11 ENCOUNTER — HOME CARE VISIT (OUTPATIENT)
Dept: SCHEDULING | Facility: HOME HEALTH | Age: 63
End: 2023-03-11
Payer: MEDICARE

## 2023-03-11 VITALS
HEART RATE: 75 BPM | DIASTOLIC BLOOD PRESSURE: 100 MMHG | SYSTOLIC BLOOD PRESSURE: 150 MMHG | RESPIRATION RATE: 18 BRPM | OXYGEN SATURATION: 100 % | TEMPERATURE: 98.1 F

## 2023-03-11 PROCEDURE — 1090000001 HH PPS REVENUE CREDIT

## 2023-03-11 PROCEDURE — G0299 HHS/HOSPICE OF RN EA 15 MIN: HCPCS

## 2023-03-11 PROCEDURE — 1090000002 HH PPS REVENUE DEBIT

## 2023-03-11 ASSESSMENT — ENCOUNTER SYMPTOMS
SKIN LESIONS: 1
PAIN LOCATION - PAIN QUALITY: IRRITATION
STOOL DESCRIPTION: LOOSE

## 2023-03-12 ENCOUNTER — HOME CARE VISIT (OUTPATIENT)
Dept: SCHEDULING | Facility: HOME HEALTH | Age: 63
End: 2023-03-12
Payer: MEDICARE

## 2023-03-12 VITALS
SYSTOLIC BLOOD PRESSURE: 154 MMHG | DIASTOLIC BLOOD PRESSURE: 100 MMHG | HEART RATE: 83 BPM | OXYGEN SATURATION: 99 % | RESPIRATION RATE: 18 BRPM | TEMPERATURE: 98.6 F

## 2023-03-12 PROCEDURE — G0299 HHS/HOSPICE OF RN EA 15 MIN: HCPCS

## 2023-03-12 PROCEDURE — 1090000002 HH PPS REVENUE DEBIT

## 2023-03-12 PROCEDURE — 1090000001 HH PPS REVENUE CREDIT

## 2023-03-12 ASSESSMENT — ENCOUNTER SYMPTOMS: STOOL DESCRIPTION: FORMED

## 2023-03-13 ENCOUNTER — HOME CARE VISIT (OUTPATIENT)
Dept: SCHEDULING | Facility: HOME HEALTH | Age: 63
End: 2023-03-13
Payer: MEDICARE

## 2023-03-13 ENCOUNTER — OFFICE VISIT (OUTPATIENT)
Dept: SURGERY | Age: 63
End: 2023-03-13
Payer: MEDICARE

## 2023-03-13 VITALS — HEIGHT: 77 IN | WEIGHT: 240 LBS | BODY MASS INDEX: 28.34 KG/M2

## 2023-03-13 DIAGNOSIS — M72.6 NECROTIZING FASCIITIS (HCC): ICD-10-CM

## 2023-03-13 DIAGNOSIS — S31.109D OPEN WOUND OF ABDOMINAL WALL, SUBSEQUENT ENCOUNTER: ICD-10-CM

## 2023-03-13 DIAGNOSIS — K65.9 LOCALIZED PERITONITIS (HCC): Primary | ICD-10-CM

## 2023-03-13 PROCEDURE — 11043 DBRDMT MUSC&/FSCA 1ST 20/<: CPT | Performed by: SURGERY

## 2023-03-13 NOTE — PROGRESS NOTES
H&P/Consult Note/Progress Note/Office Note:   Miranda Manriquez  MRN: [id number]  :1960  Age:62 y.o.    HPI: Miranda Manriquez is a 65 y.o. male who is s/p abd wall wound debridement and possible drainage of anterior peritonitis on 23. He is s/p open drainage of localized peritonitis, debridement of necrotizing fasciitis of abdominal wall infection, removal of infected PD cath,   and repair of recurrent incarcerated ventral hernia with Vicryl mesh on 22. Prior to surgery he was admitted by the hospitalist with sepsis on 22 after he presented to the ER with a 2-week history of progressive abdominal pain and bulging. He reported a prior recurrent ventral hernia for many years following open umbilical hernia repair with Prolene mesh by Dr. Catrina Jackman on 2014. He had a PD cath placed at that time and is dialysis dependent. Recently however he developed progressive and severe abdominal pain associated with worsening bulging and tenderness. He also described an associated eschar of the umbilical skin recently  This was associated with a change in the color of the effluent from the PD catheter. He has had associated N/V, tachycardia, and leukocytosis on admission. He was placed on vancomycin and Zosyn. CT imaging was performed as shown below and general surgery consultation was obtained. He described a prior episode of peritonitis without abscess or hernia treated with Abx in approximately . He described having an AV fistula for dialysis placed many years ago which has never been used. He wants to stop having peritoneal dialysis and convert to hemodialysis. 22 CT abd/pelvis withoral but no IV contrast  Hx: Yellowish solution after peritoneal dialysis, concern for peritonitis       FINDINGS: Evaluation of the hollow and solid viscera is limited by the lack of intravenous contrast.   Dependent atelectasis is present.    CT ABDOMEN: The stomach is unremarkable. Innumerable cysts are noted throughout the bilateral kidneys unchanged from the prior exam in keeping with polycystic kidney disease. Multiple of the cysts are hyperdense and likely hemorrhagic. There is no hydronephrosis. No renal or ureteral stones evident. The adrenal glands are normal. An umbilical hernia is present in the lower mid abdomen at the insertion site of the peritoneal dialysis catheter. There is a fluid collection within the hernia sac concerning for possible seroma or abscess that measures 5.7 x 3.2 x 6.0 cm. There are herniated bowel loops as well without bowel obstruction. The abdominal wall defect measures roughly 7.5 cm. CT PELVIS: The bowel is normal in course, caliber, and wall thickness. No bowel obstruction evident. The appendix is normal. The peritoneal dialysis catheter coils in the central pelvis and a small amount of ascitic fluid. The bladder is decompressed. There is no inguinal hernia. The rectum is unremarkable. No aggressive bone lesions identified. Impression:  1. Moderate-sized umbilical hernia with herniated loops of bowel and fluid. The fluid collection measures roughly 5.7 x 6.0 cm. Seroma or abscess could be considered. 2. Polycystic kidney disease. There is no hydronephrosis. 2/27/23 CT abd/pelvis without IV contrast      Hx: Incisional hernia with obstruction, without gangrene; Cutaneous abscess of abdominal wall; Non-pressure chronic ulcer of skin of other sites with necrosis of muscle       FINDINGS: LUNG BASES: No focal consolidation in the visualized lung bases. Calcified coronary atherosclerosis. LIVER: Stable small hypodensities in the right hepatic lobe. No new liver lesion. BILIARY TREE: The gallbladder is within normal limits. No biliary dilation. SPLEEN: Normal.     PANCREAS: No pancreatic mass or ductal dilation. ADRENALS: Normal.     KIDNEYS/BLADDER: The kidneys are symmetric in size.  Innumerable bilateral renal cysts, grossly similar to prior exam. Again, several cysts are somewhat hyperdense and likely hemorrhagic. No hydronephrosis. No ureteral calculus. The urinary bladder is nondistended, limiting its evaluation. BOWEL: Mild colonic diverticulosis without evidence of diverticulitis. No focal colonic wall thickening. The small bowel is normal in caliber. No bowel wall thickening. APPENDIX: The appendix is unremarkable. PERITONEUM/RETROPERITONEUM: The previously seen peritoneal dialysis catheter is no longer visualized and is likely been removed. Small volume abdominal and pelvic ascites. Present edema/stranding throughout the anterior mesentery/peritoneum. No discrete drainable intra-abdominal fluid collection. No enlarged abdominal or pelvic lymph nodes. VESSELS: Abdominal aorta is normal in caliber with atherosclerotic calcification. ABDOMINAL WALL: Anterior abdominal wall postsurgical changes. There is an 8.6 x 4.5 cm gas and fluid collection in the anterior abdominal wall subq tissues with possible extension into the anterior most aspect of the abdomen. Fat and fluid containing left inguinal hernia. REPRODUCTIVE: Unremarkable. BONES: No acute osseous abnormality. Multilevel degenerative changes of the lumbar spine, similar prior. Multifocal endplate lucencies, unchanged from prior and possibly reflecting Schmorl nodes     Impression  -Anterior abdominal wall postsurgical changes. 8.6 cm gas and fluid collection in the anterior abdominal wall at the surgical site   with slight extension into the anterior abdominal cavity, concerning for abscess.     -Interval increased mesenteric stranding in the anterior abdomen. This may be reactive, though superimposed peritonitis is also a consideration.     -Small-volume abdominal and pelvic ascites.  Interval removal of previously seen peritoneal dialysis catheter.     -Polycystic kidney disease, similar to prior.     -Additional chronic/incidental findings, as a result above         Additional hx:  12/27/22 POD1: Patient alert and conversant. Abdominal pain is minimal.  Binder in place with surgical dressing CDI. LUE AVF pulsatile. WBC 13.2. Cr 13.3 K+4.4 AF/VSS. -Flatus/-BM  12/28/22 POD2 up in chair; comfortable; AF; Hg lower and subq Heparin on hold; await return of bowel function  12/29/22 POD3 feels better each day; abd dressing changes; 2 breanne left in place  12/30/22 POD4 Tolerating clears so far this am;  Abd breanne changed by Dr Graff Roles this am or tomorrow on PO Abx    1/23/23 POD28 office visit; feels well. No abdominal pain. No cellulitis. Wound VAC is being changed 3 times a week as outpatient. 2/6/23 no nausea vomiting or abdominal pain. The umbilical skin is swollen under the wound VAC and the black foam is not being packed into the wound orifice as the wound entry site is too small. Converted to 1/4\" Iodoform. 2/22/23 office visit; purulent drainage from small opening in umb region    2/28/23 OR wound debridement and drainage of anterior abd peritonitis  3/13/23 office visit; dressing change          Past Medical History:   Diagnosis Date    Anemia     Anxiety     Arthritis     CAD (coronary artery disease)     per pt- no MI- \"They said the back of my heart was something. ..like 40%\"    Chronic kidney disease     [facility]; stage 5; pt has AV fistula to left arm; PERITONEAL DIALYSIS daily since 2014    Chronic pain     CKD (chronic kidney disease)     Former smoker     Fracture, tibial plateau 67/86/1754    GERD (gastroesophageal reflux disease)     Heart disease     Hyperlipidemia     on med for control    Hypertension     controlled with med    Hypertensive nephrosclerosis     per nephrology note 4/14/14    Hypoactive thyroid     Kidney stone     Peritoneal dialysis catheter in place [facility])     Peritoneal dialysis-associated peritonitis ([address] 06/24/2017    during hs    PUD (peptic ulcer disease)     SBP (spontaneous bacterial peritonitis) [address] 09/24/2016    Sepsis ([facility] 09/24/2016    Tibial plateau fracture, left 84/96/3566    Umbilical hernia     Unspecified sleep apnea     pt does not have a CPAP     Past Surgical History:   Procedure Laterality Date    ABDOMEN SURGERY Right 6/13/2022    RIGHT LOWER QUADRANT ABDOMEN LESION BIOPSY EXCISION performed by Amparo Farr MD at [address] N/A 12/26/2022    ABDOMEN INCISION AND DRAINAGE, VENTRAL HERNIA  REPAIRE WITH MESH. REMOVAL PD CATH performed by Justo Painting MD at [address] N/A 2/28/2023    DEBRIDEMENT ABDOMINAL WOUND, DRAINAGE OF PERITINITIS. performed by Justo Painting MD at [address] [facility] Right 1995    tip of 5th finger    COLONOSCOPY N/A 3/23/2018    COLONOSCOPY  BMI 34 performed by Tolu Engle MD at [address] PYELOGRAMS    CYSTOSCOPY Left 11/1/2022    CYSTOSCOPY, LEFT URETEROSCOPY, BASKET STONE EXTRACTION, LEFT URETERAL STENT INSERTION performed by Isa Samayoa MD at [facility] [address] Left as a child    thumb    ORTHOPEDIC SURGERY Left 1989    knee    ORTHOPEDIC SURGERY Left 2017    LEFT LATERAL TIBIAL PLATEAU OPEN REDUCTION INTERNAL FIXATION    SC UNLISTED PROCEDURE ABDOMEN PERITONEUM & OMENTUM  6/11/14    PD catheter placement    SKIN BIOPSY Left 6/13/2022    LEFT BUTTOCK MASS EXCISION performed by Amparo Farr MD at [address] [place] [address] Left 2012    AV fistula to arm     Current Outpatient Medications   Medication Sig    amLODIPine-valsartan-HCTZ -25 MG TABS Take 1 tablet by mouth Daily. valsartan-hydroCHLOROthiazide (DIOVAN-HCT) 320-25 MG per tablet Take 1 tablet by mouth daily.     doxycycline monohydrate (ADOXA) 100 MG tablet Take 1 tablet by mouth 2 times daily for 14 days    finasteride (PROSCAR) 5 MG tablet Take 1 tablet by mouth daily Cholecalciferol (D3 PO) Take 125 mcg by mouth daily    diphenhydrAMINE-APAP, sleep, (TYLENOL PM EXTRA STRENGTH)  MG tablet Take 2 tablets by mouth at bedtime. traZODone (DESYREL) 50 MG tablet Take 1 tablet by mouth nightly    B Complex-C-Folic Acid (DIALYVITE PO) Take 1 tablet by mouth daily    cinacalcet (SENSIPAR) 90 MG tablet Take 60 mg by mouth daily take 2 tabs by mouth daily    cloNIDine (CATAPRES) 0.3 MG/24HR PTWK Place 1 patch onto the skin every 3 days     finasteride (PROSCAR) 5 MG tablet Take 5 mg by mouth at bedtime    ondansetron (ZOFRAN) 4 MG tablet Take 4 mg by mouth every 8 hours as needed     No current facility-administered medications for this visit.      ALLERGIES:  Iodine    Social History     Socioeconomic History    Marital status: Single     Spouse name: None    Number of children: None    Years of education: None    Highest education level: None   Tobacco Use    Smoking status: Former     Packs/day: 0.50     Years: 15.00     Pack years: 7.50     Types: Cigarettes     Quit date: 3/1/2014     Years since quittin.0    Smokeless tobacco: Never    Tobacco comments:     Quit smoking: pt states only smoked cigarettes when drinking alcohol   Substance and Sexual Activity    Alcohol use: Yes    Drug use: Yes     Types: Marijuana Tara Andrews     Comment: \"when I can afford it\"      Social Determinants of Health     Financial Resource Strain: Low Risk     Difficulty of Paying Living Expenses: Not hard at all   Food Insecurity: No Food Insecurity    Worried About Running Out of Food in the Last Year: Never true    Ran Out of Food in the Last Year: Never true   Physical Activity: Insufficiently Active    Days of Exercise per Week: 1 day    Minutes of Exercise per Session: 20 min     Social History     Tobacco Use   Smoking Status Former    Packs/day: 0.50    Years: 15.00    Pack years: 7.50    Types: Cigarettes    Quit date: 3/1/2014    Years since quittin.0   Smokeless Tobacco Never   Tobacco Comments    Quit smoking: pt states only smoked cigarettes when drinking alcohol     Family History   Problem Relation Age of Onset    COPD Father     Heart Disease Father     Hypertension Mother     Diabetes Mother     Hypertension Father      ROS: The patient has no difficulty with chest pain or shortness of breath. No fever or chills. Comprehensive review of systems was otherwise unremarkable except as noted above. Physical Exam:   Ht 6' 5\" (1.956 m)   Wt 240 lb (108.9 kg)   BMI 28.46 kg/m²   Vitals:    03/13/23 0941   Weight: 240 lb (108.9 kg)   Height: 6' 5\" (1.956 m)       No intake/output data recorded. 12/28 1901 - 12/30 0700  In: 1165 [I.V.:665]  Out: 1500     Constitutional: Alert, oriented, cooperative patient in no acute distress; appears stated age    Eyes:Sclera are clear. EOMs intact  ENMT: no external lesions gross hearing normal; no obvious neck masses, no ear or lip lesions, nares normal  CV: RRR. Normal perfusion  Resp: No JVD. Breathing is  non-labored; no audible wheezing. GI: Ventral wound is granulating with a lot of debris which was removed on 3/13/2023 with sharp excisional technique. The wound is about 6 x 6 x 4 cm        Musculoskeletal: unremarkable with normal function. No embolic signs or cyanosis.  LUE with AVF pulsitile  Neuro:  Oriented; moves all 4; no focal deficits  Psychiatric: normal affect and mood, no memory impairment    Recent vitals (if inpt):  Patient Vitals for the past 24 hrs:   BP Temp Temp src Pulse Resp SpO2 Height Weight   12/30/22 1110 (!) 131/98 98.2 °F (36.8 °C) Oral 99 18 96 % -- --   12/30/22 0959 -- -- -- -- -- -- 6' 5\" (1.956 m) --   12/30/22 0717 (!) 133/109 98.6 °F (37 °C) Oral (!) 103 18 96 % -- --   12/30/22 0517 -- -- -- -- 19 -- -- --   12/30/22 0447 (!) 133/97 99 °F (37.2 °C) Oral 97 18 94 % -- --   12/29/22 2313 115/87 99.1 °F (37.3 °C) Oral (!) 102 17 95 % -- --   12/29/22 1944 (!) 129/94 98.4 °F (36.9 °C) Oral (!) 109 20 97 % -- -- 12/29/22 1931 -- -- -- -- -- -- -- 256 lb 5 oz (116.3 kg)   12/29/22 1542 106/82 100.3 °F (37.9 °C) Oral (!) 108 18 96 % -- --   12/29/22 1253 129/88 98.2 °F (36.8 °C) Oral (!) 106 18 99 % -- --       Amount and/or Complexity of Data Reviewed and Analyzed:  I reviewed and analyzed all of the unique labs and radiologic studies that are shown below as well as any that are in the HPI, and any that are in the expanded problem list below  *Each unique test, order, or document contributes to the combination of 2 or combination of 3 in Category 1 below. For this visit I also reviewed old records and prior notes. No results for input(s): WBC, HGB, PLT, NA, K, CL, CO2, BUN, CREA, GLU, INR, APTT, ALT, AML, AML, LCAD, PCO2, PO2, HCO3 in the last 72 hours.     Invalid input(s): PTP, TBIL, TBILI, CBIL, SGOT, GPT, AP, LPSE, NH4, TROPT, TROIQ,  PH      Review of most recent CBC  Lab Results   Component Value Date    WBC 10.0 12/31/2022    HGB 9.2 (L) 12/31/2022    HCT 27.8 (L) 12/31/2022    MCV 95.2 12/31/2022     12/31/2022       Review of most recent BMP  Lab Results   Component Value Date/Time     02/28/2023 06:07 AM    K 4.4 02/28/2023 06:07 AM     02/28/2023 06:07 AM    CO2 27 02/28/2023 06:07 AM    BUN 28 02/28/2023 06:07 AM    CREATININE 8.90 02/28/2023 06:07 AM    GLUCOSE 82 02/28/2023 06:07 AM    CALCIUM 10.1 02/28/2023 06:07 AM        Review of most recent LFTs (and lipase if done)  Lab Results   Component Value Date    ALT 9 (L) 12/29/2022    AST 10 (L) 12/29/2022    ALKPHOS 51 12/29/2022    BILITOT 1.1 12/29/2022     Lab Results   Component Value Date    LIPASE 88 12/25/2022       No results found for: INR, APTT, LCAD    Review of most recent HgbA1c  No results found for: LABA1C    Nutritional assessment screen for wound healing issues:  Lab Results   Component Value Date    LABALBU 1.8 (L) 12/29/2022         Xray Result (most recent):  XR CHEST PORTABLE 12/25/2022    Narrative  KUB    CLINICAL INDICATION: Sepsis    FINDINGS: Single AP view the chest compared to a similar exam dated 6/24/2017  show the lungs are slightly underexpanded but otherwise clear. No pleural  effusion or pneumothorax. The cardiac silhouette and mediastinum are  unremarkable. The bones are normal.    Impression  Slightly under expanded lungs, otherwise no acute abnormality. CT Result (most recent):  CT ABDOMEN PELVIS WO IV CONTRAST 02/27/2023    Narrative  EXAMINATION: CT ABDOMEN PELVIS WO CONTRAST 2/27/2023 4:55 PM    ACCESSION NUMBER: [id number]    COMPARISON: CT abdomen/pelvis 12/25/2022 and earlier. INDICATION: Incisional hernia with obstruction, without gangrene; Cutaneous  abscess of abdominal wall; Non-pressure chronic ulcer of skin of other sites  with necrosis of muscle    TECHNIQUE: Contiguous axial computed tomographic images were obtained from the  domes of the diaphragm to the symphysis pubis without intravenous contrast.  Coronal and sagittal reformats are provided. Please note that the detection of solid organ and vascular abnormalities is  limited in the absence of intravenous contrast.    Radiation dose reduction techniques were used for this study. Our CT scanners  use one or all of the following: Automated exposure control, adjustment of the  mA and/or kV according to patient size, iterative reconstruction. FINDINGS:  LUNG BASES: No focal consolidation in the visualized lung bases. Calcified  coronary atherosclerosis. LIVER: Stable small hypodensities in the right hepatic lobe. No new liver  lesion. BILIARY TREE: The gallbladder is within normal limits. No biliary dilation. SPLEEN: Normal.    PANCREAS: No pancreatic mass or ductal dilation. ADRENALS: Normal.    KIDNEYS/BLADDER: The kidneys are symmetric in size. Innumerable bilateral renal  cysts, grossly similar to prior exam. Again, several cysts are somewhat  hyperdense and likely hemorrhagic. No hydronephrosis. No ureteral calculus. The  urinary bladder is nondistended, limiting its evaluation. BOWEL: Mild colonic diverticulosis without evidence of diverticulitis. No focal  colonic wall thickening. The small bowel is normal in caliber. No bowel wall  thickening. APPENDIX: The appendix is unremarkable. PERITONEUM/RETROPERITONEUM: The previously seen peritoneal dialysis catheter is  no longer visualized and is likely been removed. Small volume abdominal and  pelvic ascites. Present edema/stranding throughout the anterior  mesentery/peritoneum. No discrete drainable intra-abdominal fluid collection. No enlarged abdominal or pelvic lymph nodes. VESSELS: Abdominal aorta is normal in caliber with atherosclerotic  calcification. .    ABDOMINAL WALL: Anterior abdominal wall postsurgical changes. There is an 8.6 x  4.5 cm gas and fluid collection in the anterior abdominal wall subcutaneous  tissues with possible extension into the anterior most aspect of the abdomen. Fat and fluid containing left inguinal hernia. REPRODUCTIVE: Unremarkable. BONES: No acute osseous abnormality. Multilevel degenerative changes of the  lumbar spine, similar prior. Multifocal endplate lucencies, unchanged from prior  and possibly reflecting Schmorl nodes. Impression  -Anterior abdominal wall postsurgical changes. 8.6 cm gas and fluid collection  in the anterior abdominal wall at the surgical site with slight extension into  the anterior abdominal cavity, concerning for abscess.    -Interval increased mesenteric stranding in the anterior abdomen. This may be  reactive, though superimposed peritonitis is also a consideration.    -Small-volume abdominal and pelvic ascites. Interval removal of previously seen  peritoneal dialysis catheter.    -Polycystic kidney disease, similar to prior.    -Additional chronic/incidental findings, as a result above.       DC4  The significant findings in this report have been referred to the Imaging  Navigator in order to communicate to the referring provider or his/her designee  as outlined in Section II.C.2.a.ii-iii of the ACR Practice Guideline for  Communication of Diagnostic Imaging Findings. US Result (most recent):  US RETROPERITONEAL COMPLETE 01/10/2019    Narrative  Renal ultrasound, 1/10/2019    History: Kidney cyst.    Comparison: None. Technique: Grayscale and doppler images of the kidneys and bladder were obtained  using a 3-5 MHz linear transducer. Findings: The right kidney measures 11.3 cm, and the left kidney measures 10.7  cm in greatest longitudinal length. No stones or evidence of hydronephrosis is  seen. Renal parenchymal echogenicity is increased consistent with chronic  medical renal changes. Numerous simple and complex renal cortical lesions are  seen in the bilateral kidneys. Many of these appear cystic. The majority of  these are not well assessed given their very small size. No clearly worrisome  dominant solid lesion is seen. The urinary bladder is collapsed about a Rojas catheter and therefore not well  assessed. The visualized abdominal aorta is normal in caliber measuring up to  2.3 cm. The IVC is patent. Small to moderate ascites is seen in the right upper  quadrant, and bilateral lower quadrants. Impression  IMPRESSION:  1. Echogenic kidneys demonstrating numerous simple and complex likely cystic  lesions. Many lesions are not well characterized given their very small size  although no clearly worrisome dominant solid mass is seen. This appearance can  be seen with multiple etiologies. However, given the patient's history of renal  failure and dialysis, these are favored to represent acquired cystic renal  disease. 2. Small to moderate ascites which can suggest some degree of fluid overload.       Admission date (for inpatients): (Not on file)   * No surgery found *  Procedure(s):  ABDOMEN INCISION AND DRAINAGE  HERNIA VENTRAL REPAIR  poss CATHETER REMOVAL PERITONEAL DIALYSIS        ASSESSMENT/PLAN:  [unfilled]  Active Problems:    * No active hospital problems. *  Resolved Problems:    * No resolved hospital problems. *     Patient Active Problem List    Diagnosis Date Noted    Localized peritonitis ([address] 12/26/2022     Priority: Medium     2/28/23 s/p open drainage of localized peritonitis with debridement of abd wall wound; Dr Yvette Hubbard      Recurrent ventral hernia with incarceration 12/26/2022     Priority: Medium    Infection due to peritoneal dialysis catheter ([address] 12/26/2022     Priority: Medium    Periumbilical abdominal pain 12/26/2022     Priority: Medium    Abscess of abdominal wall 12/26/2022     Priority: Medium    Necrotizing fasciitis ([address] 12/26/2022     Priority: Medium    Abdominal wall ulcer, with necrosis of muscle ([address] 12/26/2022     Priority: Medium    Peritonitis ([address] 12/25/2022     Priority: Medium    Peritoneal dialysis status ([address] 12/25/2022     Priority: Medium    Anemia 12/25/2022     Priority: Medium    Hypomagnesemia 12/25/2022     Priority: Medium    DNR (do not resuscitate) 12/25/2022     Priority: Medium    Soft tissue mass      Priority: Medium    Kidney stone 10/14/2022     Added automatically from request for surgery [id number]      Other hyperlipidemia 04/21/2022    ESRD (end stage renal disease) ([address] 05/04/2021    Non-recurrent unilateral inguinal hernia without obstruction or gangrene 12/22/2020    Hypertensive urgency 06/24/2017    Essential hypertension 55/70/2943    Umbilical hernia 96/74/7858          Number and Complexity of Problems addressed and   Risks of complications and/or morbidity of management        Recurrent, incarcerated ventral hernia  Localized peritonitis    12/26/22    1. Open drainage of localized peritonitis   2. Open repair of recurrent incarcerated ventral hernia with Vicryl mesh   3. Removal of tunneled intraperitoneal dialysis catheter  4.   Debridement of skin, subcutaneous adipose, muscle, and fascia for abdominal wall necrotizing infection     He is s/p debridement of abd wall wound and possible drainage of anterior peritonitis on  2/28/23 (see CT 2/27/23)  In the office on 3/13/2023 we sharply excised debrided the wound to remove devitalized skin subcu and fascia. Approximately 6 cm² wound surface there was debrided. The wound will be packed daily with minimally moist saline gauze. I will see him back in 2 to 3 weeks or sooner if needed. Leukocytosis-->resolved  WBC 7.9k  Cxs from OR 12/26/22 grew alpha Strep  On IV Vanc and Zosyn --> PO as outpt-->off abx-->restart (doxy) on 2/22/23    Abdominal pain -->resolved    Dialysis dependence  PD cath (originally placed in 2014 by Dr Raphael Castañeda) was removed for infection on 12/26/22  Nephrology following for HD, pt has a patent LUE AVF                     Level of MDM (2/3 elements below)  Number and Complexity of Problems Addressed Amount and/or Complexity of Data to be Reviewed and Analyzed  *Each unique test, order, or document contributes to the combination of 2 or combination of 3 in Category 1 below. Risk of Complications and/or Morbidity or Mortality of pt Management     69398  78314 SF Minimal  ?1self-limited or minor problem Minimal or none Minimal risk of morbidity from additional diagnostic testing or Rx   84347  72777 Low Low  ? 2or more self-limited or minor problems;    or  ? 1stable chronic illness;    or  ?1acute, uncomplicated illness or injury   Limited  (Must meet the requirements of at least 1 of the 2 categories)  Category 1: Tests and documents   ? Any combination of 2 from the following:  ?Review of prior external note(s) from each unique source*;  ?review of the result(s) of each unique test*;   ?ordering of each unique test*    or   Category 2: Assessment requiring an independent historian(s)  (For the categories of independent interpretation of tests and discussion of management or test interpretation, see moderate or high) Low risk of morbidity from additional diagnostic testing or treatment     34583  27368 Mod Moderate  ? 1or more chronic illnesses with exacerbation, progression, or side effects of treatment;    or  ?2or more stable chronic illnesses;    or  ?1undiagnosed new problem with uncertain prognosis;    or  ?1acute illness with systemic symptoms;    or  ?1acute complicated injury   Moderate  (Must meet the requirements of at least 1 out of 3 categories)  Category 1: Tests, documents, or independent historian(s)  ? Any combination of 3 from the following:   ?Review of prior external note(s) from each unique source*;  ?Review of the result(s) of each unique test*;  ?Ordering of each unique test*;  ?Assessment requiring an independent historian(s)    or  Category 2: Independent interpretation of tests   ? Independent interpretation of a test performed by another physician/other qualified health care professional (not separately reported);     or  Category 3: Discussion of management or test interpretation  ? Discussion of management or test interpretation with external physician/other qualified health care professional/appropriate source (not separately reported)   Moderate risk of morbidity from additional diagnostic testing or treatment  Examples only:  ?Prescription drug management   ? Decision regarding minor surgery with identified patient or procedure risk factors  ? Decision regarding elective major surgery without identified patient or procedure risk factors   ? Diagnosis or treatment significantly limited by social determinants of health       [id number] High High  ? 1or more chronic illnesses with severe exacerbation, progression, or side effects of treatment;    or  ?1 acute or chronic illness or injury that poses a threat to life or bodily function   Extensive  (Must meet the requirements of at least 2 out of 3 categories)  Category 1: Tests, documents, or independent historian(s)  ? Any combination of 3 from the following:   ?Review of prior external note(s) from each unique source*;  ?Review of the result(s) of each unique test*;   ?Ordering of each unique test*;   ?Assessment requiring an independent historian(s)    or   Category 2: Independent interpretation of tests   ? Independent interpretation of a test performed by another physician/other qualified health care professional (not separately reported);     or  Category 3: Discussion of management or test interpretation  ? Discussion of management or test interpretation with external physician/other qualified health care professional/appropriate source (not separately reported)   High risk of morbidity from additional diagnostic testing or treatment  Examples only:  ?Drug therapy requiring intensive monitoring for toxicity  ? Decision regarding elective major surgery with identified patient or procedure risk factors  ? Decision regarding emergency major surgery  ? Decision regarding hospitalization  ? Decision not to resuscitate or to de-escalate care because of poor prognosis             I have personally performed a face-to-face diagnostic evaluation and management  service on this patient. I have independently seen the patient. I have independently obtained the above history from the patient/family. I have independently examined the patient with above findings. I have independently reviewed data/labs for this patient and developed the above plan of care (MDM).       Signed: Kinza Eden MD  3/13/2023    9:46 AM

## 2023-03-14 ENCOUNTER — HOME CARE VISIT (OUTPATIENT)
Dept: SCHEDULING | Facility: HOME HEALTH | Age: 63
End: 2023-03-14
Payer: MEDICARE

## 2023-03-14 ENCOUNTER — PREP FOR PROCEDURE (OUTPATIENT)
Dept: VASCULAR SURGERY | Age: 63
End: 2023-03-14

## 2023-03-14 ENCOUNTER — OFFICE VISIT (OUTPATIENT)
Dept: VASCULAR SURGERY | Age: 63
End: 2023-03-14
Payer: MEDICARE

## 2023-03-14 VITALS
HEART RATE: 82 BPM | WEIGHT: 250 LBS | SYSTOLIC BLOOD PRESSURE: 144 MMHG | HEIGHT: 77 IN | BODY MASS INDEX: 29.52 KG/M2 | OXYGEN SATURATION: 100 % | DIASTOLIC BLOOD PRESSURE: 97 MMHG

## 2023-03-14 VITALS
HEART RATE: 98 BPM | TEMPERATURE: 97.7 F | SYSTOLIC BLOOD PRESSURE: 150 MMHG | DIASTOLIC BLOOD PRESSURE: 80 MMHG | OXYGEN SATURATION: 100 % | RESPIRATION RATE: 18 BRPM

## 2023-03-14 DIAGNOSIS — I73.9 PVD (PERIPHERAL VASCULAR DISEASE) WITH CLAUDICATION (HCC): Primary | ICD-10-CM

## 2023-03-14 PROCEDURE — G0299 HHS/HOSPICE OF RN EA 15 MIN: HCPCS

## 2023-03-14 PROCEDURE — 3074F SYST BP LT 130 MM HG: CPT | Performed by: SURGERY

## 2023-03-14 PROCEDURE — 3078F DIAST BP <80 MM HG: CPT | Performed by: SURGERY

## 2023-03-14 PROCEDURE — 99204 OFFICE O/P NEW MOD 45 MIN: CPT | Performed by: SURGERY

## 2023-03-14 RX ORDER — PREDNISONE 50 MG/1
50 TABLET ORAL SEE ADMIN INSTRUCTIONS
Qty: 3 TABLET | Refills: 0 | Status: SHIPPED | OUTPATIENT
Start: 2023-03-14

## 2023-03-14 NOTE — PROGRESS NOTES
[address] [address], [address]  [phone] FAX: [address]  1960    Chief Complaint   Patient presents with    New Patient     New Patient  Assessment for Sherron FLOYD   Mr. Kiana Pierre is a 65y.o. year old male who presents for evaluation of malfunctioning left arm fistula. Fistula placed about 9 years ago was recently on peritoneal dialysis had removed. Current Outpatient Medications   Medication Sig Dispense Refill    amLODIPine-valsartan-HCTZ -25 MG TABS Take 1 tablet by mouth Daily. valsartan-hydroCHLOROthiazide (DIOVAN-HCT) 320-25 MG per tablet Take 1 tablet by mouth daily. doxycycline monohydrate (ADOXA) 100 MG tablet Take 1 tablet by mouth 2 times daily for 14 days 28 tablet 2    finasteride (PROSCAR) 5 MG tablet Take 1 tablet by mouth daily 90 tablet 3    Cholecalciferol (D3 PO) Take 125 mcg by mouth daily      diphenhydrAMINE-APAP, sleep, (TYLENOL PM EXTRA STRENGTH)  MG tablet Take 2 tablets by mouth at bedtime. traZODone (DESYREL) 50 MG tablet Take 1 tablet by mouth nightly 90 tablet 1    B Complex-C-Folic Acid (DIALYVITE PO) Take 1 tablet by mouth daily      cinacalcet (SENSIPAR) 90 MG tablet Take 60 mg by mouth daily take 2 tabs by mouth daily      cloNIDine (CATAPRES) 0.3 MG/24HR PTWK Place 1 patch onto the skin every 3 days       finasteride (PROSCAR) 5 MG tablet Take 5 mg by mouth at bedtime      ondansetron (ZOFRAN) 4 MG tablet Take 4 mg by mouth every 8 hours as needed       No current facility-administered medications for this visit. Allergies   Allergen Reactions    Iodine Other (See Comments)     Iodine Dye r/t kidney function     Past Medical History:   Diagnosis Date    Anemia     Anxiety     Arthritis     CAD (coronary artery disease)     per pt- no MI- \"They said the back of my heart was something. ..like 40%\"    Chronic kidney disease     [facility]; pt has AV fistula to left arm; PERITONEAL DIALYSIS daily since 2014    Chronic pain     CKD (chronic kidney disease)     Former smoker     Fracture, tibial plateau 59/91/8606    GERD (gastroesophageal reflux disease)     Heart disease     Hyperlipidemia     on med for control    Hypertension     controlled with med    Hypertensive nephrosclerosis     per nephrology note 4/14/14    Hypoactive thyroid     Kidney stone     Peritoneal dialysis catheter in place [facility])     Peritoneal dialysis-associated peritonitis ([address] 06/24/2017    during hs    PUD (peptic ulcer disease)     SBP (spontaneous bacterial peritonitis) ([address] 09/24/2016    Sepsis ([address] 09/24/2016    Tibial plateau fracture, left 75/56/1742    Umbilical hernia     Unspecified sleep apnea     pt does not have a CPAP     Family History   Problem Relation Age of Onset    COPD Father     Heart Disease Father     Hypertension Mother     Diabetes Mother     Hypertension Father      Past Surgical History:   Procedure Laterality Date    ABDOMEN SURGERY Right 6/13/2022    RIGHT LOWER QUADRANT ABDOMEN LESION BIOPSY EXCISION performed by Tyler Medina MD at [address] [place] [address] 280 N/A 12/26/2022    ABDOMEN INCISION AND DRAINAGE, VENTRAL HERNIA  REPAIRE WITH MESH. REMOVAL PD CATH performed by Deb Goins MD at [address] [place] [address] 280 N/A 2/28/2023    DEBRIDEMENT ABDOMINAL WOUND, DRAINAGE OF PERITINITIS.  performed by Deb Goins MD at [address]    tip of 5th finger    COLONOSCOPY N/A 3/23/2018    COLONOSCOPY  BMI 34 performed by Bao Rich MD at [address] [facility]    CYSTOSCOPY Left 11/1/2022    CYSTOSCOPY, LEFT URETEROSCOPY, BASKET STONE EXTRACTION, LEFT URETERAL STENT INSERTION performed by Khadra Stuart MD at [facility] 141 Left as a child    thumb    ORTHOPEDIC SURGERY Left 1989 knee    ORTHOPEDIC SURGERY Left 2017    LEFT LATERAL TIBIAL PLATEAU OPEN REDUCTION INTERNAL FIXATION    LA UNLISTED PROCEDURE ABDOMEN PERITONEUM & OMENTUM  14    PD catheter placement    SKIN BIOPSY Left 2022    LEFT BUTTOCK MASS EXCISION performed by Koreen Heimlich, MD at [facility]    VASCULAR SURGERY Left     AV fistula to arm     Social History     Tobacco Use    Smoking status: Former     Packs/day: 0.50     Years: 15.00     Pack years: 7.50     Types: Cigarettes     Quit date: 3/1/2014     Years since quittin.0    Smokeless tobacco: Never    Tobacco comments:     Quit smoking: pt states only smoked cigarettes when drinking alcohol   Substance Use Topics    Alcohol use: Yes        Review of Systems  Constitutional: Negative for fever and chills. HENT: Negative for congestion and sore throat. Skin: Negative for rash and itching. Eyes: Negative for blurred vision and double vision. Respiratory: Negative for cough and shortness of breath. Cardiovascular: Negative for chest pain, palpitations, claudication and leg swelling. Gastrointestinal: Negative for nausea, vomiting and abdominal pain. Genitourinary: Negative for dysuria, hematuria and flank pain. Musculoskeletal: Negative for joint pain and falls. Neurological: Negative for dizziness, sensory change, focal weakness, loss of consciousness and headaches. PHYSICAL EXAM   [unfilled]     Constitutional: he appears well-developed. No distress. HENT: Hearing intact. Head: Atraumatic. Eyes: Pupils are equal, round, and reactive to light. Neck: Normal range of motion. Cardiovascular: Regular rhythm. Pulmonary/Chest: Effort normal and breath sounds normal. No respiratory distress. Abdominal: Soft. Bowel sounds are normal. he exhibits no distension. There is no tenderness. There is no guarding. No hernia. Musculoskeletal: Normal range of motion. Neurological: He is alert.  CN II- XII grossly intact  Skin: Warm and dry. Vascular: Palpable thrill and bruit      Imaging Results  Duplex study showed a patent fistula with some mild stenosis just before the antecubital fossa    ASSESSMENT AND PLAN   Mr. Norm Charles is a 65y.o. year old male malfunctioning left brachiocephalic fistula we will schedule patient for left arm fistulogram I will access to just above the first pseudoaneurysmal segment from his radial.  I discussed with the patient the risk of been procedure. We will schedule patient in the near future. Elements of this note have been dictated using speech recognition software. As a result, errors of speech recognition may have occurred. 50 minutes of time was spent on this consult with greater than 50% of time spent face-to-face with the patient discussing the disease process, education and treatment options.

## 2023-03-15 ENCOUNTER — HOME CARE VISIT (OUTPATIENT)
Dept: SCHEDULING | Facility: HOME HEALTH | Age: 63
End: 2023-03-15
Payer: MEDICARE

## 2023-03-15 VITALS
SYSTOLIC BLOOD PRESSURE: 140 MMHG | HEART RATE: 88 BPM | OXYGEN SATURATION: 100 % | RESPIRATION RATE: 18 BRPM | TEMPERATURE: 97 F | DIASTOLIC BLOOD PRESSURE: 90 MMHG

## 2023-03-15 PROCEDURE — G0299 HHS/HOSPICE OF RN EA 15 MIN: HCPCS

## 2023-03-16 ENCOUNTER — HOME CARE VISIT (OUTPATIENT)
Dept: SCHEDULING | Facility: HOME HEALTH | Age: 63
End: 2023-03-16
Payer: MEDICARE

## 2023-03-16 PROCEDURE — G0299 HHS/HOSPICE OF RN EA 15 MIN: HCPCS

## 2023-03-16 NOTE — PERIOP NOTE
Patient verified name and . Order for consent NOT found in EHR at time of PAT visit. Unable to verify case posting against order; surgery verified by patient. Type 1B surgery, PAT phone assessment complete. Orders not received. Labs per surgeon: unknown; no orders received    Labs per anesthesia protocol: Hgb, POC K+ s/h for DOS    Patient answered medical/surgical history questions at their best of ability. All prior to admission medications documented in EPIC. Patient instructed to take the following medications the day of surgery according to anesthesia guidelines with a small sip of water: clonidine. Take benadryl and prednisone as prescribed by surgeon. Hold all vitamins 7 days prior to surgery and NSAIDS 5 days prior to surgery. Prescription meds to hold:none  Patient instructed on the following:    > Arrive at [facility], time of arrival to be called the day before by 1700  > NPO after midnight, unless otherwise indicated, including gum, mints, and ice chips  > Responsible adult must drive patient to the hospital, stay during surgery, and patient will need supervision 24 hours after anesthesia  > Use antibacterial soap in shower the night before surgery and on the morning of surgery  > All piercings must be removed prior to arrival.    > Leave all valuables (money and jewelry) at home but bring insurance card and ID on DOS.   > You may be required to pay a deductible or co-pay on the day of your procedure. You can pre-pay by calling [phone] if your surgery is at the [facility] or [phone] if your surgery is at the [facility]. > Do not wear make-up, nail polish, lotions, cologne, perfumes, powders, or oil on skin. Artificial nails are not permitted.

## 2023-03-17 ENCOUNTER — HOME CARE VISIT (OUTPATIENT)
Dept: SCHEDULING | Facility: HOME HEALTH | Age: 63
End: 2023-03-17
Payer: MEDICARE

## 2023-03-17 PROCEDURE — G0299 HHS/HOSPICE OF RN EA 15 MIN: HCPCS

## 2023-03-18 ENCOUNTER — HOME CARE VISIT (OUTPATIENT)
Dept: SCHEDULING | Facility: HOME HEALTH | Age: 63
End: 2023-03-18
Payer: MEDICARE

## 2023-03-18 VITALS
TEMPERATURE: 98 F | OXYGEN SATURATION: 97 % | SYSTOLIC BLOOD PRESSURE: 140 MMHG | DIASTOLIC BLOOD PRESSURE: 87 MMHG | RESPIRATION RATE: 18 BRPM | HEART RATE: 71 BPM

## 2023-03-18 PROCEDURE — G0299 HHS/HOSPICE OF RN EA 15 MIN: HCPCS

## 2023-03-19 ENCOUNTER — HOME CARE VISIT (OUTPATIENT)
Dept: SCHEDULING | Facility: HOME HEALTH | Age: 63
End: 2023-03-19
Payer: MEDICARE

## 2023-03-19 PROCEDURE — G0299 HHS/HOSPICE OF RN EA 15 MIN: HCPCS

## 2023-03-20 ENCOUNTER — HOME CARE VISIT (OUTPATIENT)
Dept: SCHEDULING | Facility: HOME HEALTH | Age: 63
End: 2023-03-20
Payer: MEDICARE

## 2023-03-20 VITALS
SYSTOLIC BLOOD PRESSURE: 152 MMHG | HEART RATE: 90 BPM | RESPIRATION RATE: 16 BRPM | OXYGEN SATURATION: 100 % | TEMPERATURE: 98.1 F | DIASTOLIC BLOOD PRESSURE: 94 MMHG

## 2023-03-20 VITALS
RESPIRATION RATE: 18 BRPM | OXYGEN SATURATION: 93 % | SYSTOLIC BLOOD PRESSURE: 150 MMHG | DIASTOLIC BLOOD PRESSURE: 90 MMHG | HEART RATE: 100 BPM | TEMPERATURE: 97.5 F

## 2023-03-20 VITALS
SYSTOLIC BLOOD PRESSURE: 140 MMHG | OXYGEN SATURATION: 100 % | RESPIRATION RATE: 18 BRPM | HEART RATE: 94 BPM | TEMPERATURE: 97.5 F | DIASTOLIC BLOOD PRESSURE: 80 MMHG

## 2023-03-20 VITALS
DIASTOLIC BLOOD PRESSURE: 42 MMHG | OXYGEN SATURATION: 100 % | RESPIRATION RATE: 16 BRPM | TEMPERATURE: 98.1 F | HEART RATE: 77 BPM | SYSTOLIC BLOOD PRESSURE: 138 MMHG

## 2023-03-20 PROCEDURE — G0299 HHS/HOSPICE OF RN EA 15 MIN: HCPCS

## 2023-03-20 ASSESSMENT — ENCOUNTER SYMPTOMS
DIARRHEA: 1
PAIN LOCATION - PAIN QUALITY: ACHE
STOOL DESCRIPTION: SOFT FORMED
PAIN LOCATION - PAIN QUALITY: ACHING

## 2023-03-21 ENCOUNTER — HOME CARE VISIT (OUTPATIENT)
Dept: SCHEDULING | Facility: HOME HEALTH | Age: 63
End: 2023-03-21
Payer: MEDICARE

## 2023-03-21 PROCEDURE — G0299 HHS/HOSPICE OF RN EA 15 MIN: HCPCS

## 2023-03-22 ENCOUNTER — ANESTHESIA EVENT (OUTPATIENT)
Dept: SURGERY | Age: 63
End: 2023-03-22
Payer: MEDICARE

## 2023-03-22 ENCOUNTER — HOME CARE VISIT (OUTPATIENT)
Dept: SCHEDULING | Facility: HOME HEALTH | Age: 63
End: 2023-03-22
Payer: MEDICARE

## 2023-03-22 VITALS
DIASTOLIC BLOOD PRESSURE: 78 MMHG | RESPIRATION RATE: 18 BRPM | HEART RATE: 94 BPM | OXYGEN SATURATION: 99 % | TEMPERATURE: 98.3 F | SYSTOLIC BLOOD PRESSURE: 136 MMHG

## 2023-03-22 PROCEDURE — G0299 HHS/HOSPICE OF RN EA 15 MIN: HCPCS

## 2023-03-22 ASSESSMENT — ENCOUNTER SYMPTOMS
STOOL DESCRIPTION: LOOSE
PAIN LOCATION - PAIN QUALITY: ACHY
DIARRHEA: 1

## 2023-03-22 ASSESSMENT — LIFESTYLE VARIABLES: SMOKING_STATUS: 1

## 2023-03-22 NOTE — ANESTHESIA PRE PROCEDURE
>3 FB   Neck ROM: full  Mouth opening: > = 3 FB   Dental: normal exam         Pulmonary:normal exam    (+) current smoker (Former)                           Cardiovascular:    (+) hypertension:, CAD: non-obstructive, hyperlipidemia                  Neuro/Psych:               GI/Hepatic/Renal:   (+) GERD: well controlled, renal disease: ESRD and dialysis,           Endo/Other:                     Abdominal:             Vascular:   + PVD, aortic or cerebral, . Other Findings:           Anesthesia Plan      TIVA     ASA 3             Anesthetic plan and risks discussed with patient.                         Caitlyn Bourne MD   3/22/2023

## 2023-03-23 ENCOUNTER — HOME CARE VISIT (OUTPATIENT)
Dept: SCHEDULING | Facility: HOME HEALTH | Age: 63
End: 2023-03-23
Payer: MEDICARE

## 2023-03-23 ENCOUNTER — HOSPITAL ENCOUNTER (OUTPATIENT)
Age: 63
Setting detail: OUTPATIENT SURGERY
Discharge: HOME | End: 2023-03-23
Attending: SURGERY | Admitting: SURGERY
Payer: MEDICARE

## 2023-03-23 ENCOUNTER — ANESTHESIA (OUTPATIENT)
Dept: SURGERY | Age: 63
End: 2023-03-23
Payer: MEDICARE

## 2023-03-23 ENCOUNTER — APPOINTMENT (OUTPATIENT)
Dept: INTERVENTIONAL RADIOLOGY/VASCULAR | Age: 63
End: 2023-03-23
Attending: SURGERY
Payer: MEDICARE

## 2023-03-23 VITALS
OXYGEN SATURATION: 100 % | DIASTOLIC BLOOD PRESSURE: 110 MMHG | BODY MASS INDEX: 29.43 KG/M2 | TEMPERATURE: 97.5 F | WEIGHT: 249.25 LBS | SYSTOLIC BLOOD PRESSURE: 175 MMHG | HEIGHT: 77 IN | RESPIRATION RATE: 16 BRPM | HEART RATE: 77 BPM

## 2023-03-23 DIAGNOSIS — I73.9 PVD (PERIPHERAL VASCULAR DISEASE) (HCC): ICD-10-CM

## 2023-03-23 LAB
HGB BLD-MCNC: 8.9 G/DL (ref 13.6–17.2)
POTASSIUM BLD-SCNC: 4.2 MMOL/L (ref 3.5–5.1)

## 2023-03-23 PROCEDURE — 76937 US GUIDE VASCULAR ACCESS: CPT

## 2023-03-23 PROCEDURE — 6360000002 HC RX W HCPCS: Performed by: NURSE ANESTHETIST, CERTIFIED REGISTERED

## 2023-03-23 PROCEDURE — 3600000017 HC SURGERY HYBRID ADDL 15MIN: Performed by: SURGERY

## 2023-03-23 PROCEDURE — 85018 HEMOGLOBIN: CPT

## 2023-03-23 PROCEDURE — 2709999900 HC NON-CHARGEABLE SUPPLY: Performed by: SURGERY

## 2023-03-23 PROCEDURE — 36901 INTRO CATH DIALYSIS CIRCUIT: CPT | Performed by: SURGERY

## 2023-03-23 PROCEDURE — 6360000002 HC RX W HCPCS: Performed by: SURGERY

## 2023-03-23 PROCEDURE — 84132 ASSAY OF SERUM POTASSIUM: CPT

## 2023-03-23 PROCEDURE — 7100000000 HC PACU RECOVERY - FIRST 15 MIN: Performed by: SURGERY

## 2023-03-23 PROCEDURE — 7100000001 HC PACU RECOVERY - ADDTL 15 MIN: Performed by: SURGERY

## 2023-03-23 PROCEDURE — 7100000010 HC PHASE II RECOVERY - FIRST 15 MIN: Performed by: SURGERY

## 2023-03-23 PROCEDURE — 3700000001 HC ADD 15 MINUTES (ANESTHESIA): Performed by: SURGERY

## 2023-03-23 PROCEDURE — 3600000007 HC SURGERY HYBRID BASE: Performed by: SURGERY

## 2023-03-23 PROCEDURE — 2580000003 HC RX 258: Performed by: NURSE ANESTHETIST, CERTIFIED REGISTERED

## 2023-03-23 PROCEDURE — 6360000004 HC RX CONTRAST MEDICATION: Performed by: SURGERY

## 2023-03-23 PROCEDURE — 6370000000 HC RX 637 (ALT 250 FOR IP): Performed by: ANESTHESIOLOGY

## 2023-03-23 PROCEDURE — G0299 HHS/HOSPICE OF RN EA 15 MIN: HCPCS

## 2023-03-23 PROCEDURE — 6370000000 HC RX 637 (ALT 250 FOR IP): Performed by: SURGERY

## 2023-03-23 PROCEDURE — 3700000000 HC ANESTHESIA ATTENDED CARE: Performed by: SURGERY

## 2023-03-23 PROCEDURE — 2580000003 HC RX 258: Performed by: ANESTHESIOLOGY

## 2023-03-23 RX ORDER — HEPARIN SODIUM 200 [USP'U]/100ML
INJECTION, SOLUTION INTRAVENOUS CONTINUOUS PRN
Status: DISCONTINUED | OUTPATIENT
Start: 2023-03-23 | End: 2023-03-23 | Stop reason: HOSPADM

## 2023-03-23 RX ORDER — SODIUM CHLORIDE 0.9 % (FLUSH) 0.9 %
5-40 SYRINGE (ML) INJECTION PRN
Status: DISCONTINUED | OUTPATIENT
Start: 2023-03-23 | End: 2023-03-23 | Stop reason: HOSPADM

## 2023-03-23 RX ORDER — FENTANYL CITRATE 50 UG/ML
100 INJECTION, SOLUTION INTRAMUSCULAR; INTRAVENOUS
Status: DISCONTINUED | OUTPATIENT
Start: 2023-03-23 | End: 2023-03-23 | Stop reason: HOSPADM

## 2023-03-23 RX ORDER — SODIUM CHLORIDE 9 MG/ML
INJECTION, SOLUTION INTRAVENOUS PRN
Status: DISCONTINUED | OUTPATIENT
Start: 2023-03-23 | End: 2023-03-23 | Stop reason: HOSPADM

## 2023-03-23 RX ORDER — SODIUM CHLORIDE 9 MG/ML
INJECTION, SOLUTION INTRAVENOUS CONTINUOUS
Status: DISCONTINUED | OUTPATIENT
Start: 2023-03-23 | End: 2023-03-23 | Stop reason: HOSPADM

## 2023-03-23 RX ORDER — HYDROMORPHONE HYDROCHLORIDE 2 MG/ML
0.25 INJECTION, SOLUTION INTRAMUSCULAR; INTRAVENOUS; SUBCUTANEOUS EVERY 5 MIN PRN
Status: DISCONTINUED | OUTPATIENT
Start: 2023-03-23 | End: 2023-03-23 | Stop reason: HOSPADM

## 2023-03-23 RX ORDER — DIPHENHYDRAMINE HYDROCHLORIDE 50 MG/ML
6.25 INJECTION INTRAMUSCULAR; INTRAVENOUS
Status: DISCONTINUED | OUTPATIENT
Start: 2023-03-23 | End: 2023-03-23 | Stop reason: HOSPADM

## 2023-03-23 RX ORDER — SODIUM CHLORIDE 9 MG/ML
INJECTION, SOLUTION INTRAVENOUS CONTINUOUS PRN
Status: DISCONTINUED | OUTPATIENT
Start: 2023-03-23 | End: 2023-03-23 | Stop reason: SDUPTHER

## 2023-03-23 RX ORDER — HYDROMORPHONE HYDROCHLORIDE 2 MG/ML
0.5 INJECTION, SOLUTION INTRAMUSCULAR; INTRAVENOUS; SUBCUTANEOUS EVERY 5 MIN PRN
Status: DISCONTINUED | OUTPATIENT
Start: 2023-03-23 | End: 2023-03-23 | Stop reason: HOSPADM

## 2023-03-23 RX ORDER — SODIUM CHLORIDE 0.9 % (FLUSH) 0.9 %
5-40 SYRINGE (ML) INJECTION EVERY 12 HOURS SCHEDULED
Status: DISCONTINUED | OUTPATIENT
Start: 2023-03-23 | End: 2023-03-23 | Stop reason: HOSPADM

## 2023-03-23 RX ORDER — LIDOCAINE HYDROCHLORIDE 10 MG/ML
1 INJECTION, SOLUTION INFILTRATION; PERINEURAL
Status: DISCONTINUED | OUTPATIENT
Start: 2023-03-23 | End: 2023-03-23 | Stop reason: HOSPADM

## 2023-03-23 RX ORDER — ONDANSETRON 2 MG/ML
4 INJECTION INTRAMUSCULAR; INTRAVENOUS
Status: DISCONTINUED | OUTPATIENT
Start: 2023-03-23 | End: 2023-03-23 | Stop reason: HOSPADM

## 2023-03-23 RX ORDER — OXYCODONE HYDROCHLORIDE 5 MG/1
5 TABLET ORAL PRN
Status: COMPLETED | OUTPATIENT
Start: 2023-03-23 | End: 2023-03-23

## 2023-03-23 RX ORDER — PROPOFOL 10 MG/ML
INJECTION, EMULSION INTRAVENOUS PRN
Status: DISCONTINUED | OUTPATIENT
Start: 2023-03-23 | End: 2023-03-23 | Stop reason: SDUPTHER

## 2023-03-23 RX ORDER — OXYCODONE HYDROCHLORIDE 5 MG/1
10 TABLET ORAL PRN
Status: COMPLETED | OUTPATIENT
Start: 2023-03-23 | End: 2023-03-23

## 2023-03-23 RX ORDER — SODIUM CHLORIDE, SODIUM LACTATE, POTASSIUM CHLORIDE, CALCIUM CHLORIDE 600; 310; 30; 20 MG/100ML; MG/100ML; MG/100ML; MG/100ML
INJECTION, SOLUTION INTRAVENOUS CONTINUOUS
Status: DISCONTINUED | OUTPATIENT
Start: 2023-03-23 | End: 2023-03-23 | Stop reason: HOSPADM

## 2023-03-23 RX ORDER — PROPOFOL 10 MG/ML
INJECTION, EMULSION INTRAVENOUS CONTINUOUS PRN
Status: DISCONTINUED | OUTPATIENT
Start: 2023-03-23 | End: 2023-03-23 | Stop reason: SDUPTHER

## 2023-03-23 RX ORDER — DIPHENHYDRAMINE HCL 25 MG
25 CAPSULE ORAL
Status: COMPLETED | OUTPATIENT
Start: 2023-03-23 | End: 2023-03-23

## 2023-03-23 RX ORDER — FENTANYL CITRATE 50 UG/ML
25 INJECTION, SOLUTION INTRAMUSCULAR; INTRAVENOUS
Status: DISCONTINUED | OUTPATIENT
Start: 2023-03-23 | End: 2023-03-23 | Stop reason: HOSPADM

## 2023-03-23 RX ORDER — MIDAZOLAM HYDROCHLORIDE 2 MG/2ML
2 INJECTION, SOLUTION INTRAMUSCULAR; INTRAVENOUS
Status: DISCONTINUED | OUTPATIENT
Start: 2023-03-23 | End: 2023-03-23 | Stop reason: HOSPADM

## 2023-03-23 RX ADMIN — PROPOFOL 80 MG: 10 INJECTION, EMULSION INTRAVENOUS at 07:21

## 2023-03-23 RX ADMIN — Medication 100 MG: at 07:15

## 2023-03-23 RX ADMIN — PROPOFOL 140 MCG/KG/MIN: 10 INJECTION, EMULSION INTRAVENOUS at 07:15

## 2023-03-23 RX ADMIN — DIPHENHYDRAMINE HYDROCHLORIDE 25 MG: 25 CAPSULE ORAL at 06:17

## 2023-03-23 RX ADMIN — OXYCODONE HYDROCHLORIDE 10 MG: 5 TABLET ORAL at 08:02

## 2023-03-23 RX ADMIN — SODIUM CHLORIDE: 9 INJECTION, SOLUTION INTRAVENOUS at 06:08

## 2023-03-23 RX ADMIN — Medication 2 G: at 07:18

## 2023-03-23 RX ADMIN — PROPOFOL 50 MG: 10 INJECTION, EMULSION INTRAVENOUS at 07:28

## 2023-03-23 RX ADMIN — SODIUM CHLORIDE: 900 INJECTION INTRAVENOUS at 06:55

## 2023-03-23 RX ADMIN — PROPOFOL 70 MG: 10 INJECTION, EMULSION INTRAVENOUS at 07:15

## 2023-03-23 ASSESSMENT — PAIN DESCRIPTION - LOCATION: LOCATION: ARM

## 2023-03-23 ASSESSMENT — PAIN - FUNCTIONAL ASSESSMENT: PAIN_FUNCTIONAL_ASSESSMENT: 0-10

## 2023-03-23 ASSESSMENT — PAIN SCALES - GENERAL
PAINLEVEL_OUTOF10: 7
PAINLEVEL_OUTOF10: 4

## 2023-03-23 NOTE — DISCHARGE INSTRUCTIONS
INSTRUCTIONS FOR A-V FISTULA, GRAFT ACCESS, REVISION OR DECLOT    ACTIVITY  As tolerated and as directed by your doctor. Keep arm straight and raised above heart level for the next 24 hours. DIET  Clear liquids until no nausea or vomiting; then light diet for the first day. Advance to regular diet on second day, unless your doctor orders otherwise. If nausea and vomiting continues, call your doctor. PAIN  Take pain medication as directed by your doctor. Call your doctor if pain is NOT relieved by the medication. DO NOT take aspirin or blood thinners until directed by your doctor. MEDICATION INTERACTION:  During your procedure you potentially received a medication or medications which may reduce the effectiveness of oral contraceptives. Please consider other forms of contraception for 1 month following your procedure if you are currently using oral contraceptives as your primary form of birth control. In addition to this, we recommend continuing your oral contraceptive as prescribed, unless otherwise instructed by your physician, during this time    5300  Rd your dressing clean and dry  Can remove ace wrap the next day    CARE OF YOUR ACCESS  DO:  Keep access area clean with soap and water daily after dressing has been removed. Feel for the thrill daily. (by placing your fingers over the graft you will be able to feel a vibration (thrill) which means blood is flowing and the graft is working.)  DO NOT:  Wear tight sleeves, watches, belts or bracelets over graft. Carry heavy bags across the graft. Sleep on graft side. Let your blood pressure be taken on graft side. Let blood be drawn from your graft side. CALL YOUR DOCTOR IF  Excessive bleeding that does not stop after holding mild pressure over area. Temperature of 101 degrees F or above.   Redness, excessive swelling or bruising, and/or green or yellow, smelly discharge from incision   Loss of sensation-cold, white, or blue fingers or toes. After general anesthesia or intravenous sedation, for 24 hours or while taking prescription Narcotics:  Limit your activities  A responsible adult needs to be with you for the next 24 hours  Do not drive and operate hazardous machinery  Do not make important personal or business decisions  Do not drink alcoholic beverages  If you have not urinated within 8 hours after discharge, and you are experiencing discomfort from urinary retention, please go to the nearest ED. If you have sleep apnea and have a CPAP machine, please use it for all naps and sleeping. Please use caution when taking narcotics and any of your home medications that may cause drowsiness. *  Please give a list of your current medications to your Primary Care Provider. *  Please update this list whenever your medications are discontinued, doses are      changed, or new medications (including over-the-counter products) are added. *  Please carry medication information at all times in case of emergency situations. These are general instructions for a healthy lifestyle:  No smoking/ No tobacco products/ Avoid exposure to second hand smoke  Surgeon General's Warning:  Quitting smoking now greatly reduces serious risk to your health. Obesity, smoking, and sedentary lifestyle greatly increases your risk for illness  A healthy diet, regular physical exercise & weight monitoring are important for maintaining a healthy lifestyle    You may be retaining fluid if you have a history of heart failure or if you experience any of the following symptoms:  Weight gain of 3 pounds or more overnight or 5 pounds in a week, increased swelling in our hands or feet or shortness of breath while lying flat in bed. Please call your doctor as soon as you notice any of these symptoms; do not wait until your next office visit.

## 2023-03-23 NOTE — OP NOTE
[address] [facility]  OPERATIVE REPORT    Name:  Ismael Farnsworth  MR#:  [id number]  :  1960  ACCOUNT #:  [de-identified]  DATE OF SERVICE:  2023    CLINICAL SERVICE:  Vascular Surgery. PREOPERATIVE DIAGNOSIS:  Malfunctioning left radiocephalic fistula. POSTOPERATIVE DIAGNOSIS:  Malfunctioning left radiocephalic fistula. PROCEDURES PERFORMED:  1. Percutaneous ultrasound-guided access of left radiocephalic fistula. 2.  Fistulogram.  3.  Central venogram.    SURGEON:  Claire Khan MD    ASSISTANT:      ANESTHESIA:  Sedation. COMPLICATIONS:      SPECIMENS REMOVED:      IMPLANTS:      ESTIMATED BLOOD LOSS: .    FINDINGS:  1. There was a patent radiocephalic fistula. 2.  The outflow cephalic vein had a mild stenosis just proximal to the antecubital fossa less than 50%. With the outflow cephalic vein being large, the brachial vein was also patent. There was no evidence of any venous stenosis. PROCEDURE:  After getting informed consent, the patient was brought to the operating room and anesthesia was then induced. Preoperative antibiotics were given before skin incision. The patient's left arm was then prepped and draped in the normal sterile fashion. Ultrasound was used to identify the cephalic vein which was accessed with an 18-gauze needle. We upsized to a micropuncture catheter. We did a digital subtraction. Please see above for anatomical findings. The cephalic vein was patent throughout the course. There was some mild narrowing. However, it did not feel it was significant. The outflow brachial and cephalic vein was all patent. Subclavian vein was patent. The central veins were patent. We removed the catheter and closed with Monocryl. The patient was extubated and taken to the PACU in stable condition.       MD EDUARD [email or web address]  D:  2023 7:43  T:  2023 19:34  JOB #:  [id number]

## 2023-03-23 NOTE — PROGRESS NOTES
Patient status post fistulogram.  Fistula is patent have some mild stenosis just proximal to the antecubital fossa however the outflow cephalic vein was patent and no evidence of the veins. Patient be discharged no follow-up is needed. Per patient for the last 2 weeks no evidence of any problem dialysis. We will call dialysis center to contact him about results if they have persistent bleeding then I will plan for fistulogram and balloon of that narrowing, the concern is that balloon without any significant clinical results that area may need to be patch angioplasty which will most likely require patient to have a tunneled catheter for several weeks.       Annie Gonzalez

## 2023-03-23 NOTE — BRIEF OP NOTE
[address]   [address] [facility] [address]  [phone] FAX: [phone]    Brief Op Note Template Note    Pre-Op Diagnosis: End stage renal disease ([facility] [N18.6]  PVD (peripheral vascular disease) ([facility] [I73.9]    Post-Op Diagnosis:  * No post-op diagnosis entered *    Procedures: Procedure(s):  LEFT ARM FISTULAGRAM    Surgeon: Yomi Mejia MD    Assistants: Surgeon(s):  Shoaib Zamora MD      Anesthesia:  General     Findings: Mild proximal stenosis less than 50% just prior to the antecubital fossa outflow vein normal with no central vein stenosis    Tourniquet Time:  * No tourniquets in log *    Estimated Blood Loss:               Specimens:            Implants:  * No implants in log *    Complications: None               Signed: Yomi Mejia MD      Elements of this note have been dictated using speech recognition software. As a result, errors of speech recognition may have occurred.

## 2023-03-23 NOTE — ANESTHESIA POSTPROCEDURE EVALUATION
Department of Anesthesiology  Postprocedure Note    Patient: Brielle Hernandez  MRN: [id number]  YOB: 1960  Date of evaluation: 3/23/2023      Procedure Summary     Date: 03/23/23 Room / Location: [facility] MAIN OR 12 / [facility] MAIN OR    Anesthesia Start: 0655 Anesthesia Stop: 1436    Procedure: LEFT ARM FISTULAGRAM (Left: Arm Lower) Diagnosis:       End stage renal disease ([address]      PVD (peripheral vascular disease) ([address]      (End stage renal disease ([address] [N18.6])      (PVD (peripheral vascular disease) ([address] [I73.9])    Providers: Fantasma Bertrand MD Responsible Provider: Sharon Persaud MD    Anesthesia Type: TIVA ASA Status: 3          Anesthesia Type: TIVA    Chico Phase I: Chico Score: 8    Chico Phase II: Chico Score: 10      Anesthesia Post Evaluation    Patient location during evaluation: PACU  Patient participation: complete - patient participated  Level of consciousness: awake  Airway patency: patent  Nausea & Vomiting: no nausea  Complications: no  Cardiovascular status: blood pressure returned to baseline and hemodynamically stable  Respiratory status: acceptable  Hydration status: stable  Multimodal analgesia pain management approach

## 2023-03-23 NOTE — PERIOP NOTE
Pt and sister given discharge instructions at car, both verbalize understanding. All questions answered.

## 2023-03-24 ENCOUNTER — HOME CARE VISIT (OUTPATIENT)
Dept: SCHEDULING | Facility: HOME HEALTH | Age: 63
End: 2023-03-24
Payer: MEDICARE

## 2023-03-24 VITALS
OXYGEN SATURATION: 100 % | TEMPERATURE: 98.2 F | SYSTOLIC BLOOD PRESSURE: 150 MMHG | RESPIRATION RATE: 18 BRPM | DIASTOLIC BLOOD PRESSURE: 90 MMHG | HEART RATE: 80 BPM

## 2023-03-24 PROCEDURE — G0299 HHS/HOSPICE OF RN EA 15 MIN: HCPCS

## 2023-03-25 ENCOUNTER — HOME CARE VISIT (OUTPATIENT)
Dept: SCHEDULING | Facility: HOME HEALTH | Age: 63
End: 2023-03-25
Payer: MEDICARE

## 2023-03-25 VITALS
OXYGEN SATURATION: 100 % | TEMPERATURE: 98.4 F | HEART RATE: 87 BPM | DIASTOLIC BLOOD PRESSURE: 80 MMHG | RESPIRATION RATE: 16 BRPM | SYSTOLIC BLOOD PRESSURE: 140 MMHG

## 2023-03-25 PROCEDURE — G0299 HHS/HOSPICE OF RN EA 15 MIN: HCPCS

## 2023-03-25 ASSESSMENT — ENCOUNTER SYMPTOMS
SKIN LESIONS: 1
STOOL DESCRIPTION: SOFT FORMED

## 2023-03-26 ENCOUNTER — HOME CARE VISIT (OUTPATIENT)
Dept: SCHEDULING | Facility: HOME HEALTH | Age: 63
End: 2023-03-26
Payer: MEDICARE

## 2023-03-26 PROCEDURE — G0299 HHS/HOSPICE OF RN EA 15 MIN: HCPCS

## 2023-03-27 ENCOUNTER — HOME CARE VISIT (OUTPATIENT)
Dept: SCHEDULING | Facility: HOME HEALTH | Age: 63
End: 2023-03-27
Payer: MEDICARE

## 2023-03-27 ENCOUNTER — OFFICE VISIT (OUTPATIENT)
Dept: SURGERY | Age: 63
End: 2023-03-27

## 2023-03-27 VITALS
RESPIRATION RATE: 16 BRPM | SYSTOLIC BLOOD PRESSURE: 144 MMHG | TEMPERATURE: 97.5 F | OXYGEN SATURATION: 100 % | HEART RATE: 74 BPM | DIASTOLIC BLOOD PRESSURE: 90 MMHG

## 2023-03-27 DIAGNOSIS — K65.9 LOCALIZED PERITONITIS (HCC): ICD-10-CM

## 2023-03-27 DIAGNOSIS — L02.211 ABSCESS OF ABDOMINAL WALL: Primary | ICD-10-CM

## 2023-03-27 DIAGNOSIS — M72.6 NECROTIZING FASCIITIS (HCC): ICD-10-CM

## 2023-03-27 DIAGNOSIS — L98.493 ABDOMINAL WALL ULCER, WITH NECROSIS OF MUSCLE (HCC): ICD-10-CM

## 2023-03-27 PROCEDURE — 99024 POSTOP FOLLOW-UP VISIT: CPT | Performed by: SURGERY

## 2023-03-27 NOTE — PROGRESS NOTES
H&P/Consult Note/Progress Note/Office Note:   Shani Dey  MRN: [id number]  :1960  Age:62 y.o.    HPI: Shani Dey is a 65 y.o. male who is s/p abd wall wound debridement and possible drainage of anterior peritonitis on 23. He is s/p open drainage of localized peritonitis, debridement of necrotizing fasciitis of abdominal wall infection, removal of infected PD cath,   and repair of recurrent incarcerated ventral hernia with Vicryl mesh on 22. Prior to surgery he was admitted by the hospitalist with sepsis on 22 after he presented to the ER with a 2-week history of progressive abdominal pain and bulging. He reported a prior recurrent ventral hernia for many years following open umbilical hernia repair with Prolene mesh by Dr. Saint Clair on 2014. He had a PD cath placed at that time and is dialysis dependent. Recently however he developed progressive and severe abdominal pain associated with worsening bulging and tenderness. He also described an associated eschar of the umbilical skin recently  This was associated with a change in the color of the effluent from the PD catheter. He has had associated N/V, tachycardia, and leukocytosis on admission. He was placed on vancomycin and Zosyn. CT imaging was performed as shown below and general surgery consultation was obtained. He described a prior episode of peritonitis without abscess or hernia treated with Abx in approximately . He described having an AV fistula for dialysis placed many years ago which has never been used. He wants to stop having peritoneal dialysis and convert to hemodialysis. 22 CT abd/pelvis withoral but no IV contrast  Hx: Yellowish solution after peritoneal dialysis, concern for peritonitis       FINDINGS: Evaluation of the hollow and solid viscera is limited by the lack of intravenous contrast.   Dependent atelectasis is present.    CT ABDOMEN: The stomach

## 2023-03-28 ENCOUNTER — HOME CARE VISIT (OUTPATIENT)
Dept: SCHEDULING | Facility: HOME HEALTH | Age: 63
End: 2023-03-28
Payer: MEDICARE

## 2023-03-28 VITALS
OXYGEN SATURATION: 97 % | DIASTOLIC BLOOD PRESSURE: 70 MMHG | SYSTOLIC BLOOD PRESSURE: 138 MMHG | TEMPERATURE: 98.3 F | HEART RATE: 100 BPM | RESPIRATION RATE: 18 BRPM

## 2023-03-28 PROCEDURE — G0299 HHS/HOSPICE OF RN EA 15 MIN: HCPCS

## 2023-03-29 ENCOUNTER — HOME CARE VISIT (OUTPATIENT)
Dept: SCHEDULING | Facility: HOME HEALTH | Age: 63
End: 2023-03-29
Payer: MEDICARE

## 2023-03-29 VITALS
HEART RATE: 87 BPM | OXYGEN SATURATION: 100 % | RESPIRATION RATE: 16 BRPM | TEMPERATURE: 98 F | SYSTOLIC BLOOD PRESSURE: 142 MMHG | DIASTOLIC BLOOD PRESSURE: 82 MMHG

## 2023-03-29 PROCEDURE — G0299 HHS/HOSPICE OF RN EA 15 MIN: HCPCS

## 2023-03-30 ENCOUNTER — HOME CARE VISIT (OUTPATIENT)
Dept: SCHEDULING | Facility: HOME HEALTH | Age: 63
End: 2023-03-30
Payer: MEDICARE

## 2023-03-30 VITALS
OXYGEN SATURATION: 96 % | HEART RATE: 89 BPM | TEMPERATURE: 97.8 F | SYSTOLIC BLOOD PRESSURE: 138 MMHG | DIASTOLIC BLOOD PRESSURE: 88 MMHG | RESPIRATION RATE: 18 BRPM

## 2023-03-30 PROCEDURE — G0299 HHS/HOSPICE OF RN EA 15 MIN: HCPCS

## 2023-03-31 ENCOUNTER — HOME CARE VISIT (OUTPATIENT)
Dept: SCHEDULING | Facility: HOME HEALTH | Age: 63
End: 2023-03-31
Payer: MEDICARE

## 2023-03-31 VITALS
SYSTOLIC BLOOD PRESSURE: 156 MMHG | HEART RATE: 99 BPM | DIASTOLIC BLOOD PRESSURE: 80 MMHG | TEMPERATURE: 98.4 F | OXYGEN SATURATION: 100 % | HEART RATE: 91 BPM | SYSTOLIC BLOOD PRESSURE: 150 MMHG | DIASTOLIC BLOOD PRESSURE: 90 MMHG | TEMPERATURE: 97.8 F | OXYGEN SATURATION: 100 %

## 2023-03-31 PROCEDURE — G0299 HHS/HOSPICE OF RN EA 15 MIN: HCPCS

## 2023-04-01 ENCOUNTER — HOME CARE VISIT (OUTPATIENT)
Dept: SCHEDULING | Facility: HOME HEALTH | Age: 63
End: 2023-04-01
Payer: MEDICARE

## 2023-04-01 PROCEDURE — G0299 HHS/HOSPICE OF RN EA 15 MIN: HCPCS

## 2023-04-02 ENCOUNTER — HOME CARE VISIT (OUTPATIENT)
Dept: SCHEDULING | Facility: HOME HEALTH | Age: 63
End: 2023-04-02
Payer: MEDICARE

## 2023-04-02 VITALS
DIASTOLIC BLOOD PRESSURE: 78 MMHG | SYSTOLIC BLOOD PRESSURE: 138 MMHG | OXYGEN SATURATION: 100 % | TEMPERATURE: 97.4 F | RESPIRATION RATE: 18 BRPM | HEART RATE: 90 BPM

## 2023-04-02 VITALS
OXYGEN SATURATION: 96 % | RESPIRATION RATE: 18 BRPM | TEMPERATURE: 97.7 F | DIASTOLIC BLOOD PRESSURE: 82 MMHG | HEART RATE: 82 BPM | SYSTOLIC BLOOD PRESSURE: 140 MMHG

## 2023-04-02 VITALS
RESPIRATION RATE: 18 BRPM | DIASTOLIC BLOOD PRESSURE: 100 MMHG | TEMPERATURE: 97.9 F | HEART RATE: 87 BPM | SYSTOLIC BLOOD PRESSURE: 150 MMHG | OXYGEN SATURATION: 100 %

## 2023-04-02 PROCEDURE — G0299 HHS/HOSPICE OF RN EA 15 MIN: HCPCS

## 2023-04-02 ASSESSMENT — ENCOUNTER SYMPTOMS: STOOL DESCRIPTION: SOFT FORMED

## 2023-04-03 ENCOUNTER — HOME CARE VISIT (OUTPATIENT)
Dept: SCHEDULING | Facility: HOME HEALTH | Age: 63
End: 2023-04-03
Payer: MEDICARE

## 2023-04-03 VITALS
DIASTOLIC BLOOD PRESSURE: 78 MMHG | OXYGEN SATURATION: 97 % | TEMPERATURE: 97.2 F | SYSTOLIC BLOOD PRESSURE: 140 MMHG | RESPIRATION RATE: 17 BRPM | HEART RATE: 70 BPM

## 2023-04-03 VITALS
SYSTOLIC BLOOD PRESSURE: 150 MMHG | HEART RATE: 91 BPM | RESPIRATION RATE: 18 BRPM | TEMPERATURE: 97.8 F | DIASTOLIC BLOOD PRESSURE: 80 MMHG | OXYGEN SATURATION: 100 %

## 2023-04-03 PROCEDURE — G0299 HHS/HOSPICE OF RN EA 15 MIN: HCPCS

## 2023-04-04 ENCOUNTER — HOME CARE VISIT (OUTPATIENT)
Dept: SCHEDULING | Facility: HOME HEALTH | Age: 63
End: 2023-04-04
Payer: MEDICARE

## 2023-04-04 VITALS
RESPIRATION RATE: 18 BRPM | HEART RATE: 99 BPM | TEMPERATURE: 98.4 F | OXYGEN SATURATION: 100 % | DIASTOLIC BLOOD PRESSURE: 90 MMHG | SYSTOLIC BLOOD PRESSURE: 156 MMHG

## 2023-04-04 PROCEDURE — G0299 HHS/HOSPICE OF RN EA 15 MIN: HCPCS

## 2023-04-05 ENCOUNTER — HOME CARE VISIT (OUTPATIENT)
Dept: SCHEDULING | Facility: HOME HEALTH | Age: 63
End: 2023-04-05
Payer: MEDICARE

## 2023-04-05 VITALS
HEART RATE: 74 BPM | RESPIRATION RATE: 16 BRPM | DIASTOLIC BLOOD PRESSURE: 74 MMHG | TEMPERATURE: 98.4 F | SYSTOLIC BLOOD PRESSURE: 138 MMHG

## 2023-04-05 VITALS
HEART RATE: 90 BPM | DIASTOLIC BLOOD PRESSURE: 80 MMHG | RESPIRATION RATE: 18 BRPM | OXYGEN SATURATION: 98 % | SYSTOLIC BLOOD PRESSURE: 120 MMHG | TEMPERATURE: 98.8 F

## 2023-04-05 PROCEDURE — G0299 HHS/HOSPICE OF RN EA 15 MIN: HCPCS

## 2023-04-05 ASSESSMENT — ENCOUNTER SYMPTOMS: STOOL DESCRIPTION: FORMED

## 2023-04-06 ENCOUNTER — HOME CARE VISIT (OUTPATIENT)
Dept: SCHEDULING | Facility: HOME HEALTH | Age: 63
End: 2023-04-06
Payer: MEDICARE

## 2023-04-06 VITALS
RESPIRATION RATE: 16 BRPM | SYSTOLIC BLOOD PRESSURE: 154 MMHG | HEART RATE: 78 BPM | OXYGEN SATURATION: 100 % | DIASTOLIC BLOOD PRESSURE: 90 MMHG | TEMPERATURE: 97.8 F

## 2023-04-06 PROCEDURE — G0299 HHS/HOSPICE OF RN EA 15 MIN: HCPCS

## 2023-04-06 ASSESSMENT — ENCOUNTER SYMPTOMS
STOOL DESCRIPTION: SOFT FORMED
PAIN LOCATION - PAIN QUALITY: ACHING

## 2023-04-07 ENCOUNTER — HOME CARE VISIT (OUTPATIENT)
Dept: SCHEDULING | Facility: HOME HEALTH | Age: 63
End: 2023-04-07
Payer: MEDICARE

## 2023-04-07 PROCEDURE — G0299 HHS/HOSPICE OF RN EA 15 MIN: HCPCS

## 2023-04-08 ENCOUNTER — HOME CARE VISIT (OUTPATIENT)
Dept: SCHEDULING | Facility: HOME HEALTH | Age: 63
End: 2023-04-08
Payer: MEDICARE

## 2023-04-08 VITALS
DIASTOLIC BLOOD PRESSURE: 88 MMHG | OXYGEN SATURATION: 96 % | SYSTOLIC BLOOD PRESSURE: 138 MMHG | RESPIRATION RATE: 18 BRPM | HEART RATE: 81 BPM | TEMPERATURE: 98 F

## 2023-04-08 PROCEDURE — G0299 HHS/HOSPICE OF RN EA 15 MIN: HCPCS

## 2023-04-09 ENCOUNTER — HOME CARE VISIT (OUTPATIENT)
Dept: SCHEDULING | Facility: HOME HEALTH | Age: 63
End: 2023-04-09
Payer: MEDICARE

## 2023-04-09 VITALS
RESPIRATION RATE: 19 BRPM | HEART RATE: 90 BPM | TEMPERATURE: 98.2 F | SYSTOLIC BLOOD PRESSURE: 140 MMHG | OXYGEN SATURATION: 99 % | DIASTOLIC BLOOD PRESSURE: 88 MMHG

## 2023-04-09 VITALS
TEMPERATURE: 97.9 F | SYSTOLIC BLOOD PRESSURE: 140 MMHG | OXYGEN SATURATION: 97 % | HEART RATE: 82 BPM | RESPIRATION RATE: 18 BRPM | DIASTOLIC BLOOD PRESSURE: 87 MMHG

## 2023-04-09 PROCEDURE — G0299 HHS/HOSPICE OF RN EA 15 MIN: HCPCS

## 2023-04-09 ASSESSMENT — ENCOUNTER SYMPTOMS: STOOL DESCRIPTION: FORMED

## 2023-04-17 ENCOUNTER — HOME CARE VISIT (OUTPATIENT)
Dept: SCHEDULING | Facility: HOME HEALTH | Age: 63
End: 2023-04-17
Payer: MEDICARE

## 2023-04-17 VITALS
DIASTOLIC BLOOD PRESSURE: 90 MMHG | HEART RATE: 87 BPM | OXYGEN SATURATION: 100 % | SYSTOLIC BLOOD PRESSURE: 160 MMHG | TEMPERATURE: 98.4 F | RESPIRATION RATE: 18 BRPM

## 2023-04-17 PROCEDURE — G0299 HHS/HOSPICE OF RN EA 15 MIN: HCPCS

## 2023-04-18 ENCOUNTER — HOME CARE VISIT (OUTPATIENT)
Dept: SCHEDULING | Facility: HOME HEALTH | Age: 63
End: 2023-04-18
Payer: MEDICARE

## 2023-04-18 VITALS
HEART RATE: 89 BPM | RESPIRATION RATE: 18 BRPM | SYSTOLIC BLOOD PRESSURE: 160 MMHG | OXYGEN SATURATION: 100 % | TEMPERATURE: 97.5 F | DIASTOLIC BLOOD PRESSURE: 90 MMHG

## 2023-04-18 PROCEDURE — G0299 HHS/HOSPICE OF RN EA 15 MIN: HCPCS

## 2023-04-19 ENCOUNTER — HOME CARE VISIT (OUTPATIENT)
Dept: SCHEDULING | Facility: HOME HEALTH | Age: 63
End: 2023-04-19
Payer: MEDICARE

## 2023-04-19 VITALS
DIASTOLIC BLOOD PRESSURE: 90 MMHG | RESPIRATION RATE: 18 BRPM | OXYGEN SATURATION: 100 % | SYSTOLIC BLOOD PRESSURE: 160 MMHG | TEMPERATURE: 97.3 F | HEART RATE: 92 BPM

## 2023-04-19 PROCEDURE — G0299 HHS/HOSPICE OF RN EA 15 MIN: HCPCS

## 2023-04-20 ENCOUNTER — HOME CARE VISIT (OUTPATIENT)
Dept: SCHEDULING | Facility: HOME HEALTH | Age: 63
End: 2023-04-20
Payer: MEDICARE

## 2023-04-20 PROCEDURE — G0299 HHS/HOSPICE OF RN EA 15 MIN: HCPCS

## 2023-04-21 ENCOUNTER — HOME CARE VISIT (OUTPATIENT)
Dept: SCHEDULING | Facility: HOME HEALTH | Age: 63
End: 2023-04-21
Payer: MEDICARE

## 2023-04-21 PROCEDURE — G0299 HHS/HOSPICE OF RN EA 15 MIN: HCPCS

## 2023-04-22 ENCOUNTER — HOME CARE VISIT (OUTPATIENT)
Dept: SCHEDULING | Facility: HOME HEALTH | Age: 63
End: 2023-04-22
Payer: MEDICARE

## 2023-04-22 VITALS
TEMPERATURE: 98.1 F | SYSTOLIC BLOOD PRESSURE: 120 MMHG | DIASTOLIC BLOOD PRESSURE: 72 MMHG | HEART RATE: 80 BPM | RESPIRATION RATE: 18 BRPM | OXYGEN SATURATION: 100 %

## 2023-04-22 PROCEDURE — G0299 HHS/HOSPICE OF RN EA 15 MIN: HCPCS

## 2023-04-23 ENCOUNTER — HOME CARE VISIT (OUTPATIENT)
Dept: SCHEDULING | Facility: HOME HEALTH | Age: 63
End: 2023-04-23
Payer: MEDICARE

## 2023-04-23 VITALS
SYSTOLIC BLOOD PRESSURE: 140 MMHG | HEART RATE: 96 BPM | RESPIRATION RATE: 19 BRPM | DIASTOLIC BLOOD PRESSURE: 88 MMHG | TEMPERATURE: 98.8 F | OXYGEN SATURATION: 99 %

## 2023-04-23 PROCEDURE — G0299 HHS/HOSPICE OF RN EA 15 MIN: HCPCS

## 2023-04-23 ASSESSMENT — ENCOUNTER SYMPTOMS: STOOL DESCRIPTION: FORMED

## 2023-04-24 ENCOUNTER — HOME CARE VISIT (OUTPATIENT)
Dept: SCHEDULING | Facility: HOME HEALTH | Age: 63
End: 2023-04-24
Payer: MEDICARE

## 2023-04-24 ENCOUNTER — OFFICE VISIT (OUTPATIENT)
Dept: SURGERY | Age: 63
End: 2023-04-24
Payer: MEDICARE

## 2023-04-24 VITALS — BODY MASS INDEX: 29.4 KG/M2 | HEIGHT: 77 IN | WEIGHT: 249 LBS

## 2023-04-24 DIAGNOSIS — L92.9 HYPERGRANULATION: ICD-10-CM

## 2023-04-24 DIAGNOSIS — S31.109D OPEN WOUND OF ABDOMINAL WALL, SUBSEQUENT ENCOUNTER: ICD-10-CM

## 2023-04-24 DIAGNOSIS — L98.493 ABDOMINAL WALL ULCER, WITH NECROSIS OF MUSCLE (HCC): ICD-10-CM

## 2023-04-24 DIAGNOSIS — M72.6 NECROTIZING FASCIITIS (HCC): Primary | ICD-10-CM

## 2023-04-24 PROCEDURE — 99202 OFFICE O/P NEW SF 15 MIN: CPT | Performed by: SURGERY

## 2023-04-24 PROCEDURE — 17250 CHEM CAUT OF GRANLTJ TISSUE: CPT | Performed by: SURGERY

## 2023-04-24 NOTE — PROGRESS NOTES
H&P/Consult Note/Progress Note/Office Note:   Leo Rowe  MRN: [id number]  :1960  Age:62 y.o.    HPI: Leo Rowe is a 65 y.o. male who is s/p abd wall wound debridement and possible drainage of anterior peritonitis on 23. He is s/p open drainage of localized peritonitis, debridement of necrotizing fasciitis of abdominal wall infection, removal of infected PD cath,   and repair of recurrent incarcerated ventral hernia with Vicryl mesh on 22. Prior to surgery he was admitted by the hospitalist with sepsis on 22 after he presented to the ER with a 2-week history of progressive abdominal pain and bulging. He reported a prior recurrent ventral hernia for many years following open umbilical hernia repair with Prolene mesh by Dr. Jesusita Johns on 2014. He had a PD cath placed at that time and is dialysis dependent. Recently however he developed progressive and severe abdominal pain associated with worsening bulging and tenderness. He also described an associated eschar of the umbilical skin recently  This was associated with a change in the color of the effluent from the PD catheter. He has had associated N/V, tachycardia, and leukocytosis on admission. He was placed on vancomycin and Zosyn. CT imaging was performed as shown below and general surgery consultation was obtained. He described a prior episode of peritonitis without abscess or hernia treated with Abx in approximately . He described having an AV fistula for dialysis placed many years ago which has never been used. He wants to stop having peritoneal dialysis and convert to hemodialysis. 22 CT abd/pelvis withoral but no IV contrast  Hx: Yellowish solution after peritoneal dialysis, concern for peritonitis       FINDINGS: Evaluation of the hollow and solid viscera is limited by the lack of intravenous contrast.   Dependent atelectasis is present.      CT ABDOMEN: The

## 2023-04-25 ENCOUNTER — HOME CARE VISIT (OUTPATIENT)
Dept: SCHEDULING | Facility: HOME HEALTH | Age: 63
End: 2023-04-25
Payer: MEDICARE

## 2023-04-25 VITALS
TEMPERATURE: 98.3 F | HEART RATE: 82 BPM | DIASTOLIC BLOOD PRESSURE: 90 MMHG | SYSTOLIC BLOOD PRESSURE: 140 MMHG | OXYGEN SATURATION: 100 % | RESPIRATION RATE: 18 BRPM

## 2023-04-25 PROCEDURE — G0299 HHS/HOSPICE OF RN EA 15 MIN: HCPCS

## 2023-04-26 ENCOUNTER — HOME CARE VISIT (OUTPATIENT)
Dept: SCHEDULING | Facility: HOME HEALTH | Age: 63
End: 2023-04-26
Payer: MEDICARE

## 2023-04-26 VITALS
HEART RATE: 87 BPM | TEMPERATURE: 97.3 F | RESPIRATION RATE: 18 BRPM | DIASTOLIC BLOOD PRESSURE: 90 MMHG | SYSTOLIC BLOOD PRESSURE: 124 MMHG | OXYGEN SATURATION: 100 %

## 2023-04-26 PROCEDURE — G0299 HHS/HOSPICE OF RN EA 15 MIN: HCPCS

## 2023-04-27 ENCOUNTER — HOME CARE VISIT (OUTPATIENT)
Dept: SCHEDULING | Facility: HOME HEALTH | Age: 63
End: 2023-04-27
Payer: MEDICARE

## 2023-04-27 VITALS
SYSTOLIC BLOOD PRESSURE: 140 MMHG | OXYGEN SATURATION: 100 % | DIASTOLIC BLOOD PRESSURE: 78 MMHG | HEART RATE: 85 BPM | TEMPERATURE: 97.6 F | RESPIRATION RATE: 18 BRPM

## 2023-04-27 PROCEDURE — G0299 HHS/HOSPICE OF RN EA 15 MIN: HCPCS

## 2023-04-28 ENCOUNTER — HOME CARE VISIT (OUTPATIENT)
Dept: SCHEDULING | Facility: HOME HEALTH | Age: 63
End: 2023-04-28
Payer: MEDICARE

## 2023-04-28 PROCEDURE — G0299 HHS/HOSPICE OF RN EA 15 MIN: HCPCS

## 2023-04-29 ENCOUNTER — HOME CARE VISIT (OUTPATIENT)
Dept: SCHEDULING | Facility: HOME HEALTH | Age: 63
End: 2023-04-29
Payer: MEDICARE

## 2023-04-29 VITALS
HEART RATE: 84 BPM | SYSTOLIC BLOOD PRESSURE: 135 MMHG | TEMPERATURE: 97.6 F | OXYGEN SATURATION: 97 % | RESPIRATION RATE: 18 BRPM | DIASTOLIC BLOOD PRESSURE: 87 MMHG

## 2023-04-29 PROCEDURE — G0299 HHS/HOSPICE OF RN EA 15 MIN: HCPCS

## 2023-04-30 ENCOUNTER — HOME CARE VISIT (OUTPATIENT)
Dept: SCHEDULING | Facility: HOME HEALTH | Age: 63
End: 2023-04-30
Payer: MEDICARE

## 2023-04-30 PROCEDURE — G0299 HHS/HOSPICE OF RN EA 15 MIN: HCPCS

## 2023-05-01 ENCOUNTER — HOME CARE VISIT (OUTPATIENT)
Dept: SCHEDULING | Facility: HOME HEALTH | Age: 63
End: 2023-05-01

## 2023-05-01 VITALS
SYSTOLIC BLOOD PRESSURE: 148 MMHG | OXYGEN SATURATION: 100 % | RESPIRATION RATE: 18 BRPM | RESPIRATION RATE: 18 BRPM | SYSTOLIC BLOOD PRESSURE: 160 MMHG | DIASTOLIC BLOOD PRESSURE: 98 MMHG | TEMPERATURE: 98.8 F | HEART RATE: 86 BPM | OXYGEN SATURATION: 100 % | DIASTOLIC BLOOD PRESSURE: 90 MMHG | HEART RATE: 87 BPM | TEMPERATURE: 98.9 F

## 2023-05-01 VITALS
TEMPERATURE: 97.3 F | DIASTOLIC BLOOD PRESSURE: 80 MMHG | RESPIRATION RATE: 18 BRPM | SYSTOLIC BLOOD PRESSURE: 140 MMHG | OXYGEN SATURATION: 100 % | HEART RATE: 65 BPM

## 2023-05-01 VITALS
HEART RATE: 74 BPM | TEMPERATURE: 97.9 F | DIASTOLIC BLOOD PRESSURE: 70 MMHG | RESPIRATION RATE: 18 BRPM | SYSTOLIC BLOOD PRESSURE: 122 MMHG | OXYGEN SATURATION: 100 %

## 2023-05-01 PROCEDURE — G0299 HHS/HOSPICE OF RN EA 15 MIN: HCPCS

## 2023-05-03 ENCOUNTER — HOME CARE VISIT (OUTPATIENT)
Dept: SCHEDULING | Facility: HOME HEALTH | Age: 63
End: 2023-05-03

## 2023-05-03 PROCEDURE — G0299 HHS/HOSPICE OF RN EA 15 MIN: HCPCS

## 2023-05-04 ENCOUNTER — HOME CARE VISIT (OUTPATIENT)
Dept: SCHEDULING | Facility: HOME HEALTH | Age: 63
End: 2023-05-04

## 2023-05-04 VITALS
RESPIRATION RATE: 18 BRPM | SYSTOLIC BLOOD PRESSURE: 142 MMHG | TEMPERATURE: 98.2 F | OXYGEN SATURATION: 100 % | DIASTOLIC BLOOD PRESSURE: 90 MMHG | HEART RATE: 80 BPM

## 2023-05-04 PROCEDURE — G0299 HHS/HOSPICE OF RN EA 15 MIN: HCPCS

## 2023-05-05 ENCOUNTER — HOME CARE VISIT (OUTPATIENT)
Dept: SCHEDULING | Facility: HOME HEALTH | Age: 63
End: 2023-05-05

## 2023-05-05 PROCEDURE — G0299 HHS/HOSPICE OF RN EA 15 MIN: HCPCS

## 2023-05-06 ENCOUNTER — HOME CARE VISIT (OUTPATIENT)
Dept: SCHEDULING | Facility: HOME HEALTH | Age: 63
End: 2023-05-06

## 2023-05-06 VITALS
DIASTOLIC BLOOD PRESSURE: 86 MMHG | OXYGEN SATURATION: 96 % | TEMPERATURE: 98 F | RESPIRATION RATE: 18 BRPM | HEART RATE: 82 BPM | SYSTOLIC BLOOD PRESSURE: 139 MMHG

## 2023-05-06 VITALS
OXYGEN SATURATION: 97 % | DIASTOLIC BLOOD PRESSURE: 87 MMHG | SYSTOLIC BLOOD PRESSURE: 139 MMHG | RESPIRATION RATE: 18 BRPM | HEART RATE: 81 BPM | TEMPERATURE: 97.8 F

## 2023-05-06 PROCEDURE — G0299 HHS/HOSPICE OF RN EA 15 MIN: HCPCS

## 2023-05-07 ENCOUNTER — HOME CARE VISIT (OUTPATIENT)
Dept: SCHEDULING | Facility: HOME HEALTH | Age: 63
End: 2023-05-07

## 2023-05-07 PROCEDURE — G0299 HHS/HOSPICE OF RN EA 15 MIN: HCPCS

## 2023-05-08 ENCOUNTER — HOME CARE VISIT (OUTPATIENT)
Dept: SCHEDULING | Facility: HOME HEALTH | Age: 63
End: 2023-05-08

## 2023-05-08 VITALS
OXYGEN SATURATION: 100 % | SYSTOLIC BLOOD PRESSURE: 160 MMHG | TEMPERATURE: 97.8 F | HEART RATE: 99 BPM | RESPIRATION RATE: 18 BRPM | DIASTOLIC BLOOD PRESSURE: 90 MMHG

## 2023-05-08 PROCEDURE — G0299 HHS/HOSPICE OF RN EA 15 MIN: HCPCS

## 2023-05-09 ENCOUNTER — HOME CARE VISIT (OUTPATIENT)
Dept: SCHEDULING | Facility: HOME HEALTH | Age: 63
End: 2023-05-09

## 2023-05-09 VITALS
HEART RATE: 76 BPM | TEMPERATURE: 98 F | SYSTOLIC BLOOD PRESSURE: 140 MMHG | OXYGEN SATURATION: 100 % | RESPIRATION RATE: 18 BRPM | DIASTOLIC BLOOD PRESSURE: 80 MMHG

## 2023-05-09 VITALS
HEART RATE: 84 BPM | RESPIRATION RATE: 16 BRPM | TEMPERATURE: 98.4 F | DIASTOLIC BLOOD PRESSURE: 82 MMHG | SYSTOLIC BLOOD PRESSURE: 142 MMHG | OXYGEN SATURATION: 100 %

## 2023-05-09 PROCEDURE — G0299 HHS/HOSPICE OF RN EA 15 MIN: HCPCS

## 2023-05-10 ENCOUNTER — HOME CARE VISIT (OUTPATIENT)
Dept: SCHEDULING | Facility: HOME HEALTH | Age: 63
End: 2023-05-10

## 2023-05-10 PROCEDURE — G0299 HHS/HOSPICE OF RN EA 15 MIN: HCPCS

## 2023-05-11 ENCOUNTER — HOME CARE VISIT (OUTPATIENT)
Dept: SCHEDULING | Facility: HOME HEALTH | Age: 63
End: 2023-05-11

## 2023-05-11 VITALS
DIASTOLIC BLOOD PRESSURE: 80 MMHG | SYSTOLIC BLOOD PRESSURE: 140 MMHG | HEART RATE: 94 BPM | TEMPERATURE: 97.8 F | OXYGEN SATURATION: 100 % | RESPIRATION RATE: 18 BRPM

## 2023-05-11 PROCEDURE — G0299 HHS/HOSPICE OF RN EA 15 MIN: HCPCS

## 2023-05-12 ENCOUNTER — HOME CARE VISIT (OUTPATIENT)
Dept: SCHEDULING | Facility: HOME HEALTH | Age: 63
End: 2023-05-12

## 2023-05-12 VITALS
OXYGEN SATURATION: 100 % | TEMPERATURE: 98.1 F | HEART RATE: 81 BPM | RESPIRATION RATE: 18 BRPM | SYSTOLIC BLOOD PRESSURE: 140 MMHG | DIASTOLIC BLOOD PRESSURE: 80 MMHG

## 2023-05-12 PROCEDURE — G0299 HHS/HOSPICE OF RN EA 15 MIN: HCPCS

## 2023-05-13 ENCOUNTER — HOME CARE VISIT (OUTPATIENT)
Dept: SCHEDULING | Facility: HOME HEALTH | Age: 63
End: 2023-05-13

## 2023-05-13 VITALS
HEART RATE: 85 BPM | TEMPERATURE: 98.1 F | TEMPERATURE: 97.7 F | HEART RATE: 95 BPM | OXYGEN SATURATION: 98 % | DIASTOLIC BLOOD PRESSURE: 78 MMHG | RESPIRATION RATE: 18 BRPM | RESPIRATION RATE: 18 BRPM | SYSTOLIC BLOOD PRESSURE: 140 MMHG | SYSTOLIC BLOOD PRESSURE: 140 MMHG | OXYGEN SATURATION: 100 % | DIASTOLIC BLOOD PRESSURE: 84 MMHG

## 2023-05-13 VITALS
DIASTOLIC BLOOD PRESSURE: 87 MMHG | SYSTOLIC BLOOD PRESSURE: 129 MMHG | TEMPERATURE: 98 F | OXYGEN SATURATION: 96 % | HEART RATE: 84 BPM | RESPIRATION RATE: 18 BRPM

## 2023-05-13 VITALS
SYSTOLIC BLOOD PRESSURE: 148 MMHG | HEART RATE: 90 BPM | TEMPERATURE: 98 F | OXYGEN SATURATION: 100 % | DIASTOLIC BLOOD PRESSURE: 90 MMHG | RESPIRATION RATE: 18 BRPM

## 2023-05-13 PROCEDURE — G0299 HHS/HOSPICE OF RN EA 15 MIN: HCPCS

## 2023-05-14 ENCOUNTER — HOME CARE VISIT (OUTPATIENT)
Dept: SCHEDULING | Facility: HOME HEALTH | Age: 63
End: 2023-05-14
Payer: MEDICARE

## 2023-05-14 PROCEDURE — G0299 HHS/HOSPICE OF RN EA 15 MIN: HCPCS

## 2023-05-15 ENCOUNTER — OFFICE VISIT (OUTPATIENT)
Dept: SURGERY | Age: 63
End: 2023-05-15

## 2023-05-15 ENCOUNTER — HOME CARE VISIT (OUTPATIENT)
Dept: SCHEDULING | Facility: HOME HEALTH | Age: 63
End: 2023-05-15
Payer: MEDICARE

## 2023-05-15 VITALS — BODY MASS INDEX: 29.4 KG/M2 | WEIGHT: 249 LBS | HEIGHT: 77 IN

## 2023-05-15 DIAGNOSIS — L98.493 ABDOMINAL WALL ULCER, WITH NECROSIS OF MUSCLE (HCC): Primary | ICD-10-CM

## 2023-05-15 PROCEDURE — 99024 POSTOP FOLLOW-UP VISIT: CPT | Performed by: SURGERY

## 2023-05-15 NOTE — PROGRESS NOTES
H&P/Consult Note/Progress Note/Office Note:   Rayburn Schwab  MRN: [id number]  :1960  Age:62 y.o.    HPI: Rayburn Schwab is a 65 y.o. male who is s/p abd wall wound debridement and possible drainage of anterior peritonitis on 23. He is s/p open drainage of localized peritonitis, debridement of necrotizing fasciitis of abdominal wall infection, removal of infected PD cath,   and repair of recurrent incarcerated ventral hernia with Vicryl mesh on 22. Prior to surgery he was admitted by the hospitalist with sepsis on 22 after he presented to the ER with a 2-week history of progressive abdominal pain and bulging. He reported a prior recurrent ventral hernia for many years following open umbilical hernia repair with Prolene mesh by Dr. EAT[facility] on 2014. He had a PD cath placed at that time and is dialysis dependent. Recently however he developed progressive and severe abdominal pain associated with worsening bulging and tenderness. He also described an associated eschar of the umbilical skin recently  This was associated with a change in the color of the effluent from the PD catheter. He has had associated N/V, tachycardia, and leukocytosis on admission. He was placed on vancomycin and Zosyn. CT imaging was performed as shown below and general surgery consultation was obtained. He described a prior episode of peritonitis without abscess or hernia treated with Abx in approximately . He described having an AV fistula for dialysis placed many years ago which has never been used. He wants to stop having peritoneal dialysis and convert to hemodialysis. 22 CT abd/pelvis withoral but no IV contrast  Hx: Yellowish solution after peritoneal dialysis, concern for peritonitis       FINDINGS: Evaluation of the hollow and solid viscera is limited by the lack of intravenous contrast.   Dependent atelectasis is present.      CT ABDOMEN: The

## 2023-05-16 ENCOUNTER — HOME CARE VISIT (OUTPATIENT)
Dept: SCHEDULING | Facility: HOME HEALTH | Age: 63
End: 2023-05-16
Payer: MEDICARE

## 2023-05-16 VITALS
SYSTOLIC BLOOD PRESSURE: 140 MMHG | TEMPERATURE: 98.6 F | OXYGEN SATURATION: 99 % | RESPIRATION RATE: 18 BRPM | DIASTOLIC BLOOD PRESSURE: 80 MMHG | HEART RATE: 97 BPM

## 2023-05-16 PROCEDURE — G0299 HHS/HOSPICE OF RN EA 15 MIN: HCPCS

## 2023-05-18 ENCOUNTER — HOME CARE VISIT (OUTPATIENT)
Dept: SCHEDULING | Facility: HOME HEALTH | Age: 63
End: 2023-05-18
Payer: MEDICARE

## 2023-05-18 PROCEDURE — G0299 HHS/HOSPICE OF RN EA 15 MIN: HCPCS

## 2023-05-20 VITALS
DIASTOLIC BLOOD PRESSURE: 80 MMHG | OXYGEN SATURATION: 99 % | TEMPERATURE: 98.3 F | SYSTOLIC BLOOD PRESSURE: 140 MMHG | HEART RATE: 92 BPM | RESPIRATION RATE: 18 BRPM

## 2023-05-20 VITALS
SYSTOLIC BLOOD PRESSURE: 140 MMHG | DIASTOLIC BLOOD PRESSURE: 70 MMHG | OXYGEN SATURATION: 100 % | RESPIRATION RATE: 18 BRPM | HEART RATE: 88 BPM | TEMPERATURE: 98.4 F

## 2024-01-11 ENCOUNTER — HOSPITAL ENCOUNTER (EMERGENCY)
Age: 64
Discharge: HOME | End: 2024-01-11
Attending: EMERGENCY MEDICINE
Payer: MEDICARE

## 2024-01-11 ENCOUNTER — APPOINTMENT (OUTPATIENT)
Dept: GENERAL RADIOLOGY | Age: 64
End: 2024-01-11
Payer: MEDICARE

## 2024-01-11 VITALS
SYSTOLIC BLOOD PRESSURE: 143 MMHG | RESPIRATION RATE: 16 BRPM | BODY MASS INDEX: 28.76 KG/M2 | DIASTOLIC BLOOD PRESSURE: 109 MMHG | TEMPERATURE: 98.2 F | OXYGEN SATURATION: 100 % | HEART RATE: 101 BPM | WEIGHT: 242.51 LBS

## 2024-01-11 DIAGNOSIS — N18.6 END STAGE RENAL DISEASE ON DIALYSIS (HCC): ICD-10-CM

## 2024-01-11 DIAGNOSIS — Z99.2 END STAGE RENAL DISEASE ON DIALYSIS (HCC): ICD-10-CM

## 2024-01-11 DIAGNOSIS — S93.601A SPRAIN OF RIGHT FOOT, INITIAL ENCOUNTER: Primary | ICD-10-CM

## 2024-01-11 DIAGNOSIS — S90.31XA CONTUSION OF RIGHT FOOT, INITIAL ENCOUNTER: ICD-10-CM

## 2024-01-11 PROCEDURE — 73630 X-RAY EXAM OF FOOT: CPT

## 2024-01-11 PROCEDURE — 99284 EMERGENCY DEPT VISIT MOD MDM: CPT

## 2024-01-11 RX ORDER — HYDROCODONE BITARTRATE AND ACETAMINOPHEN 5; 325 MG/1; MG/1
1 TABLET ORAL EVERY 6 HOURS PRN
Qty: 10 TABLET | Refills: 0 | Status: SHIPPED | OUTPATIENT
Start: 2024-01-11 | End: 2024-01-14

## 2024-01-11 RX ORDER — MORPHINE SULFATE 15 MG/1
7.5 TABLET ORAL EVERY 6 HOURS PRN
Qty: 10 TABLET | Refills: 0 | Status: SHIPPED | OUTPATIENT
Start: 2024-01-11 | End: 2024-01-11

## 2024-01-11 ASSESSMENT — ENCOUNTER SYMPTOMS
NAUSEA: 0
SHORTNESS OF BREATH: 0
RHINORRHEA: 0
VOMITING: 0
DIARRHEA: 0
COUGH: 0
BACK PAIN: 0
COLOR CHANGE: 0
ABDOMINAL PAIN: 0

## 2024-01-11 ASSESSMENT — PAIN SCALES - GENERAL: PAINLEVEL_OUTOF10: 7

## 2024-01-11 ASSESSMENT — PAIN DESCRIPTION - ORIENTATION: ORIENTATION: RIGHT

## 2024-01-11 ASSESSMENT — PAIN DESCRIPTION - DESCRIPTORS: DESCRIPTORS: ACHING

## 2024-01-11 ASSESSMENT — PAIN - FUNCTIONAL ASSESSMENT: PAIN_FUNCTIONAL_ASSESSMENT: 0-10

## 2024-01-11 ASSESSMENT — PAIN DESCRIPTION - LOCATION: LOCATION: FOOT

## 2024-01-11 NOTE — DISCHARGE INSTRUCTIONS
Wear hard soled shoe at all times when ambulating for the next couple of weeks.  Ice for 20 minutes every 4 hours while awake for 5 days.  Elevate your right foot above your heart is much as possible the next several days to help with pain and swelling.   Motrin for pain.  Norco for breakthrough pain.  follow-up with orthopedics when called with appointment done.  Call them in 5 to 7 days if you have not heard from them regarding this appointment time.  You absolutely must go to dialysis tomorrow and cannot miss another day

## 2024-01-11 NOTE — ED TRIAGE NOTES
Pt arrives to ER with crutches. Tuesday night, reports feeling dehydrated and diarrhea/. Ptient reports passing out, falling, and twisting right foot. Reports severe pain in right foot and toes. Unable to bear any weight on right foot and has been utilizing ct ruches.     Hemodialysis patient.

## 2024-01-11 NOTE — ED PROVIDER NOTES
Emergency Department Provider Note       PCP: Charbel Montanez MD   Age: 63 y.o.   Sex: male     DISPOSITION Decision To Discharge 01/11/2024 01:51:54 PM       ICD-10-CM    1. Sprain of right foot, initial encounter  S93.601A [facility]      HYDROcodone-acetaminophen (NORCO) 5-325 MG per tablet     DISCONTINUED: morphine (MSIR) 15 MG tablet      2. Contusion of right foot, initial encounter  S90.31XA [facility]      HYDROcodone-acetaminophen (NORCO) 5-325 MG per tablet     DISCONTINUED: morphine (MSIR) 15 MG tablet      3. End stage renal disease on dialysis (HCC)  N18.6     Z99.2           Medical Decision Making     Complexity of Problems Addressed:  1 or more acute illnesses that pose a threat to life or bodily function.   Acute injury    Data Reviewed and Analyzed:  I independently ordered and reviewed each unique test.         I interpreted the X-rays no for fracture, radiology calls possible great toe distal phalanx fracture but patient has no pain there or tenderness on the.  EKG interpretation in absence of cardiology, EKG shows a sinus tachycardia rate 101, normal axis, no hypertrophy, no acute ST-T changes, no STEMI, no T waves are peaked suggestive of hyperkalemia  Otherwise normal EKG  Discussion of management or test interpretation.  Patient presented with syncopal episode several days ago after dialysis where he believes too much fluid was taken off.  He reports he took off 5 pounds.  He was very lightheaded upon sitting and standing and had to get up too quickly due to diarrhea that day and passed out injuring his left foot.  EKG today shows normal ST-T waves and no peaked T waves suggestive of hyperkalemia.  Patient will go to dialysis tomorrow.  He does not appear ill or toxic.  His left foot is markedly swollen from injury but no fractures are identified.  There is no tenderness

## 2024-01-11 NOTE — ED NOTES
I have reviewed discharge instructions with the patient.  The patient verbalized understanding.    Patient left ED via Discharge Method: wheelchair to Home with (self).    Opportunity for questions and clarification provided.       Patient given 1 escripts.         To continue your aftercare when you leave the hospital, you may receive an automated call from our care team to check in on how you are doing.  This is a free service and part of our promise to provide the best care and service to meet your aftercare needs.” If you have questions, or wish to unsubscribe from this service please call [phone].  Thank you for Choosing our [facility] Emergency Department.       Jocelyn Javier, RN  01/11/24 [id number]

## 2024-01-12 ENCOUNTER — CARE COORDINATION (OUTPATIENT)
Dept: CARE COORDINATION | Facility: CLINIC | Age: 64
End: 2024-01-12

## 2024-01-12 NOTE — CARE COORDINATION
Ambulatory Care Management Outreach Attempt    This patient was received as a referral from  Daily assignment for case management of ed utilizer.    Attempted to reach patient for ED follow up. Pt has respectfully declined services at this time, my contact information has been shared with pt in the event there circumstances change. They are free to reach out at any time. ACM signing off.        Patient: Devin Mendez Patient : 1960 MRN: [id number]    Last Discharge Facility       Date Complaint Diagnosis Description Type Department Provider    24 Foot Pain Sprain of right foot, initial encounter ... ED (DISCHARGE) SFManny Benjamin MD                Noted following upcoming appointments from discharge chart review:   BS follow up appointment(s):   Future Appointments   Date Time Provider Department Center   1/15/2024  8:00 AM [id number] BLOOD DRAW [id number] [facility] AMB   2024  9:15 AM Deejay Mercado MD [id number] [facility]     Non-BS follow up appointment(s):

## 2024-01-15 ENCOUNTER — NURSE ONLY (OUTPATIENT)
Dept: UROLOGY | Age: 64
End: 2024-01-15

## 2024-01-15 DIAGNOSIS — N40.1 BENIGN PROSTATIC HYPERPLASIA WITH LOWER URINARY TRACT SYMPTOMS, SYMPTOM DETAILS UNSPECIFIED: ICD-10-CM

## 2024-01-15 LAB — PSA SERPL-MCNC: 0.3 NG/ML

## 2024-01-17 ENCOUNTER — OFFICE VISIT (OUTPATIENT)
Dept: ORTHOPEDIC SURGERY | Age: 64
End: 2024-01-17
Payer: MEDICARE

## 2024-01-17 DIAGNOSIS — S92.404A NONDISPLACED UNSPECIFIED FRACTURE OF RIGHT GREAT TOE, INITIAL ENCOUNTER FOR CLOSED FRACTURE: Primary | ICD-10-CM

## 2024-01-17 DIAGNOSIS — R52 PAIN: ICD-10-CM

## 2024-01-17 PROCEDURE — 99204 OFFICE O/P NEW MOD 45 MIN: CPT | Performed by: PHYSICIAN ASSISTANT

## 2024-01-17 RX ORDER — HYDROCODONE BITARTRATE AND ACETAMINOPHEN 5; 325 MG/1; MG/1
1 TABLET ORAL EVERY 6 HOURS PRN
Qty: 28 TABLET | Refills: 0 | Status: SHIPPED | OUTPATIENT
Start: 2024-01-17 | End: 2024-01-24

## 2024-01-17 NOTE — PROGRESS NOTES
Name: Devin Mendez  YOB: 1960  Gender: male  MRN: [id number]    Treatment Plan:   I had a thorough discussion with the patient regarding the treatment plan patient has a right nondisplaced distal phalanx fracture of the great toe.    Already in a postop shoe.  May WBAT in the postop shoe.  Final prescription of Norco 5 mg was given.    Patient understands and in full agreement with the treatment plan.    Weight-bearing status: WBAT in Boot/hardsole shoe        Return to work/work restrictions: none  Norco 5/325, 1 tab p.o. q6 h PRN pain, disp #20. I discussed the side effects of nausea and vomiting and GI distress.  I also discussed how this is a narcotic and its abuse potential.  I told the patient I am not a long term pain medication provider so this Rx is for a short time only.  I explained the need for accountability and honestly with narcotics.  I explained how overdosing can result in respiratory suppression and potentially death. I also discussed how this medication contains tylenol and they should not take tylenol or other medications that contain tylenol. I also discussed tylenols affect on the liver and advised no alcohol intake while taking this medication.     F/u 4-6 weeks w/ Foot Xray  Plan at next visit: Transition to normal shoes with/without carbon fiber insert     CC: right great toe pain    HPI: Devin Mendez is a 63 y.o. male who has toe pain.  Patient states that there in dialysis for CKD and he just finished with his dialysis treatment and got lightheaded and passed out.  He states when he passed out his foot curled underneath him.  He notes that he had significant pain in his toe and was unable to walk without pain and difficulty.  He presented to the ED where he was diagnosed with a nondisplaced distal phalanx of the great toe fracture.  He was placed in a postop shoe at that time.  He has not been weightbearing on that foot and using

## 2024-10-15 ENCOUNTER — OFFICE VISIT (OUTPATIENT)
Dept: VASCULAR SURGERY | Age: 64
End: 2024-10-15
Payer: MEDICARE

## 2024-10-15 VITALS
SYSTOLIC BLOOD PRESSURE: 158 MMHG | HEIGHT: 77 IN | BODY MASS INDEX: 29.4 KG/M2 | OXYGEN SATURATION: 98 % | HEART RATE: 87 BPM | DIASTOLIC BLOOD PRESSURE: 122 MMHG | WEIGHT: 249 LBS

## 2024-10-15 DIAGNOSIS — I73.9 PVD (PERIPHERAL VASCULAR DISEASE) WITH CLAUDICATION (HCC): Primary | ICD-10-CM

## 2024-10-15 PROCEDURE — 3080F DIAST BP >= 90 MM HG: CPT | Performed by: SURGERY

## 2024-10-15 PROCEDURE — 99213 OFFICE O/P EST LOW 20 MIN: CPT | Performed by: SURGERY

## 2024-10-15 PROCEDURE — 3077F SYST BP >= 140 MM HG: CPT | Performed by: SURGERY

## 2024-10-15 RX ORDER — AMLODIPINE BESYLATE 10 MG/1
10 TABLET ORAL DAILY
COMMUNITY

## 2024-10-15 RX ORDER — CALCITRIOL 0.5 UG/1
0.5 CAPSULE, LIQUID FILLED ORAL 2 TIMES DAILY
COMMUNITY
Start: 2024-07-25

## 2024-10-15 NOTE — PROGRESS NOTES
[address] [address]  [phone] FAX: [phone]    Devni Mccallum [place]  : 1960    Chief Complaint:     History of Present Illness:   Patient follows up today for follow-up ultrasound.  Patient has a history of dialysis has a left radiocephalic fistula placement    CURRENT MEDICATIONS:  Current Outpatient Medications   Medication Sig Dispense Refill    amLODIPine (NORVASC) 10 MG tablet Take 1 tablet by mouth daily      calcitRIOL (ROCALTROL) 0.5 MCG capsule Take 1 capsule by mouth 2 times daily      ferric citrate (AURYXIA) 1  MG(Fe) TABS tablet Take 3 tablets by mouth 3 times daily Take 3 tablets by mouth three times daily with meals and 1 tablet twice daily with snacks      valsartan-hydroCHLOROthiazide (DIOVAN-HCT) 320-25 MG per tablet Take 1 tablet by mouth nightly      cloNIDine (CATAPRES) 0.3 MG/24HR PTWK Place 1 patch onto the skin every 3 days       No current facility-administered medications for this visit.       Past Medical History:   Diagnosis Date    Anemia     Anxiety     Arthritis     CAD (coronary artery disease)     per pt- no MI- \"They said the back of my heart was something...like 40%\"    Chronic kidney disease     followed by [facility] in [place]- M, W, F; stage 5; pt has AV fistula to left arm; PERITONEAL DIALYSIS daily since     Chronic pain     CKD (chronic kidney disease)     Former smoker     Fracture, tibial plateau 2017    GERD (gastroesophageal reflux disease)     Heart disease     History of gastric ulcer     Hyperlipidemia     on med for control    Hypertension     controlled with med    Hypertensive nephrosclerosis     per nephrology note 14    Hypoactive thyroid     Kidney stone     Peritoneal dialysis catheter in place (HCC)     Peritoneal dialysis-associated peritonitis (HCC) 2017    during hs    PUD (peptic ulcer disease)     SBP (spontaneous bacterial peritonitis) ([facility]) 2016    Sepsis ([facility])

## 2025-02-24 NOTE — PROGRESS NOTES
H&P/Consult Note/Progress Note/Office Note:   Maria Del Rosario Melgar  MRN: [id number]  :1960  Age:62 y.o.    HPI: Maria Del Rosario Melgar is a 65 y.o. male who is s/p open drainage of localized peritonitis, debridement of necrotizing fasciitis of abdominal wall infection, removal of infected PD cath, and repair of recurrent incarcerated ventral hernia with Vicryl mesh on 22. Prior to surgery he was admitted by the hospitalist with sepsis on 22 after he presented to the ER with a 2-week history of progressive abdominal pain and bulging. He reported a prior recurrent ventral hernia for many years following open umbilical hernia repair with Prolene mesh by Dr. EATON [facility] on 2014. He had a PD cath placed at that time and is dialysis dependent. Recently however he developed progressive and severe abdominal pain associated with worsening bulging and tenderness. He also described an associated eschar of the umbilical skin recently  This was associated with a change in the color of the effluent from the PD catheter. He has had associated N/V, tachycardia, and leukocytosis on admission. He was placed on vancomycin and Zosyn. CT imaging was performed as shown below and general surgery consultation was obtained. He described a prior episode of peritonitis without abscess or hernia treated with Abx in approximately . He described having an AV fistula for dialysis placed many years ago which has never been used. He wants to stop having peritoneal dialysis and convert to hemodialysis. 22 CT abd/pelvis withoral but no IV contrast  Hx: Yellowish solution after peritoneal dialysis, concern for peritonitis       FINDINGS: Evaluation of the hollow and solid viscera is limited by the lack of intravenous contrast.   Dependent atelectasis is present. CT ABDOMEN: The stomach is unremarkable.  Innumerable cysts are noted throughout the bilateral kidneys unchanged from the prior exam in keeping with polycystic kidney disease. Multiple of the cysts are hyperdense and likely hemorrhagic. There is no hydronephrosis. No renal or ureteral stones evident. The adrenal glands are normal. An umbilical hernia is present in the lower mid abdomen at the insertion site of the peritoneal dialysis catheter. There is a fluid collection within the hernia sac concerning for possible seroma or abscess that measures 5.7 x 3.2 x 6.0 cm. There are herniated bowel loops as well without bowel obstruction. The abdominal wall defect measures roughly 7.5 cm. CT PELVIS: The bowel is normal in course, caliber, and wall thickness. No bowel obstruction evident. The appendix is normal. The peritoneal dialysis catheter coils in the central pelvis and a small amount of ascitic fluid. The bladder is decompressed. There is no inguinal hernia. The rectum is unremarkable. No aggressive bone lesions identified. Impression:  1. Moderate-sized umbilical hernia with herniated loops of bowel and fluid. The fluid collection measures roughly 5.7 x 6.0 cm. Seroma or abscess could be considered. 2. Polycystic kidney disease. There is no hydronephrosis. Additional hx:  12/27/22 POD1: Patient alert and conversant. Abdominal pain is minimal.  Binder in place with surgical dressing CDI. LUE AVF pulsatile. WBC 13.2. Cr 13.3 K+4.4 AF/VSS. -Flatus/-BM  12/28/22 POD2 up in chair; comfortable; AF; Hg lower and subq Heparin on hold; await return of bowel function  12/29/22 POD3 feels better each day; abd dressing changes; 2 breanne left in place  12/30/22 POD4 Tolerating clears so far this am;  Abd breanne changed by Dr Javon Candelario this am or tomorrow on PO Abx    1/23/23 POD28 office visit; feels well. No abdominal pain. No cellulitis. Wound VAC is being changed 3 times a week as outpatient. 2/6/23 no nausea vomiting or abdominal pain.   The umbilical skin is swollen under the wound VAC and the black foam is not being packed into the wound orifice as the wound entry site is too small. Converted to 1/4\" Iodoform. Past Medical History:   Diagnosis Date    Anemia     Anxiety     Arthritis     CAD (coronary artery disease)     per pt- no MI- \"They said the back of my heart was something. ..like 40%\"    Chronic kidney disease     followed by [facility]; stage 5; pt has AV fistula to left arm; PERITONEAL DIALYSIS daily since 2014    Chronic pain     CKD (chronic kidney disease)     Former smoker     Fracture, tibial plateau 80/68/7493    GERD (gastroesophageal reflux disease)     Heart disease     Hyperlipidemia     on med for control    Hypertension     controlled with med    Hypertensive nephrosclerosis     per nephrology note 4/14/14    Hypoactive thyroid     Kidney stone     Peritoneal dialysis catheter in place [facility])     Peritoneal dialysis-associated peritonitis ([address] 06/24/2017    during hs    PUD (peptic ulcer disease)     SBP (spontaneous bacterial peritonitis) ([address] 09/24/2016    Sepsis ([address] 09/24/2016    Tibial plateau fracture, left 00/09/8532    Umbilical hernia     Unspecified sleep apnea     pt does not have a CPAP     Past Surgical History:   Procedure Laterality Date    ABDOMEN SURGERY Right 6/13/2022    RIGHT LOWER QUADRANT ABDOMEN LESION BIOPSY EXCISION performed by Kacey Epstein MD at [address] [place] [address] 280 N/A 12/26/2022    ABDOMEN INCISION AND DRAINAGE, VENTRAL HERNIA  REPAIRE WITH MESH.  REMOVAL PD CATH performed by Danette Moya MD at [address] [place] [address] Right 1995    tip of 5th finger    COLONOSCOPY N/A 3/23/2018    COLONOSCOPY  BMI 34 performed by Sara Keyes MD at [address] [place] [address] PYELOGRAMS    CYSTOSCOPY Left 11/1/2022    CYSTOSCOPY, LEFT URETEROSCOPY, BASKET STONE EXTRACTION, LEFT URETERAL STENT INSERTION performed by Grace Portillo MD at [place] [address] Left as a child    thumb ORTHOPEDIC SURGERY Left     knee    ORTHOPEDIC SURGERY Left 2017    LEFT LATERAL TIBIAL PLATEAU OPEN REDUCTION INTERNAL FIXATION    AL UNLISTED PROCEDURE ABDOMEN PERITONEUM & OMENTUM  14    PD catheter placement    SKIN BIOPSY Left 2022    LEFT BUTTOCK MASS EXCISION performed by Rani Rizvi MD at [address] [place] [address] Left     AV fistula to arm     Current Outpatient Medications   Medication Sig    finasteride (PROSCAR) 5 MG tablet Take 1 tablet by mouth daily    valsartan-hydroCHLOROthiazide (DIOVAN-HCT) 320-25 MG per tablet Take 1 tablet by mouth daily. Cholecalciferol (D3 PO) Take 125 mcg by mouth daily    diphenhydrAMINE-APAP, sleep, (TYLENOL PM EXTRA STRENGTH)  MG tablet Take 2 tablets by mouth at bedtime. pantoprazole (PROTONIX) 20 MG tablet Take 1 tablet by mouth every morning (before breakfast)    traZODone (DESYREL) 50 MG tablet Take 1 tablet by mouth nightly    B Complex-C-Folic Acid (DIALYVITE PO) Take 1 tablet by mouth daily    cinacalcet (SENSIPAR) 90 MG tablet Take 60 mg by mouth daily take 2 tabs by mouth daily    cloNIDine (CATAPRES) 0.3 MG/24HR PTWK Place 1 patch onto the skin every 3 days     finasteride (PROSCAR) 5 MG tablet Take 5 mg by mouth at bedtime    ondansetron (ZOFRAN) 4 MG tablet Take 4 mg by mouth every 8 hours as needed    potassium chloride (KLOR-CON) 10 MEQ extended release tablet Take 10 mEq by mouth daily take one tablet by mouth twice daily    sevelamer hcl (RENAGEL) 800 MG tablet Take 2,400 mg by mouth 3 times daily (with meals)      No current facility-administered medications for this visit.      ALLERGIES:  Iodine    Social History     Socioeconomic History    Marital status: Single   Tobacco Use    Smoking status: Former     Packs/day: 0.50     Years: 15.00     Pack years: 7.50     Types: Cigarettes     Quit date: 3/1/2014     Years since quittin.9    Smokeless tobacco: Never    Tobacco comments:     Quit smoking: pt states only smoked cigarettes when drinking alcohol   Substance and Sexual Activity    Alcohol use: Yes    Drug use: Yes     Types: Marijuana Shashank Gobble)     Comment: \"when I can afford it\"      Social Determinants of Health     Financial Resource Strain: Low Risk     Difficulty of Paying Living Expenses: Not hard at all   Food Insecurity: No Food Insecurity    Worried About Running Out of Food in the Last Year: Never true    Ran Out of Food in the Last Year: Never true   Physical Activity: Insufficiently Active    Days of Exercise per Week: 1 day    Minutes of Exercise per Session: 20 min     Social History     Tobacco Use   Smoking Status Former    Packs/day: 0.50    Years: 15.00    Pack years: 7.50    Types: Cigarettes    Quit date: 3/1/2014    Years since quittin.9   Smokeless Tobacco Never   Tobacco Comments    Quit smoking: pt states only smoked cigarettes when drinking alcohol     Family History   Problem Relation Age of Onset    COPD Father     Heart Disease Father     Hypertension Mother     Diabetes Mother     Hypertension Father      ROS: The patient has no difficulty with chest pain or shortness of breath. No fever or chills. Comprehensive review of systems was otherwise unremarkable except as noted above. Physical Exam:   Ht 6' 5\" (1.956 m)   Wt 248 lb (112.5 kg)   BMI 29.41 kg/m²   Vitals:    23 0806   Weight: 248 lb (112.5 kg)   Height: 6' 5\" (1.956 m)       No intake/output data recorded.  1901 -  0700  In: 1165 [I.V.:665]  Out: 1500     Constitutional: Alert, oriented, cooperative patient in no acute distress; appears stated age    Eyes:Sclera are clear. EOMs intact  ENMT: no external lesions gross hearing normal; no obvious neck masses, no ear or lip lesions, nares normal  CV: RRR. Normal perfusion  Resp: No JVD. Breathing is  non-labored; no audible wheezing. GI:   The wound orifice in the central aspect of the umbilicus is quite small.   It had irreversibly ischemic tissue around it. It was debrided on 2/6/2023. Wound tunnels about 2 cm. The wound orifice is about 3 mm x 2 mm. There is a second wound around the periphery of the umbilicus which was also debrided sharply on 2/6/2023. The umbilical wound is too small to allow black foam from the wound VAC. There is a lot of edema around the umbilical skin. No fluctuance or cellulitis. Musculoskeletal: unremarkable with normal function. No embolic signs or cyanosis. LUE with AVF pulsitile  Neuro:  Oriented; moves all 4; no focal deficits  Psychiatric: normal affect and mood, no memory impairment    Recent vitals (if inpt):  Patient Vitals for the past 24 hrs:   BP Temp Temp src Pulse Resp SpO2 Height Weight   12/30/22 1110 (!) 131/98 98.2 °F (36.8 °C) Oral 99 18 96 % -- --   12/30/22 0959 -- -- -- -- -- -- 6' 5\" (1.956 m) --   12/30/22 0717 (!) 133/109 98.6 °F (37 °C) Oral (!) 103 18 96 % -- --   12/30/22 0517 -- -- -- -- 19 -- -- --   12/30/22 0447 (!) 133/97 99 °F (37.2 °C) Oral 97 18 94 % -- --   12/29/22 2313 115/87 99.1 °F (37.3 °C) Oral (!) 102 17 95 % -- --   12/29/22 1944 (!) 129/94 98.4 °F (36.9 °C) Oral (!) 109 20 97 % -- --   12/29/22 1931 -- -- -- -- -- -- -- 256 lb 5 oz (116.3 kg)   12/29/22 1542 106/82 100.3 °F (37.9 °C) Oral (!) 108 18 96 % -- --   12/29/22 1253 129/88 98.2 °F (36.8 °C) Oral (!) 106 18 99 % -- --       Amount and/or Complexity of Data Reviewed and Analyzed:  I reviewed and analyzed all of the unique labs and radiologic studies that are shown below as well as any that are in the HPI, and any that are in the expanded problem list below  *Each unique test, order, or document contributes to the combination of 2 or combination of 3 in Category 1 below. For this visit I also reviewed old records and prior notes. No results for input(s): WBC, HGB, PLT, NA, K, CL, CO2, BUN, CREA, GLU, INR, APTT, ALT, AML, AML, LCAD, PCO2, PO2, HCO3 in the last 72 hours.     Invalid input(s): PTP, TBIL, TBILI, CBIL, SGOT, GPT, AP, LPSE, NH4, TROPT, TROIQ,  PH    Review of most recent CBC  Lab Results   Component Value Date    WBC 10.0 12/31/2022    HGB 9.2 (L) 12/31/2022    HCT 27.8 (L) 12/31/2022    MCV 95.2 12/31/2022     12/31/2022       Review of most recent BMP  Lab Results   Component Value Date/Time     12/30/2022 08:15 AM    K 3.7 12/30/2022 08:15 AM     12/30/2022 08:15 AM    CO2 26 12/30/2022 08:15 AM    BUN 38 12/30/2022 08:15 AM    CREATININE 12.10 12/30/2022 08:15 AM    GLUCOSE 102 12/30/2022 08:15 AM    CALCIUM 9.0 12/30/2022 08:15 AM        Review of most recent LFTs (and lipase if done)  Lab Results   Component Value Date    ALT 9 (L) 12/29/2022    AST 10 (L) 12/29/2022    ALKPHOS 51 12/29/2022    BILITOT 1.1 12/29/2022     Lab Results   Component Value Date    LIPASE 88 12/25/2022       No results found for: INR, APTT, LCAD    Review of most recent HgbA1c  No results found for: LABA1C    Nutritional assessment screen for wound healing issues:  Lab Results   Component Value Date    LABALBU 1.8 (L) 12/29/2022         Xray Result (most recent):  XR CHEST PORTABLE 12/25/2022    Narrative  KUB    CLINICAL INDICATION: Sepsis    FINDINGS: Single AP view the chest compared to a similar exam dated 6/24/2017  show the lungs are slightly underexpanded but otherwise clear. No pleural  effusion or pneumothorax. The cardiac silhouette and mediastinum are  unremarkable. The bones are normal.    Impression  Slightly under expanded lungs, otherwise no acute abnormality. CT Result (most recent):  CT ABDOMEN PELVIS WO IV CONTRAST 12/25/2022    Narrative  CT of the abdomen and pelvis without contrast.    CLINICAL INDICATION: Yellowish solution after peritoneal dialysis, concern for  peritonitis    PROCEDURE: Serial thin-section axial images obtained from the upper abdomen  through the proximal femurs without the administration of intravenous contrast.  Oral contrast was given.  Radiation dose reduction techniques were used for this  study. Our CT scanners use one or all of the following: Automated exposure  control, adjusted of the mA and/or kV according to patient size, iterative  reconstruction    COMPARISON: CT abdomen and pelvis dated 7/6/2022    FINDINGS:  Evaluation of the hollow and solid viscera is limited by the lack of  intravenous contrast.    Dependent atelectasis is present. CT ABDOMEN: The stomach is unremarkable. Innumerable cysts are noted throughout  the bilateral kidneys unchanged from the prior exam in keeping with polycystic  kidney disease. Multiple of the cysts are hyperdense and likely hemorrhagic. There is no hydronephrosis. No renal or ureteral stones evident. The adrenal  glands are normal. An umbilical hernia is present in the lower mid abdomen at  the insertion site of the peritoneal dialysis catheter. There is a fluid  collection within the hernia sac concerning for possible seroma or abscess that  measures 5.7 x 3.2 x 6.0 cm. There are herniated bowel loops as well without  bowel obstruction. The abdominal wall defect measures roughly 7.5 cm. CT PELVIS: The bowel is normal in course, caliber, and wall thickness. No bowel  obstruction evident. The appendix is normal. The peritoneal dialysis catheter  coils in the central pelvis and a small amount of ascitic fluid. The bladder is  decompressed. There is no inguinal hernia. The rectum is unremarkable. No aggressive bone lesions identified. Impression  1. Moderate-sized umbilical hernia with herniated loops of bowel and fluid. The  fluid collection measures roughly 5.7 x 6.0 cm. Seroma or abscess could be  considered. 2. Polycystic kidney disease. There is no hydronephrosis. US Result (most recent):  US RETROPERITONEAL COMPLETE 01/10/2019    Narrative  Renal ultrasound, 1/10/2019    History: Kidney cyst.    Comparison: None.     Technique: Grayscale and doppler images of the kidneys and bladder were obtained  using a 3-5 MHz linear transducer. Findings: The right kidney measures 11.3 cm, and the left kidney measures 10.7  cm in greatest longitudinal length. No stones or evidence of hydronephrosis is  seen. Renal parenchymal echogenicity is increased consistent with chronic  medical renal changes. Numerous simple and complex renal cortical lesions are  seen in the bilateral kidneys. Many of these appear cystic. The majority of  these are not well assessed given their very small size. No clearly worrisome  dominant solid lesion is seen. The urinary bladder is collapsed about a Rojas catheter and therefore not well  assessed. The visualized abdominal aorta is normal in caliber measuring up to  2.3 cm. The IVC is patent. Small to moderate ascites is seen in the right upper  quadrant, and bilateral lower quadrants. Impression  IMPRESSION:  1. Echogenic kidneys demonstrating numerous simple and complex likely cystic  lesions. Many lesions are not well characterized given their very small size  although no clearly worrisome dominant solid mass is seen. This appearance can  be seen with multiple etiologies. However, given the patient's history of renal  failure and dialysis, these are favored to represent acquired cystic renal  disease. 2. Small to moderate ascites which can suggest some degree of fluid overload. Admission date (for inpatients): (Not on file)   * No surgery found *  Procedure(s):  ABDOMEN INCISION AND DRAINAGE  HERNIA VENTRAL REPAIR  poss CATHETER REMOVAL PERITONEAL DIALYSIS        ASSESSMENT/PLAN:  [unfilled]  Active Problems:    * No active hospital problems. *  Resolved Problems:    * No resolved hospital problems.  *     Patient Active Problem List    Diagnosis Date Noted    Localized peritonitis ([address] 12/26/2022     Priority: Medium    Recurrent ventral hernia with incarceration 12/26/2022     Priority: Medium    Infection due to peritoneal dialysis catheter ([address] 12/26/2022     Priority: Medium Periumbilical abdominal pain 12/26/2022     Priority: Medium    Abdominal wall abscess 12/26/2022     Priority: Medium    Necrotizing fasciitis ([address] 12/26/2022     Priority: Medium    Abdominal wall ulcer, with necrosis of muscle ([address] 12/26/2022     Priority: Medium    Peritonitis ([address] 12/25/2022     Priority: Medium    Peritoneal dialysis status ([address] 12/25/2022     Priority: Medium    Anemia 12/25/2022     Priority: Medium    Hypomagnesemia 12/25/2022     Priority: Medium    DNR (do not resuscitate) 12/25/2022     Priority: Medium    Soft tissue mass      Priority: Medium    Kidney stone 10/14/2022     Added automatically from request for surgery [id number]      Other hyperlipidemia 04/21/2022    ESRD (end stage renal disease) ([address] 05/04/2021    Non-recurrent unilateral inguinal hernia without obstruction or gangrene 12/22/2020    Hypertensive urgency 06/24/2017    Essential hypertension 39/73/3157    Umbilical hernia 53/17/0235          Number and Complexity of Problems addressed and   Risks of complications and/or morbidity of management        Recurrent, incarcerated ventral hernia  Localized peritonitis    12/26/22    1. Open drainage of localized peritonitis   2. Open repair of recurrent incarcerated ventral hernia with Vicryl mesh   3. Removal of tunneled intraperitoneal dialysis catheter  4. Debridement of skin, subcutaneous adipose, muscle, and fascia for abdominal wall necrotizing infection       PROCEDURE  On 2/6/23 I sharply debrided devitalized and irreversibly ischemic skin and subcu tissues from the wound. Surgical scissors were used. Sharp excisional technique was used. 1 cm² of 1 surface area was debrided. Wound margins were extended. Wound VAC with black foam 3 times a week continued on 2/6/2023. He was converted to quarter-inch to form packing daily. The wound orifice is too small for the black foam.    Follow-up with me in 2 weeks.   Velcro abd binder at all times      Leukocytosis-->resolved  WBC 7.9k  Cxs from OR 12/26/22 grew alpha Strep  On IV Vanc and Zosyn --> PO as outpt-->now off abx    Abdominal pain -->resolved    Dialysis dependence  PD cath (originally placed in 2014 by Dr EATON [facility]) was removed for infection on 12/26/22  Nephrology following for HD, pt has a patent LUE AVF                     Level of MDM (2/3 elements below)  Number and Complexity of Problems Addressed Amount and/or Complexity of Data to be Reviewed and Analyzed  *Each unique test, order, or document contributes to the combination of 2 or combination of 3 in Category 1 below. Risk of Complications and/or Morbidity or Mortality of pt Management     21136  82946 SF Minimal  ?1self-limited or minor problem Minimal or none Minimal risk of morbidity from additional diagnostic testing or Rx   98939  42623 Low Low  ? 2or more self-limited or minor problems;    or  ? 1stable chronic illness;    or  ?1acute, uncomplicated illness or injury   Limited  (Must meet the requirements of at least 1 of the 2 categories)  Category 1: Tests and documents   ? Any combination of 2 from the following:  ?Review of prior external note(s) from each unique source*;  ?review of the result(s) of each unique test*;   ?ordering of each unique test*    or   Category 2: Assessment requiring an independent historian(s)  (For the categories of independent interpretation of tests and discussion of management or test interpretation, see moderate or high) Low risk of morbidity from additional diagnostic testing or treatment     07914  73587 Mod Moderate  ? 1or more chronic illnesses with exacerbation, progression, or side effects of treatment;    or  ?2or more stable chronic illnesses;    or  ?1undiagnosed new problem with uncertain prognosis;    or  ?1acute illness with systemic symptoms;    or  ?1acute complicated injury   Moderate  (Must meet the requirements of at least 1 out of 3 categories)  Category 1: Tests, documents, or independent historian(s)  ?Any combination of 3 from the following:   ?Review of prior external note(s) from each unique source*;  ?Review of the result(s) of each unique test*;  ?Ordering of each unique test*;  ?Assessment requiring an independent historian(s)    or  Category 2: Independent interpretation of tests   ?Independent interpretation of a test performed by another physician/other qualified health care professional (not separately reported);     or  Category 3: Discussion of management or test interpretation  ?Discussion of management or test interpretation with external physician/other qualified health care professional/appropriate source (not separately reported)   Moderate risk of morbidity from additional diagnostic testing or treatment  Examples only:  ?Prescription drug management   ?Decision regarding minor surgery with identified patient or procedure risk factors  ?Decision regarding elective major surgery without identified patient or procedure risk factors   ?Diagnosis or treatment significantly limited by social determinants of health       66062  35643 High High  ?1or more chronic illnesses with severe exacerbation, progression, or side effects of treatment;    or  ?1 acute or chronic illness or injury that poses a threat to life or bodily function   Extensive  (Must meet the requirements of at least 2 out of 3 categories)  Category 1: Tests, documents, or independent historian(s)  ?Any combination of 3 from the following:   ?Review of prior external note(s) from each unique source*;  ?Review of the result(s) of each unique test*;   ?Ordering of each unique test*;   ?Assessment requiring an independent historian(s)    or   Category 2: Independent interpretation of tests   ?Independent interpretation of a test performed by another physician/other qualified health care professional (not separately reported);     or  Category 3: Discussion of management or test interpretation  ?Discussion of  management or test interpretation with external physician/other qualified health care professional/appropriate source (not separately reported)   High risk of morbidity from additional diagnostic testing or treatment  Examples only:  ?Drug therapy requiring intensive monitoring for toxicity  ? Decision regarding elective major surgery with identified patient or procedure risk factors  ? Decision regarding emergency major surgery  ? Decision regarding hospitalization  ? Decision not to resuscitate or to de-escalate care because of poor prognosis             I have personally performed a face-to-face diagnostic evaluation and management  service on this patient. I have independently seen the patient. I have independently obtained the above history from the patient/family. I have independently examined the patient with above findings. I have independently reviewed data/labs for this patient and developed the above plan of care (MDM).       Signed: Perla Lassiter MD  2/6/2023    8:06 AM Quality 226: Preventive Care And Screening: Tobacco Use: Screening And Cessation Intervention: Patient screened for tobacco use and is an ex/non-smoker Detail Level: Detailed Quality 130: Documentation Of Current Medications In The Medical Record: Current Medications Documented Quality 431: Preventive Care And Screening: Unhealthy Alcohol Use - Screening: Patient not identified as an unhealthy alcohol user when screened for unhealthy alcohol use using a systematic screening method Quality 47: Advance Care Plan: Advance Care Planning discussed and documented; advance care plan or surrogate decision maker documented in the medical record. Quality 137: Melanoma: Continuity Of Care - Recall System: Patient information entered into a recall system that includes: target date for the next exam specified AND a process to follow up with patients regarding missed or unscheduled appointments

## (undated) DEVICE — BUTTON SWITCH PENCIL BLADE ELECTRODE, HOLSTER: Brand: EDGE

## (undated) DEVICE — CLEANER,CAUTERY TIP,2X2",STERILE: Brand: MEDLINE

## (undated) DEVICE — GAUZE,SPONGE,4"X4",16PLY,STRL,LF,10/TRAY: Brand: MEDLINE

## (undated) DEVICE — Device

## (undated) DEVICE — DRAPE,LAPAROSCOPY,STERILE: Brand: MEDLINE

## (undated) DEVICE — KENDALL SCD EXPRESS SLEEVES, KNEE LENGTH, LARGE: Brand: KENDALL SCD

## (undated) DEVICE — DRAPE TWL SURG 16X26IN BLU ORB04] ALLCARE INC]

## (undated) DEVICE — CONTAINER PREFIL FRMLN 40ML --

## (undated) DEVICE — PENCIL ES L3M BTTN SWCH HOLSTER W/ BLDE ELECTRD EDGE

## (undated) DEVICE — SYR 3ML LL TIP 1/10ML GRAD --

## (undated) DEVICE — FORCEPS BX L240CM JAW DIA2.8MM L CAP W/ NDL MIC MESH TOOTH

## (undated) DEVICE — SUTURE ETHLN SZ 2-0 L18IN NONABSORBABLE BLK L26MM PS 3/8 585H

## (undated) DEVICE — SUTURE MCRYL SZ 2-0 L36IN ABSRB UD L36MM CT-1 1/2 CIR Y945H

## (undated) DEVICE — CANNULA NSL ORAL AD FOR CAPNOFLEX CO2 O2 AIRLFE

## (undated) DEVICE — CYSTO/BLADDER IRRIGATION SET, REGULATING CLAMP

## (undated) DEVICE — 2.5MM DRILL BIT/QC/GOLD/110MM

## (undated) DEVICE — REM POLYHESIVE ADULT PATIENT RETURN ELECTRODE: Brand: VALLEYLAB

## (undated) DEVICE — DRAPE SHT 3 QTR PROXIMA 53X77 --

## (undated) DEVICE — SOLUTION IRRIG 1000ML 0.9% SOD CHL USP POUR PLAS BTL

## (undated) DEVICE — SYR BULB 60ML IRRIGATION -- CONVERT TO ITEM 116413

## (undated) DEVICE — CYSTO: Brand: MEDLINE INDUSTRIES, INC.

## (undated) DEVICE — IMMOBILIZER KNEE PREMIER PRO TRI PNL 24INCH FOAM TIETEX PAT

## (undated) DEVICE — APPLICATOR MEDICATED 26 CC SOLUTION HI LT ORNG CHLORAPREP

## (undated) DEVICE — TRAY PREP DRY W/ PREM GLV 2 APPL 6 SPNG 2 UNDPD 1 OVERWRAP

## (undated) DEVICE — SYR 5ML 1/5 GRAD LL NSAF LF --

## (undated) DEVICE — GLOVE SURG SZ 65 THK91MIL LTX FREE SYN POLYISOPRENE

## (undated) DEVICE — SOLUTION IRRIG 3000ML H2O STRL BAG

## (undated) DEVICE — NEEDLE HYPO 21GA L1.5IN INTRAMUSCULAR S STL LATCH BVL UP

## (undated) DEVICE — ELECTRODE PT RET AD L9FT HI MOIST COND ADH HYDRGEL CORDED

## (undated) DEVICE — SHEET, DRAPE, SPLIT, STERILE: Brand: MEDLINE

## (undated) DEVICE — PREMIUM WET SKIN PREP TRAY: Brand: MEDLINE INDUSTRIES, INC.

## (undated) DEVICE — GUIDEWIRE UROLOGICAL STR 3 CM 0.038 INX150 CM DUAL-FLEX

## (undated) DEVICE — DRAPE,U/SHT,SPLIT,FILM,60X84,STERILE: Brand: MEDLINE

## (undated) DEVICE — KENDALL RADIOLUCENT FOAM MONITORING ELECTRODE RECTANGULAR SHAPE: Brand: KENDALL

## (undated) DEVICE — X-LARGE COTTON GLOVE: Brand: DEROYAL

## (undated) DEVICE — DISPOSABLE TOURNIQUET CUFF SINGLE BLADDER, DUAL PORT AND QUICK CONNECT CONNECTOR: Brand: COLOR CUFF

## (undated) DEVICE — SUTURE MCRYL SZ 4-0 L27IN ABSRB UD L19MM PS-2 1/2 CIR PRIM Y426H

## (undated) DEVICE — 3M™ IOBAN™ 2 ANTIMICROBIAL INCISE DRAPE 6650EZ: Brand: IOBAN™ 2

## (undated) DEVICE — BIT DRL L300MM DIA2.8MM CALIB L200MM PERC QUIK CPL

## (undated) DEVICE — CONNECTOR TBNG OD5-7MM O2 END DISP

## (undated) DEVICE — SHEET, T, LAPAROTOMY, STERILE: Brand: MEDLINE

## (undated) DEVICE — BIT DRL DIA2.8MM SHT CALIB QUIK CPL W/ STP PRO-PAK

## (undated) DEVICE — PACK SURGICAL PROCEDURE KIT CYSTOSCOPY TOTE

## (undated) DEVICE — SUTURE VCRL SZ 3-0 L18IN ABSRB UD L26MM SH 1/2 CIR J864D

## (undated) DEVICE — GAUZE,PACKING STRIP,IODOFORM,1"X5YD,STRL: Brand: CURAD

## (undated) DEVICE — DRESSING,GAUZE,XEROFORM,CURAD,5"X9",ST: Brand: CURAD

## (undated) DEVICE — SUTURE PDS II SZ 0 L18IN ABSRB VLT L36MM CT-1 1/2 CIR Z740D

## (undated) DEVICE — BIT DRL L195MM DIA3.5MM QUIK CPL FOR PELV INSTR SET PRO-PAK

## (undated) DEVICE — SCREW BNE L50MM DIA3.5MM CORT S STL ST NONCANNULATED LOK
Type: IMPLANTABLE DEVICE | Site: TIBIA | Status: NON-FUNCTIONAL
Removed: 2017-03-22

## (undated) DEVICE — GDWIRE 3CM FLX-TIP 0.038X150CM -- BX/5 SENSOR

## (undated) DEVICE — SYR 50ML SLIP TIP NSAF LF STRL --

## (undated) DEVICE — BASIC: Brand: MEDLINE INDUSTRIES, INC.

## (undated) DEVICE — NDL PRT INJ NSAF BLNT 18GX1.5 --

## (undated) DEVICE — SOLUTION IV STRL H2O 500 ML AQUALITE POUR BTL

## (undated) DEVICE — SPLINT MAT XF SPEC 5X30IN --

## (undated) DEVICE — BINDER ABD M/L H12IN FOR 46-62IN WHT 4 SLD PNL DSGN HOOP

## (undated) DEVICE — (D)PREP SKN CHLRAPRP APPL 26ML -- CONVERT TO ITEM 371833

## (undated) DEVICE — BLOCK BITE AD 60FR W/ VELC STRP ADDRESSES MOST PT AND

## (undated) DEVICE — GLOVE SURG SZ 75 CRM LTX FREE POLYISOPRENE POLYMER BEAD ANTI

## (undated) DEVICE — CATH URET 5FRX70CM W/OPN END -- BX/20

## (undated) DEVICE — IMPLANTABLE DEVICE
Type: IMPLANTABLE DEVICE | Site: TIBIA | Status: NON-FUNCTIONAL
Removed: 2017-03-22

## (undated) DEVICE — SOLUTION IV 1000ML 0.9% SOD CHL

## (undated) DEVICE — BIT DRL L200MM DIA2.8MM CALIB L100MM FOR 3.5MM VA LCP PROX

## (undated) DEVICE — AMD ANTIMICROBIAL SUPER SPONGES,MEDIUM: Brand: KERLIX

## (undated) DEVICE — GLOVE SURG SZ 65 CRM LTX FREE POLYISOPRENE POLYMER BEAD ANTI

## (undated) DEVICE — BANDAGE E W6INXL11YD CLP CLSR DBL LEN FLEX-MASTER

## (undated) DEVICE — GLOVE SURG SZ 7 L12IN FNGR THK79MIL GRN LTX FREE

## (undated) DEVICE — SINGLE-USE DIGITAL FLEXIBLE URETEROSCOPE: Brand: LITHOVUE

## (undated) DEVICE — 2000CC GUARDIAN II: Brand: GUARDIAN

## (undated) DEVICE — SHEATH URET ACC NAVIGATOR 13 AND 15FRX46CM

## (undated) DEVICE — SWAB CULTURE DOUBLE AMIES GEL N

## (undated) DEVICE — BANDAGE,GAUZE,BULKEE II,4.5"X4.1YD,STRL: Brand: MEDLINE

## (undated) DEVICE — MAJOR GENERAL: Brand: MEDLINE INDUSTRIES, INC.

## (undated) DEVICE — 3M™ TEGADERM™ TRANSPARENT FILM DRESSING FRAME STYLE, 1628, 6 IN X 8 IN (15 CM X 20 CM), 10/CT 8CT/CASE: Brand: 3M™ TEGADERM™

## (undated) DEVICE — SUTURE MCRYL SZ 0 L36IN ABSRB UD L36MM CT-1 1/2 CIR Y946H